# Patient Record
Sex: FEMALE | Race: WHITE
[De-identification: names, ages, dates, MRNs, and addresses within clinical notes are randomized per-mention and may not be internally consistent; named-entity substitution may affect disease eponyms.]

---

## 2017-01-09 ENCOUNTER — HOSPITAL ENCOUNTER (OUTPATIENT)
Dept: HOSPITAL 83 - LAB | Age: 82
Discharge: HOME | End: 2017-01-09
Payer: COMMERCIAL

## 2017-01-09 DIAGNOSIS — I49.3: Primary | ICD-10-CM

## 2017-01-09 DIAGNOSIS — R07.89: ICD-10-CM

## 2017-01-09 DIAGNOSIS — Z79.899: ICD-10-CM

## 2017-01-09 LAB
BASOPHILS # BLD AUTO: 0 10*3/UL (ref 0–0.1)
BASOPHILS NFR BLD AUTO: 0.4 % (ref 0–1)
BUN SERPL-MCNC: 28 MG/DL (ref 7–24)
CHLORIDE SERPL-SCNC: 102 MMOL/L (ref 98–107)
CO2 SERPL-SCNC: 32 MMOL/L (ref 21–32)
EOSINOPHIL # BLD AUTO: 0.2 10*3/UL (ref 0–0.4)
EOSINOPHIL # BLD AUTO: 2.1 % (ref 1–4)
ERYTHROCYTE [DISTWIDTH] IN BLOOD BY AUTOMATED COUNT: 14.4 % (ref 0–14.5)
GLUCOSE SERPL-MCNC: 112 MG/DL (ref 65–99)
HCT VFR BLD AUTO: 40 % (ref 37–47)
HGB BLD-MCNC: 13.5 G/DL (ref 12–16)
IG #: 0 10*3/UL (ref 0–0.1)
LYMPHOCYTES # BLD AUTO: 2.5 10*3/UL (ref 1.3–4.4)
LYMPHOCYTES NFR BLD AUTO: 25.3 % (ref 27–41)
MCH RBC QN AUTO: 29.9 PG (ref 27–31)
MCHC RBC AUTO-ENTMCNC: 33.8 G/DL (ref 33–37)
MCV RBC AUTO: 88.5 FL (ref 81–99)
MONOCYTES # BLD AUTO: 0.8 10*3/UL (ref 0.1–1)
MONOCYTES NFR BLD MANUAL: 8.4 % (ref 3–9)
NEUT #: 6.3 10*3/UL (ref 2.3–7.9)
NEUT %: 63.5 % (ref 47–73)
NRBC BLD QL AUTO: 0 10*3/UL (ref 0–0)
PLATELET # BLD AUTO: 246 10*3/UL (ref 130–400)
PMV BLD AUTO: 9.6 FL (ref 9.6–12.3)
POTASSIUM SERPL-SCNC: 3.2 MMOL/L (ref 3.5–5.1)
RBC # BLD AUTO: 4.52 10*6/UL (ref 4.1–5.1)
SODIUM SERPL-SCNC: 145 MMOL/L (ref 136–145)
TSH SERPL DL<=0.005 MIU/L-ACNC: 4.16 UIU/ML (ref 0.36–4.75)
WBC NRBC COR # BLD AUTO: 10 10*3/UL (ref 4.8–10.8)

## 2017-06-23 ENCOUNTER — HOSPITAL ENCOUNTER (OUTPATIENT)
Dept: HOSPITAL 83 - RAD | Age: 82
Discharge: HOME | End: 2017-06-23
Attending: FAMILY MEDICINE
Payer: COMMERCIAL

## 2017-06-23 DIAGNOSIS — J98.4: Primary | ICD-10-CM

## 2017-06-23 DIAGNOSIS — I10: ICD-10-CM

## 2017-07-18 PROBLEM — I05.0 RHEUMATIC MITRAL STENOSIS: Status: ACTIVE | Noted: 2017-07-18

## 2017-08-11 ENCOUNTER — HOSPITAL ENCOUNTER (EMERGENCY)
Dept: HOSPITAL 83 - ED | Age: 82
Discharge: HOME | End: 2017-08-11
Payer: COMMERCIAL

## 2017-08-11 VITALS — BODY MASS INDEX: 25.55 KG/M2 | WEIGHT: 159 LBS | HEIGHT: 65.98 IN

## 2017-08-11 DIAGNOSIS — Z79.899: ICD-10-CM

## 2017-08-11 DIAGNOSIS — Z88.2: ICD-10-CM

## 2017-08-11 DIAGNOSIS — I50.9: ICD-10-CM

## 2017-08-11 DIAGNOSIS — I11.0: ICD-10-CM

## 2017-08-11 DIAGNOSIS — F41.9: Primary | ICD-10-CM

## 2017-08-11 DIAGNOSIS — Z88.1: ICD-10-CM

## 2017-08-11 LAB
ALBUMIN SERPL-MCNC: 3.3 GM/DL (ref 3.1–4.5)
ALP SERPL-CCNC: 96 U/L (ref 45–117)
ALT SERPL W P-5'-P-CCNC: 18 U/L (ref 12–78)
AST SERPL-CCNC: 18 IU/L (ref 3–35)
BASOPHILS # BLD AUTO: 0.1 10*3/UL (ref 0–0.1)
BASOPHILS NFR BLD AUTO: 0.4 % (ref 0–1)
BUN SERPL-MCNC: 37 MG/DL (ref 7–24)
CHLORIDE SERPL-SCNC: 102 MMOL/L (ref 98–107)
CO2 SERPL-SCNC: 27 MMOL/L (ref 21–32)
EOSINOPHIL # BLD AUTO: 0.3 10*3/UL (ref 0–0.4)
EOSINOPHIL # BLD AUTO: 2.2 % (ref 1–4)
ERYTHROCYTE [DISTWIDTH] IN BLOOD BY AUTOMATED COUNT: 14.6 % (ref 0–14.5)
GLUCOSE SERPL-MCNC: 189 MG/DL (ref 65–99)
HCT VFR BLD AUTO: 37.4 % (ref 37–47)
HGB BLD-MCNC: 12.7 G/DL (ref 12–16)
IG #: 0.1 10*3/UL (ref 0–0.1)
INR BLD: 0.9 (ref 2–3.5)
LYMPHOCYTES # BLD AUTO: 2.6 10*3/UL (ref 1.3–4.4)
LYMPHOCYTES NFR BLD AUTO: 21 % (ref 27–41)
MAGNESIUM SERPL-MCNC: 1.6 MG/DL (ref 1.5–2.1)
MCH RBC QN AUTO: 29.7 PG (ref 27–31)
MCHC RBC AUTO-ENTMCNC: 34 G/DL (ref 33–37)
MCV RBC AUTO: 87.4 FL (ref 81–99)
MONOCYTES # BLD AUTO: 0.9 10*3/UL (ref 0.1–1)
MONOCYTES NFR BLD MANUAL: 7.5 % (ref 3–9)
NEUT #: 8.4 10*3/UL (ref 2.3–7.9)
NEUT %: 68.4 % (ref 47–73)
NRBC BLD QL AUTO: 0 10*3/UL (ref 0–0)
PLATELET # BLD AUTO: 230 10*3/UL (ref 130–400)
PMV BLD AUTO: 9.9 FL (ref 9.6–12.3)
POTASSIUM SERPL-SCNC: 3.2 MMOL/L (ref 3.5–5.1)
PROT SERPL-MCNC: 7.1 GM/DL (ref 6.4–8.2)
PROTHROMBIN TIME: 10 SECONDS (ref 9–12.4)
RBC # BLD AUTO: 4.28 10*6/UL (ref 4.1–5.1)
SODIUM SERPL-SCNC: 139 MMOL/L (ref 136–145)
TROPONIN I SERPL-MCNC: < 0.015 NG/ML (ref ?–0.04)
WBC NRBC COR # BLD AUTO: 12.2 10*3/UL (ref 4.8–10.8)

## 2017-10-30 ENCOUNTER — HOSPITAL ENCOUNTER (OUTPATIENT)
Dept: HOSPITAL 83 - LAB | Age: 82
Discharge: HOME | End: 2017-10-30
Attending: FAMILY MEDICINE
Payer: COMMERCIAL

## 2017-10-30 DIAGNOSIS — F41.1: ICD-10-CM

## 2017-10-30 DIAGNOSIS — E78.00: ICD-10-CM

## 2017-10-30 DIAGNOSIS — I10: Primary | ICD-10-CM

## 2017-10-30 DIAGNOSIS — E74.00: ICD-10-CM

## 2017-10-30 DIAGNOSIS — E55.9: ICD-10-CM

## 2017-10-30 LAB
ALBUMIN SERPL-MCNC: 3.7 GM/DL (ref 3.1–4.5)
ALP SERPL-CCNC: 72 U/L (ref 45–117)
ALT SERPL W P-5'-P-CCNC: 19 U/L (ref 12–78)
AST SERPL-CCNC: 14 IU/L (ref 3–35)
BUN SERPL-MCNC: 25 MG/DL (ref 7–24)
CHLORIDE SERPL-SCNC: 102 MMOL/L (ref 98–107)
CHOLEST SERPL-MCNC: 209 MG/DL (ref ?–200)
CREAT SERPL-MCNC: 1.36 MG/DL (ref 0.55–1.02)
ERYTHROCYTE [DISTWIDTH] IN BLOOD BY AUTOMATED COUNT: 14.7 % (ref 0–14.5)
HCT VFR BLD AUTO: 41.8 % (ref 37–47)
HDLC SERPL-MCNC: 48 MG/DL (ref 40–60)
HGB BLD-MCNC: 14.1 G/DL (ref 12–16)
LDLC SERPL DIRECT ASSAY-MCNC: 120 MG/DL (ref 9–159)
MCH RBC QN AUTO: 30.3 PG (ref 27–31)
MCHC RBC AUTO-ENTMCNC: 33.7 G/DL (ref 33–37)
MCV RBC AUTO: 89.7 FL (ref 81–99)
NRBC BLD QL AUTO: 0 10*3/UL (ref 0–0)
PLATELET # BLD AUTO: 277 10*3/UL (ref 130–400)
PMV BLD AUTO: 9.7 FL (ref 9.6–12.3)
POTASSIUM SERPL-SCNC: 3.7 MMOL/L (ref 3.5–5.1)
PROT SERPL-MCNC: 7.6 GM/DL (ref 6.4–8.2)
RBC # BLD AUTO: 4.66 10*6/UL (ref 4.1–5.1)
SODIUM SERPL-SCNC: 139 MMOL/L (ref 136–145)
TRIGL SERPL-MCNC: 204 MG/DL (ref ?–150)
VLDLC SERPL CALC-MCNC: 41 MG/DL (ref 6–40)
WBC NRBC COR # BLD AUTO: 10.5 10*3/UL (ref 4.8–10.8)

## 2017-11-08 ENCOUNTER — HOSPITAL ENCOUNTER (OUTPATIENT)
Dept: HOSPITAL 83 - US | Age: 82
Discharge: HOME | End: 2017-11-08
Attending: FAMILY MEDICINE
Payer: COMMERCIAL

## 2017-11-08 DIAGNOSIS — E04.2: ICD-10-CM

## 2017-11-08 DIAGNOSIS — R42: ICD-10-CM

## 2017-11-08 DIAGNOSIS — I65.23: Primary | ICD-10-CM

## 2018-01-29 ENCOUNTER — HOSPITAL ENCOUNTER (OUTPATIENT)
Dept: HOSPITAL 83 - LAB | Age: 83
Discharge: HOME | End: 2018-01-29
Attending: FAMILY MEDICINE
Payer: COMMERCIAL

## 2018-01-29 DIAGNOSIS — R53.83: ICD-10-CM

## 2018-01-29 DIAGNOSIS — E04.1: Primary | ICD-10-CM

## 2018-01-29 LAB
T4 FREE SERPL-MCNC: 1.07 NG/DL (ref 0.76–1.46)
TSH SERPL DL<=0.005 MIU/L-ACNC: 3.05 UIU/ML (ref 0.36–4.75)

## 2018-01-31 ENCOUNTER — HOSPITAL ENCOUNTER (OUTPATIENT)
Dept: HOSPITAL 83 - US | Age: 83
Discharge: HOME | End: 2018-01-31
Attending: FAMILY MEDICINE
Payer: COMMERCIAL

## 2018-01-31 DIAGNOSIS — E04.1: Primary | ICD-10-CM

## 2018-04-19 ENCOUNTER — HOSPITAL ENCOUNTER (OUTPATIENT)
Dept: HOSPITAL 83 - US | Age: 83
Discharge: HOME | End: 2018-04-19
Attending: FAMILY MEDICINE
Payer: COMMERCIAL

## 2018-04-19 DIAGNOSIS — Z90.710: ICD-10-CM

## 2018-04-19 DIAGNOSIS — R10.819: Primary | ICD-10-CM

## 2018-04-19 DIAGNOSIS — Z90.722: ICD-10-CM

## 2018-07-31 ENCOUNTER — OFFICE VISIT (OUTPATIENT)
Dept: CARDIOLOGY CLINIC | Age: 83
End: 2018-07-31
Payer: COMMERCIAL

## 2018-07-31 VITALS
DIASTOLIC BLOOD PRESSURE: 70 MMHG | SYSTOLIC BLOOD PRESSURE: 132 MMHG | RESPIRATION RATE: 16 BRPM | WEIGHT: 155 LBS | BODY MASS INDEX: 24.91 KG/M2 | HEART RATE: 89 BPM | HEIGHT: 66 IN

## 2018-07-31 DIAGNOSIS — I49.3 PVC'S (PREMATURE VENTRICULAR CONTRACTIONS): Primary | ICD-10-CM

## 2018-07-31 DIAGNOSIS — R07.89 ATYPICAL CHEST PAIN: ICD-10-CM

## 2018-07-31 DIAGNOSIS — I34.2 NON-RHEUMATIC MITRAL VALVE STENOSIS: ICD-10-CM

## 2018-07-31 PROCEDURE — 99213 OFFICE O/P EST LOW 20 MIN: CPT | Performed by: INTERNAL MEDICINE

## 2018-07-31 PROCEDURE — 93000 ELECTROCARDIOGRAM COMPLETE: CPT | Performed by: INTERNAL MEDICINE

## 2018-07-31 RX ORDER — FLUTICASONE PROPIONATE 50 MCG
SPRAY, SUSPENSION (ML) NASAL
Refills: 1 | COMMUNITY
Start: 2018-07-26 | End: 2019-05-03

## 2018-07-31 RX ORDER — SERTRALINE HYDROCHLORIDE 25 MG/1
TABLET, FILM COATED ORAL
Refills: 3 | COMMUNITY
Start: 2018-05-01 | End: 2019-05-03

## 2018-07-31 NOTE — PROGRESS NOTES
chills  Respiratory: feels congested and states that she has crackles in her chest when laying down at night  Cardiovascular: negative for chest pain, positive for occasional dyspnea with exertion and occasional ankle edema  Gastrointestinal: negative for abdominal pain, diarrhea, nausea and vomiting  Genitourinary: negative for dysuria and hematuria  Derm: negative for rash and skin lesion(s)  Neurological: negative for seizures and tremors  Endocrine: negative for diabetic symptoms including polydipsia and polyuria  Musculoskeletal: negative for CTD  Psychiatric: negative for anxiety and major depression.      The remainder of the review of systems is negative except as noted above. On exam today, she is a well-nourished white female who is awake, alert and oriented. She does look younger than her stated age. Her pulse is 89 and regular. Her blood pressure is 122/70. She weighs 155 pounds (which is 4 pounds less than a year ago). BMI is 25.0. HEENT: Normocephalic and atraumatic. EOMI. Sclerae are clear. Pupils are equal, round and react to light. The oral mucosa is moist.  Tongue is midline. Her neck is supple. She has no jugular distention or hepatojugular reflux. Her carotids are full without bruits. She has no neck or supraclavicular masses. No thyromegaly. Respirations are unlabored. Her chest is clear to auscultation and percussion. She has no presacral edema and no chest wall tenderness. Her heart has a regular rhythm. She has a grade 2/6 holosystolic murmur at the apex. There are no diastolic murmurs. The PMI is not displaced. She has no precordial heave, lift or thrill. Her abdomen is soft and normally active without masses, organomegaly or bruits. Her extremities show no edema. Peripheral pulses are easily palpated bilaterally. She has no obvious skin rashes. I personally reviewed her electrocardiogram.  Once again showed sinus rhythm. I saw no PVCs today.   She had a LAFB and poor precordial R wave progression but these changes were similar to those seen 07/18/2017. She continues to do well and, therefore, I am making no changes today. I thought to possibly repeat her echocardiogram but since she is functionally stable it would not change my management and, therefore, I did not order the echo at this time. She will continue:  sertraline (ZOLOFT) 25 MG tablet TAKE ONE TABLET BY MOUTH ONCE A DAY   fluticasone (FLONASE) 50 MCG/ACT nasal spray INSTILL 2 SPRAYS IN EACH NOSTRIL EVERY DAY   valsartan (DIOVAN) 320 MG tablet TAKE ONE TABLET BY MOUTH DAILY   potassium chloride (KLOR-CON M) 20 MEQ extended release tablet TAKE ONE TABLET BY MOUTH EVERY DAY   hydrochlorothiazide (HYDRODIURIL) 25 MG tablet Take 1 tablet by mouth daily   metoprolol succinate ER (TOPROL-XL) 50 MG XL tablet Take 1 tablet by mouth 2 times daily   fluocinonide (LIDEX) 0.05 % cream Rub into skin 3 times per day. CPAP Machine MISC by Does not apply route nightly. vitamin D3 (CHOLECALCIFEROL) 1000 UNITS TABS tablet Take 1 tablet by mouth daily. Ferrous Sulfate (IRON) 325 (65 FE) MG TABS Take  by mouth daily. As long as she is doing well, we will continue to see her yearly. I thank you, Dr. Israel Reardon, for asking our advice regarding her care.         DAB/aed  [id number]

## 2018-10-17 ENCOUNTER — HOSPITAL ENCOUNTER (OUTPATIENT)
Dept: HOSPITAL 83 - RAD | Age: 83
Discharge: HOME | End: 2018-10-17
Attending: FAMILY MEDICINE
Payer: COMMERCIAL

## 2018-10-17 DIAGNOSIS — R05: ICD-10-CM

## 2018-10-17 DIAGNOSIS — I11.0: ICD-10-CM

## 2018-10-17 DIAGNOSIS — I50.9: ICD-10-CM

## 2018-10-17 DIAGNOSIS — R06.02: Primary | ICD-10-CM

## 2019-03-25 LAB
ALBUMIN SERPL-MCNC: 3.1 GM/DL (ref 3.1–4.5)
ALP SERPL-CCNC: 73 U/L (ref 45–117)
ALT SERPL W P-5'-P-CCNC: 18 U/L (ref 12–78)
APTT PPP: 24.1 SECONDS (ref 20.8–31.5)
AST SERPL-CCNC: 14 IU/L (ref 3–35)
BASOPHILS # BLD AUTO: 0 10*3/UL (ref 0–0.1)
BASOPHILS NFR BLD AUTO: 0.3 % (ref 0–1)
BUN SERPL-MCNC: 29 MG/DL (ref 7–24)
CHLORIDE SERPL-SCNC: 101 MMOL/L (ref 98–107)
CREAT SERPL-MCNC: 1.23 MG/DL (ref 0.55–1.02)
EOSINOPHIL # BLD AUTO: 0.2 10*3/UL (ref 0–0.4)
EOSINOPHIL # BLD AUTO: 1.8 % (ref 1–4)
ERYTHROCYTE [DISTWIDTH] IN BLOOD BY AUTOMATED COUNT: 14.9 % (ref 0–14.5)
HCT VFR BLD AUTO: 37.3 % (ref 37–47)
HGB BLD-MCNC: 12.5 G/DL (ref 12–16)
INR BLD: 1 (ref 2–3.5)
LYMPHOCYTES # BLD AUTO: 2.5 10*3/UL (ref 1.3–4.4)
LYMPHOCYTES NFR BLD AUTO: 21.6 % (ref 27–41)
MCH RBC QN AUTO: 29.6 PG (ref 27–31)
MCHC RBC AUTO-ENTMCNC: 33.5 G/DL (ref 33–37)
MCV RBC AUTO: 88.2 FL (ref 81–99)
MONOCYTES # BLD AUTO: 1 10*3/UL (ref 0.1–1)
MONOCYTES NFR BLD MANUAL: 8.4 % (ref 3–9)
NEUT #: 7.8 10*3/UL (ref 2.3–7.9)
NEUT %: 67.6 % (ref 47–73)
NRBC BLD QL AUTO: 0 % (ref 0–0)
PLATELET # BLD AUTO: 272 10*3/UL (ref 130–400)
PMV BLD AUTO: 9.8 FL (ref 9.6–12.3)
POTASSIUM SERPL-SCNC: 3.1 MMOL/L (ref 3.5–5.1)
PROT SERPL-MCNC: 6.8 GM/DL (ref 6.4–8.2)
RBC # BLD AUTO: 4.23 10*6/UL (ref 4.1–5.1)
SODIUM SERPL-SCNC: 139 MMOL/L (ref 136–145)
WBC NRBC COR # BLD AUTO: 11.6 10*3/UL (ref 4.8–10.8)

## 2019-03-27 ENCOUNTER — HOSPITAL ENCOUNTER (INPATIENT)
Dept: HOSPITAL 83 - SDC | Age: 84
LOS: 2 days | Discharge: OTHER INSTITUTION | DRG: 503 | End: 2019-03-29
Attending: INTERNAL MEDICINE | Admitting: INTERNAL MEDICINE
Payer: MEDICARE

## 2019-03-27 VITALS — DIASTOLIC BLOOD PRESSURE: 60 MMHG

## 2019-03-27 VITALS — WEIGHT: 166.31 LBS | HEIGHT: 65.98 IN | BODY MASS INDEX: 26.73 KG/M2

## 2019-03-27 VITALS — SYSTOLIC BLOOD PRESSURE: 132 MMHG | DIASTOLIC BLOOD PRESSURE: 60 MMHG

## 2019-03-27 VITALS — DIASTOLIC BLOOD PRESSURE: 49 MMHG | SYSTOLIC BLOOD PRESSURE: 121 MMHG

## 2019-03-27 VITALS — DIASTOLIC BLOOD PRESSURE: 61 MMHG

## 2019-03-27 VITALS — DIASTOLIC BLOOD PRESSURE: 53 MMHG

## 2019-03-27 VITALS — DIASTOLIC BLOOD PRESSURE: 55 MMHG

## 2019-03-27 VITALS — DIASTOLIC BLOOD PRESSURE: 59 MMHG | SYSTOLIC BLOOD PRESSURE: 128 MMHG

## 2019-03-27 VITALS — DIASTOLIC BLOOD PRESSURE: 66 MMHG

## 2019-03-27 DIAGNOSIS — Z88.9: ICD-10-CM

## 2019-03-27 DIAGNOSIS — Z83.3: ICD-10-CM

## 2019-03-27 DIAGNOSIS — F41.9: ICD-10-CM

## 2019-03-27 DIAGNOSIS — E78.5: ICD-10-CM

## 2019-03-27 DIAGNOSIS — E55.9: ICD-10-CM

## 2019-03-27 DIAGNOSIS — I10: ICD-10-CM

## 2019-03-27 DIAGNOSIS — N39.0: ICD-10-CM

## 2019-03-27 DIAGNOSIS — S92.331A: Primary | ICD-10-CM
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
## 2019-03-27 DIAGNOSIS — Z79.899: ICD-10-CM

## 2019-03-27 DIAGNOSIS — F32.9: ICD-10-CM
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
## 2019-03-27 DIAGNOSIS — Z90.710: ICD-10-CM

## 2019-03-27 DIAGNOSIS — Z88.2: ICD-10-CM

## 2019-03-27 DIAGNOSIS — Z82.5: ICD-10-CM

## 2019-03-27 DIAGNOSIS — N17.0: ICD-10-CM

## 2019-03-27 DIAGNOSIS — S92.301D: ICD-10-CM

## 2019-03-27 DIAGNOSIS — R26.2: ICD-10-CM

## 2019-03-27 DIAGNOSIS — E43: ICD-10-CM

## 2019-03-27 DIAGNOSIS — R73.9: ICD-10-CM
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
## 2019-03-27 DIAGNOSIS — E87.6: ICD-10-CM

## 2019-03-27 PROCEDURE — 0SSK04Z REPOSITION RIGHT TARSOMETATARSAL JOINT WITH INTERNAL FIXATION DEVICE, OPEN APPROACH: ICD-10-PCS | Performed by: PODIATRIST

## 2019-03-27 PROCEDURE — 0QSN04Z REPOSITION RIGHT METATARSAL WITH INTERNAL FIXATION DEVICE, OPEN APPROACH: ICD-10-PCS | Performed by: PODIATRIST

## 2019-03-28 VITALS — DIASTOLIC BLOOD PRESSURE: 51 MMHG

## 2019-03-28 VITALS — DIASTOLIC BLOOD PRESSURE: 49 MMHG

## 2019-03-28 LAB
25(OH)D3 SERPL-MCNC: 49 NG/ML (ref 30–100)
ALBUMIN SERPL-MCNC: 2.6 GM/DL (ref 3.1–4.5)
ALP SERPL-CCNC: 65 U/L (ref 45–117)
ALT SERPL W P-5'-P-CCNC: 12 U/L (ref 12–78)
AST SERPL-CCNC: 12 IU/L (ref 3–35)
BASOPHILS # BLD AUTO: 0 10*3/UL (ref 0–0.1)
BASOPHILS NFR BLD AUTO: 0.4 % (ref 0–1)
BUN SERPL-MCNC: 22 MG/DL (ref 7–24)
CHLORIDE SERPL-SCNC: 101 MMOL/L (ref 98–107)
CHOLEST SERPL-MCNC: 157 MG/DL (ref ?–200)
CREAT SERPL-MCNC: 0.94 MG/DL (ref 0.55–1.02)
EOSINOPHIL # BLD AUTO: 0.2 10*3/UL (ref 0–0.4)
EOSINOPHIL # BLD AUTO: 2.2 % (ref 1–4)
ERYTHROCYTE [DISTWIDTH] IN BLOOD BY AUTOMATED COUNT: 14.8 % (ref 0–14.5)
HCT VFR BLD AUTO: 33.1 % (ref 37–47)
HDLC SERPL-MCNC: 38 MG/DL (ref 40–60)
HGB BLD-MCNC: 11.1 G/DL (ref 12–16)
LDLC SERPL DIRECT ASSAY-MCNC: 89 MG/DL (ref 9–159)
LYMPHOCYTES # BLD AUTO: 1.6 10*3/UL (ref 1.3–4.4)
LYMPHOCYTES NFR BLD AUTO: 14.9 % (ref 27–41)
MCH RBC QN AUTO: 29.9 PG (ref 27–31)
MCHC RBC AUTO-ENTMCNC: 33.5 G/DL (ref 33–37)
MCV RBC AUTO: 89.2 FL (ref 81–99)
MONOCYTES # BLD AUTO: 1 10*3/UL (ref 0.1–1)
MONOCYTES NFR BLD MANUAL: 9.3 % (ref 3–9)
NEUT #: 7.8 10*3/UL (ref 2.3–7.9)
NEUT %: 72.7 % (ref 47–73)
NRBC BLD QL AUTO: 0 10*3/UL (ref 0–0)
PHOSPHATE SERPL-MCNC: 2.3 MG/DL (ref 2.5–4.9)
PLATELET # BLD AUTO: 232 10*3/UL (ref 130–400)
PMV BLD AUTO: 9.6 FL (ref 9.6–12.3)
POTASSIUM SERPL-SCNC: 3.4 MMOL/L (ref 3.5–5.1)
PROT SERPL-MCNC: 6 GM/DL (ref 6.4–8.2)
RBC # BLD AUTO: 3.71 10*6/UL (ref 4.1–5.1)
SODIUM SERPL-SCNC: 137 MMOL/L (ref 136–145)
TRIGL SERPL-MCNC: 151 MG/DL (ref ?–150)
TSH SERPL DL<=0.005 MIU/L-ACNC: 1.33 UIU/ML (ref 0.36–4.75)
VITAMIN B12: 529 PG/ML (ref 247–911)
VLDLC SERPL CALC-MCNC: 30 MG/DL (ref 6–40)
WBC NRBC COR # BLD AUTO: 10.7 10*3/UL (ref 4.8–10.8)

## 2019-03-29 VITALS — DIASTOLIC BLOOD PRESSURE: 59 MMHG

## 2019-03-29 VITALS — DIASTOLIC BLOOD PRESSURE: 60 MMHG

## 2019-04-17 ENCOUNTER — HOSPITAL ENCOUNTER (OUTPATIENT)
Dept: HOSPITAL 83 - RAD | Age: 84
Discharge: HOME | End: 2019-04-17
Attending: FAMILY MEDICINE
Payer: MEDICARE

## 2019-04-17 DIAGNOSIS — M25.512: Primary | ICD-10-CM

## 2019-05-03 ENCOUNTER — APPOINTMENT (OUTPATIENT)
Dept: GENERAL RADIOLOGY | Age: 84
DRG: 690 | End: 2019-05-03
Payer: MEDICARE

## 2019-05-03 ENCOUNTER — APPOINTMENT (OUTPATIENT)
Dept: CT IMAGING | Age: 84
DRG: 690 | End: 2019-05-03
Payer: MEDICARE

## 2019-05-03 ENCOUNTER — HOSPITAL ENCOUNTER (INPATIENT)
Age: 84
LOS: 2 days | Discharge: HOME | DRG: 690 | End: 2019-05-05
Attending: EMERGENCY MEDICINE | Admitting: HOSPITALIST
Payer: MEDICARE

## 2019-05-03 DIAGNOSIS — N28.1 RENAL CYST: ICD-10-CM

## 2019-05-03 DIAGNOSIS — R42 DIZZINESS: ICD-10-CM

## 2019-05-03 DIAGNOSIS — N30.00 ACUTE CYSTITIS WITHOUT HEMATURIA: Primary | ICD-10-CM

## 2019-05-03 DIAGNOSIS — R07.9 CHEST PAIN, UNSPECIFIED TYPE: ICD-10-CM

## 2019-05-03 PROBLEM — N30.01 ACUTE CYSTITIS WITH HEMATURIA: Status: ACTIVE | Noted: 2019-05-03

## 2019-05-03 PROBLEM — N18.30 CKD (CHRONIC KIDNEY DISEASE) STAGE 3, GFR 30-59 ML/MIN (HCC): Chronic | Status: ACTIVE | Noted: 2019-05-03

## 2019-05-03 LAB
ALBUMIN SERPL-MCNC: 4 G/DL (ref 3.5–5.2)
ALP BLD-CCNC: 77 U/L (ref 35–104)
ALT SERPL-CCNC: 12 U/L (ref 0–32)
ANION GAP SERPL CALCULATED.3IONS-SCNC: 11 MMOL/L (ref 7–16)
AST SERPL-CCNC: 11 U/L (ref 0–31)
BACTERIA: ABNORMAL /HPF
BASOPHILS ABSOLUTE: 0.04 E9/L (ref 0–0.2)
BASOPHILS RELATIVE PERCENT: 0.4 % (ref 0–2)
BILIRUB SERPL-MCNC: 0.2 MG/DL (ref 0–1.2)
BILIRUBIN URINE: NEGATIVE
BLOOD, URINE: NEGATIVE
BUN BLDV-MCNC: 30 MG/DL (ref 8–23)
CALCIUM SERPL-MCNC: 10.2 MG/DL (ref 8.6–10.2)
CHLORIDE BLD-SCNC: 99 MMOL/L (ref 98–107)
CLARITY: CLEAR
CO2: 29 MMOL/L (ref 22–29)
COLOR: YELLOW
CREAT SERPL-MCNC: 1.2 MG/DL (ref 0.5–1)
EKG ATRIAL RATE: 77 BPM
EKG P AXIS: 3 DEGREES
EKG P-R INTERVAL: 146 MS
EKG Q-T INTERVAL: 362 MS
EKG QRS DURATION: 88 MS
EKG QTC CALCULATION (BAZETT): 409 MS
EKG R AXIS: -42 DEGREES
EKG T AXIS: 19 DEGREES
EKG VENTRICULAR RATE: 77 BPM
EOSINOPHILS ABSOLUTE: 0.24 E9/L (ref 0.05–0.5)
EOSINOPHILS RELATIVE PERCENT: 2.2 % (ref 0–6)
EPITHELIAL CELLS, UA: ABNORMAL /HPF
GFR AFRICAN AMERICAN: 52
GFR NON-AFRICAN AMERICAN: 43 ML/MIN/1.73
GLUCOSE BLD-MCNC: 146 MG/DL (ref 74–99)
GLUCOSE URINE: 100 MG/DL
HCT VFR BLD CALC: 40.1 % (ref 34–48)
HEMOGLOBIN: 13.3 G/DL (ref 11.5–15.5)
IMMATURE GRANULOCYTES #: 0.04 E9/L
IMMATURE GRANULOCYTES %: 0.4 % (ref 0–5)
KETONES, URINE: ABNORMAL MG/DL
LACTIC ACID: 2.1 MMOL/L (ref 0.5–2.2)
LEUKOCYTE ESTERASE, URINE: ABNORMAL
LIPASE: 43 U/L (ref 13–60)
LYMPHOCYTES ABSOLUTE: 1.97 E9/L (ref 1.5–4)
LYMPHOCYTES RELATIVE PERCENT: 18.3 % (ref 20–42)
MCH RBC QN AUTO: 29.3 PG (ref 26–35)
MCHC RBC AUTO-ENTMCNC: 33.2 % (ref 32–34.5)
MCV RBC AUTO: 88.3 FL (ref 80–99.9)
MONOCYTES ABSOLUTE: 0.9 E9/L (ref 0.1–0.95)
MONOCYTES RELATIVE PERCENT: 8.4 % (ref 2–12)
NEUTROPHILS ABSOLUTE: 7.57 E9/L (ref 1.8–7.3)
NEUTROPHILS RELATIVE PERCENT: 70.3 % (ref 43–80)
NITRITE, URINE: NEGATIVE
PDW BLD-RTO: 14.6 FL (ref 11.5–15)
PH UA: 5 (ref 5–9)
PLATELET # BLD: 262 E9/L (ref 130–450)
PMV BLD AUTO: 9.4 FL (ref 7–12)
POTASSIUM REFLEX MAGNESIUM: 3.7 MMOL/L (ref 3.5–5)
PROTEIN UA: NEGATIVE MG/DL
RBC # BLD: 4.54 E12/L (ref 3.5–5.5)
RBC UA: ABNORMAL /HPF (ref 0–2)
SODIUM BLD-SCNC: 139 MMOL/L (ref 132–146)
SPECIFIC GRAVITY UA: 1.02 (ref 1–1.03)
TOTAL PROTEIN: 6.9 G/DL (ref 6.4–8.3)
TROPONIN: <0.01 NG/ML (ref 0–0.03)
TROPONIN: <0.01 NG/ML (ref 0–0.03)
UROBILINOGEN, URINE: 0.2 E.U./DL
WBC # BLD: 10.8 E9/L (ref 4.5–11.5)
WBC UA: ABNORMAL /HPF (ref 0–5)

## 2019-05-03 PROCEDURE — 6370000000 HC RX 637 (ALT 250 FOR IP): Performed by: STUDENT IN AN ORGANIZED HEALTH CARE EDUCATION/TRAINING PROGRAM

## 2019-05-03 PROCEDURE — 93005 ELECTROCARDIOGRAM TRACING: CPT | Performed by: STUDENT IN AN ORGANIZED HEALTH CARE EDUCATION/TRAINING PROGRAM

## 2019-05-03 PROCEDURE — 6360000002 HC RX W HCPCS: Performed by: STUDENT IN AN ORGANIZED HEALTH CARE EDUCATION/TRAINING PROGRAM

## 2019-05-03 PROCEDURE — 71275 CT ANGIOGRAPHY CHEST: CPT

## 2019-05-03 PROCEDURE — 83605 ASSAY OF LACTIC ACID: CPT

## 2019-05-03 PROCEDURE — 99223 1ST HOSP IP/OBS HIGH 75: CPT | Performed by: HOSPITALIST

## 2019-05-03 PROCEDURE — 71045 X-RAY EXAM CHEST 1 VIEW: CPT

## 2019-05-03 PROCEDURE — 81001 URINALYSIS AUTO W/SCOPE: CPT

## 2019-05-03 PROCEDURE — 2580000003 HC RX 258: Performed by: STUDENT IN AN ORGANIZED HEALTH CARE EDUCATION/TRAINING PROGRAM

## 2019-05-03 PROCEDURE — 93010 ELECTROCARDIOGRAM REPORT: CPT | Performed by: INTERNAL MEDICINE

## 2019-05-03 PROCEDURE — 85025 COMPLETE CBC W/AUTO DIFF WBC: CPT

## 2019-05-03 PROCEDURE — 83690 ASSAY OF LIPASE: CPT

## 2019-05-03 PROCEDURE — 96366 THER/PROPH/DIAG IV INF ADDON: CPT

## 2019-05-03 PROCEDURE — 80053 COMPREHEN METABOLIC PANEL: CPT

## 2019-05-03 PROCEDURE — G0378 HOSPITAL OBSERVATION PER HR: HCPCS

## 2019-05-03 PROCEDURE — 99285 EMERGENCY DEPT VISIT HI MDM: CPT

## 2019-05-03 PROCEDURE — 96365 THER/PROPH/DIAG IV INF INIT: CPT

## 2019-05-03 PROCEDURE — 6360000004 HC RX CONTRAST MEDICATION: Performed by: RADIOLOGY

## 2019-05-03 PROCEDURE — 2060000000 HC ICU INTERMEDIATE R&B

## 2019-05-03 PROCEDURE — 87088 URINE BACTERIA CULTURE: CPT

## 2019-05-03 PROCEDURE — 84484 ASSAY OF TROPONIN QUANT: CPT

## 2019-05-03 RX ORDER — NITROGLYCERIN 0.4 MG/1
0.4 TABLET SUBLINGUAL ONCE
Status: COMPLETED | OUTPATIENT
Start: 2019-05-03 | End: 2019-05-03

## 2019-05-03 RX ORDER — ASPIRIN 81 MG/1
324 TABLET, CHEWABLE ORAL ONCE
Status: COMPLETED | OUTPATIENT
Start: 2019-05-03 | End: 2019-05-03

## 2019-05-03 RX ORDER — NITROGLYCERIN 0.4 MG/1
0.4 TABLET SUBLINGUAL EVERY 5 MIN PRN
Status: DISCONTINUED | OUTPATIENT
Start: 2019-05-03 | End: 2019-05-05 | Stop reason: HOSPADM

## 2019-05-03 RX ORDER — VALSARTAN 320 MG/1
1 TABLET ORAL DAILY
Status: DISCONTINUED | OUTPATIENT
Start: 2019-05-04 | End: 2019-05-05 | Stop reason: HOSPADM

## 2019-05-03 RX ORDER — ASPIRIN 81 MG/1
81 TABLET, CHEWABLE ORAL DAILY
Status: DISCONTINUED | OUTPATIENT
Start: 2019-05-04 | End: 2019-05-05 | Stop reason: HOSPADM

## 2019-05-03 RX ORDER — ATORVASTATIN CALCIUM 40 MG/1
40 TABLET, FILM COATED ORAL NIGHTLY
Status: DISCONTINUED | OUTPATIENT
Start: 2019-05-03 | End: 2019-05-05 | Stop reason: HOSPADM

## 2019-05-03 RX ORDER — ONDANSETRON 2 MG/ML
4 INJECTION INTRAMUSCULAR; INTRAVENOUS EVERY 6 HOURS PRN
Status: DISCONTINUED | OUTPATIENT
Start: 2019-05-03 | End: 2019-05-05 | Stop reason: HOSPADM

## 2019-05-03 RX ORDER — FERROUS SULFATE 325(65) MG
325 TABLET ORAL DAILY
Status: DISCONTINUED | OUTPATIENT
Start: 2019-05-04 | End: 2019-05-05 | Stop reason: HOSPADM

## 2019-05-03 RX ORDER — PHENAZOPYRIDINE HYDROCHLORIDE 100 MG/1
200 TABLET, FILM COATED ORAL
Status: DISCONTINUED | OUTPATIENT
Start: 2019-05-04 | End: 2019-05-05 | Stop reason: HOSPADM

## 2019-05-03 RX ORDER — CHOLECALCIFEROL (VITAMIN D3) 50 MCG
2000 TABLET ORAL DAILY
Status: DISCONTINUED | OUTPATIENT
Start: 2019-05-04 | End: 2019-05-05 | Stop reason: HOSPADM

## 2019-05-03 RX ORDER — METOPROLOL TARTRATE 50 MG/1
50 TABLET, FILM COATED ORAL 2 TIMES DAILY
Status: DISCONTINUED | OUTPATIENT
Start: 2019-05-03 | End: 2019-05-05 | Stop reason: HOSPADM

## 2019-05-03 RX ORDER — POTASSIUM CHLORIDE 20 MEQ/1
40 TABLET, EXTENDED RELEASE ORAL PRN
Status: DISCONTINUED | OUTPATIENT
Start: 2019-05-03 | End: 2019-05-05 | Stop reason: HOSPADM

## 2019-05-03 RX ORDER — HYDROCHLOROTHIAZIDE 25 MG/1
25 TABLET ORAL DAILY
Status: DISCONTINUED | OUTPATIENT
Start: 2019-05-04 | End: 2019-05-05 | Stop reason: HOSPADM

## 2019-05-03 RX ORDER — SENNA AND DOCUSATE SODIUM 50; 8.6 MG/1; MG/1
2 TABLET, FILM COATED ORAL 2 TIMES DAILY
Status: DISCONTINUED | OUTPATIENT
Start: 2019-05-03 | End: 2019-05-05 | Stop reason: HOSPADM

## 2019-05-03 RX ORDER — SODIUM CHLORIDE 0.9 % (FLUSH) 0.9 %
10 SYRINGE (ML) INJECTION PRN
Status: DISCONTINUED | OUTPATIENT
Start: 2019-05-03 | End: 2019-05-05 | Stop reason: HOSPADM

## 2019-05-03 RX ORDER — POTASSIUM CHLORIDE 7.45 MG/ML
10 INJECTION INTRAVENOUS PRN
Status: DISCONTINUED | OUTPATIENT
Start: 2019-05-03 | End: 2019-05-05 | Stop reason: HOSPADM

## 2019-05-03 RX ORDER — ACETAMINOPHEN 325 MG/1
650 TABLET ORAL EVERY 4 HOURS PRN
Status: DISCONTINUED | OUTPATIENT
Start: 2019-05-03 | End: 2019-05-05 | Stop reason: HOSPADM

## 2019-05-03 RX ORDER — METOPROLOL TARTRATE 50 MG/1
50 TABLET, FILM COATED ORAL 2 TIMES DAILY
COMMUNITY
End: 2021-03-03 | Stop reason: ALTCHOICE

## 2019-05-03 RX ORDER — 0.9 % SODIUM CHLORIDE 0.9 %
1000 INTRAVENOUS SOLUTION INTRAVENOUS ONCE
Status: COMPLETED | OUTPATIENT
Start: 2019-05-03 | End: 2019-05-03

## 2019-05-03 RX ORDER — SODIUM CHLORIDE 0.9 % (FLUSH) 0.9 %
10 SYRINGE (ML) INJECTION EVERY 12 HOURS SCHEDULED
Status: DISCONTINUED | OUTPATIENT
Start: 2019-05-03 | End: 2019-05-05 | Stop reason: HOSPADM

## 2019-05-03 RX ADMIN — SODIUM CHLORIDE 1000 ML: 9 INJECTION, SOLUTION INTRAVENOUS at 20:44

## 2019-05-03 RX ADMIN — ASPIRIN 81 MG 324 MG: 81 TABLET ORAL at 21:46

## 2019-05-03 RX ADMIN — CEFEPIME 2 G: 2 INJECTION, POWDER, FOR SOLUTION INTRAVENOUS at 20:06

## 2019-05-03 RX ADMIN — NITROGLYCERIN 0.4 MG: 0.4 TABLET, ORALLY DISINTEGRATING SUBLINGUAL at 21:46

## 2019-05-03 RX ADMIN — IOPAMIDOL 60 ML: 755 INJECTION, SOLUTION INTRAVENOUS at 19:55

## 2019-05-03 ASSESSMENT — ENCOUNTER SYMPTOMS
ABDOMINAL PAIN: 0
ABDOMINAL DISTENTION: 0
EYE REDNESS: 0
SHORTNESS OF BREATH: 0
COUGH: 0
BACK PAIN: 0
EYE DISCHARGE: 0
NAUSEA: 1
DIARRHEA: 0
VOMITING: 0
CHEST TIGHTNESS: 0

## 2019-05-03 ASSESSMENT — PAIN SCALES - GENERAL: PAINLEVEL_OUTOF10: 0

## 2019-05-03 NOTE — ED PROVIDER NOTES
Sachin Phillips is an 81year old female who presents with dysuria and dizziness. Dizziness has been present since patient fractured her right foot/ankle. Patient has been dizzy intermittently. Patient feels worse with ambulation and with head movement. Patient's symptoms are Patient is being followed at [address] [place] [address] for ortho. Patient has had multiple surgeries and is on Lovenox. Patient is compliant with medication. Patient has no history of PE or DVT. The history is provided by the patient. Dysuria    This is a new problem. The current episode started yesterday. The problem occurs every urination. The problem has not changed since onset. The quality of the pain is described as burning. The pain is at a severity of 0/10. The patient is experiencing no pain. There has been no fever. Associated symptoms include nausea, frequency and urgency. Pertinent negatives include no chills, no vomiting, no discharge and no possible pregnancy. She has tried nothing for the symptoms. Her past medical history does not include kidney stones. Review of Systems   Constitutional: Negative for appetite change, chills, fatigue and fever. HENT: Negative for congestion. Eyes: Negative for discharge and redness. Respiratory: Negative for cough, chest tightness and shortness of breath. Cardiovascular: Negative for chest pain. Gastrointestinal: Positive for nausea. Negative for abdominal distention, abdominal pain, diarrhea and vomiting. Genitourinary: Positive for dysuria, frequency and urgency. Musculoskeletal: Negative for back pain. Skin: Negative for pallor and rash. Neurological: Positive for dizziness. Negative for light-headedness and headaches. Physical Exam   Constitutional: She is oriented to person, place, and time. She appears well-developed and well-nourished. No distress. HENT:   Head: Normocephalic and atraumatic. Eyes: Pupils are equal, round, and reactive to light. Conjunctivae and EOM are normal. Right eye exhibits no discharge. Left eye exhibits no discharge. Neck: Neck supple. Cardiovascular: Normal rate and regular rhythm. Pulmonary/Chest: Effort normal. No respiratory distress. Abdominal: Soft. Bowel sounds are normal. She exhibits no distension. There is tenderness (suprapubic tenderness mild). There is no rebound and no guarding. Musculoskeletal: She exhibits no edema or tenderness. Neurological: She is alert and oriented to person, place, and time. Skin: Skin is warm and dry. Capillary refill takes less than 2 seconds. She is not diaphoretic. No erythema. No pallor. Psychiatric: She has a normal mood and affect. Her behavior is normal.   Nursing note and vitals reviewed. Procedures    MDM  Number of Diagnoses or Management Options  Acute cystitis without hematuria:   Chest pain, unspecified type:   Dizziness:   Renal cyst:   Diagnosis management comments: Syed Vega presented with symptoms of dysuria and dizziness. Patient was found to have UTI. Patient had positive orthostatics. Patient was given cefepime IV in ED. Patient's SpO2 dropped to 88% with standing. Patient became short of breath. Patient is on Subcutaneous lovenox, CTA was ordered due to hypoxia. CTA was negative for PE. Patient is still dizzy following IVF. Patient is afebrile and stable while in ED. Patient unable to ambulate per nursing due to hypoxia and dizziness. Decision was made to admit patient. Just before admission patient developed chest pain radiating to her neck. Patient was given 324 mg aspirin and sublingual nitro. Chest pain still present following one SL nitro. Troponin was drawn. Patient's Last stress test was 2014 per our records and ECHO 2017.  Patient will be admitted for chest pain evaluation.          --------------------------------------------- PAST HISTORY ---------------------------------------------  Past Medical History:  has a past medical history of Anemia, CPAP (continuous positive airway pressure) dependence, Diabetes mellitus ([address] Esophageal stricture, Gastric ulcer, Goiter, Heart murmur, Hypertension, Kidney stones, Rheumatic fever, and Sleep apnea. Past Surgical History:  has a past surgical history that includes cardiovascular stress test (1999); doppler echocardiography (05/14/03); doppler echocardiography (06/09/04); Hysterectomy; Cholecystectomy; polypectomy; Upper gastrointestinal endoscopy (07/11); Colonoscopy (07/11); and Breast lumpectomy (Left). Social History:  reports that she has never smoked. She has never used smokeless tobacco. She reports that she does not drink alcohol or use drugs. Family History: family history includes Alcohol Abuse in her father; Asthma in her mother; Diabetes in her mother. The patients home medications have been reviewed.     Allergies: Nitrofuran derivatives and Sulfa antibiotics    -------------------------------------------------- RESULTS -------------------------------------------------    LABS:  Results for orders placed or performed during the hospital encounter of 05/03/19   CBC auto differential   Result Value Ref Range    WBC 10.8 4.5 - 11.5 E9/L    RBC 4.54 3.50 - 5.50 E12/L    Hemoglobin 13.3 11.5 - 15.5 g/dL    Hematocrit 40.1 34.0 - 48.0 %    MCV 88.3 80.0 - 99.9 fL    MCH 29.3 26.0 - 35.0 pg    MCHC 33.2 32.0 - 34.5 %    RDW 14.6 11.5 - 15.0 fL    Platelets 821 186 - 052 E9/L    MPV 9.4 7.0 - 12.0 fL    Neutrophils % 70.3 43.0 - 80.0 %    Immature Granulocytes % 0.4 0.0 - 5.0 %    Lymphocytes % 18.3 (L) 20.0 - 42.0 %    Monocytes % 8.4 2.0 - 12.0 %    Eosinophils % 2.2 0.0 - 6.0 %    Basophils % 0.4 0.0 - 2.0 %    Neutrophils # 7.57 (H) 1.80 - 7.30 E9/L    Immature Granulocytes # 0.04 E9/L    Lymphocytes # 1.97 1.50 - 4.00 E9/L    Monocytes # 0.90 0.10 - 0.95 E9/L    Eosinophils # 0.24 0.05 - 0.50 E9/L    Basophils # 0.04 0.00 - 0.20 E9/L   Comprehensive Metabolic Panel w/ Reflex to MG Result Value Ref Range    Sodium 139 132 - 146 mmol/L    Potassium reflex Magnesium 3.7 3.5 - 5.0 mmol/L    Chloride 99 98 - 107 mmol/L    CO2 29 22 - 29 mmol/L    Anion Gap 11 7 - 16 mmol/L    Glucose 146 (H) 74 - 99 mg/dL    BUN 30 (H) 8 - 23 mg/dL    CREATININE 1.2 (H) 0.5 - 1.0 mg/dL    GFR Non-African American 43 >=60 mL/min/1.73    GFR African American 52     Calcium 10.2 8.6 - 10.2 mg/dL    Total Protein 6.9 6.4 - 8.3 g/dL    Alb 4.0 3.5 - 5.2 g/dL    Total Bilirubin 0.2 0.0 - 1.2 mg/dL    Alkaline Phosphatase 77 35 - 104 U/L    ALT 12 0 - 32 U/L    AST 11 0 - 31 U/L   Lactic acid, plasma   Result Value Ref Range    Lactic Acid 2.1 0.5 - 2.2 mmol/L   Lipase   Result Value Ref Range    Lipase 43 13 - 60 U/L   Urinalysis with Microscopic   Result Value Ref Range    Color, UA Yellow Straw/Yellow    Clarity, UA Clear Clear    Glucose, Ur 100 (A) Negative mg/dL    Bilirubin Urine Negative Negative    Ketones, Urine TRACE (A) Negative mg/dL    Specific Gravity, UA 1.025 1.005 - 1.030    Blood, Urine Negative Negative    pH, UA 5.0 5.0 - 9.0    Protein, UA Negative Negative mg/dL    Urobilinogen, Urine 0.2 <2.0 E.U./dL    Nitrite, Urine Negative Negative    Leukocyte Esterase, Urine MODERATE (A) Negative    WBC, UA 10-20 (A) 0 - 5 /HPF    RBC, UA 0-1 0 - 2 /HPF    Epi Cells MODERATE /HPF    Bacteria, UA RARE (A) /HPF   Troponin   Result Value Ref Range    Troponin <0.01 0.00 - 0.03 ng/mL   EKG 12 Lead   Result Value Ref Range    Ventricular Rate 77 BPM    Atrial Rate 77 BPM    P-R Interval 146 ms    QRS Duration 88 ms    Q-T Interval 362 ms    QTc Calculation (Bazett) 409 ms    P Axis 3 degrees    R Axis -42 degrees    T Axis 19 degrees       RADIOLOGY:  CTA CHEST W CONTRAST   Final Result      No evidence for pulmonary embolism or aortic dissection. Multiple renal cysts bilaterally that were visualized on the prior   study of 2011. One of these is now hemorrhagic and attenuating.        Round lesion in the cardiac monitoring and continuous pulse oximetry    This patient has remained hemodynamically stable during their ED course. Diagnosis:  1. Acute cystitis without hematuria    2. Dizziness    3. Renal cyst    4. Chest pain, unspecified type        Disposition:  Patient's disposition: Admit to telemetry  Patient's condition is stable.                 Avery Champagne MD  05/03/19 4621

## 2019-05-04 ENCOUNTER — APPOINTMENT (OUTPATIENT)
Dept: NUCLEAR MEDICINE | Age: 84
DRG: 690 | End: 2019-05-04
Payer: MEDICARE

## 2019-05-04 PROBLEM — N30.00 ACUTE CYSTITIS WITHOUT HEMATURIA: Status: ACTIVE | Noted: 2019-05-03

## 2019-05-04 LAB
ALBUMIN SERPL-MCNC: 3.3 G/DL (ref 3.5–5.2)
ALP BLD-CCNC: 66 U/L (ref 35–104)
ALT SERPL-CCNC: 10 U/L (ref 0–32)
ANION GAP SERPL CALCULATED.3IONS-SCNC: 10 MMOL/L (ref 7–16)
AST SERPL-CCNC: 10 U/L (ref 0–31)
BILIRUB SERPL-MCNC: <0.2 MG/DL (ref 0–1.2)
BUN BLDV-MCNC: 25 MG/DL (ref 8–23)
CALCIUM SERPL-MCNC: 9.2 MG/DL (ref 8.6–10.2)
CHLORIDE BLD-SCNC: 98 MMOL/L (ref 98–107)
CHOLESTEROL, TOTAL: 176 MG/DL (ref 0–199)
CO2: 28 MMOL/L (ref 22–29)
CREAT SERPL-MCNC: 1.2 MG/DL (ref 0.5–1)
EKG ATRIAL RATE: 75 BPM
EKG P AXIS: 22 DEGREES
EKG P-R INTERVAL: 152 MS
EKG Q-T INTERVAL: 388 MS
EKG QRS DURATION: 88 MS
EKG QTC CALCULATION (BAZETT): 433 MS
EKG R AXIS: -37 DEGREES
EKG T AXIS: 21 DEGREES
EKG VENTRICULAR RATE: 75 BPM
GFR AFRICAN AMERICAN: 52
GFR NON-AFRICAN AMERICAN: 43 ML/MIN/1.73
GLUCOSE BLD-MCNC: 129 MG/DL (ref 74–99)
HBA1C MFR BLD: 7.1 % (ref 4–5.6)
HCT VFR BLD CALC: 37.5 % (ref 34–48)
HDLC SERPL-MCNC: 35 MG/DL
HEMOGLOBIN: 12.1 G/DL (ref 11.5–15.5)
LDL CHOLESTEROL CALCULATED: 78 MG/DL (ref 0–99)
LV EF: 70 %
LVEF MODALITY: NORMAL
MCH RBC QN AUTO: 28.7 PG (ref 26–35)
MCHC RBC AUTO-ENTMCNC: 32.3 % (ref 32–34.5)
MCV RBC AUTO: 88.9 FL (ref 80–99.9)
PDW BLD-RTO: 14.7 FL (ref 11.5–15)
PLATELET # BLD: 230 E9/L (ref 130–450)
PMV BLD AUTO: 9.7 FL (ref 7–12)
POTASSIUM REFLEX MAGNESIUM: 3.6 MMOL/L (ref 3.5–5)
PRO-BNP: 404 PG/ML (ref 0–450)
RBC # BLD: 4.22 E12/L (ref 3.5–5.5)
SODIUM BLD-SCNC: 136 MMOL/L (ref 132–146)
T4 FREE: 1.08 NG/DL (ref 0.93–1.7)
TOTAL PROTEIN: 6 G/DL (ref 6.4–8.3)
TRIGL SERPL-MCNC: 315 MG/DL (ref 0–149)
TROPONIN: <0.01 NG/ML (ref 0–0.03)
TROPONIN: <0.01 NG/ML (ref 0–0.03)
TSH SERPL DL<=0.05 MIU/L-ACNC: 5.25 UIU/ML (ref 0.27–4.2)
VLDLC SERPL CALC-MCNC: 63 MG/DL
WBC # BLD: 10.3 E9/L (ref 4.5–11.5)

## 2019-05-04 PROCEDURE — APPSS60 APP SPLIT SHARED TIME 46-60 MINUTES: Performed by: NURSE PRACTITIONER

## 2019-05-04 PROCEDURE — 6370000000 HC RX 637 (ALT 250 FOR IP): Performed by: HOSPITALIST

## 2019-05-04 PROCEDURE — 36415 COLL VENOUS BLD VENIPUNCTURE: CPT

## 2019-05-04 PROCEDURE — APPSS30 APP SPLIT SHARED TIME 16-30 MINUTES: Performed by: PHYSICIAN ASSISTANT

## 2019-05-04 PROCEDURE — 78452 HT MUSCLE IMAGE SPECT MULT: CPT

## 2019-05-04 PROCEDURE — 99232 SBSQ HOSP IP/OBS MODERATE 35: CPT | Performed by: INTERNAL MEDICINE

## 2019-05-04 PROCEDURE — 93017 CV STRESS TEST TRACING ONLY: CPT

## 2019-05-04 PROCEDURE — 93016 CV STRESS TEST SUPVJ ONLY: CPT | Performed by: INTERNAL MEDICINE

## 2019-05-04 PROCEDURE — 80061 LIPID PANEL: CPT

## 2019-05-04 PROCEDURE — 6360000002 HC RX W HCPCS: Performed by: HOSPITALIST

## 2019-05-04 PROCEDURE — G0378 HOSPITAL OBSERVATION PER HR: HCPCS

## 2019-05-04 PROCEDURE — A9500 TC99M SESTAMIBI: HCPCS | Performed by: RADIOLOGY

## 2019-05-04 PROCEDURE — 83880 ASSAY OF NATRIURETIC PEPTIDE: CPT

## 2019-05-04 PROCEDURE — 99222 1ST HOSP IP/OBS MODERATE 55: CPT | Performed by: INTERNAL MEDICINE

## 2019-05-04 PROCEDURE — 80053 COMPREHEN METABOLIC PANEL: CPT

## 2019-05-04 PROCEDURE — 96372 THER/PROPH/DIAG INJ SC/IM: CPT

## 2019-05-04 PROCEDURE — 84443 ASSAY THYROID STIM HORMONE: CPT

## 2019-05-04 PROCEDURE — 93018 CV STRESS TEST I&R ONLY: CPT | Performed by: INTERNAL MEDICINE

## 2019-05-04 PROCEDURE — 2580000003 HC RX 258: Performed by: HOSPITALIST

## 2019-05-04 PROCEDURE — 84484 ASSAY OF TROPONIN QUANT: CPT

## 2019-05-04 PROCEDURE — 84439 ASSAY OF FREE THYROXINE: CPT

## 2019-05-04 PROCEDURE — 96367 TX/PROPH/DG ADDL SEQ IV INF: CPT

## 2019-05-04 PROCEDURE — 3430000000 HC RX DIAGNOSTIC RADIOPHARMACEUTICAL: Performed by: RADIOLOGY

## 2019-05-04 PROCEDURE — 2060000000 HC ICU INTERMEDIATE R&B

## 2019-05-04 PROCEDURE — 83036 HEMOGLOBIN GLYCOSYLATED A1C: CPT

## 2019-05-04 PROCEDURE — 85027 COMPLETE CBC AUTOMATED: CPT

## 2019-05-04 RX ADMIN — SERTRALINE HYDROCHLORIDE 50 MG: 50 TABLET ORAL at 14:33

## 2019-05-04 RX ADMIN — PHENAZOPYRIDINE HYDROCHLORIDE 200 MG: 100 TABLET ORAL at 14:32

## 2019-05-04 RX ADMIN — SENNOSIDES AND DOCUSATE SODIUM 2 TABLET: 8.6; 5 TABLET ORAL at 14:33

## 2019-05-04 RX ADMIN — VALSARTAN 320 MG: 320 TABLET, FILM COATED ORAL at 14:33

## 2019-05-04 RX ADMIN — Medication 10 ML: at 20:23

## 2019-05-04 RX ADMIN — CHOLECALCIFEROL TAB 50 MCG (2000 UNIT) 2000 UNITS: 50 TAB at 14:32

## 2019-05-04 RX ADMIN — HYDROCHLOROTHIAZIDE 25 MG: 25 TABLET ORAL at 14:32

## 2019-05-04 RX ADMIN — ASPIRIN 81 MG 81 MG: 81 TABLET ORAL at 14:32

## 2019-05-04 RX ADMIN — CEFTRIAXONE 1 G: 1 INJECTION, POWDER, FOR SOLUTION INTRAMUSCULAR; INTRAVENOUS at 00:19

## 2019-05-04 RX ADMIN — CEFTRIAXONE 1 G: 1 INJECTION, POWDER, FOR SOLUTION INTRAMUSCULAR; INTRAVENOUS at 23:46

## 2019-05-04 RX ADMIN — Medication 30 MILLICURIE: at 12:45

## 2019-05-04 RX ADMIN — SENNOSIDES AND DOCUSATE SODIUM 2 TABLET: 8.6; 5 TABLET ORAL at 00:19

## 2019-05-04 RX ADMIN — PHENAZOPYRIDINE HYDROCHLORIDE 200 MG: 100 TABLET ORAL at 17:16

## 2019-05-04 RX ADMIN — Medication 10 MILLICURIE: at 10:45

## 2019-05-04 RX ADMIN — ATORVASTATIN CALCIUM 40 MG: 40 TABLET, FILM COATED ORAL at 00:19

## 2019-05-04 RX ADMIN — ENOXAPARIN SODIUM 40 MG: 40 INJECTION SUBCUTANEOUS at 14:33

## 2019-05-04 RX ADMIN — FERROUS SULFATE TAB 325 MG (65 MG ELEMENTAL FE) 325 MG: 325 (65 FE) TAB at 14:32

## 2019-05-04 RX ADMIN — ATORVASTATIN CALCIUM 40 MG: 40 TABLET, FILM COATED ORAL at 20:22

## 2019-05-04 RX ADMIN — METOPROLOL TARTRATE 50 MG: 50 TABLET ORAL at 20:22

## 2019-05-04 RX ADMIN — REGADENOSON 0.4 MG: 0.08 INJECTION, SOLUTION INTRAVENOUS at 12:14

## 2019-05-04 RX ADMIN — Medication 10 ML: at 14:33

## 2019-05-04 ASSESSMENT — PAIN SCALES - GENERAL
PAINLEVEL_OUTOF10: 0

## 2019-05-04 NOTE — PROGRESS NOTES
[facility] Hospitalist   Progress Note    Admitting Date and Time: 5/3/2019  4:45 PM  Admit Dx: Chest pain [R07.9]  Chest pain [R07.9]  Acute cystitis with hematuria [N30.01]    Subjective:    Patient was admitted with Chest pain [R07.9]  Chest pain [R07.9]  Acute cystitis with hematuria [N30.01]. Patient states that she is feeling slightly better today. She states that she did begin to feel nauseous during her stress test. She states that she has been urinating only little amounts at a time. Dysuria is resolving. Denies CP, SOB, current nausea, fever and chills. Per RN: No acute events overnight     ROS: denies fever, chills, cp, sob, n/v, HA unless stated above.      enoxaparin  40 mg Subcutaneous Daily    ferrous sulfate  325 mg Oral Daily    hydrochlorothiazide  25 mg Oral Daily    metoprolol tartrate  50 mg Oral BID    sertraline  50 mg Oral Daily    valsartan  1 tablet Oral Daily    vitamin D  2,000 Units Oral Daily    sodium chloride flush  10 mL Intravenous 2 times per day    atorvastatin  40 mg Oral Nightly    aspirin  81 mg Oral Daily    cefTRIAXone (ROCEPHIN) IV  1 g Intravenous Q24H    phenazopyridine  200 mg Oral TID WC    sennosides-docusate sodium  2 tablet Oral BID       sodium chloride flush 10 mL PRN   potassium chloride 40 mEq PRN   Or     potassium alternative oral replacement 40 mEq PRN   Or     potassium chloride 10 mEq PRN   ondansetron 4 mg Q6H PRN   nitroGLYCERIN 0.4 mg Q5 Min PRN   acetaminophen 650 mg Q4H PRN        Objective:    /65   Pulse 75   Temp 98.3 °F (36.8 °C) (Oral)   Resp 18   Ht 5' 6\" (1.676 m)   Wt 151 lb (68.5 kg)   SpO2 94%   BMI 24.37 kg/m²   General Appearance: alert and oriented to person, place and time, well-developed and well-nourished, in no acute distress  Skin: warm and dry, no rash or erythema  Head: normocephalic and atraumatic  Eyes: extraocular eye movements intact and conjunctivae normal  ENT: hearing grossly normal bilaterally  Neck: neck supple and non tender without mass and no thyromegaly   Pulmonary/Chest: clear to auscultation bilaterally- no wheezes, rales or rhonchi, normal air movement, no respiratory distress  Cardiovascular: normal rate, regular rhythm, normal S1 and S2, no murmurs, no gallops and no JVD  Extremities: no cyanosis, no clubbing, no edema and R leg in cast.       Recent Labs     05/03/19  1704 05/04/19  0315    136   K 3.7 3.6   CL 99 98   CO2 29 28   BUN 30* 25*   CREATININE 1.2* 1.2*   GLUCOSE 146* 129*   CALCIUM 10.2 9.2       Recent Labs     05/03/19  1704 05/04/19  0315   WBC 10.8 10.3   RBC 4.54 4.22   HGB 13.3 12.1   HCT 40.1 37.5   MCV 88.3 88.9   MCH 29.3 28.7   MCHC 33.2 32.3   RDW 14.6 14.7    230   MPV 9.4 9.7       Radiology:   CTA CHEST W CONTRAST   Final Result      No evidence for pulmonary embolism or aortic dissection. Multiple renal cysts bilaterally that were visualized on the prior   study of 2011. One of these is now hemorrhagic and attenuating. Round lesion in the tip of the left lobe of the liver is unchanged. No evidence for acute process on CTA of the chest.      XR CHEST PORTABLE   Final Result      No evidence for acute cardiopulmonary process. Scarring in the bilateral lung bases. NM Cardiac Stress Test Nuclear Imaging    (Results Pending)       Assessment:    Principal Problem:    Acute cystitis without hematuria  Active Problems:    HTN (hypertension)    Hyperlipidemia    GERD (gastroesophageal reflux disease)    JERICA on CPAP    Atypical chest pain    CKD (chronic kidney disease) stage 3, GFR 30-59 ml/min ([facility])  Resolved Problems:    * No resolved hospital problems. *      Plan:  1. Acute Cystitis with Hematuria   - Continue Rocephin, await final cultures     2. Chest Pain   - Cardiology consulted   - Stress test pending, Trop - x3     3. HTN  - Improving, Continue home medications     4.  HLD  - Lipid panel done with AM  - hypertriglyceridemia, Pt states that this is known to her but unsure why she isn't on medications, will defer to PCP for further management. 5. JERICA  - Pt does not use home mask d/t not fitting well     6. GERD  - Continue     7. CKD  - Cr 1.2 today, Stable continue to monitor         Electronically signed by Sally Gutiérrez PA-C on 5/4/2019 at 2:48 PM  HOSPITALIST ATTENDING PHYSICIAN NOTE 5/4/2019 2039PM:    Details of the evaluation - subjective assessment (including medication profile, past medical, family and social history when applicable), examination, review of lab and test data, diagnostic impressions and medical decision making - performed by Sally Gutiérrez PA-C, were discussed with me on the date of service and I agree with clinical information herein unless otherwise noted. The patient has been evaluated by me personally earlier today. Pt reports no fevers, chills,n/v.     Exam: heart reg at rate of 76,lungs cta, abd pos bs soft nt, ext neg for le edema    I agree with the assessment and plan of Varsha Cole PA-C    Acute cystitis with hematuria  Chest pain  htn  Hyperlipidemia      Electronically signed by Emerita Marin D.O.   Hospitalist  [facility]

## 2019-05-04 NOTE — PROGRESS NOTES
Database complete. Medications reconciled. Care plans and education initiated. Has CPAP, but has not worn since 8/2018(needed new mask and hose). NWB to RLE and is using sitting rollator at home. Lives alone. Has visiting nurse once a week.

## 2019-05-04 NOTE — PROGRESS NOTES
[facility] Quality Flow/Interdisciplinary Rounds Progress Note        Quality Flow Rounds held on May 4, 2019    Disciplines Attending:  Bedside Nurse, ,  and Nursing Unit Leadership    Beth Flores was admitted on 5/3/2019  4:45 PM    Anticipated Discharge Date:  Expected Discharge Date: 05/06/19    Disposition:    Sumit Score:  Sumit Scale Score: 19    Readmission Risk              Risk of Unplanned Readmission:        14           Discussed patient goal for the day, patient clinical progression, and barriers to discharge. The following Goal(s) of the Day/Commitment(s) have been identified: Check Cardiology plan.       Sharona Camejo  May 4, 2019

## 2019-05-04 NOTE — PROCEDURES
[facility] Nuclear Stress Test:    Cardiologist: Dr. Hina Cordova Scrocco    Date: 5/4/2019    Indications for study: Chest pain    1. No chest pain  2. No new arrhythmias  3. Non-diagnostic stress EKG  4.  Nuclear images pending    Deana Gilmore MD  [facility]) Cardiology

## 2019-05-04 NOTE — H&P
[place] [address] [facility]   History and Physical      CHIEF COMPLAINT:  Dizziness and trouble with pain with urination    History of Present Illness:  81 y.o. female with a history of hypertension, chronic kidney disease, sleep apnea, diabetes presents with dizziness when trying to stand today along with weakness and fatigue along with dysuria and pain with urination. Patient states symptoms began in the last couple days and worsening. She states that's how she fell and broke her right leg, which is still casted. She was seen by her podiatrist who placed 5 pins in her leg 5 weeks ago during surgery. Recently, the cast was removed on Wednesday and her leg was evaluated. She has been continued on Lovenox shots for another 30 days as she had been on the previous 35 days. Patient has not been ambulatory and is using a wheelchair for ambulation. Her sister has been helping her. She states that she's been having pain with urination and the pain is worse now. Patient also states that she has been eating and drinking well. No diarrhea, loose stools. She has had some constipation. Patient states that she had evaluation in [place] with a cardiologist prior to her surgery as she was unable to see her cardiologist from [facility] cardiology. Additionally, patient states she had a normal echocardiogram at that time. Records to be obtained. No recent stress tests. All she was in the ER tonight, she began having pain in the chest and trouble breathing. She states the pain was almost side of the chest that was moderate in severity and was making her short of breath. It made her dizziness somewhat worse at that time as well while she was sitting. Patient denies fevers, chills, headache, vision or hearing changes, new skin rashes or lesions, bedsores. Patient claims pain in the chest is worse with exertion, she also has shortness of breath and dizziness.     Informant(s) for H&P: Patient and sister    REVIEW OF TAKE ONE TABLET BY MOUTH EVERY DAY 5/19/17  Yes Erica Gerard MD   hydrochlorothiazide (HYDRODIURIL) 25 MG tablet Take 1 tablet by mouth daily 5/18/17  Yes Erica Gerard MD   vitamin D3 (CHOLECALCIFEROL) 1000 UNITS TABS tablet Take 1 tablet by mouth daily. Patient taking differently: Take 2,000 Units by mouth daily  9/17/14  Yes Luz Andrade,    Ferrous Sulfate (IRON) 325 (65 FE) MG TABS Take 1 tablet by mouth daily    Yes Historical Provider, MD   CPAP Machine MISC by Does not apply route nightly Has not used since 8/2018 d/t needing mask and hose    Historical Provider, MD       Allergies:    Nitrofuran derivatives and Sulfa antibiotics    Social History:    reports that she has never smoked. She has never used smokeless tobacco. She reports that she does not drink alcohol or use drugs.     Family History:       Problem Relation Age of Onset    Asthma Mother     Diabetes Mother     Alcohol Abuse Father        PHYSICAL EXAM:  Vitals:  BP (!) 184/96   Pulse 79   Temp 98.4 °F (36.9 °C)   Resp 18   Ht 5' 6\" (1.676 m)   Wt 150 lb (68 kg)   SpO2 99%   BMI 24.21 kg/m²   General Appearance: alert and oriented to person, place and time, well developed and well- nourished, in no acute distress  Skin: warm and dry, no rash or erythema  Head: normocephalic and atraumatic  Eyes: pupils equal, round, and reactive to light, extraocular eye movements intact, conjunctivae normal  ENT: tympanic membrane, external ear and ear canal normal bilaterally, nose without deformity, nasal mucosa and turbinates normal without polyps  Neck: supple and non-tender without mass, no thyromegaly or thyroid nodules, no cervical lymphadenopathy  Pulmonary/Chest: clear to auscultation bilaterally- no wheezes, rales or rhonchi, normal air movement, no respiratory distress  Cardiovascular: normal rate, regular rhythm, normal S1 and S2, no murmurs, rubs, clicks, or gallops, distal pulses intact, no carotid bruits  Abdomen: soft, non-tender, non-distended, normal bowel sounds, no masses or organomegaly  Extremities: no cyanosis, clubbing or edema  Neurologic: reflexes normal and symmetric, no cranial nerve deficit, gait, coordination and speech normal  Musculoskeletal: Right leg casted from the mid foot to the knee. He now palpation of the left side of the chest      LABS:  Recent Labs     05/03/19  1704      K 3.7   CL 99   CO2 29   BUN 30*   CREATININE 1.2*   GLUCOSE 146*   CALCIUM 10.2       Recent Labs     05/03/19  1704   WBC 10.8   RBC 4.54   HGB 13.3   HCT 40.1   MCV 88.3   MCH 29.3   MCHC 33.2   RDW 14.6      MPV 9.4       No results for input(s): POCGLU in the last 72 hours.     CBC with Differential:    Lab Results   Component Value Date    WBC 10.8 05/03/2019    RBC 4.54 05/03/2019    HGB 13.3 05/03/2019    HCT 40.1 05/03/2019     05/03/2019    MCV 88.3 05/03/2019    MCH 29.3 05/03/2019    MCHC 33.2 05/03/2019    RDW 14.6 05/03/2019    SEGSPCT 67 02/27/2012    LYMPHOPCT 18.3 05/03/2019    MONOPCT 8.4 05/03/2019    EOSPCT 2.0 04/12/2016    BASOPCT 0.4 05/03/2019    MONOSABS 0.90 05/03/2019    LYMPHSABS 1.97 05/03/2019    EOSABS 0.24 05/03/2019    BASOSABS 0.04 05/03/2019     Hepatic Function Panel:    Lab Results   Component Value Date    ALKPHOS 77 05/03/2019    ALT 12 05/03/2019    AST 11 05/03/2019    PROT 6.9 05/03/2019    BILITOT 0.2 05/03/2019    BILIDIR 0.1 02/27/2012    LABALBU 4.0 05/03/2019    LABALBU 4.3 02/27/2012    LABALBU 3.5 02/27/2012     Magnesium:  No results found for: MG  Phosphorus:  No results found for: PHOS  Warfarin PT/INR:  No components found for: Felisa Murrieta  Last 3 Troponin:    Lab Results   Component Value Date    TROPONINI <0.01 05/03/2019    TROPONINI <0.01 05/03/2019     HgBA1c:  No results found for: LABA1C  FLP:    Lab Results   Component Value Date    TRIG 195 12/30/2015    HDL 48 12/30/2015    LDLCALC 105 12/30/2015     TSH:    Lab Results   Component Value Date    TSH that she's had symptoms in the past but none currently. Given the fact she has been on blood thinners for the last month, check stool for occult blood to see if there is evidence of GI bleeding. However, patient states that she has had no symptoms of reflux recently. 6. Atypical chest pain with worsening pain on palpation of the chest. Consult cardiology and order stress test to evaluate further. Heart score is 5.  7. CK D stage III: Dose medications appropriately based on kidney function  8. Low-sodium diet    Code Status: full  DVT prophylaxis: lovenox      Electronically signed by Michoacano Conley MD on 5/3/2019 at 10:45 PM      NOTE: This report was transcribed using voice recognition software. Every effort was made to ensure accuracy; however, inadvertent computerized transcription errors may be present.

## 2019-05-04 NOTE — CONSULTS
Inpatient Cardiology Consultation      Reason for Consult:  Chest Pain    Consulting Physician: Dr Andi Martínez    Requesting Physician:  Dr Domi Rincon    Date of Consultation: 5/4/2019    HISTORY OF PRESENT ILLNESS: 80 yo  female known to Dr Luiz Reza last seen in [facility] outpatient office 7/31/2018. PMH: mitral stenosis, non ischemic stress (EF 75%) 2016, JERICA (hasn't used for over a year needs new mask), HTN, HLD, CKD, T2DM with neuropathy, R foot fracture s/p surgical repair 4/2018 (non weight bearing). [facility] ED 5/3/2019 with dysuria. Complains of ongoing exertion dyspnea no worse than it has been for last couple of years. When she stands to pivot to wheelchair she does complain of some dizziness. L sided CP over last 4 weeks mostly when laying down described as heavy rated at worse 4 out of 10 improved when patient rolls on side. /73, HR 70's SR, afebrile. Na 139, K+ 3.7, Bun/Cr 30/1.2, , troponin x 3 , triglycerides 315, T Chol 176, HDL 35, LDL 78, HgbA1c 7.1, Albumin 3.3, CBC and LFT's WNL. + UTI. With standing patient was hypoxic (88%), complained of SOB. Orthostatic BP positive. CTA Chest no PE, multiple renal cysts one is now hemorrhagic, round lesion on tip of liver (unchanged). 1 liter NSS , Maxipme IV. According to ED developed CP radiating to neck was given ASA and 1 SL nitroglycerin with no resolution of CP. During physical exam was able to reproduce CP and back pain with palpation. Herlinda reports she is on daily Lovenox shots due to NWB on RLE. Stress test ordered by primary service. Please note: past medical records were reviewed per electronic medical record (EMR) - see detailed reports under Past Medical/ Surgical History. Past Medical/Surgical History:   1. Lifelong non smoker  2. Hypertension. 3. Type II diabetes mellitus. 4. Hyperlipidemia. 5. Obstructive sleep apnea (has not used C-pap for at least 1-2 years [id number] needs new mask)  6. GERD  7.  Hx gastric ulcer (reports reason she does not take ASA)  8. Stomach polyps  had 16 polyps removed from stomach. 9. Esophageal stricture s/p dilatation  10. Hx blood transfusions  11. Cholecystectomy, L breast lumpectomy (begnign), appendectomy, hysterectomy. 12. Chest Pain-->Exercise MPS, 12/29/2014. 1 minute, 51 seconds Henrique protocol. 109% MPHR. Terminated for dyspnea. No diagnostic electrocardiographic changes. Normal myocardial perfusion images with ejection fraction 90%. No evidence for ischemia. Low risk study. 13. TTE Dr. Obrien Standing, 02/12/2015. Normal LV size with borderline global hypokinesis. EF 50-55%. Mild CLVH. Moderate LAE. Moderate-severe MI. Mild AI. Mild-moderate TI. Mild PHTN. 14. TTE 01/08/2016. Normal LV size with moderate CLVH. Normal regional wall motion and systolic function with Stage II diastolic dysfunction. Aortic sclerosis without stenosis, but with mild aortic insufficiency. Thickening of the mitral leaflets with decreased leaflet excursion associated with mild mitral stenosis. Mean transvalvular gradient 7 mmHg and moderate mitral insufficiency with a centrally directed jet. Pulmonary venous flow pattern appeared normal.   15. Chest Pain-->Pharmacologic MPS, 04/19/2016. EF 75%. No ischemia. Low risk exam.   16. R foot fracture s/p surgical repair at [facility] 4/2019--> NWB RLE on Lovenox QD  17. Thyroid nodules diagnosed in 2019        Medications Prior to admit:  Prior to Admission medications    Medication Sig Start Date End Date Taking?  Authorizing Provider   metoprolol tartrate (LOPRESSOR) 50 MG tablet Take 50 mg by mouth 2 times daily   Yes Historical Provider, MD   sertraline (ZOLOFT) 50 MG tablet Take 50 mg by mouth daily   Yes Historical Provider, MD   enoxaparin (LOVENOX) 40 MG/0.4ML injection Inject 40 mg into the skin daily   Yes Historical Provider, MD   valsartan (DIOVAN) 320 MG tablet TAKE ONE TABLET BY MOUTH DAILY 5/24/17  Yes Irina Romero MD   potassium chloride (KLOR-CON M) 20 MEQ extended release tablet TAKE ONE TABLET BY MOUTH EVERY DAY 5/19/17  Yes Keysha Chi MD   hydrochlorothiazide (HYDRODIURIL) 25 MG tablet Take 1 tablet by mouth daily 5/18/17  Yes Keysha Chi MD   vitamin D3 (CHOLECALCIFEROL) 1000 UNITS TABS tablet Take 1 tablet by mouth daily.   Patient taking differently: Take 2,000 Units by mouth daily  9/17/14  Yes Rilla Hipps, DO   Ferrous Sulfate (IRON) 325 (65 FE) MG TABS Take 1 tablet by mouth daily    Yes Historical Provider, MD   CPAP Machine MISC by Does not apply route nightly Has not used since 8/2018 d/t needing mask and hose    Historical Provider, MD       Current Medications:    Current Facility-Administered Medications: enoxaparin (LOVENOX) injection 40 mg, 40 mg, Subcutaneous, Daily  ferrous sulfate tablet 325 mg, 325 mg, Oral, Daily  hydrochlorothiazide (HYDRODIURIL) tablet 25 mg, 25 mg, Oral, Daily  metoprolol tartrate (LOPRESSOR) tablet 50 mg, 50 mg, Oral, BID  sertraline (ZOLOFT) tablet 50 mg, 50 mg, Oral, Daily  valsartan (DIOVAN) tablet 320 mg, 1 tablet, Oral, Daily  vitamin D tablet 2,000 Units, 2,000 Units, Oral, Daily  sodium chloride flush 0.9 % injection 10 mL, 10 mL, Intravenous, 2 times per day  sodium chloride flush 0.9 % injection 10 mL, 10 mL, Intravenous, PRN  potassium chloride (KLOR-CON M) extended release tablet 40 mEq, 40 mEq, Oral, PRN **OR** potassium bicarb-citric acid (EFFER-K) effervescent tablet 40 mEq, 40 mEq, Oral, PRN **OR** potassium chloride 10 mEq/100 mL IVPB (Peripheral Line), 10 mEq, Intravenous, PRN  ondansetron (ZOFRAN) injection 4 mg, 4 mg, Intravenous, Q6H PRN  atorvastatin (LIPITOR) tablet 40 mg, 40 mg, Oral, Nightly  aspirin chewable tablet 81 mg, 81 mg, Oral, Daily  nitroGLYCERIN (NITROSTAT) SL tablet 0.4 mg, 0.4 mg, Sublingual, Q5 Min PRN  cefTRIAXone (ROCEPHIN) 1 g in dextrose 5 % 50 mL IVPB (vial-mate), 1 g, Intravenous, Q24H  phenazopyridine (PYRIDIUM) tablet 200 mg, 200 mg, Oral, TID WC  regadenoson (LEXISCAN) injection 0.4 mg, 0.4 mg, Intravenous, ONCE PRN  sennosides-docusate sodium (SENOKOT-S) 8.6-50 MG tablet 2 tablet, 2 tablet, Oral, BID  acetaminophen (TYLENOL) tablet 650 mg, 650 mg, Oral, Q4H PRN    Allergies:    Nitrofuran: fever and chills  Sulfa: Rash    Social History:    Lifelong non smoker  Denies ETOH/Illicit Drugs  Caffeine: Decaf Tea  Activity; Lives with son in one story home with basement. Using wheelchair currently and notices SOB with activities. CP when laying down improved with turning on side. Code Status: Full Code      Family History: Non contributory secondary to age      REVIEW OF SYSTEMS:     · Constitutional: Denies fatigue, fevers, chills or night sweats  · Eyes: Denies visual changes or drainage  · ENT: Denies headaches or hearing loss. No mouth sores or sore throat. No epistaxis   · Cardiovascular: + chest pain when laying down improved with turning on side. Denies  palpitations. No lower extremity swelling. · Respiratory: Denies LEE, cough, orthopnea or PND. No hemoptysis   · Gastrointestinal: Denies hematemesis or anorexia. No hematochezia or melena    · Genitourinary: Denies urgency, dysuria or hematuria. · Musculoskeletal: Denies gait disturbance, weakness or joint complaints  · Integumentary: Denies rash, hives or pruritis   · Neurological: +dizziness with position changes. Denies headaches or seizures. No numbness or tingling  · Psychiatric: Denies anxiety or depression. · Endocrine: Denies temperature intolerance. No recent weight change. .  · Hematologic/Lymphatic: Denies abnormal bruising or bleeding. No swollen lymph nodes    PHYSICAL EXAM:   BP (!) 145/75   Pulse 75   Temp 97.7 °F (36.5 °C) (Oral)   Resp 18   Ht 5' 6\" (1.676 m)   Wt 151 lb (68.5 kg)   SpO2 97%   BMI 24.37 kg/m²   CONST:  Well developed, well nourished  female who appears of stated age. Awake, alert and cooperative. No apparent distress.    HEENT:   Head- cast  Neurologic: Normal mood and affect    Laboratory Tests:  Lab Results   Component Value Date    WBC 10.3 05/04/2019    HGB 12.1 05/04/2019    HCT 37.5 05/04/2019    MCV 88.9 05/04/2019     05/04/2019     Lab Results   Component Value Date    CREATININE 1.2 (H) 05/04/2019    BUN 25 (H) 05/04/2019     05/04/2019    K 3.6 05/04/2019    CL 98 05/04/2019    CO2 28 05/04/2019     No results found for: MG  Lab Results   Component Value Date    TROPONINI <0.01 05/04/2019    TROPONINI <0.01 05/04/2019    TROPONINI <0.01 05/03/2019     Lab Results   Component Value Date    TSH 3.590 12/30/2015     No results for input(s): PROBNP in the last 72 hours. Lab Results   Component Value Date    CHOL 176 05/04/2019    CHOL 192 12/30/2015    CHOL 141 02/27/2012     Lab Results   Component Value Date    TRIG 315 (H) 05/04/2019    TRIG 195 12/30/2015    TRIG 163 (H) 02/27/2012     Lab Results   Component Value Date    HDL 35 05/04/2019    HDL 48 12/30/2015    HDL 53.0 02/27/2012     Lab Results   Component Value Date    LDLCALC 78 05/04/2019    LDLCALC 105 12/30/2015    LDLCALC 55 02/27/2012     Lab Results   Component Value Date    LABVLDL 63 05/04/2019    VLDL 39 12/30/2015     ECG: SR, LAD, NSSTT changes    Telemetry findings reviewed: SR, rate 70's    Impression:  1. Chest pain (with atypical components)  2. HTN  3. HLD  4. DM II  5. JERICA -- untreated the past 2 years (need new mask)  6. History of esophageal stricture s/p dilatation  7. Valvular heart disease  8. Negative stress test in 4/2016  9. Right foot fracture s/p surgical repair at [facility] 4/2019  10. CKD -- most recent Cr 1.2    Plan:  1. Lexiscan nuclear stress test in progress  2. Continue current medications (including ASA, statin, ARB, and metoprolol)  3. Aggressive risk factor modifications  4. Serial echocardiograms    Thank you for allowing me to participate in your patient's care.  Please feel free to contact me if you have any questions or concerns.     Lucien Funes MD  [facility]) Cardiology

## 2019-05-05 VITALS
HEIGHT: 66 IN | DIASTOLIC BLOOD PRESSURE: 59 MMHG | RESPIRATION RATE: 18 BRPM | WEIGHT: 150.2 LBS | OXYGEN SATURATION: 94 % | BODY MASS INDEX: 24.14 KG/M2 | TEMPERATURE: 98.3 F | HEART RATE: 70 BPM | SYSTOLIC BLOOD PRESSURE: 140 MMHG

## 2019-05-05 LAB
CHOLESTEROL, TOTAL: 172 MG/DL (ref 0–199)
HDLC SERPL-MCNC: 38 MG/DL
LDL CHOLESTEROL CALCULATED: 86 MG/DL (ref 0–99)
TRIGL SERPL-MCNC: 242 MG/DL (ref 0–149)
URINE CULTURE, ROUTINE: NORMAL
VLDLC SERPL CALC-MCNC: 48 MG/DL

## 2019-05-05 PROCEDURE — 2580000003 HC RX 258: Performed by: HOSPITALIST

## 2019-05-05 PROCEDURE — 96366 THER/PROPH/DIAG IV INF ADDON: CPT

## 2019-05-05 PROCEDURE — 36415 COLL VENOUS BLD VENIPUNCTURE: CPT

## 2019-05-05 PROCEDURE — APPSS45 APP SPLIT SHARED TIME 31-45 MINUTES: Performed by: PHYSICIAN ASSISTANT

## 2019-05-05 PROCEDURE — 96372 THER/PROPH/DIAG INJ SC/IM: CPT

## 2019-05-05 PROCEDURE — G0378 HOSPITAL OBSERVATION PER HR: HCPCS

## 2019-05-05 PROCEDURE — 99239 HOSP IP/OBS DSCHRG MGMT >30: CPT | Performed by: INTERNAL MEDICINE

## 2019-05-05 PROCEDURE — 99232 SBSQ HOSP IP/OBS MODERATE 35: CPT | Performed by: INTERNAL MEDICINE

## 2019-05-05 PROCEDURE — 80061 LIPID PANEL: CPT

## 2019-05-05 PROCEDURE — 6370000000 HC RX 637 (ALT 250 FOR IP): Performed by: HOSPITALIST

## 2019-05-05 PROCEDURE — 6360000002 HC RX W HCPCS: Performed by: HOSPITALIST

## 2019-05-05 RX ADMIN — VALSARTAN 320 MG: 320 TABLET, FILM COATED ORAL at 08:26

## 2019-05-05 RX ADMIN — ASPIRIN 81 MG 81 MG: 81 TABLET ORAL at 08:27

## 2019-05-05 RX ADMIN — ENOXAPARIN SODIUM 40 MG: 40 INJECTION SUBCUTANEOUS at 08:26

## 2019-05-05 RX ADMIN — METOPROLOL TARTRATE 50 MG: 50 TABLET ORAL at 08:26

## 2019-05-05 RX ADMIN — SENNOSIDES AND DOCUSATE SODIUM 2 TABLET: 8.6; 5 TABLET ORAL at 08:26

## 2019-05-05 RX ADMIN — CHOLECALCIFEROL TAB 50 MCG (2000 UNIT) 2000 UNITS: 50 TAB at 08:26

## 2019-05-05 RX ADMIN — HYDROCHLOROTHIAZIDE 25 MG: 25 TABLET ORAL at 08:26

## 2019-05-05 RX ADMIN — PHENAZOPYRIDINE HYDROCHLORIDE 200 MG: 100 TABLET ORAL at 08:27

## 2019-05-05 RX ADMIN — FERROUS SULFATE TAB 325 MG (65 MG ELEMENTAL FE) 325 MG: 325 (65 FE) TAB at 08:27

## 2019-05-05 RX ADMIN — Medication 10 ML: at 08:27

## 2019-05-05 RX ADMIN — PHENAZOPYRIDINE HYDROCHLORIDE 200 MG: 100 TABLET ORAL at 12:00

## 2019-05-05 RX ADMIN — SERTRALINE HYDROCHLORIDE 50 MG: 50 TABLET ORAL at 08:26

## 2019-05-05 ASSESSMENT — PAIN SCALES - GENERAL: PAINLEVEL_OUTOF10: 0

## 2019-05-05 NOTE — DISCHARGE SUMMARY
[facility] EMERGENCY SERVICE Physician Discharge Summary       Anthony Jones [address]  [phone]    Call in 1 week      Juan Soto Masood Perez [address] [facility]  [phone]    Call  Call regarding recurrent [address] Jayme Hayden MD  [address]  [phone]    Call in 1 week  follow up for cardiology needs      Activity level: As tolerated     Diet: DIET LOW SODIUM 2 GM; No Caffeine    Dispo: Home    Condition at discharge: Stable    Patient ID:  Lakeisha Delaney  [id number]  81 y.o.  1935    Admit date: 5/3/2019    Discharge date and time:  5/5/2019  2:53 PM    Admission Diagnoses: Principal Problem:    Acute cystitis without hematuria  Active Problems:    HTN (hypertension)    Hyperlipidemia    GERD (gastroesophageal reflux disease)    JERICA on CPAP    Atypical chest pain    CKD (chronic kidney disease) stage 3, GFR 30-59 ml/min (HCC)    Valvular heart disease  Resolved Problems:    * No resolved hospital problems. *      Discharge Diagnoses: Principal Problem:    Acute cystitis without hematuria  Active Problems:    HTN (hypertension)    Hyperlipidemia    GERD (gastroesophageal reflux disease)    JERICA on CPAP    Atypical chest pain    CKD (chronic kidney disease) stage 3, GFR 30-59 ml/min (HCC)    Valvular heart disease  Resolved Problems:    * No resolved hospital problems. *      Consults:  IP CONSULT TO CARDIOLOGY    Procedures:   Stress test: 5-4-19  1. No reversible perfusion defect   2. Ejection fraction is greater than 70  %.   3. No significant wall motion abnormality   4. Oasis software is not available at the time of the interpretation,   if the software becomes available an addendum may be added to this   report         Hospital Course: Patient was admitted with Chest pain [R07.9]  Chest pain [R07.9]  Acute cystitis with hematuria [N30.01].  Fiona Fiore is a 81YOF with a PMHx of hypertension, chronic kidney disease, sleep apnea, diabetes who presented to the ED on 5-3-19 with dizziness when trying to stand along with weakness, fatigue, and dysuria. While in the ED she began to experience chest pain with SOB, EKG without cute findings. She was admitted, cardiology consulted and stress test resulted as above. Cardiology states that she is OK for d/c and  recommended outpatient follow up as well as aggressive risk factor modifications. Pt did have elevated A1C at 7.1 and TSH of 5,  she states that she would rather follow up with PCP for further management of these issues. She was treated with 3 doses of Rocephin, her urine culture resulted with <10,000 CFU of mixed gram positive organisms. Discussed with pt recurrent UTIs and she stated that she was seen by urology before and has a h/o renal cysts with possible obstructive cause of recurrent UTIs. Instructed pt to follow up with urology as an outpatient. She states that she is ready to go home, She will be discharged home today.      Discharge Exam:  Vitals:    05/04/19 2000 05/04/19 2330 05/05/19 0034 05/05/19 0815   BP: (!) 144/90 (!) 168/83  (!) 140/59   Pulse: 78 66  70   Resp: 16 18  18   Temp: 98.5 °F (36.9 °C) 98.2 °F (36.8 °C)  98.3 °F (36.8 °C)   TempSrc:  Oral  Oral   SpO2: 96% 93%  94%   Weight:   150 lb 3.2 oz (68.1 kg)    Height:           General Appearance: alert and oriented to person, place and time, well-developed and well-nourished, in no acute distress  Skin: warm and dry, no rash or erythema  Head: normocephalic and atraumatic  Eyes: extraocular eye movements intact and conjunctivae normal  ENT: hearing grossly normal bilaterally  Neck: neck supple and non tender without mass and no thyromegaly   Pulmonary/Chest: clear to auscultation bilaterally- no wheezes, rales or rhonchi, normal air movement, no respiratory distress  Cardiovascular: normal rate, regular rhythm, normal S1 and S2, no murmurs, no gallops and no JVD  Abdomen: soft, non-tender, non-distended, normal bowel sounds, no masses or organomegaly  Extremities: no cyanosis, no clubbing and no edema. R leg in cast.     I/O last 3 completed shifts: In: 170 [P.O.:120; IV Piggyback:50]  Out: 1125 [DIIQS:1437]  I/O this shift:  In: 250 [P.O.:240; I.V.:10]  Out: 550 [Urine:550]      LABS:  Recent Labs     05/03/19 1704 05/04/19  0315    136   K 3.7 3.6   CL 99 98   CO2 29 28   BUN 30* 25*   CREATININE 1.2* 1.2*   GLUCOSE 146* 129*   CALCIUM 10.2 9.2       Recent Labs     05/03/19  1704 05/04/19 0315   WBC 10.8 10.3   RBC 4.54 4.22   HGB 13.3 12.1   HCT 40.1 37.5   MCV 88.3 88.9   MCH 29.3 28.7   MCHC 33.2 32.3   RDW 14.6 14.7    230   MPV 9.4 9.7       No results for input(s): POCGLU in the last 72 hours. Imaging:   NM Cardiac Stress Test Nuclear Imaging   Final Result   1. No reversible perfusion defect   2. Ejection fraction is greater than 70  %. 3. No significant wall motion abnormality   4. Oasis software is not available at the time of the interpretation,   if the software becomes available an addendum may be added to this   report      CTA CHEST W CONTRAST   Final Result      No evidence for pulmonary embolism or aortic dissection. Multiple renal cysts bilaterally that were visualized on the prior   study of 2011. One of these is now hemorrhagic and attenuating. Round lesion in the tip of the left lobe of the liver is unchanged. No evidence for acute process on CTA of the chest.      XR CHEST PORTABLE   Final Result      No evidence for acute cardiopulmonary process. Scarring in the bilateral lung bases. Patient Instructions:      Medication List      CHANGE how you take these medications    vitamin D 1000 units Tabs tablet  Commonly known as:  CHOLECALCIFEROL  Take 1 tablet by mouth daily.   What changed:  how much to take        CONTINUE taking these medications    CPAP Machine Misc     enoxaparin 40 MG/0.4ML injection  Commonly known as:  LOVENOX     hydrochlorothiazide 25 MG tablet  Commonly known as:  HYDRODIURIL  Take 1 tablet by mouth daily     Iron 325 (65 Fe) MG Tabs     metoprolol tartrate 50 MG tablet  Commonly known as:  LOPRESSOR     potassium chloride 20 MEQ extended release tablet  Commonly known as:  KLOR-CON M  TAKE ONE TABLET BY MOUTH EVERY DAY     sertraline 50 MG tablet  Commonly known as:  ZOLOFT     valsartan 320 MG tablet  Commonly known as:  DIOVAN  TAKE ONE TABLET BY MOUTH DAILY              Note that more than 30 minutes was spent in preparing discharge papers, discussing discharge with patient, medication review, etc.    Signed:  Electronically signed by Letha Hopkins PA-C on 5/5/2019 at 2:53 PM    NOTE: This report was transcribed using voice recognition software. Every effort was made to ensure accuracy; however, inadvertent computerized transcription errors may be present. HOSPITALIST ATTENDING PHYSICIAN NOTE 5/5/2019 1454PM:    Details of the evaluation - subjective assessment (including medication profile, past medical, family and social history when applicable), examination, review of lab and test data, diagnostic impressions and medical decision making - performed by Letha Hopkins PA-C, were discussed with me on the date of service and I agree with clinical information herein unless otherwise noted. The patient has been evaluated by me personally earlier today. Pt reports no fevers, chills,n/v     Exam: heart reg at rate of 74,lungs cta,abd pos bs soft nt, ext neg for le edema    I agree with the assessment and plan of Varsha Cole PA-C    Acute cystitis with hematuria  Chest pain  htn  Hyperlipidemia    Discussed stress test results with pt. Discussed pt to set up appt with pcp for follow up of hospital visit and for stress test results    Total time for discharge is 37  min    Electronically signed by Ni Hunter D.O.   Hospitalist  [facility] Hospitalist [facility] at [facility]

## 2019-05-05 NOTE — PROGRESS NOTES
INPATIENT CARDIOLOGY FOLLOW-UP    Name: Katlin Weller    Age: 81 y.o. Date of Service: 5/5/2019    Chief Complaint: Follow-up for chest pain    Interim History:  Admission for evaluation/treatment UTI. She reports multiple episodes of chest pain recently -- nonexertional, episodes last minutes, \"pain\", +musculoskeletal component, currently CP free. No new overnight cardiac complaints. Negative stress test on 5/4/19. Review of Systems:   Cardiac: As per HPI  General: No fever, chills  Pulmonary: As per HPI  HEENT: No visual disturbances, difficult swallowing  GI: No nausea, vomiting  Musculoskeletal: +RLE cast  Skin: No rashes, warm  Neuro/Psych: No headache or seizures    Problem List:  Patient Active Problem List   Diagnosis    HTN (hypertension)    Hyperlipidemia    Anemia    GERD (gastroesophageal reflux disease)    JERICA on CPAP    Vitamin D deficiency    Abnormal bone density screening    PVC's (premature ventricular contractions)    Mitral insufficiency    Acute on chronic diastolic CHF (congestive heart failure), NYHA class 1 ([facility])    Renal insufficiency    Atypical chest pain    Rheumatic mitral stenosis    Non-rheumatic mitral valve stenosis    Chest pain    CKD (chronic kidney disease) stage 3, GFR 30-59 ml/min ([facility])    Acute cystitis without hematuria    Valvular heart disease       Allergies: Allergies   Allergen Reactions    Nitrofuran Derivatives Other (See Comments)     Causes fever and chills.     Sulfa Antibiotics        Current Medications:  Current Facility-Administered Medications   Medication Dose Route Frequency Provider Last Rate Last Dose    enoxaparin (LOVENOX) injection 40 mg  40 mg Subcutaneous Daily Juany Snyder MD   40 mg at 05/05/19 8914    ferrous sulfate tablet 325 mg  325 mg Oral Daily Juany Snyder MD   325 mg at 05/05/19 0846    hydrochlorothiazide (HYDRODIURIL) tablet 25 mg  25 mg Oral Daily Juany Snyder MD   25 mg at 05/05/19 0826    metoprolol tartrate (LOPRESSOR) tablet 50 mg  50 mg Oral BID Lynette Burt MD   50 mg at 05/05/19 2709    sertraline (ZOLOFT) tablet 50 mg  50 mg Oral Daily Lynette Burt MD   50 mg at 05/05/19 5003    valsartan (DIOVAN) tablet 320 mg  1 tablet Oral Daily Lynette Burt MD   320 mg at 05/05/19 0275    vitamin D tablet 2,000 Units  2,000 Units Oral Daily Lynette Burt MD   2,000 Units at 05/05/19 3298    sodium chloride flush 0.9 % injection 10 mL  10 mL Intravenous 2 times per day Lynette Burt MD   10 mL at 05/05/19 0827    sodium chloride flush 0.9 % injection 10 mL  10 mL Intravenous PRN Lynette Burt MD        potassium chloride (KLOR-CON M) extended release tablet 40 mEq  40 mEq Oral PRN Lynette Burt MD        Or    potassium bicarb-citric acid (EFFER-K) effervescent tablet 40 mEq  40 mEq Oral PRN Lynette Burt MD        Or    potassium chloride 10 mEq/100 mL IVPB (Peripheral Line)  10 mEq Intravenous PRN Lynette Burt MD        ondansetron [facility]) injection 4 mg  4 mg Intravenous Q6H PRN Lynette Burt MD        atorvastatin (LIPITOR) tablet 40 mg  40 mg Oral Nightly Lynette Burt MD   40 mg at 05/04/19 2022    aspirin chewable tablet 81 mg  81 mg Oral Daily Lynette Burt MD   81 mg at 05/05/19 0827    nitroGLYCERIN (NITROSTAT) SL tablet 0.4 mg  0.4 mg Sublingual Q5 Min PRN Lynette Burt MD        cefTRIAXone (ROCEPHIN) 1 g in dextrose 5 % 50 mL IVPB (vial-mate)  1 g Intravenous Q24H Lynette Burt MD   Stopped at 05/05/19 0116    phenazopyridine (PYRIDIUM) tablet 200 mg  200 mg Oral TID WC Lynette Burt MD   200 mg at 05/05/19 0827    sennosides-docusate sodium (SENOKOT-S) 8.6-50 MG tablet 2 tablet  2 tablet Oral BID Lynette Burt MD   2 tablet at 05/05/19 7974    acetaminophen (TYLENOL) tablet 650 mg  650 mg Oral Q4H PRN Lynette Burt MD             Physical Exam:  BP (!) 140/59   Pulse 70   Temp 98.3 °F (36.8 °C) (Oral)   Resp 18   Ht 5' 6\" (1.676 m)   Wt 150 lb 3.2 oz (68.1 kg)   SpO2 94%   BMI 24.24 kg/m²   Wt Readings from Last 3 Encounters:   05/05/19 150 lb 3.2 oz (68.1 kg)   07/31/18 155 lb (70.3 kg)   07/18/17 159 lb (72.1 kg)     Appearance: Awake, no acute respiratory distress  Head: Normocephalic, atraumatic  Eyes: EOMI, no conjunctival erythema  ENMT: MMM, no rhinorrhea  Neck: Supple, no carotid bruits  Lungs: No murmurs apparent  Cardiac: Regular rate and rhythm, no murmurs apparent  Abdomen: Soft, +bowel sounds  Extremities: No LLE edema, +RLE cast  Neurologic: Normal mood and affect    Intake/Output:    Intake/Output Summary (Last 24 hours) at 5/5/2019 0836  Last data filed at 5/5/2019 0828  Gross per 24 hour   Intake 180 ml   Output 1475 ml   Net -1295 ml     I/O this shift:  In: 10 [I.V.:10]  Out: 350 [Urine:350]    Laboratory Tests:  Recent Labs     05/03/19 1704 05/04/19  0315    136   K 3.7 3.6   CL 99 98   CO2 29 28   BUN 30* 25*   CREATININE 1.2* 1.2*   GLUCOSE 146* 129*   CALCIUM 10.2 9.2     No results found for: MG  Recent Labs     05/03/19  1704 05/04/19  0315   ALKPHOS 77 66   ALT 12 10   AST 11 10   PROT 6.9 6.0*   BILITOT 0.2 <0.2   LABALBU 4.0 3.3*     Recent Labs     05/03/19 1704 05/04/19  0315   WBC 10.8 10.3   RBC 4.54 4.22   HGB 13.3 12.1   HCT 40.1 37.5   MCV 88.3 88.9   MCH 29.3 28.7   MCHC 33.2 32.3   RDW 14.6 14.7    230   MPV 9.4 9.7     Lab Results   Component Value Date    TROPONINI <0.01 05/04/2019    TROPONINI <0.01 05/04/2019    TROPONINI <0.01 05/03/2019     Lab Results   Component Value Date    INR 1.2 06/29/2011    PROTIME 12.7 (H) 06/29/2011     Lab Results   Component Value Date    TSH 5.250 (H) 05/04/2019     Lab Results   Component Value Date    LABA1C 7.1 (H) 05/04/2019     No results found for: EAG  Lab Results   Component Value Date    CHOL 172 05/05/2019    CHOL 176 05/04/2019    CHOL 192 12/30/2015     Lab Results   Component Value Date    TRIG 242 (H) 05/05/2019    TRIG 315 (H) 05/04/2019    TRIG 195 12/30/2015     Lab Results   Component Value Date HDL 38 05/05/2019    HDL 35 05/04/2019    HDL 48 12/30/2015     Lab Results   Component Value Date    LDLCALC 86 05/05/2019    LDLCALC 78 05/04/2019    LDLCALC 105 12/30/2015     Lab Results   Component Value Date    LABVLDL 48 05/05/2019    LABVLDL 63 05/04/2019    VLDL 39 12/30/2015     No results found for: CHOLHDLRATIO  Recent Labs     05/04/19  0315   PROBNP 404     Cardiac Tests:  ECG: SR, LAD, NSSTT changes     Telemetry findings reviewed: SR, rate 60's    Lexiscan nuclear stress test: 5/4/19  1. No reversible perfusion defect   2. Ejection fraction is greater than 70  %.   3. No significant wall motion abnormality     Impression:  1. Chest pain (with atypical components) -- currently CP free, negative stress test on 5/4/19  2. HTN -- 's-160's  3. HLD  4. DM II  5. JERICA -- untreated the past 2 years (need new mask)  6. History of esophageal stricture s/p dilatation  7. Valvular heart disease  8. Negative stress test in 4/2016  9. Right foot fracture s/p surgical repair at [facility] 4/2019  10. CKD -- most recent Cr 1.2     Plan:  1. Results of 5/4/19 Lexiscan nuclear stress test reviewed with the patient and her daughter today  2. Continue current medications (including ASA, statin, ARB, and metoprolol)  3. Aggressive risk factor modifications / treatment of JERICA as indicated  4. Serial echocardiograms  5.  Outpatient cardiology follow-up     Jocelynn Lynne MD  [facility]) Cardiology

## 2019-05-05 NOTE — PROGRESS NOTES
P Quality Flow/Interdisciplinary Rounds Progress Note        Quality Flow Rounds held on May 5, 2019    Disciplines Attending:  Bedside Nurse, ,  and Nursing Unit Leadership    Marta Mack was admitted on 5/3/2019  4:45 PM    Anticipated Discharge Date:  Expected Discharge Date: 05/06/19    Disposition:    Sumit Score:  Sumit Scale Score: 19    Readmission Risk              Risk of Unplanned Readmission:        14           Discussed patient goal for the day, patient clinical progression, and barriers to discharge. The following Goal(s) of the Day/Commitment(s) have been identified:  Patient being d/c home today after ATB given.   No needs upon d/c home      Farrah Nayak  May 5, 2019 -Janessa Wu PA-C

## 2019-05-15 ENCOUNTER — TELEPHONE (OUTPATIENT)
Dept: CARDIOLOGY CLINIC | Age: 84
End: 2019-05-15

## 2019-08-30 ENCOUNTER — TELEPHONE (OUTPATIENT)
Dept: CARDIOLOGY CLINIC | Age: 84
End: 2019-08-30

## 2019-10-24 ENCOUNTER — HOSPITAL ENCOUNTER (OUTPATIENT)
Dept: GENERAL RADIOLOGY | Age: 84
Discharge: HOME | End: 2019-10-26
Payer: MEDICARE

## 2019-10-24 DIAGNOSIS — R13.10 DYSPHAGIA, UNSPECIFIED TYPE: ICD-10-CM

## 2019-10-24 PROCEDURE — 6370000000 HC RX 637 (ALT 250 FOR IP): Performed by: RADIOLOGY

## 2019-10-24 PROCEDURE — 74241 FL UGI W KUB: CPT

## 2019-10-24 PROCEDURE — 2500000003 HC RX 250 WO HCPCS: Performed by: RADIOLOGY

## 2019-10-24 RX ADMIN — BARIUM SULFATE 176 G: 960 POWDER, FOR SUSPENSION ORAL at 08:56

## 2019-10-24 RX ADMIN — ANTACID/ANTIFLATULENT 1 EACH: 380; 550; 10; 10 GRANULE, EFFERVESCENT ORAL at 08:56

## 2019-10-24 RX ADMIN — BARIUM SULFATE 140 ML: 980 POWDER, FOR SUSPENSION ORAL at 08:56

## 2019-11-11 RX ORDER — OMEPRAZOLE 40 MG/1
1 CAPSULE, DELAYED RELEASE ORAL EVERY MORNING
Refills: 0 | COMMUNITY
Start: 2019-10-17

## 2019-11-11 RX ORDER — CETIRIZINE HYDROCHLORIDE 10 MG/1
1 TABLET ORAL DAILY
Refills: 3 | COMMUNITY
Start: 2019-09-26 | End: 2019-11-11 | Stop reason: DRUGHIGH

## 2019-11-12 ENCOUNTER — OFFICE VISIT (OUTPATIENT)
Dept: CARDIOLOGY CLINIC | Age: 84
End: 2019-11-12
Payer: MEDICARE

## 2019-11-12 VITALS
RESPIRATION RATE: 18 BRPM | DIASTOLIC BLOOD PRESSURE: 78 MMHG | BODY MASS INDEX: 26.52 KG/M2 | HEART RATE: 84 BPM | WEIGHT: 165 LBS | SYSTOLIC BLOOD PRESSURE: 128 MMHG | HEIGHT: 66 IN

## 2019-11-12 DIAGNOSIS — I05.0 MITRAL VALVE STENOSIS, UNSPECIFIED ETIOLOGY: ICD-10-CM

## 2019-11-12 DIAGNOSIS — I10 ESSENTIAL HYPERTENSION: Primary | ICD-10-CM

## 2019-11-12 PROCEDURE — G8417 CALC BMI ABV UP PARAM F/U: HCPCS | Performed by: INTERNAL MEDICINE

## 2019-11-12 PROCEDURE — G8427 DOCREV CUR MEDS BY ELIG CLIN: HCPCS | Performed by: INTERNAL MEDICINE

## 2019-11-12 PROCEDURE — 1123F ACP DISCUSS/DSCN MKR DOCD: CPT | Performed by: INTERNAL MEDICINE

## 2019-11-12 PROCEDURE — 4040F PNEUMOC VAC/ADMIN/RCVD: CPT | Performed by: INTERNAL MEDICINE

## 2019-11-12 PROCEDURE — 1036F TOBACCO NON-USER: CPT | Performed by: INTERNAL MEDICINE

## 2019-11-12 PROCEDURE — G8400 PT W/DXA NO RESULTS DOC: HCPCS | Performed by: INTERNAL MEDICINE

## 2019-11-12 PROCEDURE — 93000 ELECTROCARDIOGRAM COMPLETE: CPT | Performed by: INTERNAL MEDICINE

## 2019-11-12 PROCEDURE — 1090F PRES/ABSN URINE INCON ASSESS: CPT | Performed by: INTERNAL MEDICINE

## 2019-11-12 PROCEDURE — 99214 OFFICE O/P EST MOD 30 MIN: CPT | Performed by: INTERNAL MEDICINE

## 2019-11-12 PROCEDURE — G8484 FLU IMMUNIZE NO ADMIN: HCPCS | Performed by: INTERNAL MEDICINE

## 2019-11-12 RX ORDER — AMOXICILLIN AND CLAVULANATE POTASSIUM 875; 125 MG/1; MG/1
TABLET, FILM COATED ORAL
Refills: 0 | COMMUNITY
Start: 2019-11-11 | End: 2021-03-03 | Stop reason: ALTCHOICE

## 2019-12-06 ENCOUNTER — HOSPITAL ENCOUNTER (OUTPATIENT)
Dept: NON INVASIVE DIAGNOSTICS | Age: 84
Discharge: HOME | End: 2019-12-06
Payer: MEDICARE

## 2019-12-06 DIAGNOSIS — I05.0 MITRAL VALVE STENOSIS, UNSPECIFIED ETIOLOGY: ICD-10-CM

## 2019-12-06 LAB
LV EF: 58 %
LVEF MODALITY: NORMAL

## 2019-12-06 PROCEDURE — 93306 TTE W/DOPPLER COMPLETE: CPT

## 2019-12-23 ENCOUNTER — HOSPITAL ENCOUNTER (OUTPATIENT)
Age: 84
Discharge: HOME | End: 2019-12-23
Payer: MEDICARE

## 2019-12-23 LAB
ALBUMIN SERPL-MCNC: 4.3 G/DL (ref 3.5–5.2)
ALP BLD-CCNC: 94 U/L (ref 35–104)
ALT SERPL-CCNC: 14 U/L (ref 0–32)
ANION GAP SERPL CALCULATED.3IONS-SCNC: 13 MMOL/L (ref 7–16)
AST SERPL-CCNC: 16 U/L (ref 0–31)
BILIRUB SERPL-MCNC: 0.4 MG/DL (ref 0–1.2)
BUN BLDV-MCNC: 20 MG/DL (ref 8–23)
CALCIUM SERPL-MCNC: 9.8 MG/DL (ref 8.6–10.2)
CHLORIDE BLD-SCNC: 93 MMOL/L (ref 98–107)
CHOLESTEROL, TOTAL: 221 MG/DL (ref 0–199)
CO2: 31 MMOL/L (ref 22–29)
CREAT SERPL-MCNC: 1.2 MG/DL (ref 0.5–1)
GFR AFRICAN AMERICAN: 52
GFR NON-AFRICAN AMERICAN: 43 ML/MIN/1.73
GLUCOSE BLD-MCNC: 169 MG/DL (ref 74–99)
HBA1C MFR BLD: 8.8 % (ref 4–5.6)
HCT VFR BLD CALC: 43.4 % (ref 34–48)
HDLC SERPL-MCNC: 49 MG/DL
HEMOGLOBIN: 14.4 G/DL (ref 11.5–15.5)
LDL CHOLESTEROL CALCULATED: 132 MG/DL (ref 0–99)
MCH RBC QN AUTO: 29.3 PG (ref 26–35)
MCHC RBC AUTO-ENTMCNC: 33.2 % (ref 32–34.5)
MCV RBC AUTO: 88.2 FL (ref 80–99.9)
PDW BLD-RTO: 14.1 FL (ref 11.5–15)
PLATELET # BLD: 296 E9/L (ref 130–450)
PMV BLD AUTO: 9.5 FL (ref 7–12)
POTASSIUM SERPL-SCNC: 3.5 MMOL/L (ref 3.5–5)
RBC # BLD: 4.92 E12/L (ref 3.5–5.5)
SODIUM BLD-SCNC: 137 MMOL/L (ref 132–146)
T4 FREE: 1.26 NG/DL (ref 0.93–1.7)
TOTAL PROTEIN: 7.4 G/DL (ref 6.4–8.3)
TRIGL SERPL-MCNC: 201 MG/DL (ref 0–149)
TSH SERPL DL<=0.05 MIU/L-ACNC: 4.05 UIU/ML (ref 0.27–4.2)
VITAMIN D 25-HYDROXY: 46 NG/ML (ref 30–100)
VLDLC SERPL CALC-MCNC: 40 MG/DL
WBC # BLD: 9.2 E9/L (ref 4.5–11.5)

## 2019-12-23 PROCEDURE — 84443 ASSAY THYROID STIM HORMONE: CPT

## 2019-12-23 PROCEDURE — 82306 VITAMIN D 25 HYDROXY: CPT

## 2019-12-23 PROCEDURE — 80061 LIPID PANEL: CPT

## 2019-12-23 PROCEDURE — 84439 ASSAY OF FREE THYROXINE: CPT

## 2019-12-23 PROCEDURE — 85027 COMPLETE CBC AUTOMATED: CPT

## 2019-12-23 PROCEDURE — 36415 COLL VENOUS BLD VENIPUNCTURE: CPT

## 2019-12-23 PROCEDURE — 80053 COMPREHEN METABOLIC PANEL: CPT

## 2019-12-23 PROCEDURE — 83036 HEMOGLOBIN GLYCOSYLATED A1C: CPT

## 2020-01-21 ENCOUNTER — HOSPITAL ENCOUNTER (OUTPATIENT)
Dept: ULTRASOUND IMAGING | Age: 85
Discharge: HOME | End: 2020-01-23
Payer: MEDICARE

## 2020-01-21 PROCEDURE — 93880 EXTRACRANIAL BILAT STUDY: CPT

## 2020-01-21 PROCEDURE — 76536 US EXAM OF HEAD AND NECK: CPT

## 2020-06-04 ENCOUNTER — HOSPITAL ENCOUNTER (OUTPATIENT)
Dept: HOSPITAL 83 - LAB | Age: 85
Discharge: HOME | End: 2020-06-04
Attending: FAMILY MEDICINE
Payer: MEDICARE

## 2020-06-04 DIAGNOSIS — E74.00: ICD-10-CM

## 2020-06-04 DIAGNOSIS — J98.11: Primary | ICD-10-CM

## 2020-06-04 DIAGNOSIS — R53.83: ICD-10-CM

## 2020-06-04 DIAGNOSIS — F41.1: ICD-10-CM

## 2020-06-04 DIAGNOSIS — M18.9: ICD-10-CM

## 2020-06-04 LAB
ALBUMIN SERPL-MCNC: 3.4 GM/DL (ref 3.1–4.5)
BUN SERPL-MCNC: 15 MG/DL (ref 7–24)
CHLORIDE SERPL-SCNC: 104 MMOL/L (ref 98–107)
CREAT SERPL-MCNC: 1.1 MG/DL (ref 0.55–1.02)
POTASSIUM SERPL-SCNC: 3.5 MMOL/L (ref 3.5–5.1)
SODIUM SERPL-SCNC: 139 MMOL/L (ref 136–145)
T4 FREE SERPL-MCNC: 1.11 NG/DL (ref 0.76–1.46)
TSH SERPL DL<=0.005 MIU/L-ACNC: 3.14 UIU/ML (ref 0.36–4.75)

## 2020-06-23 ENCOUNTER — HOSPITAL ENCOUNTER (OUTPATIENT)
Dept: HOSPITAL 83 - RAD | Age: 85
Discharge: HOME | End: 2020-06-23
Attending: FAMILY MEDICINE
Payer: MEDICARE

## 2020-06-23 DIAGNOSIS — J98.11: Primary | ICD-10-CM

## 2020-10-09 ENCOUNTER — HOSPITAL ENCOUNTER (EMERGENCY)
Dept: HOSPITAL 83 - ED | Age: 85
Discharge: HOME | End: 2020-10-09
Payer: MEDICARE

## 2020-10-09 VITALS — HEIGHT: 55 IN

## 2020-10-09 DIAGNOSIS — Z88.8: ICD-10-CM

## 2020-10-09 DIAGNOSIS — Z79.899: ICD-10-CM

## 2020-10-09 DIAGNOSIS — S20.361A: Primary | ICD-10-CM

## 2020-10-09 DIAGNOSIS — Z88.2: ICD-10-CM
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
## 2020-11-23 ENCOUNTER — HOSPITAL ENCOUNTER (OUTPATIENT)
Dept: HOSPITAL 83 - COVID19 | Age: 85
Discharge: HOME | End: 2020-11-23
Attending: FAMILY MEDICINE
Payer: MEDICARE

## 2020-11-23 DIAGNOSIS — Z20.828: Primary | ICD-10-CM

## 2021-03-03 ENCOUNTER — APPOINTMENT (OUTPATIENT)
Dept: GENERAL RADIOLOGY | Age: 86
DRG: 308 | End: 2021-03-03
Payer: MEDICARE

## 2021-03-03 ENCOUNTER — HOSPITAL ENCOUNTER (INPATIENT)
Age: 86
LOS: 7 days | Discharge: HOME | DRG: 308 | End: 2021-03-10
Attending: EMERGENCY MEDICINE | Admitting: INTERNAL MEDICINE
Payer: MEDICARE

## 2021-03-03 DIAGNOSIS — I48.91 ATRIAL FIBRILLATION WITH RVR (HCC): Primary | ICD-10-CM

## 2021-03-03 DIAGNOSIS — D72.829 LEUKOCYTOSIS, UNSPECIFIED TYPE: ICD-10-CM

## 2021-03-03 DIAGNOSIS — I50.9 CONGESTIVE HEART FAILURE, UNSPECIFIED HF CHRONICITY, UNSPECIFIED HEART FAILURE TYPE (HCC): ICD-10-CM

## 2021-03-03 LAB
ALBUMIN SERPL-MCNC: 2.7 G/DL (ref 3.5–5.2)
ALP BLD-CCNC: 62 U/L (ref 35–104)
ALT SERPL-CCNC: 9 U/L (ref 0–32)
ANION GAP SERPL CALCULATED.3IONS-SCNC: 12 MMOL/L (ref 7–16)
AST SERPL-CCNC: 15 U/L (ref 0–31)
BASOPHILS ABSOLUTE: 0 E9/L (ref 0–0.2)
BASOPHILS RELATIVE PERCENT: 0 % (ref 0–2)
BILIRUB SERPL-MCNC: 0.9 MG/DL (ref 0–1.2)
BILIRUBIN URINE: NEGATIVE
BLOOD, URINE: NEGATIVE
BUN BLDV-MCNC: 24 MG/DL (ref 8–23)
CALCIUM SERPL-MCNC: 7.3 MG/DL (ref 8.6–10.2)
CHLORIDE BLD-SCNC: 103 MMOL/L (ref 98–107)
CHOLESTEROL, TOTAL: 138 MG/DL (ref 0–199)
CLARITY: CLEAR
CO2: 21 MMOL/L (ref 22–29)
COLOR: YELLOW
CREAT SERPL-MCNC: 1 MG/DL (ref 0.5–1)
EKG ATRIAL RATE: 133 BPM
EKG Q-T INTERVAL: 296 MS
EKG QRS DURATION: 88 MS
EKG QTC CALCULATION (BAZETT): 506 MS
EKG R AXIS: -46 DEGREES
EKG T AXIS: 138 DEGREES
EKG VENTRICULAR RATE: 176 BPM
EOSINOPHILS ABSOLUTE: 0 E9/L (ref 0.05–0.5)
EOSINOPHILS RELATIVE PERCENT: 0 % (ref 0–6)
GFR AFRICAN AMERICAN: >60
GFR NON-AFRICAN AMERICAN: 53 ML/MIN/1.73
GLUCOSE BLD-MCNC: 232 MG/DL (ref 74–99)
GLUCOSE URINE: 100 MG/DL
HBA1C MFR BLD: 8.6 % (ref 4–5.6)
HCT VFR BLD CALC: 38.4 % (ref 34–48)
HDLC SERPL-MCNC: 36 MG/DL
HEMOGLOBIN: 12.9 G/DL (ref 11.5–15.5)
INR BLD: 1.3
KETONES, URINE: NEGATIVE MG/DL
LACTIC ACID, SEPSIS: 2.7 MMOL/L (ref 0.5–1.9)
LACTIC ACID, SEPSIS: 2.7 MMOL/L (ref 0.5–1.9)
LDL CHOLESTEROL CALCULATED: 80 MG/DL (ref 0–99)
LEUKOCYTE ESTERASE, URINE: NEGATIVE
LIPASE: 26 U/L (ref 13–60)
LYMPHOCYTES ABSOLUTE: 1.16 E9/L (ref 1.5–4)
LYMPHOCYTES RELATIVE PERCENT: 6.1 % (ref 20–42)
MAGNESIUM: 1.4 MG/DL (ref 1.6–2.6)
MAGNESIUM: 1.5 MG/DL (ref 1.6–2.6)
MCH RBC QN AUTO: 29 PG (ref 26–35)
MCHC RBC AUTO-ENTMCNC: 33.6 % (ref 32–34.5)
MCV RBC AUTO: 86.3 FL (ref 80–99.9)
METAMYELOCYTES RELATIVE PERCENT: 0.9 % (ref 0–1)
METER GLUCOSE: 162 MG/DL (ref 74–99)
MONOCYTES ABSOLUTE: 0.97 E9/L (ref 0.1–0.95)
MONOCYTES RELATIVE PERCENT: 5.2 % (ref 2–12)
NEUTROPHILS ABSOLUTE: 17.27 E9/L (ref 1.8–7.3)
NEUTROPHILS RELATIVE PERCENT: 87.8 % (ref 43–80)
NITRITE, URINE: NEGATIVE
NUCLEATED RED BLOOD CELLS: 0 /100 WBC
PDW BLD-RTO: 14.7 FL (ref 11.5–15)
PH UA: 5.5 (ref 5–9)
PLATELET # BLD: 394 E9/L (ref 130–450)
PMV BLD AUTO: 10.8 FL (ref 7–12)
POTASSIUM REFLEX MAGNESIUM: 3.5 MMOL/L (ref 3.5–5)
PRO-BNP: ABNORMAL PG/ML (ref 0–450)
PROTEIN UA: NEGATIVE MG/DL
PROTHROMBIN TIME: 13.8 SEC (ref 9.3–12.4)
RBC # BLD: 4.45 E12/L (ref 3.5–5.5)
REASON FOR REJECTION: NORMAL
REJECTED TEST: NORMAL
SARS-COV-2, NAAT: NOT DETECTED
SODIUM BLD-SCNC: 136 MMOL/L (ref 132–146)
SPECIFIC GRAVITY UA: 1.02 (ref 1–1.03)
T4 FREE: 1.46 NG/DL (ref 0.93–1.7)
TOTAL PROTEIN: 5.7 G/DL (ref 6.4–8.3)
TRIGL SERPL-MCNC: 112 MG/DL (ref 0–149)
TROPONIN: <0.01 NG/ML (ref 0–0.03)
TSH SERPL DL<=0.05 MIU/L-ACNC: 1.8 UIU/ML (ref 0.27–4.2)
UROBILINOGEN, URINE: 0.2 E.U./DL
VLDLC SERPL CALC-MCNC: 22 MG/DL
WBC # BLD: 19.4 E9/L (ref 4.5–11.5)

## 2021-03-03 PROCEDURE — 99285 EMERGENCY DEPT VISIT HI MDM: CPT

## 2021-03-03 PROCEDURE — 83735 ASSAY OF MAGNESIUM: CPT

## 2021-03-03 PROCEDURE — 87040 BLOOD CULTURE FOR BACTERIA: CPT

## 2021-03-03 PROCEDURE — 83880 ASSAY OF NATRIURETIC PEPTIDE: CPT

## 2021-03-03 PROCEDURE — APPSS60 APP SPLIT SHARED TIME 46-60 MINUTES: Performed by: NURSE PRACTITIONER

## 2021-03-03 PROCEDURE — 80053 COMPREHEN METABOLIC PANEL: CPT

## 2021-03-03 PROCEDURE — 87635 SARS-COV-2 COVID-19 AMP PRB: CPT

## 2021-03-03 PROCEDURE — 80061 LIPID PANEL: CPT

## 2021-03-03 PROCEDURE — 85025 COMPLETE CBC W/AUTO DIFF WBC: CPT

## 2021-03-03 PROCEDURE — 85610 PROTHROMBIN TIME: CPT

## 2021-03-03 PROCEDURE — 96366 THER/PROPH/DIAG IV INF ADDON: CPT

## 2021-03-03 PROCEDURE — 82962 GLUCOSE BLOOD TEST: CPT

## 2021-03-03 PROCEDURE — 93005 ELECTROCARDIOGRAM TRACING: CPT | Performed by: EMERGENCY MEDICINE

## 2021-03-03 PROCEDURE — 96368 THER/DIAG CONCURRENT INF: CPT

## 2021-03-03 PROCEDURE — 2500000003 HC RX 250 WO HCPCS: Performed by: NURSE PRACTITIONER

## 2021-03-03 PROCEDURE — 2500000003 HC RX 250 WO HCPCS: Performed by: EMERGENCY MEDICINE

## 2021-03-03 PROCEDURE — 2580000003 HC RX 258: Performed by: EMERGENCY MEDICINE

## 2021-03-03 PROCEDURE — 83605 ASSAY OF LACTIC ACID: CPT

## 2021-03-03 PROCEDURE — 83690 ASSAY OF LIPASE: CPT

## 2021-03-03 PROCEDURE — 99223 1ST HOSP IP/OBS HIGH 75: CPT | Performed by: INTERNAL MEDICINE

## 2021-03-03 PROCEDURE — 6360000002 HC RX W HCPCS: Performed by: STUDENT IN AN ORGANIZED HEALTH CARE EDUCATION/TRAINING PROGRAM

## 2021-03-03 PROCEDURE — 83036 HEMOGLOBIN GLYCOSYLATED A1C: CPT

## 2021-03-03 PROCEDURE — 2060000000 HC ICU INTERMEDIATE R&B

## 2021-03-03 PROCEDURE — 6360000002 HC RX W HCPCS: Performed by: NURSE PRACTITIONER

## 2021-03-03 PROCEDURE — 6370000000 HC RX 637 (ALT 250 FOR IP): Performed by: FAMILY MEDICINE

## 2021-03-03 PROCEDURE — 96375 TX/PRO/DX INJ NEW DRUG ADDON: CPT

## 2021-03-03 PROCEDURE — 6360000002 HC RX W HCPCS: Performed by: EMERGENCY MEDICINE

## 2021-03-03 PROCEDURE — 81003 URINALYSIS AUTO W/O SCOPE: CPT

## 2021-03-03 PROCEDURE — 6360000002 HC RX W HCPCS

## 2021-03-03 PROCEDURE — 36415 COLL VENOUS BLD VENIPUNCTURE: CPT

## 2021-03-03 PROCEDURE — 71045 X-RAY EXAM CHEST 1 VIEW: CPT

## 2021-03-03 PROCEDURE — 84443 ASSAY THYROID STIM HORMONE: CPT

## 2021-03-03 PROCEDURE — 51702 INSERT TEMP BLADDER CATH: CPT

## 2021-03-03 PROCEDURE — 93010 ELECTROCARDIOGRAM REPORT: CPT | Performed by: INTERNAL MEDICINE

## 2021-03-03 PROCEDURE — 84484 ASSAY OF TROPONIN QUANT: CPT

## 2021-03-03 PROCEDURE — 96372 THER/PROPH/DIAG INJ SC/IM: CPT

## 2021-03-03 PROCEDURE — 96365 THER/PROPH/DIAG IV INF INIT: CPT

## 2021-03-03 PROCEDURE — 96376 TX/PRO/DX INJ SAME DRUG ADON: CPT

## 2021-03-03 PROCEDURE — 84439 ASSAY OF FREE THYROXINE: CPT

## 2021-03-03 RX ORDER — ESMOLOL HYDROCHLORIDE 10 MG/ML
50-300 INJECTION, SOLUTION INTRAVENOUS CONTINUOUS
Status: DISCONTINUED | OUTPATIENT
Start: 2021-03-03 | End: 2021-03-05

## 2021-03-03 RX ORDER — ESMOLOL HYDROCHLORIDE 10 MG/ML
30 INJECTION INTRAVENOUS ONCE
Status: COMPLETED | OUTPATIENT
Start: 2021-03-03 | End: 2021-03-03

## 2021-03-03 RX ORDER — METOPROLOL SUCCINATE 50 MG/1
50 TABLET, EXTENDED RELEASE ORAL 2 TIMES DAILY
COMMUNITY
End: 2021-09-16 | Stop reason: SDUPTHER

## 2021-03-03 RX ORDER — SODIUM CHLORIDE 9 MG/ML
INJECTION, SOLUTION INTRAVENOUS ONCE
Status: COMPLETED | OUTPATIENT
Start: 2021-03-03 | End: 2021-03-03

## 2021-03-03 RX ORDER — DILTIAZEM HYDROCHLORIDE 5 MG/ML
20 INJECTION INTRAVENOUS ONCE
Status: COMPLETED | OUTPATIENT
Start: 2021-03-03 | End: 2021-03-03

## 2021-03-03 RX ORDER — DEXTROSE MONOHYDRATE 25 G/50ML
12.5 INJECTION, SOLUTION INTRAVENOUS PRN
Status: DISCONTINUED | OUTPATIENT
Start: 2021-03-03 | End: 2021-03-10 | Stop reason: HOSPADM

## 2021-03-03 RX ORDER — DEXTROSE MONOHYDRATE 50 MG/ML
100 INJECTION, SOLUTION INTRAVENOUS PRN
Status: DISCONTINUED | OUTPATIENT
Start: 2021-03-03 | End: 2021-03-10 | Stop reason: HOSPADM

## 2021-03-03 RX ORDER — DILTIAZEM HYDROCHLORIDE 5 MG/ML
8 INJECTION INTRAVENOUS ONCE
Status: COMPLETED | OUTPATIENT
Start: 2021-03-03 | End: 2021-03-03

## 2021-03-03 RX ORDER — FUROSEMIDE 10 MG/ML
40 INJECTION INTRAMUSCULAR; INTRAVENOUS 2 TIMES DAILY
Status: DISCONTINUED | OUTPATIENT
Start: 2021-03-03 | End: 2021-03-09

## 2021-03-03 RX ORDER — POTASSIUM CHLORIDE 20 MEQ/1
20 TABLET, EXTENDED RELEASE ORAL DAILY
COMMUNITY
End: 2021-10-29

## 2021-03-03 RX ORDER — PANTOPRAZOLE SODIUM 40 MG/1
40 TABLET, DELAYED RELEASE ORAL
Status: DISCONTINUED | OUTPATIENT
Start: 2021-03-04 | End: 2021-03-10 | Stop reason: HOSPADM

## 2021-03-03 RX ORDER — CETIRIZINE HYDROCHLORIDE 10 MG/1
10 TABLET ORAL DAILY
COMMUNITY
End: 2021-04-27

## 2021-03-03 RX ORDER — DIGOXIN 0.25 MG/ML
250 INJECTION INTRAMUSCULAR; INTRAVENOUS EVERY 6 HOURS
Status: COMPLETED | OUTPATIENT
Start: 2021-03-03 | End: 2021-03-04

## 2021-03-03 RX ORDER — NICOTINE POLACRILEX 4 MG
15 LOZENGE BUCCAL PRN
Status: DISCONTINUED | OUTPATIENT
Start: 2021-03-03 | End: 2021-03-10 | Stop reason: HOSPADM

## 2021-03-03 RX ORDER — METOPROLOL TARTRATE 5 MG/5ML
5 INJECTION INTRAVENOUS ONCE
Status: COMPLETED | OUTPATIENT
Start: 2021-03-03 | End: 2021-03-03

## 2021-03-03 RX ORDER — DILTIAZEM HYDROCHLORIDE 5 MG/ML
0.25 INJECTION INTRAVENOUS ONCE
Status: COMPLETED | OUTPATIENT
Start: 2021-03-03 | End: 2021-03-03

## 2021-03-03 RX ORDER — FERROUS SULFATE 325(65) MG
325 TABLET ORAL
COMMUNITY

## 2021-03-03 RX ORDER — MAGNESIUM SULFATE IN WATER 40 MG/ML
2000 INJECTION, SOLUTION INTRAVENOUS ONCE
Status: COMPLETED | OUTPATIENT
Start: 2021-03-03 | End: 2021-03-03

## 2021-03-03 RX ORDER — VALSARTAN 320 MG/1
320 TABLET ORAL DAILY
Status: ON HOLD | COMMUNITY
End: 2021-03-10 | Stop reason: HOSPADM

## 2021-03-03 RX ORDER — DIGOXIN 0.25 MG/ML
250 INJECTION INTRAMUSCULAR; INTRAVENOUS ONCE
Status: COMPLETED | OUTPATIENT
Start: 2021-03-03 | End: 2021-03-03

## 2021-03-03 RX ORDER — FUROSEMIDE 10 MG/ML
40 INJECTION INTRAMUSCULAR; INTRAVENOUS ONCE
Status: COMPLETED | OUTPATIENT
Start: 2021-03-03 | End: 2021-03-03

## 2021-03-03 RX ORDER — DIGOXIN 0.25 MG/ML
INJECTION INTRAMUSCULAR; INTRAVENOUS
Status: COMPLETED
Start: 2021-03-03 | End: 2021-03-03

## 2021-03-03 RX ORDER — ADENOSINE 3 MG/ML
6 INJECTION, SOLUTION INTRAVENOUS ONCE
Status: DISCONTINUED | OUTPATIENT
Start: 2021-03-03 | End: 2021-03-03

## 2021-03-03 RX ADMIN — DIGOXIN 250 MCG: 0.25 INJECTION INTRAMUSCULAR; INTRAVENOUS at 22:05

## 2021-03-03 RX ADMIN — ESMOLOL HYDROCHLORIDE 50 MCG/KG/MIN: 10 INJECTION INTRAVENOUS at 13:43

## 2021-03-03 RX ADMIN — ESMOLOL HYDROCHLORIDE 150 MCG/KG/MIN: 10 INJECTION INTRAVENOUS at 18:18

## 2021-03-03 RX ADMIN — ESMOLOL HYDROCHLORIDE 30 MG: 10 INJECTION, SOLUTION INTRAVENOUS at 13:42

## 2021-03-03 RX ADMIN — FUROSEMIDE 40 MG: 10 INJECTION, SOLUTION INTRAMUSCULAR; INTRAVENOUS at 18:23

## 2021-03-03 RX ADMIN — DILTIAZEM HYDROCHLORIDE 17 MG: 5 INJECTION INTRAVENOUS at 11:16

## 2021-03-03 RX ADMIN — DIGOXIN 250 MCG: 0.25 INJECTION INTRAMUSCULAR; INTRAVENOUS at 15:15

## 2021-03-03 RX ADMIN — MAGNESIUM SULFATE HEPTAHYDRATE 2000 MG: 40 INJECTION, SOLUTION INTRAVENOUS at 13:15

## 2021-03-03 RX ADMIN — ESMOLOL HYDROCHLORIDE 200 MCG/KG/MIN: 10 INJECTION INTRAVENOUS at 21:56

## 2021-03-03 RX ADMIN — SODIUM CHLORIDE: 9 INJECTION, SOLUTION INTRAVENOUS at 11:15

## 2021-03-03 RX ADMIN — DEXTROSE MONOHYDRATE 5 MG/HR: 50 INJECTION, SOLUTION INTRAVENOUS at 11:22

## 2021-03-03 RX ADMIN — DIGOXIN 250 MCG: 0.25 INJECTION INTRAMUSCULAR; INTRAVENOUS at 12:19

## 2021-03-03 RX ADMIN — FUROSEMIDE 40 MG: 10 INJECTION, SOLUTION INTRAMUSCULAR; INTRAVENOUS at 13:19

## 2021-03-03 RX ADMIN — ENOXAPARIN SODIUM 70 MG: 80 INJECTION SUBCUTANEOUS at 13:44

## 2021-03-03 RX ADMIN — DILTIAZEM HYDROCHLORIDE 20 MG: 5 INJECTION INTRAVENOUS at 11:38

## 2021-03-03 RX ADMIN — METOPROLOL TARTRATE 5 MG: 5 INJECTION INTRAVENOUS at 12:54

## 2021-03-03 RX ADMIN — INSULIN LISPRO 1 UNITS: 100 INJECTION, SOLUTION INTRAVENOUS; SUBCUTANEOUS at 20:27

## 2021-03-03 RX ADMIN — DIGOXIN: 250 INJECTION, SOLUTION INTRAMUSCULAR; INTRAVENOUS; PARENTERAL at 17:44

## 2021-03-03 RX ADMIN — DILTIAZEM HYDROCHLORIDE 8 MG: 5 INJECTION INTRAVENOUS at 11:17

## 2021-03-03 ASSESSMENT — ENCOUNTER SYMPTOMS
VOMITING: 0
COUGH: 1
NAUSEA: 0
CONSTIPATION: 0
CHEST TIGHTNESS: 1
RHINORRHEA: 0
DIARRHEA: 0
SHORTNESS OF BREATH: 1
ABDOMINAL PAIN: 0
SORE THROAT: 0

## 2021-03-03 ASSESSMENT — PAIN SCALES - GENERAL
PAINLEVEL_OUTOF10: 0
PAINLEVEL_OUTOF10: 0

## 2021-03-03 NOTE — H&P
[place] [id number]  Resident History and Physical      CHIEF COMPLAINT:    Chief Complaint   Patient presents with    Emesis     \"havent ate or drank in 2 days\"    Cough     heaviness in chest when coughting    Tachycardia        History of Present Illness:   Farhana Rich  is a 81 y.o. female patient of Nemesio Frye MD  with a pertinent PMHx of DM, HTN, JERICA, GERD, CHF class 1, and depression who presented to the ER from home with chief complaint of cough, shortness of breath, and lightheadedness. She says that her shortness of breath and cough have been going on for months and she was told by her PCP she might have asthma. She states that the lightheadedness started approximately one month ago, and she thought she had a sinus infection. She states that her son was concerned about her symptoms and encouraged her to come into the ED. She states that she hasn't taken any of her medication in 2 days. She states that her symptoms were improved by her medications and exacerbated by nothing. She denies fever, chest pain, headache, palpitations, and swelling. ED Course: In the ED, she was found to be in A Fib RVR with HR of 170-190s. She has no history of A Fib and has not been on any anticoagulation. She was given Cardizem, metoprolol, and digoxin with no response of her HR. Cardiology was consulted from the ED and started the patient on Esmolol drip which improved her HR to 120-130s. Patient was started on therapeutic lovenox in the ED as well. Patient was found to have an elevated WBC and lactate. UA was negative. Chest Xray showed hazy opacities in the lungs bilaterally which may represent an infectious/inflammatory process. Blood cultures were drawn and patient was afebrile. ROS:   Review of Systems   Constitutional: Negative for chills, fatigue and fever. HENT: Negative for congestion, rhinorrhea and sore throat.     Respiratory: Positive for cough, chest tightness and shortness of breath. Cardiovascular: Negative for chest pain and palpitations. Gastrointestinal: Negative for abdominal pain, constipation, diarrhea, nausea and vomiting. Genitourinary: Negative for dysuria and frequency. Neurological: Positive for light-headedness. Negative for dizziness and headaches. All other systems reviewed and are negative. PMHx:  Past Medical History:   Diagnosis Date    Acute cystitis with hematuria 5/3/2019    Anemia 2008    RECEIVED 4 UNITS BLOOD    Atrial fibrillation (HCC)     CKD (chronic kidney disease) stage 3, GFR 30-59 ml/min 5/3/2019    CPAP (continuous positive airway pressure) dependence     Diabetes mellitus (HCC)     Esophageal stricture     Gastric ulcer     Goiter     Heart murmur     Hypertension     SINCE 1994    Kidney stones     Rheumatic fever     POSSIBLY AS A CHILD    Sleep apnea        PSHx  Past Surgical History:   Procedure Laterality Date    BREAST LUMPECTOMY Left     CALCIUM BUILDUP    CARDIOVASCULAR STRESS TEST  1999    ISCHEMIC NUCLEAR STRESS TEST - PATIENT REFUSED HEART CATHETERIZATION    CHOLECYSTECTOMY      COLONOSCOPY  07/11    W/POLYP REMOVAL    DOPPLER ECHOCARDIOGRAPHY  05/14/03    POSSIBLE MILD MITRAL STENOSIS    DOPPLER ECHOCARDIOGRAPHY  06/09/04    MILD MITRAL STENSIS, MODERATE MITRAL REGURGITATION    FOOT SURGERY      HYSTERECTOMY      POLYPECTOMY      16 POLYPS REMOVED FROM STOMACH AND 1 FROM COLON    UPPER GASTROINTESTINAL ENDOSCOPY  07/11       Medications Prior to Admission:    Prior to Admission medications    Medication Sig Start Date End Date Taking?  Authorizing Provider   potassium chloride (KLOR-CON M) 20 MEQ extended release tablet Take 20 mEq by mouth daily   Yes Historical Provider, MD   valsartan (DIOVAN) 320 MG tablet Take 320 mg by mouth daily   Yes Historical Provider, MD   omeprazole (PRILOSEC) 40 MG delayed release capsule Take 1 capsule by mouth every morning 10/17/19  Yes Historical Provider, MD sertraline (ZOLOFT) 50 MG tablet Take 50 mg by mouth daily  9/26/19  Yes Historical Provider, MD   hydrochlorothiazide (HYDRODIURIL) 25 MG tablet Take 1 tablet by mouth daily 5/18/17  Yes Jaxson Wilkerson MD   CPAP Machine MISC by Does not apply route nightly    Yes Historical Provider, MD   vitamin D3 (CHOLECALCIFEROL) 1000 UNITS TABS tablet Take 1 tablet by mouth daily. Patient taking differently: Take 2,000 Units by mouth daily  9/17/14  Yes Manual Mons, DO        Allergies:   Nitrofuran derivatives, Other, and Sulfa antibiotics    Social History:    reports that she has never smoked. She has never used smokeless tobacco. She reports that she does not drink alcohol or use drugs. Family History:   family history includes Alcohol Abuse in her father; Asthma in her mother; Diabetes in her mother. PHYSICAL EXAM:    Vitals:  /71   Pulse 136   Temp 97.8 °F (36.6 °C) (Oral)   Resp 16   Ht 5' 6\" (1.676 m)   Wt 150 lb (68 kg)   SpO2 96%   BMI 24.21 kg/m²     Physical Exam  Constitutional:       General: She is not in acute distress. Appearance: Normal appearance. HENT:      Head: Normocephalic and atraumatic. Nose: Nose normal.      Mouth/Throat:      Mouth: Mucous membranes are moist.      Pharynx: Oropharynx is clear. Eyes:      Extraocular Movements: Extraocular movements intact. Conjunctiva/sclera: Conjunctivae normal.   Neck:      Musculoskeletal: Normal range of motion. Cardiovascular:      Rate and Rhythm: Tachycardia present. Rhythm irregularly irregular. Pulses: Normal pulses. Heart sounds: Normal heart sounds. No murmur. Pulmonary:      Effort: Pulmonary effort is normal.      Breath sounds: Normal breath sounds. No wheezing. Abdominal:      General: Bowel sounds are normal. There is no distension. Palpations: Abdomen is soft. Tenderness: There is no abdominal tenderness. Musculoskeletal:         General: No swelling.    Skin:     General: Range    Protime 13.8 (H) 9.3 - 12.4 sec    INR 1.3    TSH without Reflex    Collection Time: 03/03/21 11:18 AM   Result Value Ref Range    TSH 1.800 0.270 - 4.200 uIU/mL   COVID-19, Rapid    Collection Time: 03/03/21 11:18 AM    Specimen: Nasopharyngeal Swab   Result Value Ref Range    SARS-CoV-2, NAAT Not Detected Not Detected   Hemoglobin A1C    Collection Time: 03/03/21 11:18 AM   Result Value Ref Range    Hemoglobin A1C 8.6 (H) 4.0 - 5.6 %   Lactate, Sepsis    Collection Time: 03/03/21 11:30 AM   Result Value Ref Range    Lactic Acid, Sepsis 2.7 (H) 0.5 - 1.9 mmol/L   SPECIMEN REJECTION    Collection Time: 03/03/21 12:40 PM   Result Value Ref Range    Rejected Test CMP, Trop     Reason for Rejection see below    Lactate, Sepsis    Collection Time: 03/03/21  1:00 PM   Result Value Ref Range    Lactic Acid, Sepsis 2.7 (H) 0.5 - 1.9 mmol/L   Comprehensive Metabolic Panel w/ Reflex to MG    Collection Time: 03/03/21  1:00 PM   Result Value Ref Range    Sodium 136 132 - 146 mmol/L    Potassium reflex Magnesium 3.5 3.5 - 5.0 mmol/L    Chloride 103 98 - 107 mmol/L    CO2 21 (L) 22 - 29 mmol/L    Anion Gap 12 7 - 16 mmol/L    Glucose 232 (H) 74 - 99 mg/dL    BUN 24 (H) 8 - 23 mg/dL    CREATININE 1.0 0.5 - 1.0 mg/dL    GFR Non-African American 53 >=60 mL/min/1.73    GFR African American >60     Calcium 7.3 (L) 8.6 - 10.2 mg/dL    Total Protein 5.7 (L) 6.4 - 8.3 g/dL    Albumin 2.7 (L) 3.5 - 5.2 g/dL    Total Bilirubin 0.9 0.0 - 1.2 mg/dL    Alkaline Phosphatase 62 35 - 104 U/L    ALT 9 0 - 32 U/L    AST 15 0 - 31 U/L   Troponin    Collection Time: 03/03/21  1:00 PM   Result Value Ref Range    Troponin <0.01 0.00 - 0.03 ng/mL   Lipid Panel    Collection Time: 03/03/21  1:00 PM   Result Value Ref Range    Cholesterol, Total 138 0 - 199 mg/dL    Triglycerides 112 0 - 149 mg/dL    HDL 36 >40 mg/dL    LDL Calculated 80 0 - 99 mg/dL    VLDL Cholesterol Calculated 22 mg/dL   Magnesium    Collection Time: 03/03/21  1:00 PM Result Value Ref Range    Magnesium 1.4 (L) 1.6 - 2.6 mg/dL   T4, Free    Collection Time: 03/03/21  1:00 PM   Result Value Ref Range    T4 Free 1.46 0.93 - 1.70 ng/dL   Urinalysis    Collection Time: 03/03/21  2:11 PM   Result Value Ref Range    Color, UA Yellow Straw/Yellow    Clarity, UA Clear Clear    Glucose, Ur 100 (A) Negative mg/dL    Bilirubin Urine Negative Negative    Ketones, Urine Negative Negative mg/dL    Specific Gravity, UA 1.020 1.005 - 1.030    Blood, Urine Negative Negative    pH, UA 5.5 5.0 - 9.0    Protein, UA Negative Negative mg/dL    Urobilinogen, Urine 0.2 <2.0 E.U./dL    Nitrite, Urine Negative Negative    Leukocyte Esterase, Urine Negative Negative       XR CHEST PORTABLE   Final Result   Hazy opacities in the lungs bilaterally may represent edema or an   infectious/inflammatory process. ASSESSMENT/PLAN:      Active Hospital Problems    Diagnosis Date Noted    Atrial fibrillation with RVR ([address] [I48.91] 03/03/2021     1. A Fib with RVR  - EKG showed A fib RVR with ventricular rate of 176  - Patient given Cardizem x3, Metoprolol, Digoxin x2, and started on esmolol drip, improved heart rate to 120-130s  - Started on therapeutic Lovenox in ED, will need to switch to oral anticoagulation at discharge  - TSH and T4 normal, troponin negative, elevated pro-BNP, decreased magnesium supplemented x1  - Patient on 4L O2 in ED resting comfortably sating at 95%, no O2 at home, history of JERICA and uses CPAP at home  - Admit to telemetry  - Cardiology consulted from ED, following  - ECHO ordered  - Daily weights, strict I/Os, daily BMP    2. Leukocytosis  - WBC elevated  - Patient afebrile  - Blood cultures collected  - UA negative for infection, COVID-19 negative  - Elevated lactate, possibly secondary to hypoperfusion from Afib  - Repeat CBC tomorrow  - Hold off on antibiotics  - Continue to monitor for fevers    3.  History of stage 1 CHF  Last ECHO in December 2019, EF of 55-60%, mild to moderate mitral stenosis and regurg, mild to moderate aortic regurg, and aortic regurg  - No lower extremity swelling  - Cardiology following  - Lasix 40mg BID  - ECHO pending    4. Diabetes  - Hb A1C is 8.6  - Patient not currently on any diabetic medication as outpatient  - Placed patient on MDSSI  - Hypoglycemia protocol ordered    5. HTN  - Hold home hydrochlorothiazide and valsartan  - Currently on esmolol drip and Lasix BID  - Normotensive in ED, 101/77    6. GERD  - Continue home omeprazole    7. Depression  - Continue Zoloft     DVT ppx: Lovenox  GI ppx: omeprazole  Code Status: Full code    Case discussed with attending Dr. Swathi Ordonez.     Helene Canela MD  Family Medicine Resident PGY-1  3/3/2021

## 2021-03-03 NOTE — ED PROVIDER NOTES
Other, and Sulfa antibiotics    -------------------------------------------------- RESULTS -------------------------------------------------  All laboratory and radiology results have been personally reviewed by myself   LABS:  Results for orders placed or performed during the hospital encounter of 03/03/21   COVID-19, Rapid    Specimen: Nasopharyngeal Swab   Result Value Ref Range    SARS-CoV-2, NAAT Not Detected Not Detected   Lactate, Sepsis   Result Value Ref Range    Lactic Acid, Sepsis 2.7 (H) 0.5 - 1.9 mmol/L   Lactate, Sepsis   Result Value Ref Range    Lactic Acid, Sepsis 2.7 (H) 0.5 - 1.9 mmol/L   CBC Auto Differential   Result Value Ref Range    WBC 19.4 (H) 4.5 - 11.5 E9/L    RBC 4.45 3.50 - 5.50 E12/L    Hemoglobin 12.9 11.5 - 15.5 g/dL    Hematocrit 38.4 34.0 - 48.0 %    MCV 86.3 80.0 - 99.9 fL    MCH 29.0 26.0 - 35.0 pg    MCHC 33.6 32.0 - 34.5 %    RDW 14.7 11.5 - 15.0 fL    Platelets 774 886 - 872 E9/L    MPV 10.8 7.0 - 12.0 fL    Neutrophils % 87.8 (H) 43.0 - 80.0 %    Lymphocytes % 6.1 (L) 20.0 - 42.0 %    Monocytes % 5.2 2.0 - 12.0 %    Eosinophils % 0.0 0.0 - 6.0 %    Basophils % 0.0 0.0 - 2.0 %    Neutrophils Absolute 17.27 (H) 1.80 - 7.30 E9/L    Lymphocytes Absolute 1.16 (L) 1.50 - 4.00 E9/L    Monocytes Absolute 0.97 (H) 0.10 - 0.95 E9/L    Eosinophils Absolute 0.00 (L) 0.05 - 0.50 E9/L    Basophils Absolute 0.00 0.00 - 0.20 E9/L    Metamyelocytes Relative 0.9 0.0 - 1.0 %    nRBC 0.0 /100 WBC   Magnesium   Result Value Ref Range    Magnesium 1.5 (L) 1.6 - 2.6 mg/dL   Lipase   Result Value Ref Range    Lipase 26 13 - 60 U/L   Brain Natriuretic Peptide   Result Value Ref Range    Pro-BNP 18,248 (H) 0 - 450 pg/mL   Protime-INR   Result Value Ref Range    Protime 13.8 (H) 9.3 - 12.4 sec    INR 1.3    TSH without Reflex   Result Value Ref Range    TSH 1.800 0.270 - 4.200 uIU/mL   SPECIMEN REJECTION   Result Value Ref Range    Rejected Test CMP, Trop     Reason for Rejection see below Comprehensive Metabolic Panel w/ Reflex to MG   Result Value Ref Range    Sodium 136 132 - 146 mmol/L    Potassium reflex Magnesium 3.5 3.5 - 5.0 mmol/L    Chloride 103 98 - 107 mmol/L    CO2 21 (L) 22 - 29 mmol/L    Anion Gap 12 7 - 16 mmol/L    Glucose 232 (H) 74 - 99 mg/dL    BUN 24 (H) 8 - 23 mg/dL    CREATININE 1.0 0.5 - 1.0 mg/dL    GFR Non-African American 53 >=60 mL/min/1.73    GFR African American >60     Calcium 7.3 (L) 8.6 - 10.2 mg/dL    Total Protein 5.7 (L) 6.4 - 8.3 g/dL    Albumin 2.7 (L) 3.5 - 5.2 g/dL    Total Bilirubin 0.9 0.0 - 1.2 mg/dL    Alkaline Phosphatase 62 35 - 104 U/L    ALT 9 0 - 32 U/L    AST 15 0 - 31 U/L   Troponin   Result Value Ref Range    Troponin <0.01 0.00 - 0.03 ng/mL   Urinalysis   Result Value Ref Range    Color, UA Yellow Straw/Yellow    Clarity, UA Clear Clear    Glucose, Ur 100 (A) Negative mg/dL    Bilirubin Urine Negative Negative    Ketones, Urine Negative Negative mg/dL    Specific Gravity, UA 1.020 1.005 - 1.030    Blood, Urine Negative Negative    pH, UA 5.5 5.0 - 9.0    Protein, UA Negative Negative mg/dL    Urobilinogen, Urine 0.2 <2.0 E.U./dL    Nitrite, Urine Negative Negative    Leukocyte Esterase, Urine Negative Negative   Lipid Panel   Result Value Ref Range    Cholesterol, Total 138 0 - 199 mg/dL    Triglycerides 112 0 - 149 mg/dL    HDL 36 >40 mg/dL    LDL Calculated 80 0 - 99 mg/dL    VLDL Cholesterol Calculated 22 mg/dL   Hemoglobin A1C   Result Value Ref Range    Hemoglobin A1C 8.6 (H) 4.0 - 5.6 %   Magnesium   Result Value Ref Range    Magnesium 1.4 (L) 1.6 - 2.6 mg/dL   T4, Free   Result Value Ref Range    T4 Free 1.46 0.93 - 1.70 ng/dL   POCT Glucose   Result Value Ref Range    Meter Glucose 162 (H) 74 - 99 mg/dL   EKG 12 Lead   Result Value Ref Range    Ventricular Rate 176 BPM    Atrial Rate 133 BPM    QRS Duration 88 ms    Q-T Interval 296 ms    QTc Calculation (Bazett) 506 ms    R Axis -46 degrees    T Axis 138 degrees RADIOLOGY:  Interpreted by Radiologist.  XR CHEST PORTABLE   Final Result   Hazy opacities in the lungs bilaterally may represent edema or an   infectious/inflammatory process. ------------------------- NURSING NOTES AND VITALS REVIEWED ---------------------------   The nursing notes within the ED encounter and vital signs as below have been reviewed. BP (!) 125/98   Pulse 150   Temp 98 °F (36.7 °C) (Oral)   Resp 22   Ht 5' 6\" (1.676 m)   Wt 150 lb (68 kg)   SpO2 90%   BMI 24.21 kg/m²   Oxygen Saturation Interpretation: Abnormal      ---------------------------------------------------PHYSICAL EXAM--------------------------------------    Physical Exam  Vitals signs reviewed. Constitutional:       General: She is not in acute distress. Appearance: Normal appearance. She is not toxic-appearing. HENT:      Head: Normocephalic and atraumatic. Right Ear: External ear normal.      Left Ear: External ear normal.      Nose: Nose normal. No congestion. Mouth/Throat:      Mouth: Mucous membranes are moist.      Pharynx: Oropharynx is clear. No posterior oropharyngeal erythema. Eyes:      Extraocular Movements: Extraocular movements intact. Pupils: Pupils are equal, round, and reactive to light. Neck:      Musculoskeletal: Normal range of motion and neck supple. No neck rigidity or muscular tenderness. Vascular: No carotid bruit. Cardiovascular:      Rate and Rhythm: Tachycardia present. Rhythm irregular. Pulses: Normal pulses. Heart sounds: No murmur. Pulmonary:      Effort: Pulmonary effort is normal.      Breath sounds: No wheezing or rhonchi. Chest:      Chest wall: No tenderness. Abdominal:      General: Bowel sounds are normal.      Tenderness: There is no abdominal tenderness. There is no right CVA tenderness, left CVA tenderness or guarding. Musculoskeletal:         General: No swelling or deformity.    Lymphadenopathy:      Cervical: No cervical adenopathy. Skin:     General: Skin is warm and dry. Capillary Refill: Capillary refill takes less than 2 seconds. Findings: No erythema, lesion or rash. Neurological:      General: No focal deficit present. Mental Status: She is alert and oriented to person, place, and time. Sensory: No sensory deficit. Motor: No weakness.       Coordination: Coordination normal.      Gait: Gait normal.   Psychiatric:         Mood and Affect: Mood normal.         Behavior: Behavior normal.               ------------------------------ ED COURSE/MEDICAL DECISION MAKING----------------------  Medications   esmolol (BREVIBLOC) 2.5gm/250ml NS infusion (200 mcg/kg/min × 68 kg Intravenous Rate/Dose Change 3/3/21 2032)   perflutren lipid microspheres (DEFINITY) injection 1.65 mg (has no administration in time range)   furosemide (LASIX) injection 40 mg (40 mg Intravenous Given 3/3/21 1823)   pantoprazole (PROTONIX) tablet 40 mg (has no administration in time range)   sertraline (ZOLOFT) tablet 50 mg (50 mg Oral Not Given 3/3/21 2027)   insulin lispro (HUMALOG) injection vial 0-12 Units (0 Units Subcutaneous Not Given 3/3/21 2027)   insulin lispro (HUMALOG) injection vial 0-6 Units (1 Units Subcutaneous Given 3/3/21 2027)   glucose (GLUTOSE) 40 % oral gel 15 g (has no administration in time range)   dextrose 50 % IV solution (has no administration in time range)   glucagon (rDNA) injection 1 mg (has no administration in time range)   dextrose 5 % solution (has no administration in time range)   ipratropium (ATROVENT) 0.02 % nebulizer solution 0.5 mg (has no administration in time range)   0.9 % sodium chloride infusion ( Intravenous Stopped 3/3/21 1253)   dilTIAZem injection 17 mg (17 mg Intravenous Given 3/3/21 1116)   dilTIAZem injection 8 mg (8 mg Intravenous Given 3/3/21 1117)   dilTIAZem injection 20 mg (20 mg Intravenous Given 3/3/21 1138)   digoxin (LANOXIN) injection 250 mcg (250 mcg Intravenous Given 3/3/21 1219)   metoprolol (LOPRESSOR) injection 5 mg (5 mg Intravenous Given 3/3/21 1254)   magnesium sulfate 2000 mg in 50 mL IVPB premix (0 mg Intravenous Stopped 3/3/21 1344)   furosemide (LASIX) injection 40 mg (40 mg Intravenous Given 3/3/21 1319)   enoxaparin (LOVENOX) injection 70 mg (70 mg Subcutaneous Given 3/3/21 1344)   esmolol (BREVIBLOC) injection 30 mg (30 mg Intravenous Given 3/3/21 1342)   digoxin (LANOXIN) injection 250 mcg (250 mcg Intravenous Given 3/3/21 1515)   digoxin (LANOXIN) 0.25 MG/ML injection (  Given 3/3/21 1744)     EKG: This EKG is signed and interpreted by me. Time:1103  Rate: 176  Rhythm: Atrial fibrillation  Interpretation: rapid ventricular resposnve, left axis deviation, LVH  Comparison: changes compared to previous EKG      ED COURSE:  ED Course as of Mar 03 2059   Wed Mar 03, 2021   1224 [facility] cardiology in evaluating patient    [DEBORAH]   [id number] Pt refractory to cardizem. Pt responded well to lopressor brought hear rate down to 140-150s. Esmolol started by cardiology. HR now going down to high 120s. Pt was given lovenox and lasix. Pt will be admitted    [DEBORAH]   0 Dr owen wants digoxin 250mcg    [DEBORAH]      ED Course User Index  [DEBORAH] Mat Martines, DO       Critical care:  Please note that the withdrawal or failure to initiate urgent interventions for this patient would likely result in a life threatening deterioration or permanent disability. Accordingly this patient received 60 minutes of critical care time, excluding separately billable procedures. Medical Decision Making:    Pt presented with afib rvr, HR was 190s on intial arrival afib is new, pt likely has valvular afib, she is not anticoagulated.  Blood pressure was stable, RVR was refractory of cardizem and digoxin, Her HR did like betablocker pt was given lopressor which brought HR down to 140s-150s, Cardiology dr owen saw pt in ED, WE both did not want to cardiovert w electricity or amio since bp was stable and pt was not anticoagulated. He recommended esmolol which did markedly help HR brought down to 120s-140s. Pt admitted to hospitalist group to intermediate floor    Counseling: The emergency provider has spoken with the patient and discussed todays results, in addition to providing specific details for the plan of care and counseling regarding the diagnosis and prognosis. Questions are answered at this time and they are agreeable with the plan.      --------------------------------- IMPRESSION AND DISPOSITION ---------------------------------    IMPRESSION  1. Atrial fibrillation with RVR (HCC)    2. Leukocytosis, unspecified type    3. Congestive heart failure, unspecified HF chronicity, unspecified heart failure type ([facility] [address]        DISPOSITION  Disposition: Admit to intermediate floor  Patient condition is fair      NOTE: This report was transcribed using voice recognition software.  Every effort was made to ensure accuracy; however, inadvertent computerized transcription errors may be present       Vinnie Bowden DO  03/03/21 2100

## 2021-03-03 NOTE — CONSULTS
Inpatient Cardiology Consultation      Reason for Consult:  AF RVR    Consulting Physician: Dr Liss Simon    Requesting Physician:  Dr Stevenson Chatman    Date of Consultation: 3/3/2021    HISTORY OF PRESENT ILLNESS: 82 yo  female known to Dr Wright Needs last seen in office 11/2019. PMH: HTN, HLD, T2DM, JERICA non complaint with C-pap, CKD stage III, VHD ( mild to moderate MS/MR/AI and mild TR with EF 55-60% 12/2019) , HFpEF, rheumatic fever, anemia, esophageal stricture s/p dilatation, and lifelong non smoker. Please note patient is a very poor historian, "[employer] and some concern of short term memory loss. [facility] 3/3/3021 for not eating only drinking water for 2 days along with dizziness/lightheaded. Also complains of chronic SOB (over several years). Denies swelling, orthopnea or PND. Has not taken her medications for 2 days and has not worn her C-pap for at least 2 months (when asked she doesn't really know why she is'nt wearing it). She does admit to feeling depressed. During interview she sis complain hear felt like it was racing. Does also admit to feeling fevered and chilled over last couple of days and sleeping a lot. Only reports chest pain when coughing or taking a deep breath. Upon arrival to ED /67 's-170's AF RVR O2 saturation 92% and placed on 3 liters NC. CXR read as hazy opacities edema versus infiltrates. Medications in ED--> 1 liter NSS, 25 mg IV then 20 mg IV of Cardizem then placed on gtt at 10 mg/hr, Digoxin 0.25 IV, Magnesium 2 grams IV, Lasix 40 mg IV, Lopressor 5 mg IV. HR remains 130-150's AF RVR. Denies any CP currently. Please note: past medical records were reviewed per electronic medical record (EMR) - see detailed reports under Past Medical/ Surgical History. Past Medical History:    1. Rheumatic fever as child  2. "[employer] wears hearing aides  3. Hypertension. 4. Type II diabetes mellitus. 5. Hyperlipidemia.   6. Obstructive sleep apnea non compliant C-pap  7. Exercise MPS, 12/29/2014. 1 minute, 51 seconds Henrique protocol. 109% MPHR. Terminated for dyspnea. No diagnostic electrocardiographic changes. Normal myocardial perfusion images with ejection fraction 90%. No evidence for ischemia. Low risk study. 8. Echo, interpreted by Dr. Adam Webb, 02/12/2015. Normal LV size with borderline global hypokinesis. EF 50-55%. Mild CLVH. Moderate LAE. Moderate-severe MI. Mild AI. Mild-moderate TI. Mild PHTN. 9. TTE 01/08/2016. Normal LV size with moderate CLVH. Normal regional wall motion and systolic function with Stage II diastolic dysfunction. Aortic sclerosis without stenosis, but with mild aortic insufficiency. Thickening of the mitral leaflets with decreased leaflet excursion associated with mild mitral stenosis. Mean transvalvular gradient 7 mmHg and moderate mitral insufficiency with a centrally directed jet. Pulmonary venous flow pattern appeared normal.   10. Pharmacologic MPS, 04/19/2016. EF 75%. No ischemia. Low risk exam.   11. CKD stage III  12. PUD  13. Anemia on iron supplementation  14. Chest Pain-->Pharmacologic MPS, 04/19/2016. EF 75%. No ischemia. Low risk exam.   15. R foot fracture s/p surgical repair at [facility] 4/2019--> NWB RLE on Lovenox QD  16. Thyroid nodules diagnosed in 2019  17. 5/2019 Lexiscan MPS: Non ischemic. EF 70%. NWM.  18. 12/2019 TTE EF 55-60%. Mild to moderate MR/MS/AI. Mild TR. Past Surgical History:    Hx esophageal dilatation, polypectomy, EDITH/BSO age 29, EGD, L breast lumpectomy (benign), cholecystectomy      Medications Prior to admit:  Prior to Admission medications    Medication Sig Start Date End Date Taking?  Authorizing Provider   amoxicillin-clavulanate (AUGMENTIN) 875-125 MG per tablet TAKE ONE TABLET BY MOUTH TWICE A DAY FOR 10 DAYS 11/11/19   Historical Provider, MD   Cetirizine HCl 10 MG CAPS Take by mouth    Historical Provider, MD   Metoprolol Succinate 50 MG CS24 Take 1 tablet by mouth 2 times daily Historical Provider, MD   omeprazole (PRILOSEC) 40 MG delayed release capsule Take 1 capsule by mouth every morning 10/17/19   Historical Provider, MD   sertraline (ZOLOFT) 25 MG tablet Take 1 tablet by mouth daily 9/26/19   Historical Provider, MD   enoxaparin (LOVENOX) 40 MG/0.4ML injection Inject 0.4 mLs into the skin daily  Patient not taking: Reported on 11/12/2019 5/5/19   Armando Haque MD   metoprolol tartrate (LOPRESSOR) 50 MG tablet Take 50 mg by mouth 2 times daily    Historical Provider, MD   valsartan (DIOVAN) 320 MG tablet TAKE ONE TABLET BY MOUTH DAILY 5/24/17   Dat Thacker MD   potassium chloride (KLOR-CON M) 20 MEQ extended release tablet TAKE ONE TABLET BY MOUTH EVERY DAY 5/19/17   Dat Thacker MD   hydrochlorothiazide (HYDRODIURIL) 25 MG tablet Take 1 tablet by mouth daily 5/18/17   Dat Thacker MD   CPAP Machine MISC by Does not apply route nightly Has not used since 8/2018 d/t needing mask and hose    Historical Provider, MD   vitamin D3 (CHOLECALCIFEROL) 1000 UNITS TABS tablet Take 1 tablet by mouth daily. Patient taking differently: Take 2,000 Units by mouth daily  9/17/14   Corena Line, DO   Ferrous Sulfate (IRON) 325 (65 FE) MG TABS Take 1 tablet by mouth daily     Historical Provider, MD       Current Medications:    Current Facility-Administered Medications: 0.9 % sodium chloride infusion, , Intravenous, Once  dilTIAZem 100 mg in dextrose 5 % 100 mL infusion (ADD-Montara), 5-15 mg/hr, Intravenous, Continuous    Allergies:  Nitrofuran derivatives and Sulfa antibiotics    Social History:    Lifelong non smoker  Denies ETOH/Illicit Drugs  Caffeine: Decaf Tea  Activity; Lives with son in one story home with basement. + chronic LEE but no CP with ADL's. Code Status: Full Code      Family History: Non contributory secondary to age      REVIEW OF SYSTEMS:     · Constitutional: + fatigue, sleeping last 2-3 days. + fevers, chills.  Denies night sweats  · Eyes: Denies visual changes or drainage  · ENT: + [place]. Denies headaches. No mouth sores or sore throat. No epistaxis   · Cardiovascular: Denies chest pain, pressure or palpitations. No lower extremity swelling. · Respiratory: + LEE. Denies cough, orthopnea or PND. No hemoptysis   · Gastrointestinal: + poor appetite. Only drinking water. Denies hematemesis or anorexia. No hematochezia or melena    · Genitourinary: Denies urgency, dysuria or hematuria. · Musculoskeletal: Denies gait disturbance, weakness or joint complaints  · Integumentary: Denies rash, hives or pruritis   · Neurological: + dizzy. Denies  headaches or seizures. No numbness or tingling  · Psychiatric: Denies anxiety or depression. · Endocrine: Denies temperature intolerance. No recent weight change. .  · Hematologic/Lymphatic: Denies abnormal bruising or bleeding. No swollen lymph nodes    PHYSICAL EXAM:   /64   Pulse 163   Temp 98.3 °F (36.8 °C)   Resp 14   Ht 5' 6\" (1.676 m)   Wt 150 lb (68 kg)   SpO2 96%   BMI 24.21 kg/m²   CONST:  Well developed, ill appearing elderly  female who appears of stated age. Awake, alert and cooperative. No apparent distress. HEENT:   Head- Normocephalic, atraumatic   Eyes- Conjunctivae pink, anicteric  Throat- Oral mucosa pink and moist  Neck-  No stridor, trachea midline, + JVD. No carotid bruit. CHEST: Chest symmetrical and non-tender to palpation. No accessory muscle use or intercostal retractions  RESPIRATORY: Lung sounds - crackles in the bases, on 3 liters NC  CARDIOVASCULAR:     Heart Ausculation- IRRR. No murmur heard. PV: Trace BLE edema. No varicosities. Pedal pulses palpable, no clubbing or cyanosis   ABDOMEN: Soft, non-tender to light palpation. Bowel sounds present. No palpable masses; no abdominal bruit  MS: Good muscle strength and tone. No atrophy or abnormal movements. : Deferred  SKIN: Warm and dry no statis dermatitis or ulcers   NEURO / PSYCH: Oriented to person, place and time. Speech clear and appropriate. Follows all commands. Flat affect. + [place]    DATA:    ECG as per Dr Hinojosa's interpretation  Tele strips: AF RVR    Diagnostic:    CXR 3/3/2021: Hazy opacities in the lungs bilaterally may represent edema or an  infectious/inflammatory process. Labs:   CBC:   Recent Labs     03/03/21  1118   WBC 19.4*   HGB 12.9   HCT 38.4          HgA1c:   Lab Results   Component Value Date    LABA1C 8.8 (H) 12/23/2019     FASTING LIPID PANEL:  Lab Results   Component Value Date    CHOL 221 12/23/2019    HDL 49 12/23/2019    LDLCALC 132 12/23/2019    TRIG 201 12/23/2019     A&P per Dr Quinn Diamond  Electronically signed by Avery Wu.  CHEYANNE Chapman on 3/3/2021 at 11:52 AM

## 2021-03-04 LAB
ANION GAP SERPL CALCULATED.3IONS-SCNC: 11 MMOL/L (ref 7–16)
BUN BLDV-MCNC: 26 MG/DL (ref 8–23)
CALCIUM SERPL-MCNC: 8 MG/DL (ref 8.6–10.2)
CHLORIDE BLD-SCNC: 102 MMOL/L (ref 98–107)
CO2: 23 MMOL/L (ref 22–29)
CREAT SERPL-MCNC: 1.2 MG/DL (ref 0.5–1)
GFR AFRICAN AMERICAN: 52
GFR NON-AFRICAN AMERICAN: 43 ML/MIN/1.73
GLUCOSE BLD-MCNC: 129 MG/DL (ref 74–99)
HCT VFR BLD CALC: 33.3 % (ref 34–48)
HEMOGLOBIN: 10.4 G/DL (ref 11.5–15.5)
LV EF: 63 %
LVEF MODALITY: NORMAL
MAGNESIUM: 1.8 MG/DL (ref 1.6–2.6)
MCH RBC QN AUTO: 28 PG (ref 26–35)
MCHC RBC AUTO-ENTMCNC: 31.2 % (ref 32–34.5)
MCV RBC AUTO: 89.8 FL (ref 80–99.9)
METER GLUCOSE: 144 MG/DL (ref 74–99)
METER GLUCOSE: 153 MG/DL (ref 74–99)
METER GLUCOSE: 153 MG/DL (ref 74–99)
METER GLUCOSE: 155 MG/DL (ref 74–99)
PDW BLD-RTO: 14.6 FL (ref 11.5–15)
PLATELET # BLD: 273 E9/L (ref 130–450)
PMV BLD AUTO: 9.7 FL (ref 7–12)
POTASSIUM REFLEX MAGNESIUM: 3.9 MMOL/L (ref 3.5–5)
RBC # BLD: 3.71 E12/L (ref 3.5–5.5)
SODIUM BLD-SCNC: 136 MMOL/L (ref 132–146)
WBC # BLD: 14.5 E9/L (ref 4.5–11.5)

## 2021-03-04 PROCEDURE — 6360000002 HC RX W HCPCS: Performed by: STUDENT IN AN ORGANIZED HEALTH CARE EDUCATION/TRAINING PROGRAM

## 2021-03-04 PROCEDURE — 6370000000 HC RX 637 (ALT 250 FOR IP): Performed by: INTERNAL MEDICINE

## 2021-03-04 PROCEDURE — 99233 SBSQ HOSP IP/OBS HIGH 50: CPT | Performed by: INTERNAL MEDICINE

## 2021-03-04 PROCEDURE — 36415 COLL VENOUS BLD VENIPUNCTURE: CPT

## 2021-03-04 PROCEDURE — 2700000000 HC OXYGEN THERAPY PER DAY

## 2021-03-04 PROCEDURE — 2060000000 HC ICU INTERMEDIATE R&B

## 2021-03-04 PROCEDURE — 83735 ASSAY OF MAGNESIUM: CPT

## 2021-03-04 PROCEDURE — 99232 SBSQ HOSP IP/OBS MODERATE 35: CPT | Performed by: INTERNAL MEDICINE

## 2021-03-04 PROCEDURE — 2500000003 HC RX 250 WO HCPCS: Performed by: NURSE PRACTITIONER

## 2021-03-04 PROCEDURE — 93308 TTE F-UP OR LMTD: CPT

## 2021-03-04 PROCEDURE — 85027 COMPLETE CBC AUTOMATED: CPT

## 2021-03-04 PROCEDURE — 6370000000 HC RX 637 (ALT 250 FOR IP): Performed by: FAMILY MEDICINE

## 2021-03-04 PROCEDURE — 6360000002 HC RX W HCPCS: Performed by: NURSE PRACTITIONER

## 2021-03-04 PROCEDURE — 82962 GLUCOSE BLOOD TEST: CPT

## 2021-03-04 PROCEDURE — 80048 BASIC METABOLIC PNL TOTAL CA: CPT

## 2021-03-04 PROCEDURE — 6360000002 HC RX W HCPCS: Performed by: INTERNAL MEDICINE

## 2021-03-04 RX ORDER — PROMETHAZINE HYDROCHLORIDE 25 MG/ML
25 INJECTION, SOLUTION INTRAMUSCULAR; INTRAVENOUS EVERY 6 HOURS PRN
Status: DISCONTINUED | OUTPATIENT
Start: 2021-03-04 | End: 2021-03-04

## 2021-03-04 RX ORDER — PROCHLORPERAZINE EDISYLATE 5 MG/ML
10 INJECTION INTRAMUSCULAR; INTRAVENOUS EVERY 6 HOURS PRN
Status: DISCONTINUED | OUTPATIENT
Start: 2021-03-04 | End: 2021-03-05 | Stop reason: ALTCHOICE

## 2021-03-04 RX ORDER — PROCHLORPERAZINE MALEATE 5 MG/1
5 TABLET ORAL EVERY 6 HOURS PRN
Status: DISCONTINUED | OUTPATIENT
Start: 2021-03-04 | End: 2021-03-10 | Stop reason: HOSPADM

## 2021-03-04 RX ORDER — PROMETHAZINE HYDROCHLORIDE 25 MG/1
25 TABLET ORAL EVERY 6 HOURS PRN
Status: DISCONTINUED | OUTPATIENT
Start: 2021-03-04 | End: 2021-03-04

## 2021-03-04 RX ADMIN — ESMOLOL HYDROCHLORIDE 200 MCG/KG/MIN: 10 INJECTION INTRAVENOUS at 02:00

## 2021-03-04 RX ADMIN — DIGOXIN 250 MCG: 0.25 INJECTION INTRAMUSCULAR; INTRAVENOUS at 09:07

## 2021-03-04 RX ADMIN — INSULIN LISPRO 1 UNITS: 100 INJECTION, SOLUTION INTRAVENOUS; SUBCUTANEOUS at 20:42

## 2021-03-04 RX ADMIN — DIGOXIN 250 MCG: 0.25 INJECTION INTRAMUSCULAR; INTRAVENOUS at 03:40

## 2021-03-04 RX ADMIN — ESMOLOL HYDROCHLORIDE 200 MCG/KG/MIN: 10 INJECTION INTRAVENOUS at 08:57

## 2021-03-04 RX ADMIN — APIXABAN 5 MG: 5 TABLET, FILM COATED ORAL at 20:42

## 2021-03-04 RX ADMIN — SERTRALINE HYDROCHLORIDE 50 MG: 50 TABLET ORAL at 09:06

## 2021-03-04 RX ADMIN — APIXABAN 5 MG: 5 TABLET, FILM COATED ORAL at 09:06

## 2021-03-04 RX ADMIN — ESMOLOL HYDROCHLORIDE 200 MCG/KG/MIN: 10 INJECTION INTRAVENOUS at 16:02

## 2021-03-04 RX ADMIN — ESMOLOL HYDROCHLORIDE 200 MCG/KG/MIN: 10 INJECTION INTRAVENOUS at 19:32

## 2021-03-04 RX ADMIN — DIGOXIN 250 MCG: 0.25 INJECTION INTRAMUSCULAR; INTRAVENOUS at 16:32

## 2021-03-04 RX ADMIN — ESMOLOL HYDROCHLORIDE 200 MCG/KG/MIN: 10 INJECTION INTRAVENOUS at 23:15

## 2021-03-04 RX ADMIN — PROCHLORPERAZINE MALEATE 5 MG: 5 TABLET ORAL at 13:37

## 2021-03-04 RX ADMIN — FUROSEMIDE 40 MG: 10 INJECTION, SOLUTION INTRAMUSCULAR; INTRAVENOUS at 09:06

## 2021-03-04 RX ADMIN — INSULIN LISPRO 2 UNITS: 100 INJECTION, SOLUTION INTRAVENOUS; SUBCUTANEOUS at 06:06

## 2021-03-04 RX ADMIN — ESMOLOL HYDROCHLORIDE 200 MCG/KG/MIN: 10 INJECTION INTRAVENOUS at 12:04

## 2021-03-04 RX ADMIN — PROCHLORPERAZINE EDISYLATE 10 MG: 5 INJECTION INTRAMUSCULAR; INTRAVENOUS at 20:41

## 2021-03-04 RX ADMIN — FUROSEMIDE 40 MG: 10 INJECTION, SOLUTION INTRAMUSCULAR; INTRAVENOUS at 17:28

## 2021-03-04 ASSESSMENT — PAIN SCALES - GENERAL: PAINLEVEL_OUTOF10: 0

## 2021-03-04 NOTE — PROGRESS NOTES
Dr. Jaciel Haynes notified of patients heart rate 130s-160s, BP 93/61. She is wheezing with increased SOB.  New orders given

## 2021-03-04 NOTE — PROGRESS NOTES
APN 81.6 mL/hr at 03/04/21 0200 200 mcg/kg/min at 03/04/21 0200    perflutren lipid microspheres (DEFINITY) injection 1.65 mg  1.5 mL Intravenous ONCE PRN Carmen Hidalgo. Justin, CHEYANNE        furosemide (LASIX) injection 40 mg  40 mg Intravenous BID Carmen Hidalgo.  Amandaer, APN   40 mg at 03/03/21 1823    pantoprazole (PROTONIX) tablet 40 mg  40 mg Oral QAM AC Babak Malena V, DO        sertraline (ZOLOFT) tablet 50 mg  50 mg Oral Daily Dalila Linear V, DO        insulin lispro (HUMALOG) injection vial 0-12 Units  0-12 Units Subcutaneous TID WC Dalila Linear V, DO   2 Units at 03/04/21 0606    insulin lispro (HUMALOG) injection vial 0-6 Units  0-6 Units Subcutaneous Nightly Dalila Linear V, DO   1 Units at 03/03/21 2027    glucose (GLUTOSE) 40 % oral gel 15 g  15 g Oral PRN Dalila Linear V, DO        dextrose 50 % IV solution  12.5 g Intravenous PRN Dalila Linear V, DO        glucagon (rDNA) injection 1 mg  1 mg Intramuscular PRN Dalila Linear V, DO        dextrose 5 % solution  100 mL/hr Intravenous PRN Dalila Linear V, DO        ipratropium (ATROVENT) 0.02 % nebulizer solution 0.5 mg  0.5 mg Nebulization Q6H PRN Domonique Jackson MD        digoxin (LANOXIN) injection 250 mcg  250 mcg Intravenous Q6H Sharon Diaz MD   250 mcg at 03/04/21 0340      esmolol 200 mcg/kg/min (03/04/21 0200)    dextrose         Physical Exam:  /63   Pulse 130   Temp 97.7 °F (36.5 °C) (Oral)   Resp 26   Ht 5' 6\" (1.676 m)   Wt 157 lb (71.2 kg)   SpO2 95%   BMI 25.34 kg/m²   Wt Readings from Last 3 Encounters:   03/04/21 157 lb (71.2 kg)   11/12/19 165 lb (74.8 kg)   05/05/19 150 lb 3.2 oz (68.1 kg)     Appearance: Awake, alert, no acute respiratory distress  Skin: Intact, no rash  Head: Normocephalic, atraumatic  Eyes: EOMI, no conjunctival erythema  ENMT: No pharyngeal erythema, MMM, no rhinorrhea  Neck: Supple, no elevated JVP, no carotid bruits  Lungs: Has some bibasilar rales and some occasional scattered wheezing  Cardiac: Irregularly irregular rate and rhythm, +S1S2, no murmurs apparent  Abdomen: Soft, nontender, +bowel sounds  Extremities: Moves all extremities x 4, no lower extremity edema  Neurologic: No focal motor deficits apparent, normal mood and affect  Peripheral Pulses: Intact posterior tibial pulses bilaterally    Intake/Output:    Intake/Output Summary (Last 24 hours) at 3/4/2021 0825  Last data filed at 3/4/2021 0751  Gross per 24 hour   Intake 120 ml   Output 1350 ml   Net -1230 ml     I/O this shift:  In: 120 [P.O.:120]  Out: -     Laboratory Tests:  Recent Labs     03/03/21  1300 03/04/21  0358    136   K 3.5 3.9    102   CO2 21* 23   BUN 24* 26*   CREATININE 1.0 1.2*   GLUCOSE 232* 129*   CALCIUM 7.3* 8.0*     Lab Results   Component Value Date    MG 1.8 03/04/2021     Recent Labs     03/03/21  1300   ALKPHOS 62   ALT 9   AST 15   PROT 5.7*   BILITOT 0.9   LABALBU 2.7*     Recent Labs     03/03/21  1118 03/04/21  0358   WBC 19.4* 14.5*   RBC 4.45 3.71   HGB 12.9 10.4*   HCT 38.4 33.3*   MCV 86.3 89.8   MCH 29.0 28.0   MCHC 33.6 31.2*   RDW 14.7 14.6    273   MPV 10.8 9.7     Lab Results   Component Value Date    TROPONINI <0.01 03/03/2021    TROPONINI <0.01 05/04/2019    TROPONINI <0.01 05/04/2019     Lab Results   Component Value Date    INR 1.3 03/03/2021    INR 1.2 06/29/2011    PROTIME 13.8 (H) 03/03/2021    PROTIME 12.7 (H) 06/29/2011     Lab Results   Component Value Date    TSH 1.800 03/03/2021     Lab Results   Component Value Date    LABA1C 8.6 (H) 03/03/2021     No results found for: EAG  Lab Results   Component Value Date    CHOL 138 03/03/2021    CHOL 221 (H) 12/23/2019    CHOL 172 05/05/2019     Lab Results   Component Value Date    TRIG 112 03/03/2021    TRIG 201 (H) 12/23/2019    TRIG 242 (H) 05/05/2019     Lab Results   Component Value Date    HDL 36 03/03/2021    HDL 49 12/23/2019    HDL 38 05/05/2019     Lab Results   Component Value Date    LDLCALC 80 03/03/2021    LDLCALC 132 (H) 12/23/2019    LDLCALC 86 05/05/2019     Lab Results   Component Value Date    LABVLDL 22 03/03/2021    LABVLDL 40 12/23/2019    LABVLDL 48 05/05/2019    VLDL 39 12/30/2015     No results found for: CHOLHDLRATIO    Cardiac Tests:  Telemetry findings reviewed: A. fib with RVR with heart rate in the 120s to 130s. No new tachy/bradyarrhythmias overnight     EKG: A. fib with rapid ventricular response, left axis deviation, LVH, abnormal EKG.    Vitals and labs were reviewed: Blood pressure 120/67 with heart rate of 122 O2 sats 92%.     Labs-BUN/creatinine 24/1.4 rest of the chemistry normal.  Lactic acid 2.7 glucose 232. proBNP 18, 248 troponin less than 0.01 cholesterol 138 HDL 36 LDL 80. Liver function normal.  A1c 8.6. WBC 19.4 rest of CBC normal.     Chest x-ray: Bilateral hazy opacities in the lungs secondary to pulmonary edema are in infectious inflammatory process.     Lexiscan nuclear stress test-5/4/2019:  1. No reversible perfusion defect   2. Ejection fraction is greater than 70  %.   3. No significant wall motion abnormality   4. Oasis software is not available at the time of the interpretation,   if the software becomes available an addendum may be added to this      TTE-12/6/2019:   Left ventricle is normal in size .   LVEF 55 to 60% with indeterminate LV diastolic function.   Mild left ventricular concentric hypertrophy noted with asymetric proximal   septal hypertrophy.   No regional wall motion abnormalities seen.   Normal left ventricular ejection fraction.   The left atrium is borderline dilated.   Mild thickening of the mitral valve leaflets with mild doming of anterior   leaflet.   Mild to moderate mitral stenosis .   Mild to moderate posteriorly directed mitral regurgitation.   Mild-to-moderate aortic regurgitation is noted.   Mild tricuspid regurgitation.  Anjana Hawks is a small circumferential and predominantly anterior pericardial   effusion noted.        Assessment:  1. New onset atrial fibrillation with RVR, not well controlled no room for titrating medications. 2. Acute HFpEF triggered by A. fib with RVR  3. [id number] score-6  4. Hypertension, well controlled  5. Type 2 diabetes, not well controlled  6. Hyperlipidemia  7. Obstructive sleep apnea noncompliant with CPAP use  8. CKD stage III  9. History of peptic ulcer disease  10. Iron deficiency anemia on supplement        Plan:   1. Continue Lasix 40 mg IV every 12 hours with close monitoring of renal functions electrolytes. 2. Strict input output charting. 3. Cardiac diet and restrict daily salt intake less than 2 g  4. Continue esmolol, complete IV digoxin 4 doses. Keep her n.p.o. and will schedule for ISABELLE guided cardioversion today around 3:00. She had a small breakfast this morning. Discussed with the patient about the risks and benefits of ISABELLE guided cardioversion and she is agreeable. 5. We will start her on Eliquis 5 mg p.o. daily for anticoagulation. 6. Continue home medications and management of diabetes as per primary service. Mary Vidales MD., Corry Baron.   [facility]) Cardiology

## 2021-03-04 NOTE — PROGRESS NOTES
[facility]) Hospitalist Progress Note    Admission Date  3/3/2021 10:55 AM  Chief Complaint Emesis / cough / tachycardia  Admit Dx   Atrial fibrillation with RVR (HCC) [I48.91]    Subjective  History of Present Illness  Patient seen at bedside with son present. Patient was going through male, made almost no eye contact through the entire visit, instead focusing on the papers in front of her. Both her and her son seem to be only paying half attention to what I was saying. Discussed current clinical picture and plan for cardioversion, now being planned for tomorrow. Otherwise, patient says she will get into a coughing fit, become nauseated, then have an episode of emesis. She says nothing has been done about her cough so far. Otherwise does admit to some shortness of breath lying in bed. She was on 5 L when seen today. Review of Systems - 12-point review of systems has been reviewed and is otherwise negative except as listed in the HPI    Objective  Physical Exam  Vitals: /63   Pulse 130   Temp 97.7 °F (36.5 °C) (Oral)   Resp 26   Ht 5' 6\" (1.676 m)   Wt 157 lb (71.2 kg)   SpO2 95%   BMI 25.34 kg/m²   General: well-developed, well-nourished, no acute distress, cooperative  Skin: warm, dry, intact, normal color without cyanosis  HEENT: normocephalic, atraumatic, mucous membranes normal  Respiratory: clear to auscultation bilaterally without respiratory distress  Cardiovascular: irregularly irregular rhythm without murmur / rub / gallop  Abdominal: soft, nontender, nondistended, normoactive bowel sounds  Extremities: no mottling, pulses intact, no edema  Neurologic: awake, alert, no focal deficits  Psychiatric: normal affect, cooperative    Assessment / Plan  Atrial fib w RVR -on esmolol drip per cardiology, Eliquis has been started. For cardioversion tomorrow.   Echo December 2019 reviewed, patient had no systolic dysfunction and, because of A. fib, diastolic function could not be evaluated. Will need to see how CV goes to determine next step    CKD IIIa - Cr 1.0-1.2, monitor on labs for now    IDDM - hold orals / diabetic diet / ISS + BG checks ACHS / hypoglycemic protocol / target -180    Anemia, HTN, sleep apnea    Pt's PMH otherwise pertinent for anemia, HTN, sleep apnea. Continue outpt med regimen; if any particular issues regarding these items, will address and move to active problem list above.      Code status  Full  DVT prophylaxis Eliquis  Disposition  Home when ready    Electronically signed by Xavier Mcleod DO on 3/4/2021 at 2:21 PM

## 2021-03-04 NOTE — CARE COORDINATION
CM NOTE: Per QFR---admitted with atrial fib. +IVF. NPO for ISABELLE with cardioversion this afternoon. CM met with pt to discuss role, anticipated LOS & current plan of care. Reports living in 1 story home with her son. Has ramp, cane & rollator from her  & is able to do a step or 2 if needed. Does not have O2 at home & is not diabetic. Hx [facility] years ago followed by [facility] when she returned home. Unable to recall name of agency---thinks it was [address]. [facility]. Discussed dx, current medications, planned cardioversion today. States her plan is to return home at discharge. Declines need for home care & states her son is there & can assist. Discussed weaning & testing of O2. No preference for dme if required at discharge. Will continue to follow pt.

## 2021-03-05 ENCOUNTER — APPOINTMENT (OUTPATIENT)
Dept: ULTRASOUND IMAGING | Age: 86
DRG: 308 | End: 2021-03-05
Payer: MEDICARE

## 2021-03-05 ENCOUNTER — APPOINTMENT (OUTPATIENT)
Dept: GENERAL RADIOLOGY | Age: 86
DRG: 308 | End: 2021-03-05
Payer: MEDICARE

## 2021-03-05 LAB
ANION GAP SERPL CALCULATED.3IONS-SCNC: 11 MMOL/L (ref 7–16)
ANION GAP SERPL CALCULATED.3IONS-SCNC: 16 MMOL/L (ref 7–16)
ANISOCYTOSIS: ABNORMAL
APTT: 25.4 SEC (ref 24.5–35.1)
B.E.: -4.3 MMOL/L (ref -3–3)
BASOPHILS ABSOLUTE: 0.01 E9/L (ref 0–0.2)
BASOPHILS RELATIVE PERCENT: 0.1 % (ref 0–2)
BUN BLDV-MCNC: 38 MG/DL (ref 8–23)
BUN BLDV-MCNC: 39 MG/DL (ref 8–23)
CALCIUM SERPL-MCNC: 8.3 MG/DL (ref 8.6–10.2)
CALCIUM SERPL-MCNC: 8.6 MG/DL (ref 8.6–10.2)
CHLORIDE BLD-SCNC: 101 MMOL/L (ref 98–107)
CHLORIDE BLD-SCNC: 103 MMOL/L (ref 98–107)
CO2: 24 MMOL/L (ref 22–29)
CO2: 27 MMOL/L (ref 22–29)
COHB: 0.1 % (ref 0–1.5)
CREAT SERPL-MCNC: 1.4 MG/DL (ref 0.5–1)
CREAT SERPL-MCNC: 1.4 MG/DL (ref 0.5–1)
CREATININE URINE: 49 MG/DL (ref 29–226)
CRITICAL: ABNORMAL
DATE ANALYZED: ABNORMAL
DATE OF COLLECTION: ABNORMAL
DIGOXIN LEVEL: 3.8 NG/ML (ref 0.8–2)
EOSINOPHILS ABSOLUTE: 0 E9/L (ref 0.05–0.5)
EOSINOPHILS RELATIVE PERCENT: 0 % (ref 0–6)
GFR AFRICAN AMERICAN: 43
GFR AFRICAN AMERICAN: 43
GFR NON-AFRICAN AMERICAN: 36 ML/MIN/1.73
GFR NON-AFRICAN AMERICAN: 36 ML/MIN/1.73
GLUCOSE BLD-MCNC: 170 MG/DL (ref 74–99)
GLUCOSE BLD-MCNC: 198 MG/DL (ref 74–99)
HCO3: 19.8 MMOL/L (ref 22–26)
HCT VFR BLD CALC: 35.1 % (ref 34–48)
HCT VFR BLD CALC: 36.6 % (ref 34–48)
HEMOGLOBIN: 11 G/DL (ref 11.5–15.5)
HEMOGLOBIN: 11.3 G/DL (ref 11.5–15.5)
HHB: 4.9 % (ref 0–5)
IMMATURE GRANULOCYTES #: 0.16 E9/L
IMMATURE GRANULOCYTES %: 1 % (ref 0–5)
INR BLD: 2.1
LAB: ABNORMAL
LACTIC ACID: 1 MMOL/L (ref 0.5–2.2)
LACTIC ACID: 1.8 MMOL/L (ref 0.5–2.2)
LV EF: 63 %
LVEF MODALITY: NORMAL
LYMPHOCYTES ABSOLUTE: 0.38 E9/L (ref 1.5–4)
LYMPHOCYTES RELATIVE PERCENT: 2.5 % (ref 20–42)
Lab: ABNORMAL
MAGNESIUM: 1.6 MG/DL (ref 1.6–2.6)
MCH RBC QN AUTO: 28.3 PG (ref 26–35)
MCH RBC QN AUTO: 28.5 PG (ref 26–35)
MCHC RBC AUTO-ENTMCNC: 30.9 % (ref 32–34.5)
MCHC RBC AUTO-ENTMCNC: 31.3 % (ref 32–34.5)
MCV RBC AUTO: 90.9 FL (ref 80–99.9)
MCV RBC AUTO: 91.5 FL (ref 80–99.9)
METER GLUCOSE: 151 MG/DL (ref 74–99)
METER GLUCOSE: 166 MG/DL (ref 74–99)
METER GLUCOSE: 170 MG/DL (ref 74–99)
METER GLUCOSE: 181 MG/DL (ref 74–99)
METER GLUCOSE: 202 MG/DL (ref 74–99)
METHB: 0.1 % (ref 0–1.5)
MODE: ABNORMAL
MONOCYTES ABSOLUTE: 0.9 E9/L (ref 0.1–0.95)
MONOCYTES RELATIVE PERCENT: 5.8 % (ref 2–12)
NEUTROPHILS ABSOLUTE: 14.01 E9/L (ref 1.8–7.3)
NEUTROPHILS RELATIVE PERCENT: 90.6 % (ref 43–80)
O2 CONTENT: 15.3 ML/DL
O2 SATURATION: 95.1 % (ref 92–98.5)
O2HB: 94.9 % (ref 94–97)
OPERATOR ID: 913
PATIENT TEMP: 37 C
PCO2: 32.9 MMHG (ref 35–45)
PDW BLD-RTO: 14.4 FL (ref 11.5–15)
PDW BLD-RTO: 14.5 FL (ref 11.5–15)
PH BLOOD GAS: 7.4 (ref 7.35–7.45)
PLATELET # BLD: 324 E9/L (ref 130–450)
PLATELET # BLD: 370 E9/L (ref 130–450)
PMV BLD AUTO: 9.4 FL (ref 7–12)
PMV BLD AUTO: 9.9 FL (ref 7–12)
PO2: 74.1 MMHG (ref 75–100)
POLYCHROMASIA: ABNORMAL
POTASSIUM SERPL-SCNC: 3.5 MMOL/L (ref 3.5–5)
POTASSIUM SERPL-SCNC: 4.2 MMOL/L (ref 3.5–5)
PROCALCITONIN: 0.2 NG/ML (ref 0–0.08)
PROTHROMBIN TIME: 23 SEC (ref 9.3–12.4)
RBC # BLD: 3.86 E12/L (ref 3.5–5.5)
RBC # BLD: 4 E12/L (ref 3.5–5.5)
SODIUM BLD-SCNC: 141 MMOL/L (ref 132–146)
SODIUM BLD-SCNC: 141 MMOL/L (ref 132–146)
SODIUM URINE: 41 MMOL/L
SOURCE, BLOOD GAS: ABNORMAL
THB: 11.4 G/DL (ref 11.5–16.5)
TIME ANALYZED: 1139
TROPONIN: <0.01 NG/ML (ref 0–0.03)
TROPONIN: <0.01 NG/ML (ref 0–0.03)
UREA NITROGEN, UR: 444 MG/DL (ref 800–1666)
VITAMIN D 25-HYDROXY: 35 NG/ML (ref 30–100)
WBC # BLD: 12.9 E9/L (ref 4.5–11.5)
WBC # BLD: 15.5 E9/L (ref 4.5–11.5)

## 2021-03-05 PROCEDURE — 6360000002 HC RX W HCPCS: Performed by: INTERNAL MEDICINE

## 2021-03-05 PROCEDURE — 84484 ASSAY OF TROPONIN QUANT: CPT

## 2021-03-05 PROCEDURE — 93312 ECHO TRANSESOPHAGEAL: CPT

## 2021-03-05 PROCEDURE — 99233 SBSQ HOSP IP/OBS HIGH 50: CPT | Performed by: INTERNAL MEDICINE

## 2021-03-05 PROCEDURE — 2700000000 HC OXYGEN THERAPY PER DAY

## 2021-03-05 PROCEDURE — 99232 SBSQ HOSP IP/OBS MODERATE 35: CPT | Performed by: INTERNAL MEDICINE

## 2021-03-05 PROCEDURE — 94660 CPAP INITIATION&MGMT: CPT

## 2021-03-05 PROCEDURE — 36415 COLL VENOUS BLD VENIPUNCTURE: CPT

## 2021-03-05 PROCEDURE — 93320 DOPPLER ECHO COMPLETE: CPT

## 2021-03-05 PROCEDURE — 2580000003 HC RX 258: Performed by: STUDENT IN AN ORGANIZED HEALTH CARE EDUCATION/TRAINING PROGRAM

## 2021-03-05 PROCEDURE — 02HV33Z INSERTION OF INFUSION DEVICE INTO SUPERIOR VENA CAVA, PERCUTANEOUS APPROACH: ICD-10-PCS | Performed by: STUDENT IN AN ORGANIZED HEALTH CARE EDUCATION/TRAINING PROGRAM

## 2021-03-05 PROCEDURE — 85025 COMPLETE CBC W/AUTO DIFF WBC: CPT

## 2021-03-05 PROCEDURE — 36556 INSERT NON-TUNNEL CV CATH: CPT

## 2021-03-05 PROCEDURE — 85610 PROTHROMBIN TIME: CPT

## 2021-03-05 PROCEDURE — 84145 PROCALCITONIN (PCT): CPT

## 2021-03-05 PROCEDURE — 82306 VITAMIN D 25 HYDROXY: CPT

## 2021-03-05 PROCEDURE — 83735 ASSAY OF MAGNESIUM: CPT

## 2021-03-05 PROCEDURE — 2580000003 HC RX 258: Performed by: INTERNAL MEDICINE

## 2021-03-05 PROCEDURE — 6360000002 HC RX W HCPCS

## 2021-03-05 PROCEDURE — 82962 GLUCOSE BLOOD TEST: CPT

## 2021-03-05 PROCEDURE — 5A2204Z RESTORATION OF CARDIAC RHYTHM, SINGLE: ICD-10-PCS | Performed by: INTERNAL MEDICINE

## 2021-03-05 PROCEDURE — 87081 CULTURE SCREEN ONLY: CPT

## 2021-03-05 PROCEDURE — 36620 INSERTION CATHETER ARTERY: CPT

## 2021-03-05 PROCEDURE — 80162 ASSAY OF DIGOXIN TOTAL: CPT

## 2021-03-05 PROCEDURE — 6360000002 HC RX W HCPCS: Performed by: NURSE PRACTITIONER

## 2021-03-05 PROCEDURE — 82570 ASSAY OF URINE CREATININE: CPT

## 2021-03-05 PROCEDURE — 71045 X-RAY EXAM CHEST 1 VIEW: CPT

## 2021-03-05 PROCEDURE — 37799 UNLISTED PX VASCULAR SURGERY: CPT

## 2021-03-05 PROCEDURE — 6370000000 HC RX 637 (ALT 250 FOR IP): Performed by: FAMILY MEDICINE

## 2021-03-05 PROCEDURE — 6370000000 HC RX 637 (ALT 250 FOR IP): Performed by: INTERNAL MEDICINE

## 2021-03-05 PROCEDURE — 92960 CARDIOVERSION ELECTRIC EXT: CPT | Performed by: INTERNAL MEDICINE

## 2021-03-05 PROCEDURE — APPSS60 APP SPLIT SHARED TIME 46-60 MINUTES: Performed by: NURSE PRACTITIONER

## 2021-03-05 PROCEDURE — 76770 US EXAM ABDO BACK WALL COMP: CPT

## 2021-03-05 PROCEDURE — 93325 DOPPLER ECHO COLOR FLOW MAPG: CPT

## 2021-03-05 PROCEDURE — 2000000000 HC ICU R&B

## 2021-03-05 PROCEDURE — 84300 ASSAY OF URINE SODIUM: CPT

## 2021-03-05 PROCEDURE — 82805 BLOOD GASES W/O2 SATURATION: CPT

## 2021-03-05 PROCEDURE — 84540 ASSAY OF URINE/UREA-N: CPT

## 2021-03-05 PROCEDURE — 99291 CRITICAL CARE FIRST HOUR: CPT | Performed by: INTERNAL MEDICINE

## 2021-03-05 PROCEDURE — 80048 BASIC METABOLIC PNL TOTAL CA: CPT

## 2021-03-05 PROCEDURE — C1751 CATH, INF, PER/CENT/MIDLINE: HCPCS

## 2021-03-05 PROCEDURE — 83605 ASSAY OF LACTIC ACID: CPT

## 2021-03-05 PROCEDURE — 85027 COMPLETE CBC AUTOMATED: CPT

## 2021-03-05 PROCEDURE — 2500000003 HC RX 250 WO HCPCS: Performed by: NURSE PRACTITIONER

## 2021-03-05 PROCEDURE — 6370000000 HC RX 637 (ALT 250 FOR IP): Performed by: STUDENT IN AN ORGANIZED HEALTH CARE EDUCATION/TRAINING PROGRAM

## 2021-03-05 PROCEDURE — 85730 THROMBOPLASTIN TIME PARTIAL: CPT

## 2021-03-05 RX ORDER — MIDAZOLAM HYDROCHLORIDE 1 MG/ML
INJECTION INTRAMUSCULAR; INTRAVENOUS
Status: COMPLETED
Start: 2021-03-05 | End: 2021-03-05

## 2021-03-05 RX ORDER — FENTANYL CITRATE 50 UG/ML
INJECTION, SOLUTION INTRAMUSCULAR; INTRAVENOUS
Status: DISPENSED
Start: 2021-03-05 | End: 2021-03-06

## 2021-03-05 RX ORDER — SODIUM CHLORIDE 9 MG/ML
INJECTION, SOLUTION INTRAVENOUS CONTINUOUS
Status: DISCONTINUED | OUTPATIENT
Start: 2021-03-05 | End: 2021-03-06

## 2021-03-05 RX ORDER — FENTANYL CITRATE 50 UG/ML
200 INJECTION, SOLUTION INTRAMUSCULAR; INTRAVENOUS ONCE
Status: COMPLETED | OUTPATIENT
Start: 2021-03-05 | End: 2021-03-05

## 2021-03-05 RX ORDER — METOPROLOL SUCCINATE 25 MG/1
25 TABLET, EXTENDED RELEASE ORAL 2 TIMES DAILY
Status: DISCONTINUED | OUTPATIENT
Start: 2021-03-05 | End: 2021-03-06

## 2021-03-05 RX ORDER — 0.9 % SODIUM CHLORIDE 0.9 %
500 INTRAVENOUS SOLUTION INTRAVENOUS ONCE
Status: COMPLETED | OUTPATIENT
Start: 2021-03-05 | End: 2021-03-05

## 2021-03-05 RX ORDER — MAGNESIUM SULFATE IN WATER 40 MG/ML
2000 INJECTION, SOLUTION INTRAVENOUS ONCE
Status: COMPLETED | OUTPATIENT
Start: 2021-03-05 | End: 2021-03-05

## 2021-03-05 RX ORDER — POTASSIUM CHLORIDE 29.8 MG/ML
20 INJECTION INTRAVENOUS ONCE
Status: COMPLETED | OUTPATIENT
Start: 2021-03-05 | End: 2021-03-05

## 2021-03-05 RX ORDER — MIDAZOLAM HYDROCHLORIDE 2 MG/2ML
4 INJECTION, SOLUTION INTRAMUSCULAR; INTRAVENOUS ONCE
Status: COMPLETED | OUTPATIENT
Start: 2021-03-05 | End: 2021-03-05

## 2021-03-05 RX ORDER — METOPROLOL SUCCINATE 50 MG/1
50 TABLET, EXTENDED RELEASE ORAL 2 TIMES DAILY
Status: DISCONTINUED | OUTPATIENT
Start: 2021-03-05 | End: 2021-03-05

## 2021-03-05 RX ORDER — FENTANYL CITRATE 50 UG/ML
INJECTION, SOLUTION INTRAMUSCULAR; INTRAVENOUS
Status: COMPLETED
Start: 2021-03-05 | End: 2021-03-05

## 2021-03-05 RX ADMIN — FENTANYL CITRATE 75 MCG: 50 INJECTION, SOLUTION INTRAMUSCULAR; INTRAVENOUS at 13:20

## 2021-03-05 RX ADMIN — APIXABAN 5 MG: 5 TABLET, FILM COATED ORAL at 08:33

## 2021-03-05 RX ADMIN — SODIUM CHLORIDE: 9 INJECTION, SOLUTION INTRAVENOUS at 18:02

## 2021-03-05 RX ADMIN — MIDAZOLAM HYDROCHLORIDE 2 MG: 1 INJECTION, SOLUTION INTRAMUSCULAR; INTRAVENOUS at 13:20

## 2021-03-05 RX ADMIN — PANTOPRAZOLE SODIUM 40 MG: 40 TABLET, DELAYED RELEASE ORAL at 06:30

## 2021-03-05 RX ADMIN — ESMOLOL HYDROCHLORIDE 200 MCG/KG/MIN: 10 INJECTION INTRAVENOUS at 03:43

## 2021-03-05 RX ADMIN — ESMOLOL HYDROCHLORIDE 122.55 MCG/KG/MIN: 10 INJECTION INTRAVENOUS at 07:13

## 2021-03-05 RX ADMIN — POTASSIUM CHLORIDE 20 MEQ: 400 INJECTION, SOLUTION INTRAVENOUS at 15:25

## 2021-03-05 RX ADMIN — APIXABAN 5 MG: 5 TABLET, FILM COATED ORAL at 20:33

## 2021-03-05 RX ADMIN — TRIMETHOBENZAMIDE HYDROCHLORIDE 200 MG: 100 INJECTION INTRAMUSCULAR at 11:30

## 2021-03-05 RX ADMIN — METOPROLOL SUCCINATE 25 MG: 25 TABLET, EXTENDED RELEASE ORAL at 20:33

## 2021-03-05 RX ADMIN — INSULIN LISPRO 2 UNITS: 100 INJECTION, SOLUTION INTRAVENOUS; SUBCUTANEOUS at 18:09

## 2021-03-05 RX ADMIN — INSULIN LISPRO 2 UNITS: 100 INJECTION, SOLUTION INTRAVENOUS; SUBCUTANEOUS at 06:29

## 2021-03-05 RX ADMIN — FUROSEMIDE 40 MG: 10 INJECTION, SOLUTION INTRAMUSCULAR; INTRAVENOUS at 08:33

## 2021-03-05 RX ADMIN — METOPROLOL SUCCINATE 25 MG: 25 TABLET, EXTENDED RELEASE ORAL at 14:33

## 2021-03-05 RX ADMIN — SODIUM CHLORIDE 500 ML: 9 INJECTION, SOLUTION INTRAVENOUS at 11:18

## 2021-03-05 RX ADMIN — INSULIN LISPRO 1 UNITS: 100 INJECTION, SOLUTION INTRAVENOUS; SUBCUTANEOUS at 20:36

## 2021-03-05 RX ADMIN — SERTRALINE HYDROCHLORIDE 50 MG: 50 TABLET ORAL at 08:33

## 2021-03-05 RX ADMIN — MIDAZOLAM HYDROCHLORIDE 2 MG: 2 INJECTION, SOLUTION INTRAMUSCULAR; INTRAVENOUS at 13:20

## 2021-03-05 RX ADMIN — OVINE DIGOXIN IMMUNE FAB: 40 INJECTION, POWDER, FOR SOLUTION INTRAVENOUS at 10:43

## 2021-03-05 RX ADMIN — TRIMETHOBENZAMIDE HYDROCHLORIDE 200 MG: 100 INJECTION INTRAMUSCULAR at 11:35

## 2021-03-05 RX ADMIN — MAGNESIUM SULFATE HEPTAHYDRATE 2000 MG: 40 INJECTION, SOLUTION INTRAVENOUS at 15:26

## 2021-03-05 ASSESSMENT — ENCOUNTER SYMPTOMS
COUGH: 0
EYE PAIN: 0
VOMITING: 1
BACK PAIN: 0
EYE REDNESS: 0
ABDOMINAL DISTENTION: 0
SINUS PRESSURE: 0
WHEEZING: 0
NAUSEA: 1
SORE THROAT: 0
SHORTNESS OF BREATH: 0
DIARRHEA: 0
EYE DISCHARGE: 0

## 2021-03-05 ASSESSMENT — PAIN SCALES - GENERAL
PAINLEVEL_OUTOF10: 0

## 2021-03-05 NOTE — PROCEDURES
Central Line Placement Procedure Note    Indication: hypovolemia and long term access    Consent: The patient was counseled regarding the procedure, its indications, risks, potential complications and alternatives, and any questions were answered. Consent was obtained to proceed. Procedure: The patient was positioned appropriately and the skin over the right internal jugular vein was prepped with betadine and draped in a sterile fashion. Local anesthesia was obtained by infiltration using 1.5 cc of 1% Lidocaine without epinephrine. A large bore needle was used to identify the vein. A guide wire was then inserted into the vein through the needle. A triple lumen catheter was then inserted into the vessel over the guide wire using the Seldinger technique. All ports showed good, free flowing blood return and were flushed with saline solution. The catheter was then securely fastened to the skin with suture at 16 cm. Two sutures were placed into the kit included tube clamp, proximal eyelets and a suture end from each of the securing sutures was extended around the catheter and tied to the proximal eyelets as an added measure to prevent dislodgement. An antibiotic disk was placed and the site was then covered with a sterile dressing. A post procedure X-ray was ordered and showed good line position. The patient tolerated the procedure well. Complications: None        Arterial Line Placement Procedure Note                     Indication: Need for serial blood work, arterial blood gases and cardiovascular instability    Consent: The patient was counseled regarding the procedure, its indications, risks, potential complications and alternatives, and any questions were answered. Consent was obtained to proceed. Mark's Test: Normal    Procedure: The skin over the left radial artery was prepped with betadine and draped in a sterile fashion. Local anesthesia was not performed.   An 18 gauge angiocath was then inserted, using a modified Seldinger technique, into the vessel. The transducer set was then attached and securely fastened to the skin with sutures and with an adhesive dressing. Waveforms on the monitor were observed and found to be adequate. The patient had good distal perfusion after the procedure. The site was then dressed in a sterile fashion. The patient tolerated the procedure well. Complications: None      Time: 1:04 PM  Placed by myself, resident physician Dr. Kang Charlton with the supervision of my attending physician.

## 2021-03-05 NOTE — PROGRESS NOTES
[facility]) Hospitalist Progress Note    Admission Date  3/3/2021 10:55 AM  Chief Complaint Emesis / cough / tachycardia  Admit Dx   Atrial fibrillation with RVR ([address] [I48.91]    Subjective  History of Present Illness  Patient seen at bedside during RRT called this morning. Pt tachycardic, hypotensive, with supratherapeutic digoxin levels and pt's esmolol gtt could not be titrated due to BPs. On my arrival Dr. Alex Qiu and Dr. Nohemi Arambula were already present, digibind being administered. Pt at the time was a bit pale though awake and alert, said she felt poorly including lightheadedness, nausea, chest pressure. Transferred to ICU. Pt's sister notified of the transfer. Review of Systems - 12-point review of systems has been reviewed and is otherwise negative except as listed in the HPI    Objective  Physical Exam  Vitals: BP (!) 144/104   Pulse 85   Temp 96.8 °F (36 °C) (Infrared)   Resp 12   Ht 5' 6\" (1.676 m)   Wt 154 lb 1.6 oz (69.9 kg)   SpO2 98%   BMI 24.87 kg/m²   General: well-developed, well-nourished, no acute distress, cooperative  Skin: warm, dry, intact, normal color without cyanosis  HEENT: normocephalic, atraumatic, mucous membranes normal  Respiratory: clear to auscultation bilaterally without respiratory distress  Cardiovascular: irregularly irregular rhythm tachy without murmur / rub / gallop  Abdominal: soft, nontender, nondistended, normoactive bowel sounds  Extremities: no mottling, pulses intact, no edema  Neurologic: awake, alert, no focal deficits  Psychiatric: normal affect, cooperative    Assessment / Plan  Atrial fib w RVR - echo showed severe LA dilation which would I think make cardioversion less likely to keep pt in sinus rhythm. Issues this AM as esmolol gtt could not be titrated due to low BPs. Dig toxicity noted and Digibind administered.   Cardiology closely involved, believe plan still remains for cardioversion today    CKD IIIa w borderline KENTRELL - baseline Cr

## 2021-03-05 NOTE — PROGRESS NOTES
tablet 5 mg  5 mg Oral BID Sb Beltre MD   5 mg at 03/04/21 2042    prochlorperazine (COMPAZINE) tablet 5 mg  5 mg Oral Q6H PRN Noelle Dunlap Lockso, DO   5 mg at 03/04/21 1337    prochlorperazine (COMPAZINE) injection 10 mg  10 mg Intravenous Q6H PRN Renetta Arriaga MD   10 mg at 03/04/21 2041    esmolol (BREVIBLOC) 2.5gm/250ml NS infusion   mcg/kg/min Intravenous Continuous Kathyleen Aase. Sumayander APN 50 mL/hr at 03/05/21 0713 122.549 mcg/kg/min at 03/05/21 8693    perflutren lipid microspheres (DEFINITY) injection 1.65 mg  1.5 mL Intravenous ONCE PRN Kathyleen Aase. CHEYANNE Anderson        furosemide (LASIX) injection 40 mg  40 mg Intravenous BID Kathyleen Aase.  JOCELYN AndersonN   40 mg at 03/04/21 1728    pantoprazole (PROTONIX) tablet 40 mg  40 mg Oral QAM AC [employer] V, DO   40 mg at 03/05/21 0630    sertraline (ZOLOFT) tablet 50 mg  50 mg Oral Daily [facility] V, DO   50 mg at 03/04/21 1131    insulin lispro (HUMALOG) injection vial 0-12 Units  0-12 Units Subcutaneous TID WC Nay [facility] [place] V, DO   2 Units at 03/05/21 6848    insulin lispro (HUMALOG) injection vial 0-6 Units  0-6 Units Subcutaneous Nightly Nay [facility] [place] V, DO   1 Units at 03/04/21 2042    glucose (GLUTOSE) 40 % oral gel 15 g  15 g Oral PRN [facility]        dextrose 50 % IV solution  12.5 g Intravenous PRN [facility]        glucagon (rDNA) injection 1 mg  1 mg Intramuscular PRN [facility]        dextrose 5 % solution  100 mL/hr Intravenous PRN [facility]        ipratropium (ATROVENT) 0.02 % nebulizer solution 0.5 mg  0.5 mg Nebulization Q6H PRN Mj Baires MD          esmolol 122.549 mcg/kg/min (03/05/21 0713)    dextrose         Physical Exam:  BP (!) 156/82   Pulse 108   Temp 97.5 °F (36.4 °C) (Oral)   Resp 26   Ht 5' 6\" (1.676 m)   Wt 158 lb (71.7 kg)   SpO2 95%   BMI 25.50 kg/m²   Wt Readings from Last 3 Encounters:   03/05/21 158 lb (71.7 kg)   11/12/19 165 lb (74.8 kg)   05/05/19 150 lb 3.2 oz (68.1 kg)     Appearance: Awake, alert, no acute respiratory distress  Skin: Intact, no rash  Head: Normocephalic, atraumatic  Eyes: EOMI, no conjunctival erythema  ENMT: No pharyngeal erythema, MMM, no rhinorrhea  Neck: Supple, no elevated JVP, no carotid bruits  Lungs: Has some bibasilar rales and some occasional scattered wheezing  Cardiac: Irregularly irregular rate and rhythm, +S1S2, no murmurs apparent  Abdomen: Soft, nontender, +bowel sounds  Extremities: Moves all extremities x 4, no lower extremity edema  Neurologic: No focal motor deficits apparent, normal mood and affect  Peripheral Pulses: Intact posterior tibial pulses bilaterally    Intake/Output:    Intake/Output Summary (Last 24 hours) at 3/5/2021 0822  Last data filed at 3/5/2021 0547  Gross per 24 hour   Intake 0 ml   Output 1400 ml   Net -1400 ml     No intake/output data recorded.     Laboratory Tests:  Recent Labs     03/03/21  1300 03/04/21  0358    136   K 3.5 3.9    102   CO2 21* 23   BUN 24* 26*   CREATININE 1.0 1.2*   GLUCOSE 232* 129*   CALCIUM 7.3* 8.0*     Lab Results   Component Value Date    MG 1.8 03/04/2021     Recent Labs     03/03/21  1300   ALKPHOS 62   ALT 9   AST 15   PROT 5.7*   BILITOT 0.9   LABALBU 2.7*     Recent Labs     03/03/21  1118 03/04/21  0358   WBC 19.4* 14.5*   RBC 4.45 3.71   HGB 12.9 10.4*   HCT 38.4 33.3*   MCV 86.3 89.8   MCH 29.0 28.0   MCHC 33.6 31.2*   RDW 14.7 14.6    273   MPV 10.8 9.7     Lab Results   Component Value Date    TROPONINI <0.01 03/03/2021    TROPONINI <0.01 05/04/2019    TROPONINI <0.01 05/04/2019     Lab Results   Component Value Date    INR 1.3 03/03/2021    INR 1.2 06/29/2011    PROTIME 13.8 (H) 03/03/2021    PROTIME 12.7 (H) 06/29/2011     Lab Results   Component Value Date    TSH 1.800 03/03/2021     Lab Results   Component Value Date    LABA1C 8.6 (H) 03/03/2021     No results found for: EAG  Lab Results   Component Value Date    CHOL 138 03/03/2021    CHOL 221 (H) 12/23/2019    CHOL 172 05/05/2019     Lab Results   Component Value Date    TRIG 112 03/03/2021    TRIG 201 (H) 12/23/2019    TRIG 242 (H) 05/05/2019     Lab Results   Component Value Date    HDL 36 03/03/2021    HDL 49 12/23/2019    HDL 38 05/05/2019     Lab Results   Component Value Date    LDLCALC 80 03/03/2021    LDLCALC 132 (H) 12/23/2019    LDLCALC 86 05/05/2019     Lab Results   Component Value Date    LABVLDL 22 03/03/2021    LABVLDL 40 12/23/2019    LABVLDL 48 05/05/2019    VLDL 39 12/30/2015     No results found for: CHOLHDLRATIO    Cardiac Tests:  Telemetry findings reviewed: A. fib with RVR with heart rate in the 120s to 130s. No new tachy/bradyarrhythmias overnight     EKG: A. fib with rapid ventricular response, left axis deviation, LVH, abnormal EKG.    Vitals and labs were reviewed: Blood pressure 156/82 with heart rate of 108 O2 sats 92%.     Labs-BUN/creatinine 24/1.4>> 26/1.2 rest of the chemistry normal.  Lactic acid 2.7 glucose 232. proBNP 18, 248 troponin less than 0.01 cholesterol 138 HDL 36 LDL 80. Liver function normal.  A1c 8.6. WBC 19.4 rest of CBC normal.     Chest x-ray: Bilateral hazy opacities in the lungs secondary to pulmonary edema are in infectious inflammatory process.     Lexiscan nuclear stress test-5/4/2019:  1. No reversible perfusion defect   2. Ejection fraction is greater than 70  %.   3. No significant wall motion abnormality   4. Oasis software is not available at the time of the interpretation,   if the software becomes available an addendum may be added to this      TTE-12/6/2019:   Left ventricle is normal in size .   LVEF 55 to 60% with indeterminate LV diastolic function.   Mild left ventricular concentric hypertrophy noted with asymetric proximal   septal hypertrophy.   No regional wall motion abnormalities seen.   Normal left ventricular ejection fraction.   The left atrium is borderline dilated.   Mild thickening of the mitral valve leaflets with mild doming of anterior   leaflet.   Mild to moderate mitral stenosis .   Mild to moderate posteriorly directed mitral regurgitation.   Mild-to-moderate aortic regurgitation is noted.   Mild tricuspid regurgitation.  Balinda Calico is a small circumferential and predominantly anterior pericardial   effusion noted.        Assessment:  1. New onset atrial fibrillation with RVR, not well controlled no room for titrating medications and remains tachycardic. Patient scheduled for ISABELLE guided cardioversion today. 2. Acute HFpEF triggered by A. fib with RVR  3. [id number] score-6  4. Hypertension, well controlled  5. Type 2 diabetes, not well controlled  6. Hyperlipidemia  7. Obstructive sleep apnea noncompliant with CPAP use  8. CKD stage III  9. History of peptic ulcer disease  10. Iron deficiency anemia on supplement  11. Nausea without any abdominal pain, diarrhea or chest pain, etiology unclear. Evaluation as per primary service. Rule out dig toxicity.        Plan:   1. We will check dig level due to nausea to rule out dig toxicity. She got 4 IV doses but not any oral medications. 2. Continue Lasix 40 mg IV every 12 hours with close monitoring of renal functions electrolytes. 3. Strict input output charting. 4. Cardiac diet and restrict daily salt intake less than 2 g  5. Continue esmolol, complete IV digoxin 4 doses. Keep her n.p.o. and will schedule for ISABELLE guided cardioversion today around 3:00. She had a small breakfast this morning. Today, we will proceed with a ISABELLE guided cardioversion as scheduled. Risks and benefits of ISABELLE guided cardioversion discussed with the patient and also with his son and he would like to proceed with it. 6. Continue  Eliquis 5 mg p.o. daily for anticoagulation. 7. Continue home medications and management of diabetes as per primary service. Connor Villa MD., Iveth Carpio.   [facility]) Cardiology

## 2021-03-05 NOTE — PROGRESS NOTES
1010: Dr. Quinn Diamond here to see patient, will cancel due to dig level. Per Dr. Quinn Diamond hopefullt will do around 2-3pm, notified Dr. Tisha Hadley and updated patient's RN. Patient to return to room.

## 2021-03-05 NOTE — PROCEDURES
Preprocedure diagnosis:  New onet atrial fibrillation and hypotension  Procedures, cardioversion elective arrhythmia external    Procedure was performed at the bed side in the ICU,  She received 2 mg of versed and 75 mg of Fentanyl. Preprocedure diagnosis: A. Fib/flutter    Prior tests anticoagulated     Primary procedure  Written informed consent was obtained, the ISABELLE was performed under stable fasting condition and intracardiac thrombi was ruled out, self-adhesive anterior-posterior defibrillation pads were applied, the defibrillator was programmed to deliver energy in a synchronized fashion with a EKG. The patient was set up for monitoring of surface EKG and pulse oximetry continuously. Blood pressure was monitored and with automatic cuff measurements. Supplemental oxygen was administered. The procedure was performed under anesthesia by anesthesiology. After ensuring adequate anesthesia, DC CV was performed by delivering 200 J of direct current delivered in a synchronized fashion. Result: Successful cardioversion to sinus rhythm, confirmed on 12-lead EKG. Complication: None    Patient was monitored until awake and vitals remained stable. Ora Dyer M.D.   [facility] cardiology

## 2021-03-05 NOTE — H&P
Critical Care Team - History and Physical Note      Date and time: 3/5/2021 5:54 PM  Patient's name:  Genoveva Pedro  Medical Record Number: [id number]  Age: 81 y.o. Date of Admission: 3/3/2021 10:55 AM  Length of stay during current admission: 2  Primary Care Physician: Khari Crowe MD  Code Status: Prior      HPI:    80 yo f pt of Dr. Monie Landa with PMHx of DM, HTN, JERICA, GERD, CHF who presented to ER with chief complaint of cough, sob, lightheadedness. She complained about her shortness of breath and cough going worse for few months. In the ER her EKG showed A. Fib RVR with HR in 170s-190s. She denied any previous history of A. Fib. She was given cardizem x3, metoprolol, and digoxin with no response to HR. Cardiology was consulted and recommended esmolol drip. She was also started on lovenox . She had leukocytosis and elevated lactate, unsure of source of infection. CXR showed hazy opacities b/l lungs. Pt had completed IV digoxin 4 doses and continued on esmolol. Pt was also continued on lasix IV q12 hours. Pt's cardioversion was planned for  morning. Pt had elevated digoxin level of 3.8.  : pt was hypotensive hence RRT was called. Pt complained about nausea, vomiting and heaviness in the chest. Pt was given Digifab once due to elevated digoxin levels. EKG showed A. Fib with RVR. Pt was also given dose of Tigan for nausea since QT was elevated. Cardiology planning for cardioversion with ISABELLE later this afternoon.        GENERAL/LINES:  Awake and following commands  No sedation or paralytics  No vasopressors  Right IJ CVC 3/05  L radial art line 3/05      Vitals  BP (!) 144/104   Pulse 87   Temp 97.4 °F (36.3 °C) (Infrared)   Resp 17   Ht 5' 6\" (1.676 m)   Wt 154 lb 1.6 oz (69.9 kg)   SpO2 96%   BMI 24.87 kg/m²   Tmax over 24 hours:  Temp (24hrs), Av.2 °F (36.2 °C), Min:96.8 °F (36 °C), Max:97.5 °F (36.4 °C)      Patient Vitals for the past 6 hrs:   BP Temp Temp src Pulse Resp SpO2 Height Weight 03/05/21 1700 -- -- -- 87 17 96 % -- --   03/05/21 1600 -- 97.4 °F (36.3 °C) Infrared 89 20 96 % -- --   03/05/21 1500 -- -- -- 94 14 99 % -- --   03/05/21 1400 (!) 144/104 -- -- 85 12 98 % -- --   03/05/21 1315 (!) 144/104 -- -- 175 24 94 % -- --   03/05/21 1300 (!) 144/104 -- -- 182 27 -- -- --   03/05/21 1200 133/79 96.8 °F (36 °C) Infrared 155 (!) 34 96 % 5' 6\" (1.676 m) 154 lb 1.6 oz (69.9 kg)         Intake/Output Summary (Last 24 hours) at 3/5/2021 1754  Last data filed at 3/5/2021 1200  Gross per 24 hour   Intake --   Output 1300 ml   Net -1300 ml     Wt Readings from Last 2 Encounters:   03/05/21 154 lb 1.6 oz (69.9 kg)   11/12/19 165 lb (74.8 kg)     Body mass index is 24.87 kg/m². VENT SETTINGS  Vent Information  SpO2: 96 %  Additional Respiratory  Assessments  Pulse: 87  Resp: 17  SpO2: 96 %  Oral Care: Mouthwash, Teeth brushed    Review of Systems   Constitutional: Positive for fatigue. Negative for chills and fever. HENT: Negative for ear pain, sinus pressure and sore throat. Eyes: Negative for pain, discharge and redness. Respiratory: Negative for cough, shortness of breath and wheezing. Cardiovascular: Positive for palpitations. Negative for chest pain and leg swelling. Gastrointestinal: Positive for nausea and vomiting. Negative for abdominal distention and diarrhea. Genitourinary: Negative for dysuria and frequency. Musculoskeletal: Negative for arthralgias and back pain. Skin: Negative for rash and wound. Neurological: Negative for dizziness, weakness and headaches. Hematological: Negative for adenopathy. All other systems reviewed and are negative.       Physical Exam:    General appearance - alert, ill appearing  Mental status - alert, oriented to person, place, and time, normal mood, behavior, speech, dress, motor activity, and thought processes  Eyes - pupils equal and reactive, extraocular eye movements intact, sclera anicteric  Ears - external ear normal  Nose - normal and patent, no erythema, discharge or polyps  Mouth - mucous membranes moist, pharynx normal without lesions  Neck - supple, no significant adenopathy  Chest - clear to auscultation, no wheezes, rales or rhonchi, symmetric air entry  Heart - tachycardia, irregular rhythm, normal S1, S2, no murmurs, rubs, clicks or gallops  Abdomen - soft, nontender, nondistended, no masses or organomegaly  Neurological - alert, oriented, normal speech, no focal findings or movement disorder noted  Extremities - peripheral pulses normal, no clubbing or cyanosis. No edema.   Skin - normal coloration and turgor, no rashes, no suspicious skin lesions noted      Medications:    Scheduled Meds:   fentaNYL        metoprolol succinate  25 mg Oral BID    apixaban  5 mg Oral BID    [Held by provider] furosemide  40 mg Intravenous BID    pantoprazole  40 mg Oral QAM AC    sertraline  50 mg Oral Daily    insulin lispro  0-12 Units Subcutaneous TID WC    insulin lispro  0-6 Units Subcutaneous Nightly     Continuous Infusions:   dextrose       PRN Meds:       trimethobenzamide, 200 mg, Q8H PRN      prochlorperazine, 5 mg, Q6H PRN      perflutren lipid microspheres, 1.5 mL, ONCE PRN      glucose, 15 g, PRN      dextrose, 12.5 g, PRN      glucagon (rDNA), 1 mg, PRN      dextrose, 100 mL/hr, PRN      ipratropium, 0.5 mg, Q6H PRN          Labs:    Complete Blood Count:   Recent Labs     03/04/21  0358 03/05/21  0855 03/05/21  1239   WBC 14.5* 12.9* 15.5*   HGB 10.4* 11.3* 11.0*   HCT 33.3* 36.6 35.1    324 370        Last 3 Blood Glucose:   Recent Labs     03/04/21  0358 03/05/21  0855 03/05/21  1239   GLUCOSE 129* 170* 198*        PT/INR:    Lab Results   Component Value Date    PROTIME 23.0 03/05/2021    PROTIME 12.7 06/29/2011    INR 2.1 03/05/2021     PTT:    Lab Results   Component Value Date    APTT 25.4 03/05/2021       Comprehensive Metabolic Profile:   Recent Labs     03/03/21  1300 03/04/21  0358 03/05/21  0855 03/05/21  1239    136 141 141   K 3.5 3.9 4.2 3.5    102 103 101   CO2 21* 23 27 24   BUN 24* 26* 38* 39*   CREATININE 1.0 1.2* 1.4* 1.4*   GLUCOSE 232* 129* 170* 198*   CALCIUM 7.3* 8.0* 8.3* 8.6   PROT 5.7*  --   --   --    LABALBU 2.7*  --   --   --    BILITOT 0.9  --   --   --    ALKPHOS 62  --   --   --    AST 15  --   --   --    ALT 9  --   --   --       Magnesium:   Lab Results   Component Value Date    MG 1.6 03/05/2021     Phosphorus: No results found for: PHOS  Ionized Calcium: No results found for: CAION     Urinalysis:     Troponin:   Recent Labs     03/03/21  1300 03/05/21  1239   TROPONINI <0.01 <0.01         Radiology/Imaging:   Echo Complete   TTE procedure:Echo Limited Study. Procedure Date Date: 03/04/2021 Start: 08:18 AM Study Location: Portable Technical Quality: Adequate visualization Indications:Atrial fibrillation. Patient Status: Routine Height: 68 inches Weight: 150 pounds BSA: 1.81 m^2 BMI: 22.81 kg/m^2 HR: 142 bpm BP: 125/72 mmHg  Findings   Left Ventricle  Normal left ventricle size and systolic function. Ejection fraction is visually estimated at 60-65%. Atrial fibrillation may  affect the evaluation of LV systolic function. No regional wall motion abnormalities seen. Normal left ventricle wall thickness. Abnormal LV diastolic function with an indeterminate grade (E/e' > 11). Right Ventricle  Normal right ventricular size and function. Left Atrium  The left atrium is severely dilated. Right Atrium  Normal right atrium size. Mitral Valve  Moderate calcification of the leaflets of the mitral valve. Mild mitral regurgitation is present. Mild mitral regurgitation with centrally directed jet. Tricuspid Valve  The tricuspid valve appears structurally normal.  Mild tricuspid regurgitation. RVSP is 56 mmHg. Pulmonary hypertension is moderate . Aortic Valve  Structurally normal aortic valve. The aortic valve is trileaflet.   No hemodynamically significant aortic stenosis is present. No evidence of aortic valve regurgitation. Pulmonic Valve  The pulmonic valve was not well visualized. Pericardial Effusion  No evidence for hemodynamically significant pericardial effusion. Pleural Effusion  No evidence of pleural effusion. Aorta  Aorta was not clearly visualized. Miscellaneous  Dilated Inferior Vena Cava. Inferior Vena Cava, normal respiratory variation. Conclusions   Summary  Normal left ventricle size and systolic function. Ejection fraction is visually estimated at 60-65%. Atrial fibrillation may  affect the evaluation of LV systolic function. No regional wall motion abnormalities seen. Normal left ventricle wall thickness. Abnormal LV diastolic function with an indeterminate grade (E/e' > 11). The left atrium is severely dilated. Normal right ventricular size and function. Mild mitral regurgitation with centrally directed jet. No hemodynamically significant aortic stenosis is present. Mild tricuspid regurgitation. RVSP is 56 mmHg. Pulmonary hypertension is moderate . No evidence for hemodynamically significant pericardial effusion. Xr Chest Portable    Result Date: 3/3/2021  EXAMINATION: ONE XRAY VIEW OF THE CHEST 3/3/2021 11:55 am COMPARISON: 03/03/2019 HISTORY: ORDERING SYSTEM PROVIDED HISTORY: chest pain TECHNOLOGIST PROVIDED HISTORY: Reason for exam:->chest pain FINDINGS: Hazy alveolar opacities in the lungs, most notably in the left lower lung, are nonspecific and may represent edema or an infectious/inflammatory process. The heart is mildly enlarged. No pneumothorax is seen. Small left pleural effusion cannot be excluded. The visualized bones appear demineralized but are otherwise intact. Hazy opacities in the lungs bilaterally may represent edema or an infectious/inflammatory process.      ASSESSMENT & PLAN:     NEURO:  - Awake, responsive  - GCS 15     RESPIRATORY:  - On 5 L NC , wean as tolerated  - CXR shows bilateral hazy opacities, likely edema as BNP is 11651 on arrival   - Procalc pending, possible pneumonia- not on antibiotics. Afebrile. Repeat CXR in the AM  -Patient with history of JERICA noncompliant with CPAP    CARDIOVASCULAR:  - A Fib with RVR : given cardizem x3, metoprolol, digoxin x4 throughout hospitalization  - Was on esmolol drip, however she became hypotensive and was found to be in A fib RVR, therefore drip was stopped  - Patient started on Dig in ER, concern for toxicity, level returned at 3.8. Patient given digibind  - Tigan for nausea as QTc prolonged  - Continue eliquis 5 mg TID for prophylaxis  - ECHO 3/4 shows EF 60-65%  - Bedside ISABELLE performed by Dr. Katherine Barton. Patient also electrically cardioverted with 200J once with conversion to sinus rhythm  - 25 PO Metoprolol BID  - D/C esmolol and dig  - Start amio if return of A fib RVR    GI:  - Protonix 40 mg daily   - Diet as tolerated after procedure  - Tigan for nausea as QTc prolonged     RENAL:  -KENTRELL. crea: 1.4, pt was on IV lasix 40 mg BID  -holding for now due to hypotensive episode   - nephro following. Likely due to hypoperfusion     INFECTIOUS DISEASE:  -afebrile, leukocytosis  -not on abx at this time- check procal     HEME/ONC:  -Hb: 11.3 , stable , monitor H&H , transfuse if < 7  - Wbc : 12.9( 19.4--> 14.5->12. 9)     ENDOCRINE:  - BGL:170  - Hx of T2DM  - continue MDSSI      Code: full   DVT prophylaxis: eliquis 5 mg BID (due to A. Fib RVR)  GI prophylaxis: protonix      Aryan Codie, DO, PGY2  5:54 PM     I personally saw, examined and provided care for the patient. Radiographs, labs and medication list were reviewed by me independently. I spoke with bedside nursing, therapists and consultants. Critical care services and times documented are independent of procedures and multidisciplinary rounds with Residents.  Additionally comprehensive, multidisciplinary rounds were conducted with the MICU team. The case was discussed in detail and plans for care were established. Review of Residents documentation was conducted and revisions were made as appropriate. I agree with the above documented exam, problem list and plan of care.   Treva Plascencia MD   CCT excluding procedures: 45'

## 2021-03-05 NOTE — SIGNIFICANT EVENT
[facility] RRT/Code Blue Note    Subjective:    Called to bedside for hypotension and tachycardia  Patient was admitted for Afib with RVR, she was supposed to get cardioversion, patient started having some nausea and lightheadedness. Patient was found to be in A. fib with RVR blood pressure was in the 80s. Earlier dig level was checked and it was elevated and patient was having this toxicity. We will start patient on IV fluids, she did not respond well, she was also started on esmolol earlier which might have contributed to her hypotension, stopped esmolol continue IV fluids, we contacted the intensive care unit we will transfer the patient to the intensive care unit for possible cardioversion. We gave the patient digbind. On exam patient had clear lungs bilaterally. Heart S1-S2 tachycardic. Abdomen soft nontender. EKG showed A. fib with RVR.   ABGs showed pH of 7.39, PCO2 32.9, PaO2 74.1  Dig level 3.8       fentaNYL        metoprolol succinate  25 mg Oral BID    apixaban  5 mg Oral BID    [Held by provider] furosemide  40 mg Intravenous BID    pantoprazole  40 mg Oral QAM AC    sertraline  50 mg Oral Daily    insulin lispro  0-12 Units Subcutaneous TID WC    insulin lispro  0-6 Units Subcutaneous Nightly         trimethobenzamide, 200 mg, Q8H PRN      prochlorperazine, 5 mg, Q6H PRN      perflutren lipid microspheres, 1.5 mL, ONCE PRN      glucose, 15 g, PRN      dextrose, 12.5 g, PRN      glucagon (rDNA), 1 mg, PRN      dextrose, 100 mL/hr, PRN      ipratropium, 0.5 mg, Q6H PRN         Objective:    BP (!) 144/104   Pulse 87   Temp 97.4 °F (36.3 °C) (Infrared)   Resp 17   Ht 5' 6\" (1.676 m)   Wt 154 lb 1.6 oz (69.9 kg)   SpO2 96%   BMI 24.87 kg/m²         Recent Labs     03/04/21  0358 03/05/21  0855 03/05/21  1239    141 141   K 3.9 4.2 3.5    103 101   CO2 23 27 24   BUN 26* 38* 39*   CREATININE 1.2* 1.4* 1.4*   GLUCOSE 129* 170* 198*   CALCIUM

## 2021-03-05 NOTE — CONSULTS
Nephrology Consult Note  Patient's Name: Tami Gutierrez  2:24 PM  3/5/2021    Nephrologist: Haylie Robbins    Reason for Consult:  KENTRELL on CKD G3A  Requesting Physician:  Diane Salinas MD    Chief Complaint:  SOB    History Obtained From:  patient and past medical records    History of Present Ilness:    Tami Gutierrez is a 81 y.o. female with prior history of CKD G3B with a baseline serum cr 1.3mg/dl and an e-GFR=43ml/min who presented to the ED 3/3/21 with he c/o SOB for 2 weeks PTA. In the ED she was found to be in Afib with RVR with an elevated lactic acid 2.7. The cr was 1.0mg/dl  And UA (-) blood & protein with a SG 1.020. pt did not respond to diltiazem IV and was treated with IV lopressor and esmolol was initiated. She was give furosemide and digoxin. WBC elevated to 19. PM of 3/3 HR went 130-160's with BP 93/61. Pt  Had an elevated digoxin level and digibind given. She underwent a successful cardioversion to NSR this PM. She states she still has some SOB, no CP.  She did say she was told in the past she had kidney disease    Past Medical History:   Diagnosis Date    Acute cystitis with hematuria 5/3/2019    Anemia 2008    RECEIVED 4 UNITS BLOOD    Atrial fibrillation (HCC)     CKD (chronic kidney disease) stage 3, GFR 30-59 ml/min 5/3/2019    CPAP (continuous positive airway pressure) dependence     Diabetes mellitus ([address]     Esophageal stricture     Gastric ulcer     Goiter     Heart murmur     Hypertension     SINCE 1994    Kidney stones     Rheumatic fever     POSSIBLY AS A CHILD    Sleep apnea        Past Surgical History:   Procedure Laterality Date    BREAST LUMPECTOMY Left     CALCIUM BUILDUP    CARDIOVASCULAR STRESS TEST  1999    ISCHEMIC NUCLEAR STRESS TEST - PATIENT REFUSED HEART CATHETERIZATION    CHOLECYSTECTOMY      COLONOSCOPY  07/11    W/POLYP REMOVAL    DOPPLER ECHOCARDIOGRAPHY  05/14/03    POSSIBLE MILD MITRAL STENOSIS    DOPPLER ECHOCARDIOGRAPHY  06/09/04    MILD MITRAL STENSIS, MODERATE MITRAL REGURGITATION    FOOT SURGERY      HYSTERECTOMY      POLYPECTOMY      16 POLYPS REMOVED FROM STOMACH AND 1 FROM COLON    UPPER GASTROINTESTINAL ENDOSCOPY  07/11       Family History   Problem Relation Age of Onset    Asthma Mother     Diabetes Mother     Alcohol Abuse Father         reports that she has never smoked. She has never used smokeless tobacco. She reports that she does not drink alcohol or use drugs. Allergies:  Nitrofuran derivatives, Other, and Sulfa antibiotics    Current Medications:        fentaNYL (SUBLIMAZE) 100 MCG/2ML injection,       metoprolol succinate (TOPROL XL) extended release tablet 25 mg, BID      trimethobenzamide (TIGAN) injection 200 mg, Q8H PRN      apixaban (ELIQUIS) tablet 5 mg, BID      prochlorperazine (COMPAZINE) tablet 5 mg, Q6H PRN      perflutren lipid microspheres (DEFINITY) injection 1.65 mg, ONCE PRN      [Held by provider] furosemide (LASIX) injection 40 mg, BID      pantoprazole (PROTONIX) tablet 40 mg, QAM AC      sertraline (ZOLOFT) tablet 50 mg, Daily      insulin lispro (HUMALOG) injection vial 0-12 Units, TID WC      insulin lispro (HUMALOG) injection vial 0-6 Units, Nightly      glucose (GLUTOSE) 40 % oral gel 15 g, PRN      dextrose 50 % IV solution, PRN      glucagon (rDNA) injection 1 mg, PRN      dextrose 5 % solution, PRN      ipratropium (ATROVENT) 0.02 % nebulizer solution 0.5 mg, Q6H PRN        Review of Systems:   Pertinent items are noted in HPI. Remainder of a complete review of systems is (-) other than stated in the HPI    Physical exam:   Constitutional:  Elderly female in NAD  Vitals:   VITALS:  BP (!) 144/104   Pulse 85   Temp 96.8 °F (36 °C) (Infrared)   Resp 12   Ht 5' 6\" (1.676 m)   Wt 154 lb 1.6 oz (69.9 kg)   SpO2 98%   BMI 24.87 kg/m²   24HR INTAKE/OUTPUT:    Intake/Output Summary (Last 24 hours) at 3/5/2021 1425  Last data filed at 3/5/2021 1200  Gross per 24 hour   Intake --   Output 02/27/2012    LABALBU 3.5 02/27/2012    CALCIUM 8.6 03/05/2021    BILITOT 0.9 03/03/2021    ALKPHOS 62 03/03/2021    AST 15 03/03/2021    ALT 9 03/03/2021     BMP:    Lab Results   Component Value Date     03/05/2021    K 3.5 03/05/2021    K 3.9 03/04/2021     03/05/2021    CO2 24 03/05/2021    BUN 39 03/05/2021    LABALBU 2.7 03/03/2021    LABALBU 4.3 02/27/2012    LABALBU 3.5 02/27/2012    CREATININE 1.4 03/05/2021    CALCIUM 8.6 03/05/2021    GFRAA 43 03/05/2021    LABGLOM 36 03/05/2021    GLUCOSE 198 03/05/2021    GLUCOSE 108 02/27/2012     BUN/Creatinine:    Lab Results   Component Value Date    BUN 39 03/05/2021    CREATININE 1.4 03/05/2021     Hepatic Function Panel:    Lab Results   Component Value Date    ALKPHOS 62 03/03/2021    ALT 9 03/03/2021    AST 15 03/03/2021    PROT 5.7 03/03/2021    BILITOT 0.9 03/03/2021    BILIDIR 0.1 02/27/2012    LABALBU 2.7 03/03/2021    LABALBU 4.3 02/27/2012    LABALBU 3.5 02/27/2012     Albumin:    Lab Results   Component Value Date    LABALBU 2.7 03/03/2021    LABALBU 4.3 02/27/2012    LABALBU 3.5 02/27/2012     Calcium:    Lab Results   Component Value Date    CALCIUM 8.6 03/05/2021     Ionized Calcium:  No results found for: IONCA  Magnesium:    Lab Results   Component Value Date    MG 1.6 03/05/2021     Phosphorus:  No results found for: PHOS  LDH:  No results found for: LDH  Uric Acid:  No results found for: LABURIC, URICACID  PT/INR:    Lab Results   Component Value Date    PROTIME 23.0 03/05/2021    PROTIME 12.7 06/29/2011    INR 2.1 03/05/2021     PTT:    Lab Results   Component Value Date    APTT 25.4 03/05/2021   [APTT}  Last 3 Troponin:    Lab Results   Component Value Date    TROPONINI <0.01 03/05/2021    TROPONINI <0.01 03/03/2021    TROPONINI <0.01 05/04/2019     U/A:    Lab Results   Component Value Date    COLORU Yellow 03/03/2021    PROTEINU Negative 03/03/2021    PHUR 5.5 03/03/2021    WBCUA 10-20 05/03/2019    WBCUA 1-3 01/23/2012    RBCUA collection seen.  No evidence for pneumothorax       There is been placement of a right IJ catheter with tip at the SVC           Impression   Placement of right IJ catheter in satisfactory position no evidence for   pneumothorax       Mild bilateral pulmonary interstitial infiltrates which appear increased from   prior exam         Assessment  1-Stage I KENTRELL most probably sec to decreased effective renal perfusion in the setting of the AFib with RVR  3/3/21 UA no blood or protein in a highly concentrated specimen making glomerular pathology-GN or NS unlikely  PLAN:1. Await the labs for the FeNa and FeUrea  2. Check Renal US    2-AFib with RVR-S/P Cardioversion to NSR  K+<4.0 and Mg++ <2 this AM  PLAN:1. Supplement K+ and Mg++ and follow levels    3-HTN with CKD I-IV  BP at near goal pre cardioversion  PLAN:1.Follow on the metoprolol for present    4- CKD G3B with a baseline serum cr 1.3mg/dl and an e-GFR=43ml/min presumed sec to microvascular disease in the setting of the Dm2 and the HTN    5- Sec HPTH of Renal Origin with hypocalcemia with hypoalbuminemia  Ca++ corrected to WNL  PLAN:1. Check PO4, PTH, Vit D    6- Anemia in CKD  HgB at goal HgB >10  PLAN:1.  Check Fe++, B12, folate      Thank you  for allowing us to participate in care of Cheri David  2:24 PM  3/5/2021

## 2021-03-05 NOTE — CARE COORDINATION
CM NOTE: Per QFR---continues on O2 5L which she does not have at home. Not feeling better & still has dyspnea. IV digoxin & lasix per cardiology----will proceed with ISABELLE guided cardioversion. Continue eliquis ACD. CM went to unit to provide Eliquis discount card to pt since medication is new. RRT activated & pt transferred to ICU. Will hold off on presentation of card at this time. WILL NEED THERAPY EVALS WHEN APPROPRIATE. Will continue to follow pt.

## 2021-03-05 NOTE — CARE COORDINATION
3/5/2021  Social Work Discharge Planning:New eliquis? Pt is on 5lo2 here and uses none at home. Referal was made toTonya with  DME. Cardioversion today at 11am. Declining [facility].  Electronically signed by RADHA Golden on 3/5/2021 at 9:39 AM

## 2021-03-06 ENCOUNTER — APPOINTMENT (OUTPATIENT)
Dept: GENERAL RADIOLOGY | Age: 86
DRG: 308 | End: 2021-03-06
Payer: MEDICARE

## 2021-03-06 LAB
ANION GAP SERPL CALCULATED.3IONS-SCNC: 10 MMOL/L (ref 7–16)
ANION GAP SERPL CALCULATED.3IONS-SCNC: 11 MMOL/L (ref 7–16)
BUN BLDV-MCNC: 41 MG/DL (ref 8–23)
BUN BLDV-MCNC: 43 MG/DL (ref 8–23)
CALCIUM SERPL-MCNC: 8.5 MG/DL (ref 8.6–10.2)
CALCIUM SERPL-MCNC: 8.8 MG/DL (ref 8.6–10.2)
CHLORIDE BLD-SCNC: 103 MMOL/L (ref 98–107)
CHLORIDE BLD-SCNC: 104 MMOL/L (ref 98–107)
CO2: 25 MMOL/L (ref 22–29)
CO2: 26 MMOL/L (ref 22–29)
CREAT SERPL-MCNC: 1.2 MG/DL (ref 0.5–1)
CREAT SERPL-MCNC: 1.4 MG/DL (ref 0.5–1)
FERRITIN: 1517 NG/ML
FOLATE: >20 NG/ML (ref 4.8–24.2)
GFR AFRICAN AMERICAN: 43
GFR AFRICAN AMERICAN: 52
GFR NON-AFRICAN AMERICAN: 36 ML/MIN/1.73
GFR NON-AFRICAN AMERICAN: 43 ML/MIN/1.73
GLUCOSE BLD-MCNC: 123 MG/DL (ref 74–99)
GLUCOSE BLD-MCNC: 207 MG/DL (ref 74–99)
HCT VFR BLD CALC: 29.6 % (ref 34–48)
HCT VFR BLD CALC: 31.2 % (ref 34–48)
HEMOGLOBIN: 9.7 G/DL (ref 11.5–15.5)
HEMOGLOBIN: 9.8 G/DL (ref 11.5–15.5)
IRON SATURATION: 18 % (ref 15–50)
IRON: 31 MCG/DL (ref 37–145)
LACTIC ACID: 1.1 MMOL/L (ref 0.5–2.2)
MAGNESIUM: 2.2 MG/DL (ref 1.6–2.6)
MCH RBC QN AUTO: 28.4 PG (ref 26–35)
MCHC RBC AUTO-ENTMCNC: 32.8 % (ref 32–34.5)
MCV RBC AUTO: 86.8 FL (ref 80–99.9)
METER GLUCOSE: 119 MG/DL (ref 74–99)
METER GLUCOSE: 123 MG/DL (ref 74–99)
METER GLUCOSE: 198 MG/DL (ref 74–99)
METER GLUCOSE: 219 MG/DL (ref 74–99)
METER GLUCOSE: 281 MG/DL (ref 74–99)
PARATHYROID HORMONE INTACT: 79 PG/ML (ref 15–65)
PDW BLD-RTO: 14.8 FL (ref 11.5–15)
PHOSPHORUS: 3.8 MG/DL (ref 2.5–4.5)
PLATELET # BLD: 324 E9/L (ref 130–450)
PMV BLD AUTO: 9.4 FL (ref 7–12)
POTASSIUM SERPL-SCNC: 3.4 MMOL/L (ref 3.5–5)
POTASSIUM SERPL-SCNC: 4 MMOL/L (ref 3.5–5)
PRO-BNP: ABNORMAL PG/ML (ref 0–450)
RBC # BLD: 3.41 E12/L (ref 3.5–5.5)
SODIUM BLD-SCNC: 139 MMOL/L (ref 132–146)
SODIUM BLD-SCNC: 140 MMOL/L (ref 132–146)
TOTAL IRON BINDING CAPACITY: 169 MCG/DL (ref 250–450)
TROPONIN: <0.01 NG/ML (ref 0–0.03)
VITAMIN B-12: 449 PG/ML (ref 211–946)
VITAMIN D 25-HYDROXY: 35 NG/ML (ref 30–100)
WBC # BLD: 16 E9/L (ref 4.5–11.5)

## 2021-03-06 PROCEDURE — 36415 COLL VENOUS BLD VENIPUNCTURE: CPT

## 2021-03-06 PROCEDURE — 6360000002 HC RX W HCPCS: Performed by: INTERNAL MEDICINE

## 2021-03-06 PROCEDURE — 99232 SBSQ HOSP IP/OBS MODERATE 35: CPT | Performed by: INTERNAL MEDICINE

## 2021-03-06 PROCEDURE — 82746 ASSAY OF FOLIC ACID SERUM: CPT

## 2021-03-06 PROCEDURE — 83550 IRON BINDING TEST: CPT

## 2021-03-06 PROCEDURE — 82607 VITAMIN B-12: CPT

## 2021-03-06 PROCEDURE — 94664 DEMO&/EVAL PT USE INHALER: CPT

## 2021-03-06 PROCEDURE — 6370000000 HC RX 637 (ALT 250 FOR IP): Performed by: INTERNAL MEDICINE

## 2021-03-06 PROCEDURE — 2580000003 HC RX 258: Performed by: STUDENT IN AN ORGANIZED HEALTH CARE EDUCATION/TRAINING PROGRAM

## 2021-03-06 PROCEDURE — 84484 ASSAY OF TROPONIN QUANT: CPT

## 2021-03-06 PROCEDURE — 94660 CPAP INITIATION&MGMT: CPT

## 2021-03-06 PROCEDURE — 94640 AIRWAY INHALATION TREATMENT: CPT

## 2021-03-06 PROCEDURE — 83605 ASSAY OF LACTIC ACID: CPT

## 2021-03-06 PROCEDURE — 82962 GLUCOSE BLOOD TEST: CPT

## 2021-03-06 PROCEDURE — P9047 ALBUMIN (HUMAN), 25%, 50ML: HCPCS | Performed by: STUDENT IN AN ORGANIZED HEALTH CARE EDUCATION/TRAINING PROGRAM

## 2021-03-06 PROCEDURE — 85014 HEMATOCRIT: CPT

## 2021-03-06 PROCEDURE — 2000000000 HC ICU R&B

## 2021-03-06 PROCEDURE — 80048 BASIC METABOLIC PNL TOTAL CA: CPT

## 2021-03-06 PROCEDURE — 99233 SBSQ HOSP IP/OBS HIGH 50: CPT | Performed by: INTERNAL MEDICINE

## 2021-03-06 PROCEDURE — 2700000000 HC OXYGEN THERAPY PER DAY

## 2021-03-06 PROCEDURE — 82306 VITAMIN D 25 HYDROXY: CPT

## 2021-03-06 PROCEDURE — 84100 ASSAY OF PHOSPHORUS: CPT

## 2021-03-06 PROCEDURE — 37799 UNLISTED PX VASCULAR SURGERY: CPT

## 2021-03-06 PROCEDURE — 71045 X-RAY EXAM CHEST 1 VIEW: CPT

## 2021-03-06 PROCEDURE — 6370000000 HC RX 637 (ALT 250 FOR IP): Performed by: STUDENT IN AN ORGANIZED HEALTH CARE EDUCATION/TRAINING PROGRAM

## 2021-03-06 PROCEDURE — 83880 ASSAY OF NATRIURETIC PEPTIDE: CPT

## 2021-03-06 PROCEDURE — 6370000000 HC RX 637 (ALT 250 FOR IP): Performed by: FAMILY MEDICINE

## 2021-03-06 PROCEDURE — 6360000002 HC RX W HCPCS: Performed by: STUDENT IN AN ORGANIZED HEALTH CARE EDUCATION/TRAINING PROGRAM

## 2021-03-06 PROCEDURE — 83970 ASSAY OF PARATHORMONE: CPT

## 2021-03-06 PROCEDURE — 85027 COMPLETE CBC AUTOMATED: CPT

## 2021-03-06 PROCEDURE — 83735 ASSAY OF MAGNESIUM: CPT

## 2021-03-06 PROCEDURE — 85018 HEMOGLOBIN: CPT

## 2021-03-06 PROCEDURE — 83540 ASSAY OF IRON: CPT

## 2021-03-06 PROCEDURE — 82728 ASSAY OF FERRITIN: CPT

## 2021-03-06 PROCEDURE — 93005 ELECTROCARDIOGRAM TRACING: CPT | Performed by: STUDENT IN AN ORGANIZED HEALTH CARE EDUCATION/TRAINING PROGRAM

## 2021-03-06 RX ORDER — LEVALBUTEROL 1.25 MG/.5ML
0.63 SOLUTION, CONCENTRATE RESPIRATORY (INHALATION) EVERY 6 HOURS
Status: DISCONTINUED | OUTPATIENT
Start: 2021-03-06 | End: 2021-03-10 | Stop reason: HOSPADM

## 2021-03-06 RX ORDER — AMLODIPINE BESYLATE 5 MG/1
5 TABLET ORAL NIGHTLY
Status: DISCONTINUED | OUTPATIENT
Start: 2021-03-06 | End: 2021-03-10 | Stop reason: HOSPADM

## 2021-03-06 RX ORDER — POTASSIUM CHLORIDE 29.8 MG/ML
20 INJECTION INTRAVENOUS ONCE
Status: COMPLETED | OUTPATIENT
Start: 2021-03-06 | End: 2021-03-06

## 2021-03-06 RX ORDER — METHYLPREDNISOLONE SODIUM SUCCINATE 125 MG/2ML
80 INJECTION, POWDER, LYOPHILIZED, FOR SOLUTION INTRAMUSCULAR; INTRAVENOUS ONCE
Status: COMPLETED | OUTPATIENT
Start: 2021-03-06 | End: 2021-03-06

## 2021-03-06 RX ORDER — ALBUMIN (HUMAN) 12.5 G/50ML
25 SOLUTION INTRAVENOUS ONCE
Status: COMPLETED | OUTPATIENT
Start: 2021-03-06 | End: 2021-03-06

## 2021-03-06 RX ORDER — FUROSEMIDE 10 MG/ML
40 INJECTION INTRAMUSCULAR; INTRAVENOUS ONCE
Status: COMPLETED | OUTPATIENT
Start: 2021-03-06 | End: 2021-03-06

## 2021-03-06 RX ORDER — SODIUM CHLORIDE FOR INHALATION 0.9 %
3 VIAL, NEBULIZER (ML) INHALATION ONCE
Status: DISCONTINUED | OUTPATIENT
Start: 2021-03-06 | End: 2021-03-10 | Stop reason: HOSPADM

## 2021-03-06 RX ORDER — LEVALBUTEROL 1.25 MG/.5ML
0.63 SOLUTION, CONCENTRATE RESPIRATORY (INHALATION) ONCE
Status: COMPLETED | OUTPATIENT
Start: 2021-03-06 | End: 2021-03-06

## 2021-03-06 RX ORDER — POTASSIUM CHLORIDE 29.8 MG/ML
INJECTION INTRAVENOUS
Status: DISPENSED
Start: 2021-03-06 | End: 2021-03-07

## 2021-03-06 RX ORDER — METOPROLOL SUCCINATE 50 MG/1
50 TABLET, EXTENDED RELEASE ORAL 2 TIMES DAILY
Status: DISCONTINUED | OUTPATIENT
Start: 2021-03-06 | End: 2021-03-10 | Stop reason: HOSPADM

## 2021-03-06 RX ADMIN — METHYLPREDNISOLONE SODIUM SUCCINATE 80 MG: 125 INJECTION, POWDER, LYOPHILIZED, FOR SOLUTION INTRAMUSCULAR; INTRAVENOUS at 10:29

## 2021-03-06 RX ADMIN — FUROSEMIDE 40 MG: 10 INJECTION, SOLUTION INTRAMUSCULAR; INTRAVENOUS at 10:29

## 2021-03-06 RX ADMIN — METOPROLOL SUCCINATE 50 MG: 50 TABLET, EXTENDED RELEASE ORAL at 08:46

## 2021-03-06 RX ADMIN — POTASSIUM CHLORIDE 20 MEQ: 400 INJECTION, SOLUTION INTRAVENOUS at 10:30

## 2021-03-06 RX ADMIN — ALBUMIN (HUMAN) 25 G: 0.25 INJECTION, SOLUTION INTRAVENOUS at 09:20

## 2021-03-06 RX ADMIN — PANTOPRAZOLE SODIUM 40 MG: 40 TABLET, DELAYED RELEASE ORAL at 06:28

## 2021-03-06 RX ADMIN — SODIUM CHLORIDE: 9 INJECTION, SOLUTION INTRAVENOUS at 01:30

## 2021-03-06 RX ADMIN — AMLODIPINE BESYLATE 5 MG: 5 TABLET ORAL at 20:52

## 2021-03-06 RX ADMIN — INSULIN LISPRO 4 UNITS: 100 INJECTION, SOLUTION INTRAVENOUS; SUBCUTANEOUS at 11:48

## 2021-03-06 RX ADMIN — LEVALBUTEROL 0.63 MG: 1.25 SOLUTION, CONCENTRATE RESPIRATORY (INHALATION) at 09:45

## 2021-03-06 RX ADMIN — APIXABAN 5 MG: 5 TABLET, FILM COATED ORAL at 20:52

## 2021-03-06 RX ADMIN — METOPROLOL SUCCINATE 50 MG: 50 TABLET, EXTENDED RELEASE ORAL at 20:52

## 2021-03-06 RX ADMIN — SERTRALINE HYDROCHLORIDE 50 MG: 50 TABLET ORAL at 09:20

## 2021-03-06 RX ADMIN — INSULIN LISPRO 3 UNITS: 100 INJECTION, SOLUTION INTRAVENOUS; SUBCUTANEOUS at 20:53

## 2021-03-06 RX ADMIN — LEVALBUTEROL 0.63 MG: 1.25 SOLUTION, CONCENTRATE RESPIRATORY (INHALATION) at 20:17

## 2021-03-06 RX ADMIN — LEVALBUTEROL 0.63 MG: 1.25 SOLUTION, CONCENTRATE RESPIRATORY (INHALATION) at 13:16

## 2021-03-06 RX ADMIN — POTASSIUM CHLORIDE 20 MEQ: 29.8 INJECTION, SOLUTION INTRAVENOUS at 07:03

## 2021-03-06 RX ADMIN — POTASSIUM CHLORIDE 20 MEQ: 29.8 INJECTION, SOLUTION INTRAVENOUS at 12:05

## 2021-03-06 RX ADMIN — APIXABAN 5 MG: 5 TABLET, FILM COATED ORAL at 08:46

## 2021-03-06 RX ADMIN — INSULIN LISPRO 2 UNITS: 100 INJECTION, SOLUTION INTRAVENOUS; SUBCUTANEOUS at 16:42

## 2021-03-06 ASSESSMENT — ENCOUNTER SYMPTOMS
EYE PAIN: 0
BACK PAIN: 0
SHORTNESS OF BREATH: 0
DIARRHEA: 0
ABDOMINAL DISTENTION: 0
NAUSEA: 0
SINUS PRESSURE: 0
WHEEZING: 0
VOMITING: 0
EYE REDNESS: 0
EYE DISCHARGE: 0
SORE THROAT: 0
COUGH: 0

## 2021-03-06 ASSESSMENT — PAIN SCALES - GENERAL
PAINLEVEL_OUTOF10: 0

## 2021-03-06 NOTE — PROGRESS NOTES
Date: 3/5/2021    Time: 10:08 PM    Patient Placed On BIPAP/CPAP/ Non-Invasive Ventilation? Yes    If no must comment. Facial area red/color change? No           If YES are Blister/Lesion present? No   If yes must notify nursing staff  BIPAP/CPAP skin barrier?   Yes    Skin barrier type:mepilexlite       Comments:        Perfecto Goes

## 2021-03-06 NOTE — PROGRESS NOTES
INPATIENT CARDIOLOGY FOLLOW-UP    Name: Karma Skiff    Age: 81 y.o. Date of Admission: 3/3/2021 10:55 AM    Date of Service: 3/6/2021    Primary Cardiologist: Dr. Conchetta Bosworth    Chief Complaint: Follow-up for new onset atrial fibrillation    Interim History:  Successful ISABELLE guided DC cardioversion yesterday. Maintaining sinus rhythm/sinus tachycardia. Reports shortness of breath, denies chest pain.     Review of Systems:   Negative except as described above    Problem List:  Patient Active Problem List   Diagnosis    HTN (hypertension)    Hyperlipidemia    Anemia    GERD (gastroesophageal reflux disease)    JERICA on CPAP    Vitamin D deficiency    Abnormal bone density screening    PVC's (premature ventricular contractions)    Mitral insufficiency    Acute on chronic diastolic CHF (congestive heart failure), NYHA class 1 ([address]    Renal insufficiency    Atypical chest pain    Rheumatic mitral stenosis    Non-rheumatic mitral valve stenosis    Chest pain    CKD (chronic kidney disease) stage 3, GFR 30-59 ml/min    Acute cystitis without hematuria    Valvular heart disease    Atrial fibrillation (HCC)    Congestive heart failure (HCC)    Atrial fibrillation with RVR (HCC)       Current Medications:    Current Facility-Administered Medications:     metoprolol succinate (TOPROL XL) extended release tablet 50 mg, 50 mg, Oral, BID, Lord Terri DO, 50 mg at 03/06/21 0846    sodium chloride nebulizer 0.9 % solution 3 mL, 3 mL, Nebulization, Once, Lord Terri DO    levalbuterol (XOPENEX) nebulizer solution 0.63 mg, 0.63 mg, Nebulization, Q6H, Etelvina Pierce MD, 0.63 mg at 03/06/21 1316    potassium chloride 20 MEQ/50ML IVPB (Central Line), , , ,     amLODIPine (NORVASC) tablet 5 mg, 5 mg, Oral, Nightly, Srini Tomas MD    trimethobenzamide Anita Jamil) injection 200 mg, 200 mg, Intramuscular, Q8H PRN, Lord Terri DO    apixaban (ELIQUIS) tablet 5 mg, 5 mg, Oral, BID, NKFVNL Regular rhythm with a borderline tachycardic rate, S1 & S2 normal, soft systolic murmur left sternal border  Abdomen: Soft, nontender, +bowel sounds  Extremities: Moves all extremities x 4, no lower extremity edema  Neurologic: No focal motor deficits apparent, normal mood and affect  Peripheral Pulses: Intact posterior tibial pulses bilaterally    Intake/Output:    Intake/Output Summary (Last 24 hours) at 3/6/2021 1412  Last data filed at 3/6/2021 1349  Gross per 24 hour   Intake 1458 ml   Output 1700 ml   Net -242 ml     I/O this shift:  In: -   Out: 750 [Urine:750]    Laboratory Tests:  Recent Labs     03/05/21  0855 03/05/21  1239 03/06/21  0435    141 140   K 4.2 3.5 3.4*    101 104   CO2 27 24 26   BUN 38* 39* 43*   CREATININE 1.4* 1.4* 1.4*   GLUCOSE 170* 198* 123*   CALCIUM 8.3* 8.6 8.5*     Lab Results   Component Value Date    MG 2.2 03/06/2021     No results for input(s): ALKPHOS, ALT, AST, PROT, BILITOT, BILIDIR, LABALBU in the last 72 hours.   Recent Labs     03/05/21  0855 03/05/21  1239 03/06/21  0435   WBC 12.9* 15.5* 16.0*   RBC 4.00 3.86 3.41*   HGB 11.3* 11.0* 9.7*   HCT 36.6 35.1 29.6*   MCV 91.5 90.9 86.8   MCH 28.3 28.5 28.4   MCHC 30.9* 31.3* 32.8   RDW 14.4 14.5 14.8    370 324   MPV 9.4 9.9 9.4     Lab Results   Component Value Date    TROPONINI <0.01 03/06/2021    TROPONINI <0.01 03/05/2021    TROPONINI <0.01 03/05/2021     Lab Results   Component Value Date    INR 2.1 03/05/2021    INR 1.3 03/03/2021    INR 1.2 06/29/2011    PROTIME 23.0 (H) 03/05/2021    PROTIME 13.8 (H) 03/03/2021    PROTIME 12.7 (H) 06/29/2011     Lab Results   Component Value Date    TSH 1.800 03/03/2021     Lab Results   Component Value Date    LABA1C 8.6 (H) 03/03/2021     No results found for: EAG  Lab Results   Component Value Date    CHOL 138 03/03/2021    CHOL 221 (H) 12/23/2019    CHOL 172 05/05/2019     Lab Results   Component Value Date    TRIG 112 03/03/2021    TRIG 201 (H) 12/23/2019 TRIG 242 (H) 05/05/2019     Lab Results   Component Value Date    HDL 36 03/03/2021    HDL 49 12/23/2019    HDL 38 05/05/2019     Lab Results   Component Value Date    LDLCALC 80 03/03/2021    LDLCALC 132 (H) 12/23/2019    LDLCALC 86 05/05/2019     Lab Results   Component Value Date    LABVLDL 22 03/03/2021    LABVLDL 40 12/23/2019    LABVLDL 48 05/05/2019    VLDL 39 12/30/2015     No results found for: CHOLHDLRATIO  No results for input(s): PROBNP in the last 72 hours. Cardiac Tests:    EKG:   3/6/2021 at 0 736: Sinus rhythm 91 bpm with PACs. Left axis deviation, nonspecific T wave changes    3/3/2021 at 1103: Atrial fibrillation  bpm.  Left axis, LVH with repolarization changes    Telemetry: Atrial fibrillation -> sinus rhythm post cardioversion    Chest X-ray:   3/6/2021, personally reviewed. Bilateral infiltrates    FINDINGS:   EKG leads overlie the chest.  Right internal jugular venous catheter remains   in place.  Cardiac silhouette is mildly prominent but unchanged.  No   pneumothorax.  Worsening diffuse bilateral infiltrates and small left   effusion.           Impression   Worsening infiltrates favored to represent edema.  Pneumonia thought less   likely but not excluded.  Continued follow-up recommended. Echocardiogram:   Transesophageal echo 3/5/2021     Summary   Normal left ventricle size and systolic function. Ejection fraction is visually estimated at 60-65%. Atrial fibrillation may   affect the evaluation of LV systolic function. No regional wall motion abnormalities seen. Normal left ventricle wall thickness. No evidence of thrombus within left atrium or appendage. Normal right ventricular size and function. Moderate mitral regurgitation is present. No hemodynamically significant aortic stenosis is present. Mild aortic regurgitation is noted. Mild tricuspid regurgitation. RVSP is 41 mmHg. Pulmonary hypertension is mild .    No evidence for hemodynamically significant pericardial effusion. Transthoracic echo 3/4/2021   Summary   Normal left ventricle size and systolic function. Ejection fraction is visually estimated at 60-65%. Atrial fibrillation may   affect the evaluation of LV systolic function. No regional wall motion abnormalities seen. Normal left ventricle wall thickness. Abnormal LV diastolic function with an indeterminate grade (E/e' > 11). The left atrium is severely dilated. Normal right ventricular size and function. Mild mitral regurgitation with centrally directed jet. No hemodynamically significant aortic stenosis is present. Mild tricuspid regurgitation. RVSP is 56 mmHg. Pulmonary hypertension is moderate . No evidence for hemodynamically significant pericardial effusion. Stress test:      Pharmacologic nuclear stress test-5/4/2019:  1. No reversible perfusion defect   2. Ejection fraction is greater than 70  %.   3. No significant wall motion abnormality   4. Oasis software is not available at the time of the interpretation,   if the software becomes available an addendum may be added to this     Cardiac catheterization:     ----------------------------------------------------------------------------------------------------------------------------------------------------------------  IMPRESSION:  New onset atrial fibrillation with RVR. S/p ISABELLE/DCC 3/5/21. Maintaining sinus, on apixaban. [id number] Score 6  Acute HFpEF triggered by A. fib with RVR. Net -3.7 L  Moderate mitral regurgitation, mild AI, mild TR  Acute digoxin toxicity status post digoxin immune Benedict, resolved  Acute hypoxic respiratory failure, on 5 L  Hypertension, well controlled  KENTRELL/CKD 3 creatinine 1.0 -> 1.4  Hypokalemia  Type 2 diabetes, not well controlled  Hyperlipidemia  Obstructive sleep apnea noncompliant with CPAP use  History of peptic ulcer disease  History of esophageal strictures and dilations  Iron deficiency anemia on supplement.   Hgb 12.9 -> 9.7, monitor    RECOMMENDATIONS:    Agree with increased dose of metoprolol succinate to help maintain sinus rhythm  Continue apixaban for stroke risk reduction  Continue diuresis, closely monitor renal function  Electrolyte replacement  Wean oxygen as able    Discussed with Dr. Candelaria Cramer MD, [address] [place] [address] Cardiology    NOTE: This report was transcribed using voice recognition software. Every effort was made to ensure accuracy; however, inadvertent computerized transcription errors may be present.

## 2021-03-06 NOTE — PROGRESS NOTES
[facility]) Hospitalist Progress Note    Admission Date  3/3/2021 10:55 AM  Chief Complaint Emesis / cough / tachycardia  Admit Dx   Atrial fibrillation with RVR ([address] [I48.91]    Subjective  History of Present Illness  Patient was seen at bedside in the ICU this morning. Cardioversion yesterday was successful, she had a fairly uneventful night. Breathing still sounds a little off, not overtly junky, but just working to breathe a little. No productive cough, no fevers, no chest pain. Son was not present today, patient appeared to be much more willing to talk today. Review of Systems - 12-point review of systems has been reviewed and is otherwise negative except as listed in the HPI    Objective  Physical Exam  Vitals: BP (!) 168/72   Pulse 105   Temp 98.3 °F (36.8 °C)   Resp (!) 37   Ht 5' 6\" (1.676 m)   Wt 154 lb 1.6 oz (69.9 kg)   SpO2 90%   BMI 24.87 kg/m²   General: well-developed, well-nourished, no acute distress, cooperative  Skin: warm, dry, intact, normal color without cyanosis  HEENT: normocephalic, atraumatic, mucous membranes normal  Respiratory: clear to auscultation bilaterally without respiratory distress  Cardiovascular: irregularly irregular rhythm tachy without murmur / rub / gallop  Abdominal: soft, nontender, nondistended, normoactive bowel sounds  Extremities: no mottling, pulses intact, no edema  Neurologic: awake, alert, no focal deficits  Psychiatric: normal affect, cooperative    Assessment / Plan  Atrial fib w RVR - echo showed severe LA dilation which would I think make cardioversion less likely to keep pt in sinus rhythm, however patient did have a cardioversion yesterday and she is still in sinus rhythm. Cardiology notes patient be for an amiodarone infusion if she goes back into A. fib. Otherwise, rate in the 90s, now on higher dose BB and Eliquis    CKD IIIa w borderline KENTRELL - baseline Cr 1.0-1.2, 1.4 today.   Feel like pt has some element of pre-renal azotemia w

## 2021-03-06 NOTE — H&P
154 lb 1.6 oz (69.9 kg)   SpO2 94%   BMI 24.87 kg/m²   Tmax over 24 hours:  Temp (24hrs), Av.2 °F (36.2 °C), Min:96.5 °F (35.8 °C), Max:97.8 °F (36.6 °C)      Patient Vitals for the past 6 hrs:   Temp Temp src Pulse Resp SpO2   21 0631 -- -- 95 (!) 33 94 %   21 0600 -- -- 93 28 95 %   21 0500 -- -- 90 25 96 %   21 0400 97.8 °F (36.6 °C) Oral 86 24 97 %         Intake/Output Summary (Last 24 hours) at 3/6/2021 0956  Last data filed at 3/6/2021 0631  Gross per 24 hour   Intake 1458 ml   Output 1350 ml   Net 108 ml     Wt Readings from Last 2 Encounters:   21 154 lb 1.6 oz (69.9 kg)   19 165 lb (74.8 kg)     Body mass index is 24.87 kg/m². VENT SETTINGS  Vent Information  FiO2 : 40 %  SpO2: 94 %  SpO2/FiO2 ratio: 245  Mask Type: Full face mask  Mask Size: Medium  Additional Respiratory  Assessments  Pulse: 95  Resp: (!) 33  SpO2: 94 %  Oral Care: Mouth swabbed    Review of Systems   Constitutional: Negative for chills, fatigue and fever. HENT: Negative for ear pain, sinus pressure and sore throat. Eyes: Negative for pain, discharge and redness. Respiratory: Negative for cough, shortness of breath and wheezing. Cardiovascular: Negative for chest pain, palpitations and leg swelling. Gastrointestinal: Negative for abdominal distention, diarrhea, nausea and vomiting. Genitourinary: Negative for dysuria and frequency. Musculoskeletal: Negative for arthralgias and back pain. Skin: Negative for rash and wound. Neurological: Negative for dizziness, weakness and headaches. Hematological: Negative for adenopathy. All other systems reviewed and are negative.       Physical Exam:    General appearance - alert, no acute distress, resting comfortably in bed  Mental status - alert, oriented to person, place, and time, normal mood, behavior, speech, dress, motor activity, and thought processes  Eyes - pupils equal and reactive, extraocular eye movements intact, Moderate calcification of the leaflets of the mitral valve. Mild mitral regurgitation is present. Mild mitral regurgitation with centrally directed jet. Tricuspid Valve  The tricuspid valve appears structurally normal.  Mild tricuspid regurgitation. RVSP is 56 mmHg. Pulmonary hypertension is moderate . Aortic Valve  Structurally normal aortic valve. The aortic valve is trileaflet. No hemodynamically significant aortic stenosis is present. No evidence of aortic valve regurgitation. Pulmonic Valve  The pulmonic valve was not well visualized. Pericardial Effusion  No evidence for hemodynamically significant pericardial effusion. Pleural Effusion  No evidence of pleural effusion. Aorta  Aorta was not clearly visualized. Miscellaneous  Dilated Inferior Vena Cava. Inferior Vena Cava, normal respiratory variation. Conclusions   Summary  Normal left ventricle size and systolic function. Ejection fraction is visually estimated at 60-65%. Atrial fibrillation may  affect the evaluation of LV systolic function. No regional wall motion abnormalities seen. Normal left ventricle wall thickness. Abnormal LV diastolic function with an indeterminate grade (E/e' > 11). The left atrium is severely dilated. Normal right ventricular size and function. Mild mitral regurgitation with centrally directed jet. No hemodynamically significant aortic stenosis is present. Mild tricuspid regurgitation. RVSP is 56 mmHg. Pulmonary hypertension is moderate . No evidence for hemodynamically significant pericardial effusion.      Xr Chest Portable    Result Date: 3/3/2021  EXAMINATION: ONE XRAY VIEW OF THE CHEST 3/3/2021 11:55 am COMPARISON: 03/03/2019 HISTORY: ORDERING SYSTEM PROVIDED HISTORY: chest pain TECHNOLOGIST PROVIDED HISTORY: Reason for exam:->chest pain FINDINGS: Hazy alveolar opacities in the lungs, most notably in the left lower lung, are nonspecific and may represent edema or an infectious/inflammatory process. The heart is mildly enlarged. No pneumothorax is seen. Small left pleural effusion cannot be excluded. The visualized bones appear demineralized but are otherwise intact. Hazy opacities in the lungs bilaterally may represent edema or an infectious/inflammatory process. ASSESSMENT & PLAN:     NEURO:  - Awake, responsive  - GCS 15     RESPIRATORY:  - On 5 L NC , wean as tolerated  - CXR shows bilateral hazy opacities, likely [place] [address] on arrival   - Procalc 0.2, unlikely pneumonia- not on antibiotics. Afebrile. - Patient with history of JERICA noncompliant with CPAP    CARDIOVASCULAR:  - A Fib with RVR : given cardizem x3, metoprolol, digoxin x4 throughout hospitalization  - Was on esmolol drip, however she became hypotensive and was found to be in A fib RVR, therefore drip was stopped on 3/5  - Dig toxicity, level returned at 3.8. Patient given digibind  - Continue eliquis 5 mg TID for prophylaxis  - ECHO 3/4 shows EF 60-65%  - Bedside ISABELLE performed by Dr. Quinn Diamond. Patient also electrically cardioverted with 200J once with conversion to sinus rhythm on 3/5  - HTN- increase to home  50 PO Metoprolol BID  - D/C esmolol and dig  - Start amio if return of A fib RVR    GI:  - Protonix 40 mg daily   - Diet as tolerated after procedure  - Tigan for nausea as QTc prolonged     RENAL:  - KENTRELL  - Hypokalemia- replace  - given albumin and lasix  - repeat labs this evening  - nephro following. Likely due to hypoperfusion     INFECTIOUS DISEASE:  -afebrile, leukocytosis  -not on abx at this time- check procal     HEME/ONC:  -Hb stable , monitor H&H , transfuse if < 7  - Wbc : 16    ENDOCRINE:  - BGL:170  - Hx of T2DM  - continue MDSSI      Code: full   DVT prophylaxis: eliquis 5 mg BID (due to A. Fib RVR)  GI prophylaxis: protonix      Ryne Sweet DO, PGY2  9:56 AM     .I personally saw, examined and provided care for the patient.  Radiographs, labs and medication list were reviewed by me independently. I spoke with bedside nursing, therapists and consultants. Critical care services and times documented are independent of procedures and multidisciplinary rounds with Residents. Additionally comprehensive, multidisciplinary rounds were conducted with the MICU team. The case was discussed in detail and plans for care were established. Review of Residents documentation was conducted and revisions were made as appropriate. I agree with the above documented exam, problem list and plan of care.   Suzzette Heimlich, MD   CCT excluding procedures:35'

## 2021-03-06 NOTE — PROGRESS NOTES
Nephrology Progress Note  Patient's Name: Sharonda Aranda  1:30 PM  3/6/2021    Nephrologist: Zahra Cerda    Reason for Consult:  KENTRELL on CKD G3A  Requesting Physician:  Tracy Bowens MD    Chief Complaint:  SOB    History Obtained From:  patient and past medical records    History of Present Ilness from the 3/5/21 note:    Sharonda Aranda is a 81 y.o. female with prior history of CKD G3B with a baseline serum cr 1.3mg/dl and an e-GFR=43ml/min who presented to the ED 3/3/21 with he c/o SOB for 2 weeks PTA. In the ED she was found to be in Afib with RVR with an elevated lactic acid 2.7. The cr was 1.0mg/dl  And UA (-) blood & protein with a SG 1.020. pt did not respond to diltiazem IV and was treated with IV lopressor and esmolol was initiated. She was give furosemide and digoxin. WBC elevated to 19. PM of 3/3 HR went 130-160's with BP 93/61. Pt  Had an elevated digoxin level and digibind given. She underwent a successful cardioversion to NSR this PM. She states she still has some SOB, no CP.  She did say she was told in the past she had kidney disease    3/6/21: Pt states she feels somewhat better but still SOB    Past Medical History:   Diagnosis Date    Acute cystitis with hematuria 5/3/2019    Anemia 2008    RECEIVED 4 UNITS BLOOD    Atrial fibrillation (HCC)     CKD (chronic kidney disease) stage 3, GFR 30-59 ml/min 5/3/2019    CPAP (continuous positive airway pressure) dependence     Diabetes mellitus ([address]     Esophageal stricture     Gastric ulcer     Goiter     Heart murmur     Hypertension     SINCE 1994    Kidney stones     Rheumatic fever     POSSIBLY AS A CHILD    Sleep apnea        Past Surgical History:   Procedure Laterality Date    BREAST LUMPECTOMY Left     CALCIUM BUILDUP    CARDIOVASCULAR STRESS TEST  1999    ISCHEMIC NUCLEAR STRESS TEST - PATIENT REFUSED HEART CATHETERIZATION    CHOLECYSTECTOMY      COLONOSCOPY  07/11    W/POLYP REMOVAL    DOPPLER ECHOCARDIOGRAPHY  05/14/03 POSSIBLE MILD MITRAL STENOSIS    DOPPLER ECHOCARDIOGRAPHY  06/09/04    MILD MITRAL STENSIS, MODERATE MITRAL REGURGITATION    FOOT SURGERY      HYSTERECTOMY      POLYPECTOMY      16 POLYPS REMOVED FROM STOMACH AND 1 FROM COLON    UPPER GASTROINTESTINAL ENDOSCOPY  07/11       Family History   Problem Relation Age of Onset    Asthma Mother     Diabetes Mother     Alcohol Abuse Father         reports that she has never smoked. She has never used smokeless tobacco. She reports that she does not drink alcohol or use drugs. Allergies:  Nitrofuran derivatives, Other, and Sulfa antibiotics    Current Medications:    metoprolol succinate (TOPROL XL) extended release tablet 50 mg, BID  sodium chloride nebulizer 0.9 % solution 3 mL, Once  levalbuterol (XOPENEX) nebulizer solution 0.63 mg, Q6H  potassium chloride 20 MEQ/50ML IVPB (Central Line),   trimethobenzamide (TIGAN) injection 200 mg, Q8H PRN  apixaban (ELIQUIS) tablet 5 mg, BID  prochlorperazine (COMPAZINE) tablet 5 mg, Q6H PRN  perflutren lipid microspheres (DEFINITY) injection 1.65 mg, ONCE PRN  [Held by provider] furosemide (LASIX) injection 40 mg, BID  pantoprazole (PROTONIX) tablet 40 mg, QAM AC  sertraline (ZOLOFT) tablet 50 mg, Daily  insulin lispro (HUMALOG) injection vial 0-12 Units, TID WC  insulin lispro (HUMALOG) injection vial 0-6 Units, Nightly  glucose (GLUTOSE) 40 % oral gel 15 g, PRN  dextrose 50 % IV solution, PRN  glucagon (rDNA) injection 1 mg, PRN  dextrose 5 % solution, PRN  ipratropium (ATROVENT) 0.02 % nebulizer solution 0.5 mg, Q6H PRN        Review of Systems:   Pertinent items are noted in HPI. Remainder of a complete review of systems is (-) other than stated in the HPI    Physical exam:   Constitutional:  Elderly female in NAD  Vitals:   VITALS:  BP (!) 168/72   Pulse 95   Temp 97.8 °F (36.6 °C) (Oral)   Resp 21   Ht 5' 6\" (1.676 m)   Wt 154 lb 1.6 oz (69.9 kg)   SpO2 96%   BMI 24.87 kg/m²   24HR INTAKE/OUTPUT: Intake/Output Summary (Last 24 hours) at 3/6/2021 1330  Last data filed at 3/6/2021 0631  Gross per 24 hour   Intake 1458 ml   Output 950 ml   Net 508 ml     URINARY CATHETER OUTPUT (Ha):  Urethral Catheter Straight-tip-Output (mL): 150 mL  DRAIN/TUBE OUTPUT:     VENT SETTINGS:  Vent Information  Skin Assessment: Clean, dry, & intact  FiO2 : 40 %  SpO2: 96 %  SpO2/FiO2 ratio: 245  Mask Type: Full face mask  Mask Size: Medium  Additional Respiratory  Assessments  Pulse: 95  Resp: 21  SpO2: 96 %  Oral Care: Mouth swabbed    Skin: no rash, turgor wnl  Heent:  eomi, mmm  Neck: no bruits or jvd noted  Cardiovascular: RRR S1, S2 without S3  Respiratory: Bilat crackles with a few end expir wheezes  Abdomen:  +bs, soft, nt, nd  Ext: (-) lower extremity edema  Psychiatric: mood and affect appropriate  Musculoskeletal:  Rom, muscular strength intact    Data:   Labs:  CBC:   Lab Results   Component Value Date    WBC 16.0 03/06/2021    RBC 3.41 03/06/2021    HGB 9.7 03/06/2021    HCT 29.6 03/06/2021    MCV 86.8 03/06/2021    MCH 28.4 03/06/2021    MCHC 32.8 03/06/2021    RDW 14.8 03/06/2021     03/06/2021    MPV 9.4 03/06/2021     CBC with Differential:    Lab Results   Component Value Date    WBC 16.0 03/06/2021    RBC 3.41 03/06/2021    HGB 9.7 03/06/2021    HCT 29.6 03/06/2021     03/06/2021    MCV 86.8 03/06/2021    MCH 28.4 03/06/2021    MCHC 32.8 03/06/2021    RDW 14.8 03/06/2021    NRBC 0.0 03/03/2021    SEGSPCT 67 02/27/2012    METASPCT 0.9 03/03/2021    LYMPHOPCT 2.5 03/05/2021    MONOPCT 5.8 03/05/2021    EOSPCT 2.0 04/12/2016    BASOPCT 0.1 03/05/2021    MONOSABS 0.90 03/05/2021    LYMPHSABS 0.38 03/05/2021    EOSABS 0.00 03/05/2021    BASOSABS 0.01 03/05/2021     Hemoglobin/Hematocrit:    Lab Results   Component Value Date    HGB 9.7 03/06/2021    HCT 29.6 03/06/2021     CMP:    Lab Results   Component Value Date     03/06/2021    K 3.4 03/06/2021    K 3.9 03/04/2021     03/06/2021 TROPONINI <0.01 03/06/2021    TROPONINI <0.01 03/05/2021    TROPONINI <0.01 03/05/2021     U/A:    Lab Results   Component Value Date    COLORU Yellow 03/03/2021    PROTEINU Negative 03/03/2021    PHUR 5.5 03/03/2021    WBCUA 10-20 05/03/2019    WBCUA 1-3 01/23/2012    RBCUA 0-1 05/03/2019    RBCUA NONE 01/23/2012    BACTERIA RARE 05/03/2019    CLARITYU Clear 03/03/2021    SPECGRAV 1.020 03/03/2021    LEUKOCYTESUR Negative 03/03/2021    UROBILINOGEN 0.2 03/03/2021    BILIRUBINUR Negative 03/03/2021    BILIRUBINUR NEGATIVE 01/23/2012    BLOODU Negative 03/03/2021    GLUCOSEU 100 03/03/2021    GLUCOSEU NEGATIVE 01/23/2012     ABG:    Lab Results   Component Value Date    PH 7.397 03/05/2021    PCO2 32.9 03/05/2021    PO2 74.1 03/05/2021    HCO3 19.8 03/05/2021    BE -4.3 03/05/2021    O2SAT 95.1 03/05/2021     HgBA1c:    Lab Results   Component Value Date    LABA1C 8.6 03/03/2021     Microalbumen/Creatinine ratio:  No components found for: RUCREAT  FLP:    Lab Results   Component Value Date    TRIG 112 03/03/2021    HDL 36 03/03/2021    LDLCALC 80 03/03/2021    LABVLDL 22 03/03/2021     TSH:    Lab Results   Component Value Date    TSH 1.800 03/03/2021     VITAMIN B12: No components found for: B12  FOLATE:    Lab Results   Component Value Date    FOLATE >20.0 03/06/2021     Iron Saturation:  No components found for: PERCENTFE  FERRITIN:    Lab Results   Component Value Date    FERRITIN 1,517 03/06/2021     ROSSY:  No results found for: ANATITER, ROSSY  AMYLASE:    Lab Results   Component Value Date    AMYLASE 40 06/29/2011     LIPASE:    Lab Results   Component Value Date    LIPASE 26 03/03/2021     24 Hour Urine for Protein:  No components found for: RAWUPRO, UHRS3, CKRQ49MF, UTV3  24 Hour Urine for Creatinine Clearance:  No components found for: CREAT4, UHRS10, UTV10     Imaging:  CXR results:  EXAMINATION:   ONE XRAY VIEW OF THE CHEST       3/5/2021 12:57 pm       COMPARISON:   March 3, 2021       HISTORY:   ORDERING SYSTEM PROVIDED HISTORY: CVC line placement   TECHNOLOGIST PROVIDED HISTORY:   Reason for exam:->CVC line placement       FINDINGS:   Cardiac silhouette is enlarged.  There is bilateral mild pulmonary   interstitial prominence increased from prior exam.  Pulmonary vasculature is   unremarkable.  No pleural fluid collection seen.  No evidence for pneumothorax       There is been placement of a right IJ catheter with tip at the SVC           Impression   Placement of right IJ catheter in satisfactory position no evidence for   pneumothorax       Mild bilateral pulmonary interstitial infiltrates which appear increased from   prior exam     RETROPERITONEAL COMPLETE [[id number]]    Collected: 03/05/21 2029    Updated: 03/05/21 2036    Narrative:     EXAMINATION:   RETROPERITONEAL ULTRASOUND OF THE KIDNEYS AND URINARY BLADDER     3/5/2021     COMPARISON:   CT chest from May 3, 2019     HISTORY:   ORDERING SYSTEM PROVIDED HISTORY: KENTRELL   TECHNOLOGIST PROVIDED HISTORY:   Reason for exam:->KENTRELL   What reading provider will be dictating this exam?->CRC     FINDINGS:     Kidneys: The right kidney measures 10.0 cm in length and the left kidney measures 10.1   cm in length. Right kidney: Corticomedullary differentiation and cortical thickness is   decreased.  Anechoic thin walled nonvascular 13 mm cyst in the mid to lower   pole of the right kidney.  2.5 cm lesion off the upper pole the right kidney   has a hyper dense nodule which probably represents a calcification.  This   appears grossly unchanged compared to prior CT scan of the chest in 2019. intrarenal calcification in the midpole measures 4 mm.  Intrarenal   calcification in the mid to lower pole measures 11 mm.  There is mild   fullness of the right renal pelvis.      Left kidney: Corticomedullary differentiation and cortical thickness is   decreased.  Anechoic thin walled left midpole 2.7 cm cyst is nonvascular on   color Doppler evaluation. Christen Tanner is an Nurys David  1:30 PM  3/6/2021

## 2021-03-06 NOTE — PATIENT CARE CONFERENCE
Intensive Care Daily Quality Rounding Checklist      ICU Team Members: Dr. Luis Wise, [address] [facility], charge nurse and     ICU Day #: 2    Intubation Date:  N/A    Ventilator Day #: N/A    Central Line Insertion Date:  3/5/21        Day #: 2     Arterial Line Insertion Date:  3/5/21      Day #: 2    Temporary Hemodialysis Catheter Insertion Date: N/A      Day # N/A    DVT Prophylaxis: Eliquis    GI Prophylaxis: Protonix    Ha Catheter Insertion Date:  3/5/21       Day #: 2      Continued need (if yes, reason documented and discussed with physician):  Strict I's & O's    Skin Issues/ Wounds and ordered treatment discussed on rounds: SOS precautions    Goals/ Plans for the Day: Monitor daily labs and vitals. Breathing treatments. Lasix and albumin ordered.  Recheck BMP and H&H

## 2021-03-06 NOTE — PLAN OF CARE
Problem: Cardiac Output - Decreased:  Goal: Hemodynamic stability will improve  Description: Hemodynamic stability will improve  3/6/2021 0023 by Veronica Diallo.  Rome Reed RN  Outcome: Met This Shift  3/5/2021 1426 by Debbie Spivey  Outcome: Ongoing

## 2021-03-07 LAB
ALBUMIN SERPL-MCNC: 3.2 G/DL (ref 3.5–5.2)
ALP BLD-CCNC: 58 U/L (ref 35–104)
ALT SERPL-CCNC: 13 U/L (ref 0–32)
ANION GAP SERPL CALCULATED.3IONS-SCNC: 7 MMOL/L (ref 7–16)
AST SERPL-CCNC: 15 U/L (ref 0–31)
BILIRUB SERPL-MCNC: 0.4 MG/DL (ref 0–1.2)
BUN BLDV-MCNC: 37 MG/DL (ref 8–23)
CALCIUM SERPL-MCNC: 8.9 MG/DL (ref 8.6–10.2)
CHLORIDE BLD-SCNC: 105 MMOL/L (ref 98–107)
CO2: 27 MMOL/L (ref 22–29)
CREAT SERPL-MCNC: 1.1 MG/DL (ref 0.5–1)
GFR AFRICAN AMERICAN: 57
GFR NON-AFRICAN AMERICAN: 47 ML/MIN/1.73
GLUCOSE BLD-MCNC: 141 MG/DL (ref 74–99)
HCT VFR BLD CALC: 28.8 % (ref 34–48)
HEMOGLOBIN: 9.2 G/DL (ref 11.5–15.5)
MAGNESIUM: 2.1 MG/DL (ref 1.6–2.6)
MCH RBC QN AUTO: 28.3 PG (ref 26–35)
MCHC RBC AUTO-ENTMCNC: 31.9 % (ref 32–34.5)
MCV RBC AUTO: 88.6 FL (ref 80–99.9)
METER GLUCOSE: 170 MG/DL (ref 74–99)
METER GLUCOSE: 185 MG/DL (ref 74–99)
METER GLUCOSE: 203 MG/DL (ref 74–99)
METER GLUCOSE: 227 MG/DL (ref 74–99)
MRSA CULTURE ONLY: NORMAL
PDW BLD-RTO: 14.7 FL (ref 11.5–15)
PHOSPHORUS: 2.8 MG/DL (ref 2.5–4.5)
PLATELET # BLD: 279 E9/L (ref 130–450)
PMV BLD AUTO: 9.4 FL (ref 7–12)
POTASSIUM SERPL-SCNC: 3.5 MMOL/L (ref 3.5–5)
RBC # BLD: 3.25 E12/L (ref 3.5–5.5)
SODIUM BLD-SCNC: 139 MMOL/L (ref 132–146)
TOTAL PROTEIN: 5.7 G/DL (ref 6.4–8.3)
WBC # BLD: 10.5 E9/L (ref 4.5–11.5)

## 2021-03-07 PROCEDURE — 6370000000 HC RX 637 (ALT 250 FOR IP): Performed by: FAMILY MEDICINE

## 2021-03-07 PROCEDURE — 36415 COLL VENOUS BLD VENIPUNCTURE: CPT

## 2021-03-07 PROCEDURE — 6370000000 HC RX 637 (ALT 250 FOR IP): Performed by: INTERNAL MEDICINE

## 2021-03-07 PROCEDURE — 85027 COMPLETE CBC AUTOMATED: CPT

## 2021-03-07 PROCEDURE — 99232 SBSQ HOSP IP/OBS MODERATE 35: CPT | Performed by: INTERNAL MEDICINE

## 2021-03-07 PROCEDURE — 84100 ASSAY OF PHOSPHORUS: CPT

## 2021-03-07 PROCEDURE — 82962 GLUCOSE BLOOD TEST: CPT

## 2021-03-07 PROCEDURE — 6360000002 HC RX W HCPCS: Performed by: INTERNAL MEDICINE

## 2021-03-07 PROCEDURE — 37799 UNLISTED PX VASCULAR SURGERY: CPT

## 2021-03-07 PROCEDURE — 80053 COMPREHEN METABOLIC PANEL: CPT

## 2021-03-07 PROCEDURE — 6370000000 HC RX 637 (ALT 250 FOR IP): Performed by: STUDENT IN AN ORGANIZED HEALTH CARE EDUCATION/TRAINING PROGRAM

## 2021-03-07 PROCEDURE — 94640 AIRWAY INHALATION TREATMENT: CPT

## 2021-03-07 PROCEDURE — 2060000000 HC ICU INTERMEDIATE R&B

## 2021-03-07 PROCEDURE — 94660 CPAP INITIATION&MGMT: CPT

## 2021-03-07 PROCEDURE — 83735 ASSAY OF MAGNESIUM: CPT

## 2021-03-07 PROCEDURE — 2700000000 HC OXYGEN THERAPY PER DAY

## 2021-03-07 PROCEDURE — 99233 SBSQ HOSP IP/OBS HIGH 50: CPT | Performed by: INTERNAL MEDICINE

## 2021-03-07 RX ORDER — VALSARTAN 160 MG/1
160 TABLET ORAL DAILY
Status: DISCONTINUED | OUTPATIENT
Start: 2021-03-08 | End: 2021-03-07

## 2021-03-07 RX ORDER — SODIUM CHLORIDE 0.9 % (FLUSH) 0.9 %
SYRINGE (ML) INJECTION
Status: DISPENSED
Start: 2021-03-07 | End: 2021-03-07

## 2021-03-07 RX ORDER — FUROSEMIDE 10 MG/ML
40 INJECTION INTRAMUSCULAR; INTRAVENOUS ONCE
Status: COMPLETED | OUTPATIENT
Start: 2021-03-07 | End: 2021-03-07

## 2021-03-07 RX ORDER — VALSARTAN 80 MG/1
80 TABLET ORAL DAILY
Status: DISCONTINUED | OUTPATIENT
Start: 2021-03-08 | End: 2021-03-10 | Stop reason: HOSPADM

## 2021-03-07 RX ORDER — POTASSIUM CHLORIDE 20 MEQ/1
40 TABLET, EXTENDED RELEASE ORAL ONCE
Status: COMPLETED | OUTPATIENT
Start: 2021-03-07 | End: 2021-03-07

## 2021-03-07 RX ADMIN — PANTOPRAZOLE SODIUM 40 MG: 40 TABLET, DELAYED RELEASE ORAL at 06:01

## 2021-03-07 RX ADMIN — INSULIN LISPRO 2 UNITS: 100 INJECTION, SOLUTION INTRAVENOUS; SUBCUTANEOUS at 20:51

## 2021-03-07 RX ADMIN — METOPROLOL SUCCINATE 50 MG: 50 TABLET, EXTENDED RELEASE ORAL at 08:38

## 2021-03-07 RX ADMIN — LEVALBUTEROL 0.63 MG: 1.25 SOLUTION, CONCENTRATE RESPIRATORY (INHALATION) at 13:24

## 2021-03-07 RX ADMIN — AMLODIPINE BESYLATE 5 MG: 5 TABLET ORAL at 20:51

## 2021-03-07 RX ADMIN — INSULIN LISPRO 2 UNITS: 100 INJECTION, SOLUTION INTRAVENOUS; SUBCUTANEOUS at 07:49

## 2021-03-07 RX ADMIN — APIXABAN 5 MG: 5 TABLET, FILM COATED ORAL at 20:51

## 2021-03-07 RX ADMIN — INSULIN LISPRO 4 UNITS: 100 INJECTION, SOLUTION INTRAVENOUS; SUBCUTANEOUS at 15:40

## 2021-03-07 RX ADMIN — LEVALBUTEROL 0.63 MG: 1.25 SOLUTION, CONCENTRATE RESPIRATORY (INHALATION) at 20:30

## 2021-03-07 RX ADMIN — INSULIN LISPRO 2 UNITS: 100 INJECTION, SOLUTION INTRAVENOUS; SUBCUTANEOUS at 11:36

## 2021-03-07 RX ADMIN — METOPROLOL SUCCINATE 50 MG: 50 TABLET, EXTENDED RELEASE ORAL at 20:51

## 2021-03-07 RX ADMIN — APIXABAN 5 MG: 5 TABLET, FILM COATED ORAL at 08:38

## 2021-03-07 RX ADMIN — SERTRALINE HYDROCHLORIDE 50 MG: 50 TABLET ORAL at 08:38

## 2021-03-07 RX ADMIN — FUROSEMIDE 40 MG: 10 INJECTION, SOLUTION INTRAMUSCULAR; INTRAVENOUS at 11:36

## 2021-03-07 RX ADMIN — POTASSIUM CHLORIDE 40 MEQ: 1500 TABLET, EXTENDED RELEASE ORAL at 14:18

## 2021-03-07 RX ADMIN — LEVALBUTEROL 0.63 MG: 1.25 SOLUTION, CONCENTRATE RESPIRATORY (INHALATION) at 08:23

## 2021-03-07 ASSESSMENT — PAIN SCALES - GENERAL: PAINLEVEL_OUTOF10: 0

## 2021-03-07 NOTE — PATIENT CARE CONFERENCE
Intensive Care Daily Quality Rounding Checklist      ICU Team Members: Dr. Gilmer Meckel, resident, charge nurse and bedside nurse    ICU Day #: 3    Intubation Date: N/A    Ventilator Day #: N/A    Central Line Insertion Date: March 5th        Day #: 3     Arterial Line Insertion Date:  March 5th      Day #: 3    Temporary Hemodialysis Catheter Insertion Date:       Day #: N/A    DVT Prophylaxis: Eliquis    GI Prophylaxis: Protonix    Ha Catheter Insertion Date:  March 5th       Day #: 3      Continued need (if yes, reason documented and discussed with physician):     Skin Issues/ Wounds and ordered treatment discussed on rounds:     Goals/ Plans for the Day: Monitor labs and vitals.  Transfer to

## 2021-03-07 NOTE — PROGRESS NOTES
INPATIENT CARDIOLOGY FOLLOW-UP    Name: Arpit Devine    Age: 81 y.o. Date of Admission: 3/3/2021 10:55 AM    Date of Service: 3/7/2021    Primary Cardiologist: Dr. Charlie Alcantara    Chief Complaint: Follow-up for new onset atrial fibrillation    Interim History:  Successful ISABELLE guided DC cardioversion 3/5/21. Maintaining sinus rhythm. Feels better. Reports less shortness of breath, denies chest pain.     Net -3.7 L, kidney function improving    Review of Systems:   Negative except as described above    Problem List:  Patient Active Problem List   Diagnosis    HTN (hypertension)    Hyperlipidemia    Anemia    GERD (gastroesophageal reflux disease)    JERICA on CPAP    Vitamin D deficiency    Abnormal bone density screening    PVC's (premature ventricular contractions)    Mitral insufficiency    Acute on chronic diastolic CHF (congestive heart failure), NYHA class 1 ([facility] [address]    Renal insufficiency    Atypical chest pain    Rheumatic mitral stenosis    Non-rheumatic mitral valve stenosis    Chest pain    CKD (chronic kidney disease) stage 3, GFR 30-59 ml/min    Acute cystitis without hematuria    Valvular heart disease    Atrial fibrillation (HCC)    Congestive heart failure (HCC)    Atrial fibrillation with RVR (HCC)       Current Medications:    Current Facility-Administered Medications:     sodium chloride flush 0.9 % injection, , , , Navya Heaton MD    furosemide (LASIX) injection 40 mg, 40 mg, Intravenous, Once, Melissa Bernardo MD    metoprolol succinate (TOPROL XL) extended release tablet 50 mg, 50 mg, Oral, BID, Shelley Hoskins MD, 50 mg at 03/07/21 0838    sodium chloride nebulizer 0.9 % solution 3 mL, 3 mL, Nebulization, Once, Shelley Hoskins MD    levalbuterol (XOPENEX) nebulizer solution 0.63 mg, 0.63 mg, Nebulization, Q6H, Shelley Hoskins MD, 0.63 mg at 03/07/21 0823    amLODIPine (NORVASC) tablet 5 mg, 5 mg, Oral, Nightly, Shelley Hoskins MD, 5 mg at 03/06/21 2052    trimethobenzamide (TIGAN) injection 200 mg, 200 mg, Intramuscular, Q8H PRN, Etelvina Pierce MD    apixaban (ELIQUIS) tablet 5 mg, 5 mg, Oral, BID, Etelvina Pierce MD, 5 mg at 03/07/21 8504    prochlorperazine (COMPAZINE) tablet 5 mg, 5 mg, Oral, Q6H PRN, Etelvina Pierce MD, 5 mg at 03/04/21 1337    perflutren lipid microspheres (DEFINITY) injection 1.65 mg, 1.5 mL, Intravenous, ONCE PRN, Etelvina Pierce MD    [Held by provider] furosemide (LASIX) injection 40 mg, 40 mg, Intravenous, BID, Etelvina Pierce MD, 40 mg at 03/05/21 0833    pantoprazole (PROTONIX) tablet 40 mg, 40 mg, Oral, QAM AC, Etelvina Pierce MD, 40 mg at 03/07/21 0601    sertraline (ZOLOFT) tablet 50 mg, 50 mg, Oral, Daily, Etelvina Pierce MD, 50 mg at 03/07/21 0838    insulin lispro (HUMALOG) injection vial 0-12 Units, 0-12 Units, Subcutaneous, TID WC, Etelvina Pierce MD, 2 Units at 03/07/21 0749    insulin lispro (HUMALOG) injection vial 0-6 Units, 0-6 Units, Subcutaneous, Nightly, Etelvina Pierce MD, 3 Units at 03/06/21 2053    glucose (GLUTOSE) 40 % oral gel 15 g, 15 g, Oral, PRN, Etelvina Pierce MD    dextrose 50 % IV solution, 12.5 g, Intravenous, PRN, Etelvina Pierce MD    glucagon (rDNA) injection 1 mg, 1 mg, Intramuscular, PRN, Etelvina Pierce MD    dextrose 5 % solution, 100 mL/hr, Intravenous, PRN, Etelvian Pierce MD    ipratropium (ATROVENT) 0.02 % nebulizer solution 0.5 mg, 0.5 mg, Nebulization, Q6H PRN, Etelvina Pierce MD    Physical Exam:  BP (!) 142/61   Pulse 96   Temp 97.5 °F (36.4 °C)   Resp 27   Ht 5' 6\" (1.676 m)   Wt 155 lb 3.3 oz (70.4 kg)   SpO2 94%   BMI 25.05 kg/m²   Wt Readings from Last 3 Encounters:   03/07/21 155 lb 3.3 oz (70.4 kg)   11/12/19 165 lb (74.8 kg)   05/05/19 150 lb 3.2 oz (68.1 kg)     Appearance: Awake, alert, no acute respiratory distress  Skin: Intact, no rash  Head: Normocephalic, atraumatic  Eyes: EOMI, no conjunctival erythema  ENMT: No pharyngeal erythema, MMM, no rhinorrhea  Neck: Supple, + elevated JVP, no carotid bruits. Right IJ triple-lumen catheter  Lungs: Bibasilar rales  Cardiac: PMI nondisplaced, Regular rhythm with a normal rate, S1 & S2 normal, soft systolic murmur left sternal border  Abdomen: Soft, nontender, +bowel sounds  Extremities: Moves all extremities x 4, no lower extremity edema  Neurologic: No focal motor deficits apparent, normal mood and affect  Peripheral Pulses: Intact posterior tibial pulses bilaterally    Intake/Output:    Intake/Output Summary (Last 24 hours) at 3/7/2021 1118  Last data filed at 3/7/2021 0600  Gross per 24 hour   Intake 816 ml   Output 1850 ml   Net -1034 ml     No intake/output data recorded.     Laboratory Tests:  Recent Labs     03/06/21  0435 03/06/21  1609 03/07/21  0600    139 139   K 3.4* 4.0 3.5    103 105   CO2 26 25 27   BUN 43* 41* 37*   CREATININE 1.4* 1.2* 1.1*   GLUCOSE 123* 207* 141*   CALCIUM 8.5* 8.8 8.9     Lab Results   Component Value Date    MG 2.1 03/07/2021     Recent Labs     03/07/21  0600   ALKPHOS 58   ALT 13   AST 15   PROT 5.7*   BILITOT 0.4   LABALBU 3.2*     Recent Labs     03/05/21  1239 03/06/21  0435 03/06/21  1609 03/07/21  0600   WBC 15.5* 16.0*  --  10.5   RBC 3.86 3.41*  --  3.25*   HGB 11.0* 9.7* 9.8* 9.2*   HCT 35.1 29.6* 31.2* 28.8*   MCV 90.9 86.8  --  88.6   MCH 28.5 28.4  --  28.3   MCHC 31.3* 32.8  --  31.9*   RDW 14.5 14.8  --  14.7    324  --  279   MPV 9.9 9.4  --  9.4     Lab Results   Component Value Date    TROPONINI <0.01 03/06/2021    TROPONINI <0.01 03/05/2021    TROPONINI <0.01 03/05/2021     Lab Results   Component Value Date    INR 2.1 03/05/2021    INR 1.3 03/03/2021    INR 1.2 06/29/2011    PROTIME 23.0 (H) 03/05/2021    PROTIME 13.8 (H) 03/03/2021    PROTIME 12.7 (H) 06/29/2011     Lab Results   Component Value Date    TSH 1.800 03/03/2021     Lab Results   Component Value Date    LABA1C 8.6 (H) 03/03/2021     No results found for: EAG  Lab Results   Component Value Date    CHOL 138 03/03/2021    CHOL 221 (H) 12/23/2019    CHOL 172 05/05/2019     Lab Results   Component Value Date    TRIG 112 03/03/2021    TRIG 201 (H) 12/23/2019    TRIG 242 (H) 05/05/2019     Lab Results   Component Value Date    HDL 36 03/03/2021    HDL 49 12/23/2019    HDL 38 05/05/2019     Lab Results   Component Value Date    LDLCALC 80 03/03/2021    LDLCALC 132 (H) 12/23/2019    LDLCALC 86 05/05/2019     Lab Results   Component Value Date    LABVLDL 22 03/03/2021    LABVLDL 40 12/23/2019    LABVLDL 48 05/05/2019    VLDL 39 12/30/2015     No results found for: CHOLHDLRATIO  Recent Labs     03/06/21  0435   PROBNP 14,660*       Cardiac Tests:    EKG:   3/6/2021 at 0 736: Sinus rhythm 91 bpm with PACs. Left axis deviation, nonspecific T wave changes    3/3/2021 at 1103: Atrial fibrillation  bpm.  Left axis, LVH with repolarization changes    Telemetry: Atrial fibrillation -> sinus rhythm post cardioversion    Chest X-ray:   3/6/2021, personally reviewed. Bilateral infiltrates    FINDINGS:   EKG leads overlie the chest.  Right internal jugular venous catheter remains   in place.  Cardiac silhouette is mildly prominent but unchanged.  No   pneumothorax.  Worsening diffuse bilateral infiltrates and small left   effusion.           Impression   Worsening infiltrates favored to represent edema.  Pneumonia thought less   likely but not excluded.  Continued follow-up recommended. Echocardiogram:   Transesophageal echo 3/5/2021     Summary   Normal left ventricle size and systolic function. Ejection fraction is visually estimated at 60-65%. Atrial fibrillation may   affect the evaluation of LV systolic function. No regional wall motion abnormalities seen. Normal left ventricle wall thickness. No evidence of thrombus within left atrium or appendage. Normal right ventricular size and function.    Moderate mitral regurgitation is present. No hemodynamically significant aortic stenosis is present. Mild aortic regurgitation is noted. Mild tricuspid regurgitation. RVSP is 41 mmHg. Pulmonary hypertension is mild . No evidence for hemodynamically significant pericardial effusion. Transthoracic echo 3/4/2021   Summary   Normal left ventricle size and systolic function. Ejection fraction is visually estimated at 60-65%. Atrial fibrillation may   affect the evaluation of LV systolic function. No regional wall motion abnormalities seen. Normal left ventricle wall thickness. Abnormal LV diastolic function with an indeterminate grade (E/e' > 11). The left atrium is severely dilated. Normal right ventricular size and function. Mild mitral regurgitation with centrally directed jet. No hemodynamically significant aortic stenosis is present. Mild tricuspid regurgitation. RVSP is 56 mmHg. Pulmonary hypertension is moderate . No evidence for hemodynamically significant pericardial effusion. Stress test:      Pharmacologic nuclear stress test-5/4/2019:  1. No reversible perfusion defect   2. Ejection fraction is greater than 70  %.   3. No significant wall motion abnormality   4. Oasis software is not available at the time of the interpretation,   if the software becomes available an addendum may be added to this     Cardiac catheterization:     ----------------------------------------------------------------------------------------------------------------------------------------------------------------  IMPRESSION:  New onset atrial fibrillation with RVR. S/p ISABELLE/DCC 3/5/21. Maintaining sinus, on apixaban. [id number] Score 6  Acute HFpEF triggered by A. fib with RVR. Net -3.7 L.   proBNP 18,200 -> 14,700  Moderate mitral regurgitation, mild AI, mild TR  Acute digoxin toxicity status post digoxin immune Benedict, resolved  Acute hypoxic respiratory failure, down to 3 L  Hypertension, well

## 2021-03-07 NOTE — PLAN OF CARE
Problem: Cardiac Output - Decreased:  Goal: Hemodynamic stability will improve  Description: Hemodynamic stability will improve  Outcome: Met This Shift     Problem: Falls - Risk of:  Goal: Will remain free from falls  Description: Will remain free from falls  Outcome: Met This Shift  Goal: Absence of physical injury  Description: Absence of physical injury  Outcome: Met This Shift     Problem: Skin Integrity:  Goal: Will show no infection signs and symptoms  Description: Will show no infection signs and symptoms  Outcome: Met This Shift  Goal: Absence of new skin breakdown  Description: Absence of new skin breakdown  Outcome: Met This Shift

## 2021-03-07 NOTE — PROGRESS NOTES
Nephrology Progress Note  Patient's Name: Tami Gutierrez  2:25 PM  3/7/2021    Nephrologist: Haylie Robbins    Reason for Consult:  KENTRELL on CKD G3A  Requesting Physician:  Diane Salinas MD    Chief Complaint:  SOB    History Obtained From:  patient and past medical records    History of Present Ilness from the 3/5/21 note:    Tami Gutierrez is a 81 y.o. female with prior history of CKD G3B with a baseline serum cr 1.3mg/dl and an e-GFR=43ml/min who presented to the ED 3/3/21 with he c/o SOB for 2 weeks PTA. In the ED she was found to be in Afib with RVR with an elevated lactic acid 2.7. The cr was 1.0mg/dl  And UA (-) blood & protein with a SG 1.020. pt did not respond to diltiazem IV and was treated with IV lopressor and esmolol was initiated. She was give furosemide and digoxin. WBC elevated to 19. PM of 3/3 HR went 130-160's with BP 93/61. Pt  Had an elevated digoxin level and digibind given. She underwent a successful cardioversion to NSR this PM. She states she still has some SOB, no CP.  She did say she was told in the past she had kidney disease    3/7/21: Pt states she feels better less SOB, she is for transfer to [facility]    Past Medical History:   Diagnosis Date    Acute cystitis with hematuria 5/3/2019    Anemia 2008    RECEIVED 4 UNITS BLOOD    Atrial fibrillation (HCC)     CKD (chronic kidney disease) stage 3, GFR 30-59 ml/min 5/3/2019    CPAP (continuous positive airway pressure) dependence     Diabetes mellitus ([address]     Esophageal stricture     Gastric ulcer     Goiter     Heart murmur     Hypertension     SINCE 1994    Kidney stones     Rheumatic fever     POSSIBLY AS A CHILD    Sleep apnea        Past Surgical History:   Procedure Laterality Date    BREAST LUMPECTOMY Left     CALCIUM BUILDUP    CARDIOVASCULAR STRESS TEST  1999    ISCHEMIC NUCLEAR STRESS TEST - PATIENT REFUSED HEART CATHETERIZATION    CHOLECYSTECTOMY      COLONOSCOPY  07/11    W/POLYP REMOVAL    DOPPLER ECHOCARDIOGRAPHY 05/14/03    POSSIBLE MILD MITRAL STENOSIS    DOPPLER ECHOCARDIOGRAPHY  06/09/04    MILD MITRAL STENSIS, MODERATE MITRAL REGURGITATION    FOOT SURGERY      HYSTERECTOMY      POLYPECTOMY      16 POLYPS REMOVED FROM STOMACH AND 1 FROM COLON    UPPER GASTROINTESTINAL ENDOSCOPY  07/11       Family History   Problem Relation Age of Onset    Asthma Mother     Diabetes Mother     Alcohol Abuse Father         reports that she has never smoked. She has never used smokeless tobacco. She reports that she does not drink alcohol or use drugs. Allergies:  Nitrofuran derivatives, Other, and Sulfa antibiotics    Current Medications:    sodium chloride flush 0.9 % injection,   metoprolol succinate (TOPROL XL) extended release tablet 50 mg, BID  sodium chloride nebulizer 0.9 % solution 3 mL, Once  levalbuterol (XOPENEX) nebulizer solution 0.63 mg, Q6H  amLODIPine (NORVASC) tablet 5 mg, Nightly  trimethobenzamide (TIGAN) injection 200 mg, Q8H PRN  apixaban (ELIQUIS) tablet 5 mg, BID  prochlorperazine (COMPAZINE) tablet 5 mg, Q6H PRN  perflutren lipid microspheres (DEFINITY) injection 1.65 mg, ONCE PRN  [Held by provider] furosemide (LASIX) injection 40 mg, BID  pantoprazole (PROTONIX) tablet 40 mg, QAM AC  sertraline (ZOLOFT) tablet 50 mg, Daily  insulin lispro (HUMALOG) injection vial 0-12 Units, TID WC  insulin lispro (HUMALOG) injection vial 0-6 Units, Nightly  glucose (GLUTOSE) 40 % oral gel 15 g, PRN  dextrose 50 % IV solution, PRN  glucagon (rDNA) injection 1 mg, PRN  dextrose 5 % solution, PRN  ipratropium (ATROVENT) 0.02 % nebulizer solution 0.5 mg, Q6H PRN        Review of Systems:   Pertinent items are noted in HPI. Remainder of a complete review of systems is (-) other than stated in the HPI    Physical exam:   Constitutional:  Elderly female in NAD  Vitals:   VITALS:  BP (!) 142/61   Pulse 89   Temp 97.5 °F (36.4 °C)   Resp 26   Ht 5' 6\" (1.676 m)   Wt 155 lb 3.3 oz (70.4 kg)   SpO2 97%   BMI 25.05 kg/m² 24HR INTAKE/OUTPUT:      Intake/Output Summary (Last 24 hours) at 3/7/2021 1425  Last data filed at 3/7/2021 0600  Gross per 24 hour   Intake 816 ml   Output 1100 ml   Net -284 ml     URINARY CATHETER OUTPUT (Ha):  Urethral Catheter Straight-tip-Output (mL): 325 mL  DRAIN/TUBE OUTPUT:     VENT SETTINGS:  Vent Information  Skin Assessment: Clean, dry, & intact  FiO2 : 40 %  SpO2: 97 %  SpO2/FiO2 ratio: 245  Mask Type: Full face mask  Mask Size: Medium  Additional Respiratory  Assessments  Pulse: 89  Resp: 26  SpO2: 97 %  Oral Care: Teeth brushed, Mouthwash    Skin: no rash, turgor wnl  Heent:  eomi, mmm  Neck: no bruits or jvd noted  Cardiovascular: RRR S1, S2 without S3  Respiratory: Bilat crackles with a few end expir wheezes  Abdomen:  +bs, soft, nt, nd  Ext: (-) lower extremity edema  Psychiatric: mood and affect appropriate  Musculoskeletal:  Rom, muscular strength intact    Data:   Labs:  CBC:   Lab Results   Component Value Date    WBC 10.5 03/07/2021    RBC 3.25 03/07/2021    HGB 9.2 03/07/2021    HCT 28.8 03/07/2021    MCV 88.6 03/07/2021    MCH 28.3 03/07/2021    MCHC 31.9 03/07/2021    RDW 14.7 03/07/2021     03/07/2021    MPV 9.4 03/07/2021     CBC with Differential:    Lab Results   Component Value Date    WBC 10.5 03/07/2021    RBC 3.25 03/07/2021    HGB 9.2 03/07/2021    HCT 28.8 03/07/2021     03/07/2021    MCV 88.6 03/07/2021    MCH 28.3 03/07/2021    MCHC 31.9 03/07/2021    RDW 14.7 03/07/2021    NRBC 0.0 03/03/2021    SEGSPCT 67 02/27/2012    METASPCT 0.9 03/03/2021    LYMPHOPCT 2.5 03/05/2021    MONOPCT 5.8 03/05/2021    EOSPCT 2.0 04/12/2016    BASOPCT 0.1 03/05/2021    MONOSABS 0.90 03/05/2021    LYMPHSABS 0.38 03/05/2021    EOSABS 0.00 03/05/2021    BASOSABS 0.01 03/05/2021     Hemoglobin/Hematocrit:    Lab Results   Component Value Date    HGB 9.2 03/07/2021    HCT 28.8 03/07/2021     CMP:    Lab Results   Component Value Date     03/07/2021    K 3.5 03/07/2021    K 3.9 03/04/2021     03/07/2021    CO2 27 03/07/2021    BUN 37 03/07/2021    CREATININE 1.1 03/07/2021    GFRAA 57 03/07/2021    LABGLOM 47 03/07/2021    GLUCOSE 141 03/07/2021    GLUCOSE 108 02/27/2012    PROT 5.7 03/07/2021    LABALBU 3.2 03/07/2021    LABALBU 4.3 02/27/2012    LABALBU 3.5 02/27/2012    CALCIUM 8.9 03/07/2021    BILITOT 0.4 03/07/2021    ALKPHOS 58 03/07/2021    AST 15 03/07/2021    ALT 13 03/07/2021     BMP:    Lab Results   Component Value Date     03/07/2021    K 3.5 03/07/2021    K 3.9 03/04/2021     03/07/2021    CO2 27 03/07/2021    BUN 37 03/07/2021    LABALBU 3.2 03/07/2021    LABALBU 4.3 02/27/2012    LABALBU 3.5 02/27/2012    CREATININE 1.1 03/07/2021    CALCIUM 8.9 03/07/2021    GFRAA 57 03/07/2021    LABGLOM 47 03/07/2021    GLUCOSE 141 03/07/2021    GLUCOSE 108 02/27/2012     BUN/Creatinine:    Lab Results   Component Value Date    BUN 37 03/07/2021    CREATININE 1.1 03/07/2021     Hepatic Function Panel:    Lab Results   Component Value Date    ALKPHOS 58 03/07/2021    ALT 13 03/07/2021    AST 15 03/07/2021    PROT 5.7 03/07/2021    BILITOT 0.4 03/07/2021    BILIDIR 0.1 02/27/2012    LABALBU 3.2 03/07/2021    LABALBU 4.3 02/27/2012    LABALBU 3.5 02/27/2012     Albumin:    Lab Results   Component Value Date    LABALBU 3.2 03/07/2021    LABALBU 4.3 02/27/2012    LABALBU 3.5 02/27/2012     Calcium:    Lab Results   Component Value Date    CALCIUM 8.9 03/07/2021     Ionized Calcium:  No results found for: IONCA  Magnesium:    Lab Results   Component Value Date    MG 2.1 03/07/2021     Phosphorus:    Lab Results   Component Value Date    PHOS 2.8 03/07/2021     LDH:  No results found for: LDH  Uric Acid:  No results found for: LABURIC, URICACID  PT/INR:    Lab Results   Component Value Date    PROTIME 23.0 03/05/2021    PROTIME 12.7 06/29/2011    INR 2.1 03/05/2021     PTT:    Lab Results   Component Value Date    APTT 25.4 03/05/2021   [APTT}  Last 3 Troponin:    Lab 3, 2021       HISTORY:   ORDERING SYSTEM PROVIDED HISTORY: CVC line placement   TECHNOLOGIST PROVIDED HISTORY:   Reason for exam:->CVC line placement       FINDINGS:   Cardiac silhouette is enlarged.  There is bilateral mild pulmonary   interstitial prominence increased from prior exam.  Pulmonary vasculature is   unremarkable.  No pleural fluid collection seen.  No evidence for pneumothorax       There is been placement of a right IJ catheter with tip at the SVC           Impression   Placement of right IJ catheter in satisfactory position no evidence for   pneumothorax       Mild bilateral pulmonary interstitial infiltrates which appear increased from   prior exam     RETROPERITONEAL COMPLETE [[id number]]    Collected: 03/05/21 2029    Updated: 03/05/21 2036    Narrative:     EXAMINATION:   RETROPERITONEAL ULTRASOUND OF THE KIDNEYS AND URINARY BLADDER     3/5/2021     COMPARISON:   CT chest from May 3, 2019     HISTORY:   ORDERING SYSTEM PROVIDED HISTORY: KENTRELL   TECHNOLOGIST PROVIDED HISTORY:   Reason for exam:->KENTRELL   What reading provider will be dictating this exam?->CRC     FINDINGS:     Kidneys: The right kidney measures 10.0 cm in length and the left kidney measures 10.1   cm in length. Right kidney: Corticomedullary differentiation and cortical thickness is   decreased.  Anechoic thin walled nonvascular 13 mm cyst in the mid to lower   pole of the right kidney.  2.5 cm lesion off the upper pole the right kidney   has a hyper dense nodule which probably represents a calcification.  This   appears grossly unchanged compared to prior CT scan of the chest in 2019. intrarenal calcification in the midpole measures 4 mm.  Intrarenal   calcification in the mid to lower pole measures 11 mm.  There is mild   fullness of the right renal pelvis.      Left kidney: Corticomedullary differentiation and cortical thickness is   decreased.  Anechoic thin walled left midpole 2.7 cm cyst is nonvascular on   color Doppler evaluation. Christen Tanner is an adjacent smaller 14 mm cyst.  No   concerning renal mass. Lawdeneennce Reichmann are intrarenal calcifications on the left with   the largest measuring 9 mm.  No hydronephrosis on the left. Bladder:     Ha catheter is decompressing the bladder. Impression:     1.  Bilateral renal cysts. Christen Tanner is a 2.5 cm lesion off the upper pole of   the right kidney which has a hyperdense mural component which probably   represents a calcification.  This is not definitely changed when compared to   prior CT scan from May 3, 2019. 2.  Bilateral renal cortical thinning.  Probably reflects underlying medical   renal disease. 3.  Ha catheter is decompressing the bladder. RECOMMENDATIONS:   Recommend follow-up renal ultrasound in 3-6 months to reassess bilateral   renal cysts and the right upper pole renal lesion         Assessment  1-Stage I KENTRELL  sec to decreased effective renal perfusion in the setting of the AFib with RVR as evidenced by the FeNa<1%  3/3/21 UA no blood or protein in a highly concentrated specimen making glomerular pathology-GN or NS unlikely  Complex R Renal cyst  PLAN:1. Evaluation of the complex renal cyst as an outpt    2-AFib with RVR-S/P Cardioversion to NSR  K+<4.0   Mg++ 2.1  PLAN:1. Dose with K+ this PM  2. Follow Mg++ and K+    3-HTN with CKD I-IV  BP above goal <130/80  PLAN:1.Added amlodipine at HS on 3/6 no new change  2. BB titrated to 50mg bid on 3/6 no new change  3.  Start ARB in the AM and wean off amlodipine    4- CKD G3B with a baseline serum cr 1.3mg/dl and an e-GFR=43ml/min presumed sec to microvascular disease in the setting of the Dm2 and the HTN    5- Sec HPTH of Renal Origin with hypocalcemia with hypoalbuminemia  Ca++ corrected to WNL  PO4 WNL  PTH 79  Vit D 35  PLAN:1. Follow Ca++ and PO4    6- Anemia in CKD  HgB at goal HgB >10  Ferritin 1517, Iron Sat 18%  Folate 20, Vit B 12 449  PLAN:1. Follow H/H      Thank you  for allowing us to participate in care of Rubens David  2:25 PM  3/7/2021

## 2021-03-07 NOTE — PROGRESS NOTES
This note also relates to the following rows which could not be included:  Resp - Cannot attach notes to unvalidated device data  SpO2 - Cannot attach notes to unvalidated device data       03/07/21 0015   NIV Type   $NIV $Daily Charge   Mode CPAP  (standby refuses at this time)

## 2021-03-07 NOTE — PROGRESS NOTES
diastolic function difficult to assess w pt in a fib. Getting Lasix IV per cardio. Follow volume status. Pt's PMH otherwise pertinent for anemia, HTN, sleep apnea. Continue outpt med regimen; if any particular issues regarding these items, will address and move to active problem list above.      Code status  Full  DVT prophylaxis Eliquis  Disposition  Home when ready    Electronically signed by Skylar Barnett, DO on 3/7/2021 at 1:57 PM

## 2021-03-08 ENCOUNTER — APPOINTMENT (OUTPATIENT)
Dept: GENERAL RADIOLOGY | Age: 86
DRG: 308 | End: 2021-03-08
Payer: MEDICARE

## 2021-03-08 LAB
ALBUMIN SERPL-MCNC: 3.1 G/DL (ref 3.5–5.2)
ALP BLD-CCNC: 56 U/L (ref 35–104)
ALT SERPL-CCNC: 15 U/L (ref 0–32)
ANION GAP SERPL CALCULATED.3IONS-SCNC: 10 MMOL/L (ref 7–16)
AST SERPL-CCNC: 14 U/L (ref 0–31)
BILIRUB SERPL-MCNC: 0.3 MG/DL (ref 0–1.2)
BLOOD CULTURE, ROUTINE: NORMAL
BUN BLDV-MCNC: 31 MG/DL (ref 8–23)
CALCIUM SERPL-MCNC: 8.7 MG/DL (ref 8.6–10.2)
CHLORIDE BLD-SCNC: 102 MMOL/L (ref 98–107)
CO2: 28 MMOL/L (ref 22–29)
CREAT SERPL-MCNC: 1.1 MG/DL (ref 0.5–1)
CULTURE, BLOOD 2: NORMAL
GFR AFRICAN AMERICAN: 57
GFR NON-AFRICAN AMERICAN: 47 ML/MIN/1.73
GLUCOSE BLD-MCNC: 160 MG/DL (ref 74–99)
HCT VFR BLD CALC: 31 % (ref 34–48)
HEMOGLOBIN: 10 G/DL (ref 11.5–15.5)
MAGNESIUM: 1.6 MG/DL (ref 1.6–2.6)
MCH RBC QN AUTO: 28.2 PG (ref 26–35)
MCHC RBC AUTO-ENTMCNC: 32.3 % (ref 32–34.5)
MCV RBC AUTO: 87.6 FL (ref 80–99.9)
METER GLUCOSE: 148 MG/DL (ref 74–99)
METER GLUCOSE: 153 MG/DL (ref 74–99)
METER GLUCOSE: 192 MG/DL (ref 74–99)
METER GLUCOSE: 204 MG/DL (ref 74–99)
PDW BLD-RTO: 15 FL (ref 11.5–15)
PHOSPHORUS: 2.5 MG/DL (ref 2.5–4.5)
PLATELET # BLD: 328 E9/L (ref 130–450)
PMV BLD AUTO: 9.3 FL (ref 7–12)
POTASSIUM SERPL-SCNC: 3.6 MMOL/L (ref 3.5–5)
PRO-BNP: ABNORMAL PG/ML (ref 0–450)
RBC # BLD: 3.54 E12/L (ref 3.5–5.5)
SODIUM BLD-SCNC: 140 MMOL/L (ref 132–146)
TOTAL PROTEIN: 5.8 G/DL (ref 6.4–8.3)
WBC # BLD: 11.7 E9/L (ref 4.5–11.5)

## 2021-03-08 PROCEDURE — 2060000000 HC ICU INTERMEDIATE R&B

## 2021-03-08 PROCEDURE — 82962 GLUCOSE BLOOD TEST: CPT

## 2021-03-08 PROCEDURE — 84100 ASSAY OF PHOSPHORUS: CPT

## 2021-03-08 PROCEDURE — 94640 AIRWAY INHALATION TREATMENT: CPT

## 2021-03-08 PROCEDURE — 2700000000 HC OXYGEN THERAPY PER DAY

## 2021-03-08 PROCEDURE — 83735 ASSAY OF MAGNESIUM: CPT

## 2021-03-08 PROCEDURE — 99232 SBSQ HOSP IP/OBS MODERATE 35: CPT | Performed by: INTERNAL MEDICINE

## 2021-03-08 PROCEDURE — 36415 COLL VENOUS BLD VENIPUNCTURE: CPT

## 2021-03-08 PROCEDURE — 6360000002 HC RX W HCPCS: Performed by: INTERNAL MEDICINE

## 2021-03-08 PROCEDURE — 6370000000 HC RX 637 (ALT 250 FOR IP): Performed by: INTERNAL MEDICINE

## 2021-03-08 PROCEDURE — 80053 COMPREHEN METABOLIC PANEL: CPT

## 2021-03-08 PROCEDURE — 83880 ASSAY OF NATRIURETIC PEPTIDE: CPT

## 2021-03-08 PROCEDURE — 97161 PT EVAL LOW COMPLEX 20 MIN: CPT

## 2021-03-08 PROCEDURE — 99233 SBSQ HOSP IP/OBS HIGH 50: CPT | Performed by: INTERNAL MEDICINE

## 2021-03-08 PROCEDURE — 94660 CPAP INITIATION&MGMT: CPT

## 2021-03-08 PROCEDURE — 85027 COMPLETE CBC AUTOMATED: CPT

## 2021-03-08 PROCEDURE — 71046 X-RAY EXAM CHEST 2 VIEWS: CPT

## 2021-03-08 RX ORDER — POTASSIUM CHLORIDE 20 MEQ/1
40 TABLET, EXTENDED RELEASE ORAL ONCE
Status: COMPLETED | OUTPATIENT
Start: 2021-03-08 | End: 2021-03-08

## 2021-03-08 RX ORDER — MAGNESIUM SULFATE IN WATER 40 MG/ML
2000 INJECTION, SOLUTION INTRAVENOUS ONCE
Status: COMPLETED | OUTPATIENT
Start: 2021-03-08 | End: 2021-03-08

## 2021-03-08 RX ORDER — SPIRONOLACTONE 25 MG/1
25 TABLET ORAL DAILY
Status: DISCONTINUED | OUTPATIENT
Start: 2021-03-08 | End: 2021-03-10 | Stop reason: HOSPADM

## 2021-03-08 RX ADMIN — FUROSEMIDE 40 MG: 10 INJECTION, SOLUTION INTRAMUSCULAR; INTRAVENOUS at 10:37

## 2021-03-08 RX ADMIN — INSULIN LISPRO 2 UNITS: 100 INJECTION, SOLUTION INTRAVENOUS; SUBCUTANEOUS at 10:40

## 2021-03-08 RX ADMIN — AMLODIPINE BESYLATE 5 MG: 5 TABLET ORAL at 20:34

## 2021-03-08 RX ADMIN — APIXABAN 5 MG: 5 TABLET, FILM COATED ORAL at 20:34

## 2021-03-08 RX ADMIN — LEVALBUTEROL 0.63 MG: 1.25 SOLUTION, CONCENTRATE RESPIRATORY (INHALATION) at 09:06

## 2021-03-08 RX ADMIN — SERTRALINE HYDROCHLORIDE 50 MG: 50 TABLET ORAL at 07:46

## 2021-03-08 RX ADMIN — LEVALBUTEROL 0.63 MG: 1.25 SOLUTION, CONCENTRATE RESPIRATORY (INHALATION) at 02:04

## 2021-03-08 RX ADMIN — LEVALBUTEROL 0.63 MG: 1.25 SOLUTION, CONCENTRATE RESPIRATORY (INHALATION) at 21:18

## 2021-03-08 RX ADMIN — POTASSIUM CHLORIDE 40 MEQ: 1500 TABLET, EXTENDED RELEASE ORAL at 12:05

## 2021-03-08 RX ADMIN — MAGNESIUM SULFATE HEPTAHYDRATE 2000 MG: 40 INJECTION, SOLUTION INTRAVENOUS at 14:45

## 2021-03-08 RX ADMIN — FUROSEMIDE 40 MG: 10 INJECTION, SOLUTION INTRAMUSCULAR; INTRAVENOUS at 17:23

## 2021-03-08 RX ADMIN — PANTOPRAZOLE SODIUM 40 MG: 40 TABLET, DELAYED RELEASE ORAL at 06:55

## 2021-03-08 RX ADMIN — METOPROLOL SUCCINATE 50 MG: 50 TABLET, EXTENDED RELEASE ORAL at 07:46

## 2021-03-08 RX ADMIN — APIXABAN 5 MG: 5 TABLET, FILM COATED ORAL at 07:46

## 2021-03-08 RX ADMIN — LEVALBUTEROL 0.63 MG: 1.25 SOLUTION, CONCENTRATE RESPIRATORY (INHALATION) at 14:31

## 2021-03-08 RX ADMIN — INSULIN LISPRO 4 UNITS: 100 INJECTION, SOLUTION INTRAVENOUS; SUBCUTANEOUS at 15:56

## 2021-03-08 RX ADMIN — INSULIN LISPRO 2 UNITS: 100 INJECTION, SOLUTION INTRAVENOUS; SUBCUTANEOUS at 07:46

## 2021-03-08 RX ADMIN — SPIRONOLACTONE 25 MG: 25 TABLET ORAL at 10:37

## 2021-03-08 RX ADMIN — VALSARTAN 80 MG: 80 TABLET, FILM COATED ORAL at 07:46

## 2021-03-08 RX ADMIN — METOPROLOL SUCCINATE 50 MG: 50 TABLET, EXTENDED RELEASE ORAL at 20:34

## 2021-03-08 RX ADMIN — INSULIN LISPRO 1 UNITS: 100 INJECTION, SOLUTION INTRAVENOUS; SUBCUTANEOUS at 20:34

## 2021-03-08 ASSESSMENT — PAIN SCALES - GENERAL
PAINLEVEL_OUTOF10: 0

## 2021-03-08 NOTE — PROGRESS NOTES
INPATIENT CARDIOLOGY FOLLOW-UP    Name: Bina Gibbs    Age: 81 y.o. Date of Admission: 3/3/2021 10:55 AM    Date of Service: 3/8/2021    Chief Complaint: Follow-up for paroxysmal atrial fibrillation, acute superimposed upon chronic diastolic heart failure, resolving acute hypoxic respiratory failure, hypertension, chronic kidney disease, obstructive sleep apnea    Interim History: The patient remains subjectively improved with ongoing fluid mobilization. Continued clinical volume overload is present with no interim improvement of proBNP levels and a pending repeat chest x-ray. Sinus rhythm has been maintained with only a transient episode of paroxysmal atrial fibrillation. Persistent stable renal function is present with evidence of hypokalemia. Review of Systems: The remainder of a complete multisystem review including consitutional, central nervous, respiratory, circulatory, gastrointestinal, genitourinary, endocrinologic, hematologic, musculoskeletal and psychiatric are negative. Problem List:  Patient Active Problem List   Diagnosis    HTN (hypertension)    Hyperlipidemia    Anemia    GERD (gastroesophageal reflux disease)    JERICA on CPAP    Vitamin D deficiency    Abnormal bone density screening    PVC's (premature ventricular contractions)    Mitral insufficiency    Acute on chronic diastolic CHF (congestive heart failure), NYHA class 1 ([address]    Renal insufficiency    Atypical chest pain    Rheumatic mitral stenosis    Non-rheumatic mitral valve stenosis    Chest pain    CKD (chronic kidney disease) stage 3, GFR 30-59 ml/min    Acute cystitis without hematuria    Valvular heart disease    Atrial fibrillation (HCC)    Congestive heart failure (HCC)    Atrial fibrillation with RVR (HCC)       Allergies: Allergies   Allergen Reactions    Nitrofuran Derivatives Other (See Comments)     Causes fever and chills.     Other Other (See Comments)     HONEYDEW: Sore Throat  Sulfa Antibiotics        Current Medications:  Current Facility-Administered Medications   Medication Dose Route Frequency Provider Last Rate Last Admin    valsartan (DIOVAN) tablet 80 mg  80 mg Oral Daily Jillian Mon MD   80 mg at 03/08/21 0746    metoprolol succinate (TOPROL XL) extended release tablet 50 mg  50 mg Oral BID Sanjeev Miles MD   50 mg at 03/08/21 0746    sodium chloride nebulizer 0.9 % solution 3 mL  3 mL Nebulization Once Sanjeev Miles MD        levalbuterol (XOPENEX) nebulizer solution 0.63 mg  0.63 mg Nebulization Q6H Sanjeev Miles MD   0.63 mg at 03/08/21 0204    amLODIPine (NORVASC) tablet 5 mg  5 mg Oral Nightly Sanjeev Miles MD   5 mg at 03/07/21 2051    trimethobenzamide (TIGAN) injection 200 mg  200 mg Intramuscular Q8H PRN Sanjeev Miles MD        apixaban (ELIQUIS) tablet 5 mg  5 mg Oral BID Sanjeev Miles MD   5 mg at 03/08/21 0746    prochlorperazine (COMPAZINE) tablet 5 mg  5 mg Oral Q6H PRN Sanjeev Miles MD   5 mg at 03/04/21 1337    perflutren lipid microspheres (DEFINITY) injection 1.65 mg  1.5 mL Intravenous ONCE PRN Sanjeev Miles MD        [Held by provider] furosemide (LASIX) injection 40 mg  40 mg Intravenous BID Sanjeev Miles MD   40 mg at 03/05/21 0833    pantoprazole (PROTONIX) tablet 40 mg  40 mg Oral QAM AC Sanjeev Miles MD   40 mg at 03/08/21 0655    sertraline (ZOLOFT) tablet 50 mg  50 mg Oral Daily Sanjeev Miles MD   50 mg at 03/08/21 0746    insulin lispro (HUMALOG) injection vial 0-12 Units  0-12 Units Subcutaneous TID WC Sanjeev Miles MD   2 Units at 03/08/21 0746    insulin lispro (HUMALOG) injection vial 0-6 Units  0-6 Units Subcutaneous Nightly Sanjeev Miles MD   2 Units at 03/07/21 2051    glucose (GLUTOSE) 40 % oral gel 15 g  15 g Oral PRN Sanjeev Miles MD        dextrose 50 % IV solution  12.5 g Intravenous PRN Sanjeev Miles MD        glucagon (rDNA) injection 1 mg  1 mg Intramuscular PRN Sue Dawkins MD        dextrose 5 % solution  100 mL/hr Intravenous PRN Sue Dawkins MD        ipratropium (ATROVENT) 0.02 % nebulizer solution 0.5 mg  0.5 mg Nebulization Q6H PRN Sue Dawkins MD          dextrose         Physical Exam:  BP (!) 151/75   Pulse 83   Temp 98.4 °F (36.9 °C) (Oral)   Resp 16   Ht 5' 6\" (1.676 m)   Wt 155 lb 3.3 oz (70.4 kg)   SpO2 97%   BMI 25.05 kg/m²   Weight change: Wt Readings from Last 3 Encounters:   03/07/21 155 lb 3.3 oz (70.4 kg)   11/12/19 165 lb (74.8 kg)   05/05/19 150 lb 3.2 oz (68.1 kg)     The patient is awake, alert and in no discomfort or distress. No gross musculoskeletal deformity is present. No significant skin or nail changes are present. Gross examination of head, eyes, nose and throat are negative. Jugular venous pressure is normal and no carotid bruits are present. Normal respiratory effort is noted with no accessory muscle usage present. Lung fields demonstrate bibasilar rales to ascultation. Cardiac examination is notable for a regular rate and rhythm with no palpable thrill. No gallop rhythm or cardiac murmur are identified. A benign abdominal examination is present with no masses or organomegaly. Intact pulses are present throughout all extremities and mild pretibial and pedal edema is present. No focal neurologic deficits are present. Intake/Output:    Intake/Output Summary (Last 24 hours) at 3/8/2021 0752  Last data filed at 3/7/2021 2030  Gross per 24 hour   Intake 222 ml   Output 2025 ml   Net -1803 ml     No intake/output data recorded. Laboratory Tests:  Lab Results   Component Value Date    CREATININE 1.1 (H) 03/08/2021    BUN 31 (H) 03/08/2021     03/08/2021    K 3.6 03/08/2021     03/08/2021    CO2 28 03/08/2021     No results for input(s): CKTOTAL, CKMB in the last 72 hours.     Invalid input(s): TROPONONI  No results found for: BNP  Lab Results Component Value Date    WBC 11.7 03/08/2021    RBC 3.54 03/08/2021    HGB 10.0 03/08/2021    HCT 31.0 03/08/2021    MCV 87.6 03/08/2021    MCH 28.2 03/08/2021    MCHC 32.3 03/08/2021    RDW 15.0 03/08/2021     03/08/2021    MPV 9.3 03/08/2021     Recent Labs     03/07/21  0600 03/08/21  0430   ALKPHOS 58 56   ALT 13 15   AST 15 14   PROT 5.7* 5.8*   BILITOT 0.4 0.3   LABALBU 3.2* 3.1*     Lab Results   Component Value Date    MG 1.6 03/08/2021     Lab Results   Component Value Date    PROTIME 23.0 03/05/2021    PROTIME 12.7 06/29/2011    INR 2.1 03/05/2021     Lab Results   Component Value Date    TSH 1.800 03/03/2021     No components found for: CHLPL  Lab Results   Component Value Date    TRIG 112 03/03/2021    TRIG 201 (H) 12/23/2019    TRIG 242 (H) 05/05/2019     Lab Results   Component Value Date    HDL 36 03/03/2021    HDL 49 12/23/2019    HDL 38 05/05/2019     Lab Results   Component Value Date    LDLCALC 80 03/03/2021    LDLCALC 132 (H) 12/23/2019    LDLCALC 86 05/05/2019       Cardiac Tests:  Telemetry findings reviewed: sinus rhythm with transient paroxysmal atrial fibrillation, no new tachy/bradyarrhythmias overnight  Chest X-ray: pending      ASSESSMENT / PLAN: On a clinical basis, the patient continues to manifest evidence of volume overload in spite of ongoing diuresis and present maintenance of sinus rhythm following cardioversion. proBNP levels failed to demonstrate significant improvement with a repeat chest x-ray pending for further assessment of her interstitial infiltrates. A persistent improving prerenal azotemia is present with borderline hypokalemia and plans of supplementation of her potassium levels as well as that of the addition of spironolactone to assist management of her diastolic heart failure. Ongoing careful monitoring of her volume status as well as that of renal function and electrolytes will be necessary.   Ongoing nutritional support will be essential to additional fluid mobilization as well as reducing her risk of the development of progressive debilitation. Persistent predominately systolic hypertension is noted with additional management necessary to optimize diastolic cardiac performance. Appropriate use of nocturnal CPAP will be further essential to assisting with diastolic cardiac performance and reducing risk of recurrent atrial arrhythmias. Ongoing aggressive risk factor modification of blood pressure, diabetes and serum lipids will remain essential to reducing risk of future atherosclerotic development. Note: This report was completed utilizing computer voice recognition software. Every effort has been made to ensure accuracy, however; inadvertent computerized transcription errors may be present. Saritha Germain, [id number] [facility]

## 2021-03-08 NOTE — PROGRESS NOTES
[facility] ([facility]) Hospitalist Progress Note    Admission Date  3/3/2021 10:55 AM  Chief Complaint Emesis / cough / tachycardia  Admit Dx   Atrial fibrillation with RVR ([address] [I48.91]    Subjective  History of Present Illness  Patient seen at bedside on the fourth floor, up in her chair watching TV, breathing much better, down to 1 L but patient notes she was maintaining her saturations in the 90s on room air though only while sitting. In good spirits today. Does appear to be improving on a daily basis. Add'l Lasix given today and spironolactone added. Review of Systems - 12-point review of systems has been reviewed and is otherwise negative except as listed in the HPI    Objective  Physical Exam  Vitals: BP (!) 151/75   Pulse 83   Temp 98.4 °F (36.9 °C) (Oral)   Resp 16   Ht 5' 6\" (1.676 m)   Wt 153 lb (69.4 kg)   SpO2 96%   BMI 24.69 kg/m²   General: well-developed, well-nourished, no acute distress, cooperative  Skin: warm, dry, intact, normal color without cyanosis  HEENT: normocephalic, atraumatic, mucous membranes normal; NCO2 in place  Respiratory: clear to auscultation bilaterally without respiratory distress  Cardiovascular: irregularly irregular rhythm tachy without murmur / rub / gallop  Abdominal: soft, nontender, nondistended, normoactive bowel sounds  Extremities: no mottling, pulses intact, no edema  Neurologic: awake, alert, no focal deficits  Psychiatric: normal affect, cooperative    Assessment / Plan  Atrial fib w RVR - s/p successful cardioversion 3/5; on Toprol XL 50 mg daily; Eliquis started; cardio following. CKD IIIa w KENTRELL now resolved - baseline Cr 1.0-1.2, monitor    IDDM - hold orals / diabetic diet / ISS + BG checks ACHS / hypoglycemic protocol / target -180 / has been generally 140-200    Volume overload - echo shows no systolic dysfunction, diastolic function difficult to assess w pt in a fib. Getting Lasix IV per cardio w spironolactone added today.   Follow volume status. Pt's PMH otherwise pertinent for anemia, HTN, sleep apnea. Continue outpt med regimen; if any particular issues regarding these items, will address and move to active problem list above.      Code status  Full  DVT prophylaxis Eliquis  Disposition  Home when ready    Electronically signed by Azeem Cerna DO on 3/8/2021 at 2:11 PM

## 2021-03-08 NOTE — PROGRESS NOTES
Pulse ox was 92% on room air at rest.   Ambulated patient on room air. Oxygen saturation was 86% on room air while ambulating. Oxygen applied.    Recovery pulse ox was 96% on 1 liters of oxygen while ambulating

## 2021-03-08 NOTE — PROGRESS NOTES
Nephrology Progress Note  Patient's Name: Bina Gibbs  1:20 PM  3/8/2021    Nephrologist: Zander Cochran    Reason for Consult:  KENTRELL on CKD G3A  Requesting Physician:  Olimpia Lamb MD    Chief Complaint:  SOB    History Obtained From:  patient and past medical records    History of Present Ilness from the 3/5/21 note:    Bina Gibbs is a 81 y.o. female with prior history of CKD G3B with a baseline serum cr 1.3mg/dl and an e-GFR=43ml/min who presented to the ED 3/3/21 with he c/o SOB for 2 weeks PTA. In the ED she was found to be in Afib with RVR with an elevated lactic acid 2.7. The cr was 1.0mg/dl  And UA (-) blood & protein with a SG 1.020. pt did not respond to diltiazem IV and was treated with IV lopressor and esmolol was initiated. She was give furosemide and digoxin. WBC elevated to 19. PM of 3/3 HR went 130-160's with BP 93/61. Pt  Had an elevated digoxin level and digibind given. She underwent a successful cardioversion to NSR this PM. She states she still has some SOB, no CP. She did say she was told in the past she had kidney disease    3/8/21: Pt states she feels better sitting up in chair.  Notes she was able to be up to walk to BR and out into the vargas     Past Medical History:   Diagnosis Date    Acute cystitis with hematuria 5/3/2019    Anemia 2008    RECEIVED 4 UNITS BLOOD    Atrial fibrillation (HCC)     CKD (chronic kidney disease) stage 3, GFR 30-59 ml/min 5/3/2019    CPAP (continuous positive airway pressure) dependence     Diabetes mellitus ([facility] [address] [facility]     Esophageal stricture     Gastric ulcer     Goiter     Heart murmur     Hypertension     SINCE 1994    Kidney stones     Rheumatic fever     POSSIBLY AS A CHILD    Sleep apnea        Past Surgical History:   Procedure Laterality Date    BREAST LUMPECTOMY Left     CALCIUM BUILDUP    CARDIOVASCULAR STRESS TEST  1999    ISCHEMIC NUCLEAR STRESS TEST - PATIENT REFUSED HEART CATHETERIZATION    CHOLECYSTECTOMY      COLONOSCOPY  07/11 W/POLYP REMOVAL    DOPPLER ECHOCARDIOGRAPHY  05/14/03    POSSIBLE MILD MITRAL STENOSIS    DOPPLER ECHOCARDIOGRAPHY  06/09/04    MILD MITRAL STENSIS, MODERATE MITRAL REGURGITATION    FOOT SURGERY      HYSTERECTOMY      POLYPECTOMY      16 POLYPS REMOVED FROM STOMACH AND 1 FROM COLON    UPPER GASTROINTESTINAL ENDOSCOPY  07/11       Family History   Problem Relation Age of Onset    Asthma Mother     Diabetes Mother     Alcohol Abuse Father         reports that she has never smoked. She has never used smokeless tobacco. She reports that she does not drink alcohol or use drugs. Allergies:  Nitrofuran derivatives, Other, and Sulfa antibiotics    Current Medications:    spironolactone (ALDACTONE) tablet 25 mg, Daily  valsartan (DIOVAN) tablet 80 mg, Daily  metoprolol succinate (TOPROL XL) extended release tablet 50 mg, BID  sodium chloride nebulizer 0.9 % solution 3 mL, Once  levalbuterol (XOPENEX) nebulizer solution 0.63 mg, Q6H  amLODIPine (NORVASC) tablet 5 mg, Nightly  trimethobenzamide (TIGAN) injection 200 mg, Q8H PRN  apixaban (ELIQUIS) tablet 5 mg, BID  prochlorperazine (COMPAZINE) tablet 5 mg, Q6H PRN  perflutren lipid microspheres (DEFINITY) injection 1.65 mg, ONCE PRN  furosemide (LASIX) injection 40 mg, BID  pantoprazole (PROTONIX) tablet 40 mg, QAM AC  sertraline (ZOLOFT) tablet 50 mg, Daily  insulin lispro (HUMALOG) injection vial 0-12 Units, TID WC  insulin lispro (HUMALOG) injection vial 0-6 Units, Nightly  glucose (GLUTOSE) 40 % oral gel 15 g, PRN  dextrose 50 % IV solution, PRN  glucagon (rDNA) injection 1 mg, PRN  dextrose 5 % solution, PRN  ipratropium (ATROVENT) 0.02 % nebulizer solution 0.5 mg, Q6H PRN        Review of Systems:   Pertinent items are noted in HPI. Remainder of a complete review of systems is (-) other than stated in the HPI    Physical exam:   Constitutional:  Elderly female in NAD  Vitals:   VITALS:  BP (!) 151/75   Pulse 83   Temp 98.4 °F (36.9 °C) (Oral)   Resp 16 Ht 5' 6\" (1.676 m)   Wt 153 lb (69.4 kg)   SpO2 96%   BMI 24.69 kg/m²   24HR INTAKE/OUTPUT:      Intake/Output Summary (Last 24 hours) at 3/8/2021 1320  Last data filed at 3/8/2021 1207  Gross per 24 hour   Intake 462 ml   Output 3900 ml   Net -3438 ml     URINARY CATHETER OUTPUT (Ha):  [REMOVED] Urethral Catheter Straight-tip-Output (mL): 1400 mL  DRAIN/TUBE OUTPUT:     VENT SETTINGS:  Vent Information  Skin Assessment: Clean, dry, & intact  FiO2 : 40 %  SpO2: 96 %  SpO2/FiO2 ratio: 245  Mask Type: Full face mask  Mask Size: Medium  Additional Respiratory  Assessments  Pulse: 83  Resp: 16  SpO2: 96 %  Oral Care: Teeth brushed, Mouthwash    Skin: no rash, turgor wnl  Heent:  eomi, mmm  Neck: no bruits or jvd noted  Cardiovascular: RRR S1, S2 without S3  Respiratory: Bilat crackles with a few end expir wheezes  Abdomen:  +bs, soft, nt, nd  Ext: (-) lower extremity edema  Psychiatric: mood and affect appropriate  Musculoskeletal:  Rom, muscular strength intact    Data:   Labs:  CBC:   Lab Results   Component Value Date    WBC 11.7 03/08/2021    RBC 3.54 03/08/2021    HGB 10.0 03/08/2021    HCT 31.0 03/08/2021    MCV 87.6 03/08/2021    MCH 28.2 03/08/2021    MCHC 32.3 03/08/2021    RDW 15.0 03/08/2021     03/08/2021    MPV 9.3 03/08/2021     CBC with Differential:    Lab Results   Component Value Date    WBC 11.7 03/08/2021    RBC 3.54 03/08/2021    HGB 10.0 03/08/2021    HCT 31.0 03/08/2021     03/08/2021    MCV 87.6 03/08/2021    MCH 28.2 03/08/2021    MCHC 32.3 03/08/2021    RDW 15.0 03/08/2021    NRBC 0.0 03/03/2021    SEGSPCT 67 02/27/2012    METASPCT 0.9 03/03/2021    LYMPHOPCT 2.5 03/05/2021    MONOPCT 5.8 03/05/2021    EOSPCT 2.0 04/12/2016    BASOPCT 0.1 03/05/2021    MONOSABS 0.90 03/05/2021    LYMPHSABS 0.38 03/05/2021    EOSABS 0.00 03/05/2021    BASOSABS 0.01 03/05/2021     Hemoglobin/Hematocrit:    Lab Results   Component Value Date    HGB 10.0 03/08/2021    HCT 31.0 03/08/2021 CMP:    Lab Results   Component Value Date     03/08/2021    K 3.6 03/08/2021    K 3.9 03/04/2021     03/08/2021    CO2 28 03/08/2021    BUN 31 03/08/2021    CREATININE 1.1 03/08/2021    GFRAA 57 03/08/2021    LABGLOM 47 03/08/2021    GLUCOSE 160 03/08/2021    GLUCOSE 108 02/27/2012    PROT 5.8 03/08/2021    LABALBU 3.1 03/08/2021    LABALBU 4.3 02/27/2012    LABALBU 3.5 02/27/2012    CALCIUM 8.7 03/08/2021    BILITOT 0.3 03/08/2021    ALKPHOS 56 03/08/2021    AST 14 03/08/2021    ALT 15 03/08/2021     BMP:    Lab Results   Component Value Date     03/08/2021    K 3.6 03/08/2021    K 3.9 03/04/2021     03/08/2021    CO2 28 03/08/2021    BUN 31 03/08/2021    LABALBU 3.1 03/08/2021    LABALBU 4.3 02/27/2012    LABALBU 3.5 02/27/2012    CREATININE 1.1 03/08/2021    CALCIUM 8.7 03/08/2021    GFRAA 57 03/08/2021    LABGLOM 47 03/08/2021    GLUCOSE 160 03/08/2021    GLUCOSE 108 02/27/2012     BUN/Creatinine:    Lab Results   Component Value Date    BUN 31 03/08/2021    CREATININE 1.1 03/08/2021     Hepatic Function Panel:    Lab Results   Component Value Date    ALKPHOS 56 03/08/2021    ALT 15 03/08/2021    AST 14 03/08/2021    PROT 5.8 03/08/2021    BILITOT 0.3 03/08/2021    BILIDIR 0.1 02/27/2012    LABALBU 3.1 03/08/2021    LABALBU 4.3 02/27/2012    LABALBU 3.5 02/27/2012     Albumin:    Lab Results   Component Value Date    LABALBU 3.1 03/08/2021    LABALBU 4.3 02/27/2012    LABALBU 3.5 02/27/2012     Calcium:    Lab Results   Component Value Date    CALCIUM 8.7 03/08/2021     Ionized Calcium:  No results found for: IONCA  Magnesium:    Lab Results   Component Value Date    MG 1.6 03/08/2021     Phosphorus:    Lab Results   Component Value Date    PHOS 2.5 03/08/2021     LDH:  No results found for: LDH  Uric Acid:  No results found for: LABURIC, URICACID  PT/INR:    Lab Results   Component Value Date    PROTIME 23.0 03/05/2021    PROTIME 12.7 06/29/2011    INR 2.1 03/05/2021     PTT:    Lab Results   Component Value Date    APTT 25.4 03/05/2021   [APTT}  Last 3 Troponin:    Lab Results   Component Value Date    TROPONINI <0.01 03/06/2021    TROPONINI <0.01 03/05/2021    TROPONINI <0.01 03/05/2021     U/A:    Lab Results   Component Value Date    COLORU Yellow 03/03/2021    PROTEINU Negative 03/03/2021    PHUR 5.5 03/03/2021    WBCUA 10-20 05/03/2019    WBCUA 1-3 01/23/2012    RBCUA 0-1 05/03/2019    RBCUA NONE 01/23/2012    BACTERIA RARE 05/03/2019    CLARITYU Clear 03/03/2021    SPECGRAV 1.020 03/03/2021    LEUKOCYTESUR Negative 03/03/2021    UROBILINOGEN 0.2 03/03/2021    BILIRUBINUR Negative 03/03/2021    BILIRUBINUR NEGATIVE 01/23/2012    BLOODU Negative 03/03/2021    GLUCOSEU 100 03/03/2021    GLUCOSEU NEGATIVE 01/23/2012     ABG:    Lab Results   Component Value Date    PH 7.397 03/05/2021    PCO2 32.9 03/05/2021    PO2 74.1 03/05/2021    HCO3 19.8 03/05/2021    BE -4.3 03/05/2021    O2SAT 95.1 03/05/2021     HgBA1c:    Lab Results   Component Value Date    LABA1C 8.6 03/03/2021     Microalbumen/Creatinine ratio:  No components found for: RUCREAT  FLP:    Lab Results   Component Value Date    TRIG 112 03/03/2021    HDL 36 03/03/2021    LDLCALC 80 03/03/2021    LABVLDL 22 03/03/2021     TSH:    Lab Results   Component Value Date    TSH 1.800 03/03/2021     VITAMIN B12: No components found for: B12  FOLATE:    Lab Results   Component Value Date    FOLATE >20.0 03/06/2021     Iron Saturation:  No components found for: PERCENTFE  FERRITIN:    Lab Results   Component Value Date    FERRITIN 1,517 03/06/2021     ROSSY:  No results found for: ANATITER, ROSSY  AMYLASE:    Lab Results   Component Value Date    AMYLASE 40 06/29/2011     LIPASE:    Lab Results   Component Value Date    LIPASE 26 03/03/2021     24 Hour Urine for Protein:  No components found for: RAWUPRO, UHRS3, FPJK26CJ, UTV3  24 Hour Urine for Creatinine Clearance:  No components found for: CREAT4, UHRS10, UTV10     Imaging:  CXR thickness is   decreased.  Anechoic thin walled left midpole 2.7 cm cyst is nonvascular on   color Doppler evaluation. Kiesha Abed is an adjacent smaller 14 mm cyst.  No   concerning renal mass. Kiesha Abed are intrarenal calcifications on the left with   the largest measuring 9 mm.  No hydronephrosis on the left. Bladder:     Ha catheter is decompressing the bladder. Impression:     1.  Bilateral renal cysts. Kiesha Abed is a 2.5 cm lesion off the upper pole of   the right kidney which has a hyperdense mural component which probably   represents a calcification.  This is not definitely changed when compared to   prior CT scan from May 3, 2019. 2.  Bilateral renal cortical thinning.  Probably reflects underlying medical   renal disease. 3.  Ha catheter is decompressing the bladder. RECOMMENDATIONS:   Recommend follow-up renal ultrasound in 3-6 months to reassess bilateral   renal cysts and the right upper pole renal lesion         Assessment  1-Stage I KENTRELL  sec to decreased effective renal perfusion in the setting of the AFib with RVR as evidenced by the FeNa<1%  3/3/21 UA no blood or protein in a highly concentrated specimen making glomerular pathology-GN or NS unlikely  Complex R Renal cyst  Cr down to 1.1mg/dl  PLAN:1. Evaluation of the complex renal cyst as an outpt    2-AFib with RVR-S/P Cardioversion to NSR  K+<4.0   Mg++ 1.6  PLAN:1. Dose with K+ po and IV Mg++ this PM  2.   Follow Mg++ and K+    3-HTN with CKD I-IV  BP above goal <130/80  PLAN:1 Started valsartan  80mg po QD this AM  2. BB titrated to 50mg bid on 3/6 no new change    4- CKD G3B with a baseline serum cr 1.3mg/dl and an e-GFR=43ml/min presumed sec to microvascular disease in the setting of the Dm2 and the HTN    5- Sec HPTH of Renal Origin with hypocalcemia with hypoalbuminemia  Ca++ corrected to WNL  PO4 WNL  PTH 79  Vit D 35  PLAN:1. Follow Ca++ and PO4    6- Anemia in CKD  HgB at goal HgB >10  Ferritin 1517, Iron Sat 18%  Folate 20, Vit B 12 449  PLAN:1. Follow H/H      Thank you  for allowing us to participate in care of Vivian David  1:20 PM  3/8/2021

## 2021-03-08 NOTE — PROGRESS NOTES
Occupational Therapy  Date:3/8/2021  Patient Name: Charles Padilla  Room: 9596/7385-C     Occupational Therapy (OT) order received, patient's medical record reviewed, and OT evaluation attempted this date; patient unavailable secondary to nursing at bedside and patient beginning to eat dinner. OT evaluation to be re-attempted at later date, as able/appropriate. Alma Cronin OTR/L  License Number: [id number]

## 2021-03-08 NOTE — CARE COORDINATION
Social Work/Discharge Planning:  Met with patient and confirmed her plan remains home with her son at discharge. Discussed option for home health care and she declines need for hhc at discharge. Informed patient to follow up with her PCP if hhc is needed. Patient PCP is Dr. Silvia Martinez and pharmacy is [facility]. Provided patient with free 30 day Eliquis savings card. Patient is currently requiring 1 liter of oxygen and RN will continue to wean as tolerated. Will continue to follow.   Electronically signed by RADHA Mason on 3/8/2021 at 3:07 PM

## 2021-03-08 NOTE — PROGRESS NOTES
Physical Therapy    Facility/Department: [address] INTERMEDIATE 1  Initial Assessment    NAME: Genoveva Pedro  : 1935  MRN: [id number]    Date of Service: 3/8/2021       REQUIRES PT FOLLOW UP: Yes       Patient Diagnosis(es): The primary encounter diagnosis was Atrial fibrillation with RVR ([facility] Diagnoses of Leukocytosis, unspecified type and Congestive heart failure, unspecified HF chronicity, unspecified heart failure type [facility]) were also pertinent to this visit. has a past medical history of Acute cystitis with hematuria, Anemia, Atrial fibrillation ([facility] [address] CKD (chronic kidney disease) stage 3, GFR 30-59 ml/min, CPAP (continuous positive airway pressure) dependence, Diabetes mellitus ([facility] Esophageal stricture, Gastric ulcer, Goiter, Heart murmur, Hypertension, Kidney stones, Rheumatic fever, and Sleep apnea. has a past surgical history that includes cardiovascular stress test (); doppler echocardiography (03); doppler echocardiography (04); Hysterectomy; Cholecystectomy; polypectomy; Upper gastrointestinal endoscopy (); Colonoscopy (); Breast lumpectomy (Left); and Foot surgery. Evaluating Therapist: Stephanie Eduardo, PT     Referring Provider:  Dr. Arik Can #:  [id number]   DIAGNOSIS:  A fib   Prolonged hospital stay   PRECAUTIONS:  Falls, O2 @1 LNC     Social:  Pt lives with  Son  in a  1  floor plan  With ramp    to enter. Prior to admission pt walked with no AD. Has cane and rollator      Initial Evaluation  Date:  3/8/2021  Treatment      Short Term/ Long Term   Goals   Was pt agreeable to Eval/treatment?   yes      Does pt have pain?  none reported      Bed Mobility  Rolling:  Independent   Supine to sit: independent   Sit to supine: independent   Scooting:  Independent    independent    Transfers Sit to stand:  S/SBA   Stand to sit:  S/SBA   Stand pivot:  SBA    independent    Ambulation     80  feet with no AD SBA   150  feet with  No AD/AAD  with  Independent    LE ROM  WFL     LE strength  4- to 4/ 5      AM- PAC RAW score  18/ 24            Pt is alert and Oriented x  3      Balance:  SBA, fall risk due to LE weakness, decreased activity tolerance   Endurance:  Decreased   Bed/Chair alarm: Yes      ASSESSMENT  Pt displays functional ability as noted in the objective portion of this evaluation. Treatment/Education:     Mobility as above. Pt on [email or web address] LNC . Pulse ox at rest 96%. On RA at rest 94%; decreased to 86% with gait. O2 re-applied upon return to room and pulse ox recovered to 91%. RN informed. Pt instructed on proper breathing technique. Mildly unsteady with gait, but no LOB. Pt did not want to trial ww. Pt educated on fall risk,  Safe and proper technique with mobiltiy        Patient response to education:   Pt verbalized understanding Pt demonstrated skill Pt requires further education in this area   x  x       Comments:  Pt left  In bed per pt request after session, with call light in reach. Encouraged pt to get OOB for meals       Rehab potential is Good for reaching above PT goals. Pts/ family goals   1. Home     Patient and or family understand(s) diagnosis, prognosis, and plan of care. - yes     PLAN  PT care will be provided in accordance with the objectives noted above. Whenever appropriate, clear delegation orders will be provided for nursing staff. Exercises and functional mobility practice will be used as well as appropriate assistive devices or modalities to obtain goals. Patient and family education will also be administered as needed. PLAN OF CARE:    Current Treatment Recommendations     [x] Strengthening     [] ROM   [x] Balance Training   [x] Endurance Training   [x] Transfer Training   [x] Gait Training   [] Stair Training   [] Positioning   [x] Safety and Education Training   [x] Patient/Caregiver Education   [] HEP  [] Other       Frequency of treatments will be 2-5x/week x  7 days.     Time in: [address]   Time out: 3327 Evaluation Time includes thorough review of current medical information, gathering information on past medical history/social history and prior level of function, completion of standardized testing/informal observation of tasks, assessment of data and education on plan of care and goals.     CPT codes:  [x] Low Complexity PT evaluation 50501  [] Moderate Complexity PT evaluation 50529  [] High Complexity PT evaluation 48981  [] PT Re-evaluation 33905  [] Gait training 55824  minutes  [] Therapeutic activities 52734  minutes  [] Therapeutic exercises 91421  minutes  [] Neuromuscular reeducation 70221  minutes       Lash[address] 18 number:  PT 3511

## 2021-03-09 LAB
ALBUMIN SERPL-MCNC: 3.4 G/DL (ref 3.5–5.2)
ALP BLD-CCNC: 60 U/L (ref 35–104)
ALT SERPL-CCNC: 13 U/L (ref 0–32)
ANION GAP SERPL CALCULATED.3IONS-SCNC: 9 MMOL/L (ref 7–16)
AST SERPL-CCNC: 9 U/L (ref 0–31)
BILIRUB SERPL-MCNC: 0.3 MG/DL (ref 0–1.2)
BUN BLDV-MCNC: 33 MG/DL (ref 8–23)
CALCIUM SERPL-MCNC: 8.6 MG/DL (ref 8.6–10.2)
CHLORIDE BLD-SCNC: 96 MMOL/L (ref 98–107)
CO2: 35 MMOL/L (ref 22–29)
CREAT SERPL-MCNC: 1.1 MG/DL (ref 0.5–1)
EKG ATRIAL RATE: 91 BPM
EKG P AXIS: 17 DEGREES
EKG P-R INTERVAL: 142 MS
EKG Q-T INTERVAL: 314 MS
EKG QRS DURATION: 92 MS
EKG QTC CALCULATION (BAZETT): 386 MS
EKG R AXIS: -31 DEGREES
EKG T AXIS: 126 DEGREES
EKG VENTRICULAR RATE: 91 BPM
GFR AFRICAN AMERICAN: 57
GFR NON-AFRICAN AMERICAN: 47 ML/MIN/1.73
GLUCOSE BLD-MCNC: 158 MG/DL (ref 74–99)
HCT VFR BLD CALC: 34.4 % (ref 34–48)
HEMOGLOBIN: 11 G/DL (ref 11.5–15.5)
MAGNESIUM: 1.7 MG/DL (ref 1.6–2.6)
MCH RBC QN AUTO: 27.9 PG (ref 26–35)
MCHC RBC AUTO-ENTMCNC: 32 % (ref 32–34.5)
MCV RBC AUTO: 87.3 FL (ref 80–99.9)
METER GLUCOSE: 134 MG/DL (ref 74–99)
METER GLUCOSE: 188 MG/DL (ref 74–99)
METER GLUCOSE: 202 MG/DL (ref 74–99)
METER GLUCOSE: 222 MG/DL (ref 74–99)
PDW BLD-RTO: 14.8 FL (ref 11.5–15)
PHOSPHORUS: 2.6 MG/DL (ref 2.5–4.5)
PLATELET # BLD: 348 E9/L (ref 130–450)
PMV BLD AUTO: 9.1 FL (ref 7–12)
POTASSIUM SERPL-SCNC: 3.5 MMOL/L (ref 3.5–5)
RBC # BLD: 3.94 E12/L (ref 3.5–5.5)
SODIUM BLD-SCNC: 140 MMOL/L (ref 132–146)
TOTAL PROTEIN: 6.1 G/DL (ref 6.4–8.3)
WBC # BLD: 12.8 E9/L (ref 4.5–11.5)

## 2021-03-09 PROCEDURE — 6370000000 HC RX 637 (ALT 250 FOR IP): Performed by: INTERNAL MEDICINE

## 2021-03-09 PROCEDURE — 80053 COMPREHEN METABOLIC PANEL: CPT

## 2021-03-09 PROCEDURE — 6360000002 HC RX W HCPCS: Performed by: INTERNAL MEDICINE

## 2021-03-09 PROCEDURE — 82962 GLUCOSE BLOOD TEST: CPT

## 2021-03-09 PROCEDURE — 94660 CPAP INITIATION&MGMT: CPT

## 2021-03-09 PROCEDURE — 85027 COMPLETE CBC AUTOMATED: CPT

## 2021-03-09 PROCEDURE — 99232 SBSQ HOSP IP/OBS MODERATE 35: CPT | Performed by: INTERNAL MEDICINE

## 2021-03-09 PROCEDURE — 2700000000 HC OXYGEN THERAPY PER DAY

## 2021-03-09 PROCEDURE — 99233 SBSQ HOSP IP/OBS HIGH 50: CPT | Performed by: INTERNAL MEDICINE

## 2021-03-09 PROCEDURE — 36415 COLL VENOUS BLD VENIPUNCTURE: CPT

## 2021-03-09 PROCEDURE — 84100 ASSAY OF PHOSPHORUS: CPT

## 2021-03-09 PROCEDURE — 83735 ASSAY OF MAGNESIUM: CPT

## 2021-03-09 PROCEDURE — 94640 AIRWAY INHALATION TREATMENT: CPT

## 2021-03-09 PROCEDURE — 2060000000 HC ICU INTERMEDIATE R&B

## 2021-03-09 RX ORDER — POTASSIUM CHLORIDE 20 MEQ/1
40 TABLET, EXTENDED RELEASE ORAL ONCE
Status: COMPLETED | OUTPATIENT
Start: 2021-03-09 | End: 2021-03-09

## 2021-03-09 RX ORDER — MAGNESIUM SULFATE IN WATER 40 MG/ML
2000 INJECTION, SOLUTION INTRAVENOUS ONCE
Status: COMPLETED | OUTPATIENT
Start: 2021-03-09 | End: 2021-03-09

## 2021-03-09 RX ORDER — FUROSEMIDE 40 MG/1
40 TABLET ORAL DAILY
Status: DISCONTINUED | OUTPATIENT
Start: 2021-03-09 | End: 2021-03-10 | Stop reason: HOSPADM

## 2021-03-09 RX ADMIN — INSULIN LISPRO 1 UNITS: 100 INJECTION, SOLUTION INTRAVENOUS; SUBCUTANEOUS at 20:04

## 2021-03-09 RX ADMIN — SPIRONOLACTONE 25 MG: 25 TABLET ORAL at 10:22

## 2021-03-09 RX ADMIN — METOPROLOL SUCCINATE 50 MG: 50 TABLET, EXTENDED RELEASE ORAL at 20:03

## 2021-03-09 RX ADMIN — FUROSEMIDE 40 MG: 40 TABLET ORAL at 10:22

## 2021-03-09 RX ADMIN — VALSARTAN 80 MG: 80 TABLET, FILM COATED ORAL at 10:25

## 2021-03-09 RX ADMIN — LEVALBUTEROL 0.63 MG: 1.25 SOLUTION, CONCENTRATE RESPIRATORY (INHALATION) at 20:18

## 2021-03-09 RX ADMIN — PANTOPRAZOLE SODIUM 40 MG: 40 TABLET, DELAYED RELEASE ORAL at 06:05

## 2021-03-09 RX ADMIN — POTASSIUM CHLORIDE 40 MEQ: 1500 TABLET, EXTENDED RELEASE ORAL at 10:23

## 2021-03-09 RX ADMIN — LEVALBUTEROL 0.63 MG: 1.25 SOLUTION, CONCENTRATE RESPIRATORY (INHALATION) at 14:13

## 2021-03-09 RX ADMIN — METOPROLOL SUCCINATE 50 MG: 50 TABLET, EXTENDED RELEASE ORAL at 10:23

## 2021-03-09 RX ADMIN — INSULIN LISPRO 4 UNITS: 100 INJECTION, SOLUTION INTRAVENOUS; SUBCUTANEOUS at 15:43

## 2021-03-09 RX ADMIN — SERTRALINE HYDROCHLORIDE 50 MG: 50 TABLET ORAL at 10:23

## 2021-03-09 RX ADMIN — INSULIN LISPRO 4 UNITS: 100 INJECTION, SOLUTION INTRAVENOUS; SUBCUTANEOUS at 10:29

## 2021-03-09 RX ADMIN — APIXABAN 5 MG: 5 TABLET, FILM COATED ORAL at 20:03

## 2021-03-09 RX ADMIN — LEVALBUTEROL 0.63 MG: 1.25 SOLUTION, CONCENTRATE RESPIRATORY (INHALATION) at 08:28

## 2021-03-09 RX ADMIN — AMLODIPINE BESYLATE 5 MG: 5 TABLET ORAL at 20:03

## 2021-03-09 RX ADMIN — MAGNESIUM SULFATE HEPTAHYDRATE 2000 MG: 40 INJECTION, SOLUTION INTRAVENOUS at 14:53

## 2021-03-09 RX ADMIN — APIXABAN 5 MG: 5 TABLET, FILM COATED ORAL at 10:23

## 2021-03-09 ASSESSMENT — PAIN SCALES - GENERAL: PAINLEVEL_OUTOF10: 0

## 2021-03-09 NOTE — CARE COORDINATION
Social Work/Discharge Planning; Met with patient and confirmed plan remains home at discharge. She is on room air. She is not interested in home health care at discharge and states she will follow up with her Primary Care Physician, IF needed. Will continue to follow.   Electronically signed by RADHA Dickinson on 3/9/2021 at 11:21 AM

## 2021-03-09 NOTE — PROGRESS NOTES
Nephrology Progress Note  Patient's Name: Sadie Francois  12:03 PM  3/9/2021    Nephrologist: Birda Scheuermann    Reason for Consult:  KENTRELL on CKD G3A  Requesting Physician:  Darryle Emerald, MD    Chief Complaint:  SOB    History Obtained From:  patient and past medical records    History of Present Ilness from the 3/5/21 note:    Sadie Francois is a 81 y.o. female with prior history of CKD G3B with a baseline serum cr 1.3mg/dl and an e-GFR=43ml/min who presented to the ED 3/3/21 with he c/o SOB for 2 weeks PTA. In the ED she was found to be in Afib with RVR with an elevated lactic acid 2.7. The cr was 1.0mg/dl  And UA (-) blood & protein with a SG 1.020. pt did not respond to diltiazem IV and was treated with IV lopressor and esmolol was initiated. She was give furosemide and digoxin. WBC elevated to 19. PM of 3/3 HR went 130-160's with BP 93/61. Pt  Had an elevated digoxin level and digibind given. She underwent a successful cardioversion to NSR this PM. She states she still has some SOB, no CP.  She did say she was told in the past she had kidney disease    3/9/21: Pt sitting up in the chair and feels well today eager to go home    Past Medical History:   Diagnosis Date    Acute cystitis with hematuria 5/3/2019    Anemia 2008    RECEIVED 4 UNITS BLOOD    Atrial fibrillation (HCC)     CKD (chronic kidney disease) stage 3, GFR 30-59 ml/min 5/3/2019    CPAP (continuous positive airway pressure) dependence     Diabetes mellitus ([address]     Esophageal stricture     Gastric ulcer     Goiter     Heart murmur     Hypertension     SINCE 1994    Kidney stones     Rheumatic fever     POSSIBLY AS A CHILD    Sleep apnea        Past Surgical History:   Procedure Laterality Date    BREAST LUMPECTOMY Left     CALCIUM BUILDUP    CARDIOVASCULAR STRESS TEST  1999    ISCHEMIC NUCLEAR STRESS TEST - PATIENT REFUSED HEART CATHETERIZATION    CHOLECYSTECTOMY      COLONOSCOPY  07/11    W/POLYP REMOVAL    DOPPLER ECHOCARDIOGRAPHY 05/14/03    POSSIBLE MILD MITRAL STENOSIS    DOPPLER ECHOCARDIOGRAPHY  06/09/04    MILD MITRAL STENSIS, MODERATE MITRAL REGURGITATION    FOOT SURGERY      HYSTERECTOMY      POLYPECTOMY      16 POLYPS REMOVED FROM STOMACH AND 1 FROM COLON    UPPER GASTROINTESTINAL ENDOSCOPY  07/11       Family History   Problem Relation Age of Onset    Asthma Mother     Diabetes Mother     Alcohol Abuse Father         reports that she has never smoked. She has never used smokeless tobacco. She reports that she does not drink alcohol or use drugs. Allergies:  Nitrofuran derivatives, Other, and Sulfa antibiotics    Current Medications:    furosemide (LASIX) tablet 40 mg, Daily  spironolactone (ALDACTONE) tablet 25 mg, Daily  valsartan (DIOVAN) tablet 80 mg, Daily  metoprolol succinate (TOPROL XL) extended release tablet 50 mg, BID  sodium chloride nebulizer 0.9 % solution 3 mL, Once  levalbuterol (XOPENEX) nebulizer solution 0.63 mg, Q6H  amLODIPine (NORVASC) tablet 5 mg, Nightly  trimethobenzamide (TIGAN) injection 200 mg, Q8H PRN  apixaban (ELIQUIS) tablet 5 mg, BID  prochlorperazine (COMPAZINE) tablet 5 mg, Q6H PRN  perflutren lipid microspheres (DEFINITY) injection 1.65 mg, ONCE PRN  pantoprazole (PROTONIX) tablet 40 mg, QAM AC  sertraline (ZOLOFT) tablet 50 mg, Daily  insulin lispro (HUMALOG) injection vial 0-12 Units, TID WC  insulin lispro (HUMALOG) injection vial 0-6 Units, Nightly  glucose (GLUTOSE) 40 % oral gel 15 g, PRN  dextrose 50 % IV solution, PRN  glucagon (rDNA) injection 1 mg, PRN  dextrose 5 % solution, PRN  ipratropium (ATROVENT) 0.02 % nebulizer solution 0.5 mg, Q6H PRN        Review of Systems:   Pertinent items are noted in HPI. Remainder of a complete review of systems is (-) other than stated in the HPI    Physical exam:   Constitutional:  Elderly female in NAD  Vitals:   VITALS:  BP (!) 148/70   Pulse 67   Temp 98.3 °F (36.8 °C) (Oral)   Resp 16   Ht 5' 6\" (1.676 m)   Wt 155 lb 10.3 oz (70.6 kg)   SpO2 94%   BMI 25.12 kg/m²   24HR INTAKE/OUTPUT:      Intake/Output Summary (Last 24 hours) at 3/9/2021 1203  Last data filed at 3/9/2021 1039  Gross per 24 hour   Intake 360 ml   Output 2800 ml   Net -2440 ml     URINARY CATHETER OUTPUT (Ha):  [REMOVED] Urethral Catheter Straight-tip-Output (mL): 1400 mL  DRAIN/TUBE OUTPUT:     VENT SETTINGS:  Vent Information  Skin Assessment: Clean, dry, & intact  FiO2 : 40 %  SpO2: 94 %  SpO2/FiO2 ratio: 245  Mask Type: Full face mask  Mask Size: Medium  Additional Respiratory  Assessments  Pulse: 67  Resp: 16  SpO2: 94 %  Oral Care: Teeth brushed, Mouthwash    Skin: no rash, turgor wnl  Heent:  eomi, mmm  Neck: no bruits or jvd noted  Cardiovascular: RRR S1, S2 without S3  Respiratory: Bilat crackles with a few end expir wheezes  Abdomen:  +bs, soft, nt, nd  Ext: (-) lower extremity edema  Psychiatric: mood and affect appropriate  Musculoskeletal:  Rom, muscular strength intact    Data:   Labs:  CBC:   Lab Results   Component Value Date    WBC 12.8 03/09/2021    RBC 3.94 03/09/2021    HGB 11.0 03/09/2021    HCT 34.4 03/09/2021    MCV 87.3 03/09/2021    MCH 27.9 03/09/2021    MCHC 32.0 03/09/2021    RDW 14.8 03/09/2021     03/09/2021    MPV 9.1 03/09/2021     CBC with Differential:    Lab Results   Component Value Date    WBC 12.8 03/09/2021    RBC 3.94 03/09/2021    HGB 11.0 03/09/2021    HCT 34.4 03/09/2021     03/09/2021    MCV 87.3 03/09/2021    MCH 27.9 03/09/2021    MCHC 32.0 03/09/2021    RDW 14.8 03/09/2021    NRBC 0.0 03/03/2021    SEGSPCT 67 02/27/2012    METASPCT 0.9 03/03/2021    LYMPHOPCT 2.5 03/05/2021    MONOPCT 5.8 03/05/2021    EOSPCT 2.0 04/12/2016    BASOPCT 0.1 03/05/2021    MONOSABS 0.90 03/05/2021    LYMPHSABS 0.38 03/05/2021    EOSABS 0.00 03/05/2021    BASOSABS 0.01 03/05/2021     Hemoglobin/Hematocrit:    Lab Results   Component Value Date    HGB 11.0 03/09/2021    HCT 34.4 03/09/2021     CMP:    Lab Results   Component Value Date     03/09/2021    K 3.5 03/09/2021    K 3.9 03/04/2021    CL 96 03/09/2021    CO2 35 03/09/2021    BUN 33 03/09/2021    CREATININE 1.1 03/09/2021    GFRAA 57 03/09/2021    LABGLOM 47 03/09/2021    GLUCOSE 158 03/09/2021    GLUCOSE 108 02/27/2012    PROT 6.1 03/09/2021    LABALBU 3.4 03/09/2021    LABALBU 4.3 02/27/2012    LABALBU 3.5 02/27/2012    CALCIUM 8.6 03/09/2021    BILITOT 0.3 03/09/2021    ALKPHOS 60 03/09/2021    AST 9 03/09/2021    ALT 13 03/09/2021     BMP:    Lab Results   Component Value Date     03/09/2021    K 3.5 03/09/2021    K 3.9 03/04/2021    CL 96 03/09/2021    CO2 35 03/09/2021    BUN 33 03/09/2021    LABALBU 3.4 03/09/2021    LABALBU 4.3 02/27/2012    LABALBU 3.5 02/27/2012    CREATININE 1.1 03/09/2021    CALCIUM 8.6 03/09/2021    GFRAA 57 03/09/2021    LABGLOM 47 03/09/2021    GLUCOSE 158 03/09/2021    GLUCOSE 108 02/27/2012     BUN/Creatinine:    Lab Results   Component Value Date    BUN 33 03/09/2021    CREATININE 1.1 03/09/2021     Hepatic Function Panel:    Lab Results   Component Value Date    ALKPHOS 60 03/09/2021    ALT 13 03/09/2021    AST 9 03/09/2021    PROT 6.1 03/09/2021    BILITOT 0.3 03/09/2021    BILIDIR 0.1 02/27/2012    LABALBU 3.4 03/09/2021    LABALBU 4.3 02/27/2012    LABALBU 3.5 02/27/2012     Albumin:    Lab Results   Component Value Date    LABALBU 3.4 03/09/2021    LABALBU 4.3 02/27/2012    LABALBU 3.5 02/27/2012     Calcium:    Lab Results   Component Value Date    CALCIUM 8.6 03/09/2021     Ionized Calcium:  No results found for: IONCA  Magnesium:    Lab Results   Component Value Date    MG 1.7 03/09/2021     Phosphorus:    Lab Results   Component Value Date    PHOS 2.6 03/09/2021     LDH:  No results found for: LDH  Uric Acid:  No results found for: LABURIC, URICACID  PT/INR:    Lab Results   Component Value Date    PROTIME 23.0 03/05/2021    PROTIME 12.7 06/29/2011    INR 2.1 03/05/2021     PTT:    Lab Results   Component Value Date    APTT 25.4 03/05/2021   [APTT}  Last 3 Troponin:    Lab Results   Component Value Date    TROPONINI <0.01 03/06/2021    TROPONINI <0.01 03/05/2021    TROPONINI <0.01 03/05/2021     U/A:    Lab Results   Component Value Date    COLORU Yellow 03/03/2021    PROTEINU Negative 03/03/2021    PHUR 5.5 03/03/2021    WBCUA 10-20 05/03/2019    WBCUA 1-3 01/23/2012    RBCUA 0-1 05/03/2019    RBCUA NONE 01/23/2012    BACTERIA RARE 05/03/2019    CLARITYU Clear 03/03/2021    SPECGRAV 1.020 03/03/2021    LEUKOCYTESUR Negative 03/03/2021    UROBILINOGEN 0.2 03/03/2021    BILIRUBINUR Negative 03/03/2021    BILIRUBINUR NEGATIVE 01/23/2012    BLOODU Negative 03/03/2021    GLUCOSEU 100 03/03/2021    GLUCOSEU NEGATIVE 01/23/2012     ABG:    Lab Results   Component Value Date    PH 7.397 03/05/2021    PCO2 32.9 03/05/2021    PO2 74.1 03/05/2021    HCO3 19.8 03/05/2021    BE -4.3 03/05/2021    O2SAT 95.1 03/05/2021     HgBA1c:    Lab Results   Component Value Date    LABA1C 8.6 03/03/2021     Microalbumen/Creatinine ratio:  No components found for: RUCREAT  FLP:    Lab Results   Component Value Date    TRIG 112 03/03/2021    HDL 36 03/03/2021    LDLCALC 80 03/03/2021    LABVLDL 22 03/03/2021     TSH:    Lab Results   Component Value Date    TSH 1.800 03/03/2021     VITAMIN B12: No components found for: B12  FOLATE:    Lab Results   Component Value Date    FOLATE >20.0 03/06/2021     Iron Saturation:  No components found for: PERCENTFE  FERRITIN:    Lab Results   Component Value Date    FERRITIN 1,517 03/06/2021     ROSSY:  No results found for: ANATITER, ROSSY  AMYLASE:    Lab Results   Component Value Date    AMYLASE 40 06/29/2011     LIPASE:    Lab Results   Component Value Date    LIPASE 26 03/03/2021     24 Hour Urine for Protein:  No components found for: RAWUPRO, UHRS3, EDBD30FY, UTV3  24 Hour Urine for Creatinine Clearance:  No components found for: CREAT4, UHRS10, UTV10     Imaging:  CXR results:  EXAMINATION:   ONE XRAY VIEW OF THE CHEST     3/5/2021 12:57 pm       COMPARISON:   March 3, 2021       HISTORY:   ORDERING SYSTEM PROVIDED HISTORY: CVC line placement   TECHNOLOGIST PROVIDED HISTORY:   Reason for exam:->CVC line placement       FINDINGS:   Cardiac silhouette is enlarged.  There is bilateral mild pulmonary   interstitial prominence increased from prior exam.  Pulmonary vasculature is   unremarkable.  No pleural fluid collection seen.  No evidence for pneumothorax       There is been placement of a right IJ catheter with tip at the SVC           Impression   Placement of right IJ catheter in satisfactory position no evidence for   pneumothorax       Mild bilateral pulmonary interstitial infiltrates which appear increased from   prior exam     RETROPERITONEAL COMPLETE [[id number]]    Collected: 03/05/21 2029    Updated: 03/05/21 2036    Narrative:     EXAMINATION:   RETROPERITONEAL ULTRASOUND OF THE KIDNEYS AND URINARY BLADDER     3/5/2021     COMPARISON:   CT chest from May 3, 2019     HISTORY:   ORDERING SYSTEM PROVIDED HISTORY: KENTRELL   TECHNOLOGIST PROVIDED HISTORY:   Reason for exam:->KENTRELL   What reading provider will be dictating this exam?->CRC     FINDINGS:     Kidneys: The right kidney measures 10.0 cm in length and the left kidney measures 10.1   cm in length. Right kidney: Corticomedullary differentiation and cortical thickness is   decreased.  Anechoic thin walled nonvascular 13 mm cyst in the mid to lower   pole of the right kidney.  2.5 cm lesion off the upper pole the right kidney   has a hyper dense nodule which probably represents a calcification.  This   appears grossly unchanged compared to prior CT scan of the chest in 2019. intrarenal calcification in the midpole measures 4 mm.  Intrarenal   calcification in the mid to lower pole measures 11 mm.  There is mild   fullness of the right renal pelvis.      Left kidney: Corticomedullary differentiation and cortical thickness is   decreased.  Anechoic thin walled left midpole 2.7 cm cyst is nonvascular on   color Doppler evaluation. José Manuel Gabe is an adjacent smaller 14 mm cyst.  No   concerning renal mass. José Manuel Gabe are intrarenal calcifications on the left with   the largest measuring 9 mm.  No hydronephrosis on the left. Bladder:     Ha catheter is decompressing the bladder. Impression:     1.  Bilateral renal cysts. José Manuel Gabe is a 2.5 cm lesion off the upper pole of   the right kidney which has a hyperdense mural component which probably   represents a calcification.  This is not definitely changed when compared to   prior CT scan from May 3, 2019. 2.  Bilateral renal cortical thinning.  Probably reflects underlying medical   renal disease. 3.  Ha catheter is decompressing the bladder. RECOMMENDATIONS:   Recommend follow-up renal ultrasound in 3-6 months to reassess bilateral   renal cysts and the right upper pole renal lesion         Assessment  1-Stage I KENTRELL  sec to decreased effective renal perfusion in the setting of the AFib with RVR as evidenced by the FeNa<1%  3/3/21 UA no blood or protein in a highly concentrated specimen making glomerular pathology-GN or NS unlikely  Complex R Renal cyst  Cr down to 1.1mg/dl  PLAN:1. Evaluation of the complex renal cyst as an outpt    2-AFib with RVR-S/P Cardioversion to NSR  K+<4.0   Mg++ 1.7  PLAN:1. Dose with K+ po and IV Mg++ this PM  2. Follow Mg++ and K+    3-HTN with CKD I-IV  BP above goal <130/80  PLAN:1 Continue  valsartan  80mg po QD started 3/8/21  2.  BB titrated to 50mg bid on 3/6 no new change    4- CKD G3B with a baseline serum cr 1.3mg/dl and an e-GFR=43ml/min presumed sec to microvascular disease in the setting of the Dm2 and the HTN    5- Sec HPTH of Renal Origin with hypocalcemia with hypoalbuminemia  Ca++ corrected to WNL  PO4 WNL  PTH 79  Vit D 35  PLAN:1. Follow Ca++ and PO4    6- Anemia in CKD  HgB at goal HgB >10  Ferritin 1517, Iron Sat 18%  Folate 20, Vit B 12 449  PLAN:1. Follow H/H      Thank you  for allowing us to participate in care of Rubens David  12:03 PM  3/9/2021

## 2021-03-09 NOTE — PROGRESS NOTES
INPATIENT CARDIOLOGY FOLLOW-UP    Name: Christine Serrano    Age: 81 y.o. Date of Admission: 3/3/2021 10:55 AM    Date of Service: 3/9/2021    Chief Complaint: Follow-up for paroxysmal atrial fibrillation, acute superimposed upon chronic diastolic heart failure, resolving acute hypoxic respiratory failure, hypertension, chronic kidney disease, obstructive sleep apnea    Interim History: The patient remains symptomatically compensated and has maintained sinus rhythm. In spite of the lack of improvement of her proBNP level, significant improvement of interstitial infiltrates are radiographically noted with ongoing fluid mobilization and a residual left pleural effusion. Transient atrial tachyarrhythmias are present in the face of persistent hypokalemia. Review of Systems: The remainder of a complete multisystem review including consitutional, central nervous, respiratory, circulatory, gastrointestinal, genitourinary, endocrinologic, hematologic, musculoskeletal and psychiatric are negative. Problem List:  Patient Active Problem List   Diagnosis    HTN (hypertension)    Hyperlipidemia    Anemia    GERD (gastroesophageal reflux disease)    JERICA on CPAP    Vitamin D deficiency    Abnormal bone density screening    PVC's (premature ventricular contractions)    Mitral insufficiency    Acute on chronic diastolic CHF (congestive heart failure), NYHA class 1 ([address]    Renal insufficiency    Atypical chest pain    Rheumatic mitral stenosis    Non-rheumatic mitral valve stenosis    Chest pain    CKD (chronic kidney disease) stage 3, GFR 30-59 ml/min    Acute cystitis without hematuria    Valvular heart disease    Atrial fibrillation (HCC)    Congestive heart failure (HCC)    Atrial fibrillation with RVR (HCC)       Allergies: Allergies   Allergen Reactions    Nitrofuran Derivatives Other (See Comments)     Causes fever and chills.     Other Other (See Comments)     HONEYDEW: Sore Throat    50 % IV solution  12.5 g Intravenous PRN Rose Cain MD        glucagon (rDNA) injection 1 mg  1 mg Intramuscular PRN Rose Cain MD        dextrose 5 % solution  100 mL/hr Intravenous PRN Rose Cain MD        ipratropium (ATROVENT) 0.02 % nebulizer solution 0.5 mg  0.5 mg Nebulization Q6H PRN MD Velia Hicks         Physical Exam:  BP (!) 164/72   Pulse 64   Temp 97.8 °F (36.6 °C) (Oral)   Resp 16   Ht 5' 6\" (1.676 m)   Wt 155 lb 10.3 oz (70.6 kg)   SpO2 93%   BMI 25.12 kg/m²   Weight change: 2 lb 10.3 oz (1.2 kg)  Wt Readings from Last 3 Encounters:   03/09/21 155 lb 10.3 oz (70.6 kg)   11/12/19 165 lb (74.8 kg)   05/05/19 150 lb 3.2 oz (68.1 kg)     The patient is awake, alert and in no discomfort or distress. No gross musculoskeletal deformity is present. No significant skin or nail changes are present. Gross examination of head, eyes, nose and throat are negative. Jugular venous pressure is normal and no carotid bruits are present. Normal respiratory effort is noted with no accessory muscle usage present. Lung fields are clear to ascultation diminished breath sounds in the left lung base. Cardiac examination is notable for a regular rate and rhythm with no palpable thrill. No gallop rhythm or cardiac murmur are identified. A benign abdominal examination is present with no masses or organomegaly. Intact pulses are present throughout all extremities and no peripheral edema is present. No focal neurologic deficits are present. Intake/Output:    Intake/Output Summary (Last 24 hours) at 3/9/2021 0707  Last data filed at 3/8/2021 2315  Gross per 24 hour   Intake 420 ml   Output 4300 ml   Net -3880 ml     No intake/output data recorded.     Laboratory Tests:  Lab Results   Component Value Date    CREATININE 1.1 (H) 03/09/2021    BUN 33 (H) 03/09/2021     03/09/2021    K 3.5 03/09/2021    CL 96 (L) 03/09/2021    CO2 35 (H) 03/09/2021     No results for input(s): CKTOTAL, CKMB in the last 72 hours. Invalid input(s): TROPONONI  No results found for: BNP  Lab Results   Component Value Date    WBC 12.8 03/09/2021    RBC 3.94 03/09/2021    HGB 11.0 03/09/2021    HCT 34.4 03/09/2021    MCV 87.3 03/09/2021    MCH 27.9 03/09/2021    MCHC 32.0 03/09/2021    RDW 14.8 03/09/2021     03/09/2021    MPV 9.1 03/09/2021     Recent Labs     03/07/21  0600 03/08/21  0430 03/09/21  0312   ALKPHOS 58 56 60   ALT 13 15 13   AST 15 14 9   PROT 5.7* 5.8* 6.1*   BILITOT 0.4 0.3 0.3   LABALBU 3.2* 3.1* 3.4*     Lab Results   Component Value Date    MG 1.7 03/09/2021     Lab Results   Component Value Date    PROTIME 23.0 03/05/2021    PROTIME 12.7 06/29/2011    INR 2.1 03/05/2021     Lab Results   Component Value Date    TSH 1.800 03/03/2021     No components found for: CHLPL  Lab Results   Component Value Date    TRIG 112 03/03/2021    TRIG 201 (H) 12/23/2019    TRIG 242 (H) 05/05/2019     Lab Results   Component Value Date    HDL 36 03/03/2021    HDL 49 12/23/2019    HDL 38 05/05/2019     Lab Results   Component Value Date    LDLCALC 80 03/03/2021    LDLCALC 132 (H) 12/23/2019    LDLCALC 86 05/05/2019       Cardiac Tests:  Telemetry findings reviewed: sinus rhythm with transient episode of paroxysmal atrial fibrillation of less than 5 seconds duration with spontaneous termination, no new tachy/bradyarrhythmias overnight  Chest X-ray: A repeat chestx-ray reviewed at the time of evaluation demonstrates borderline cardiomegaly with significant improvement of interstitial infiltrates and a residual left pleural effusion      ASSESSMENT / PLAN: On a clinical basis, the patient presently remains compensated from a cardiovascular standpoint with ongoing fluid mobilization and significant improvement of her chest x-ray in spite of the absence of improvement of proBNP levels.   Presently conversion to oral diuretics with careful monitoring of her volume status as well as that of

## 2021-03-09 NOTE — PLAN OF CARE
Problem: Falls - Risk of:  Goal: Will remain free from falls  Description: Will remain free from falls  3/9/2021 0007 by Rock Harding RN  Outcome: Met This Shift     Problem: Falls - Risk of:  Goal: Absence of physical injury  Description: Absence of physical injury  3/9/2021 0007 by Rock Harding RN  Outcome: Met This Shift     Problem: Skin Integrity:  Goal: Will show no infection signs and symptoms  Description: Will show no infection signs and symptoms  3/9/2021 0007 by Rock Harding RN  Outcome: Met This Shift     Problem: Skin Integrity:  Goal: Absence of new skin breakdown  Description: Absence of new skin breakdown  3/9/2021 0007 by Rock Harding RN  Outcome: Met This Shift

## 2021-03-10 ENCOUNTER — TELEPHONE (OUTPATIENT)
Dept: CARDIOLOGY CLINIC | Age: 86
End: 2021-03-10

## 2021-03-10 VITALS
TEMPERATURE: 98.2 F | BODY MASS INDEX: 24.17 KG/M2 | OXYGEN SATURATION: 94 % | SYSTOLIC BLOOD PRESSURE: 122 MMHG | HEART RATE: 74 BPM | HEIGHT: 66 IN | WEIGHT: 150.4 LBS | RESPIRATION RATE: 16 BRPM | DIASTOLIC BLOOD PRESSURE: 58 MMHG

## 2021-03-10 LAB
METER GLUCOSE: 132 MG/DL (ref 74–99)
METER GLUCOSE: 191 MG/DL (ref 74–99)

## 2021-03-10 PROCEDURE — 6370000000 HC RX 637 (ALT 250 FOR IP): Performed by: INTERNAL MEDICINE

## 2021-03-10 PROCEDURE — 82962 GLUCOSE BLOOD TEST: CPT

## 2021-03-10 PROCEDURE — 94660 CPAP INITIATION&MGMT: CPT

## 2021-03-10 PROCEDURE — 99239 HOSP IP/OBS DSCHRG MGMT >30: CPT | Performed by: INTERNAL MEDICINE

## 2021-03-10 PROCEDURE — 97165 OT EVAL LOW COMPLEX 30 MIN: CPT

## 2021-03-10 PROCEDURE — 97530 THERAPEUTIC ACTIVITIES: CPT

## 2021-03-10 PROCEDURE — 6360000002 HC RX W HCPCS: Performed by: INTERNAL MEDICINE

## 2021-03-10 PROCEDURE — 94640 AIRWAY INHALATION TREATMENT: CPT

## 2021-03-10 PROCEDURE — 99232 SBSQ HOSP IP/OBS MODERATE 35: CPT | Performed by: INTERNAL MEDICINE

## 2021-03-10 RX ORDER — SPIRONOLACTONE 25 MG/1
25 TABLET ORAL DAILY
Qty: 30 TABLET | Refills: 0 | Status: SHIPPED | OUTPATIENT
Start: 2021-03-11 | End: 2021-04-27

## 2021-03-10 RX ORDER — VALSARTAN 80 MG/1
80 TABLET ORAL DAILY
Qty: 30 TABLET | Refills: 0 | Status: SHIPPED | OUTPATIENT
Start: 2021-03-11 | End: 2021-09-16 | Stop reason: ALTCHOICE

## 2021-03-10 RX ORDER — FUROSEMIDE 40 MG/1
40 TABLET ORAL DAILY
Qty: 30 TABLET | Refills: 0 | Status: SHIPPED | OUTPATIENT
Start: 2021-03-11 | End: 2022-04-06 | Stop reason: DRUGHIGH

## 2021-03-10 RX ORDER — AMLODIPINE BESYLATE 5 MG/1
5 TABLET ORAL NIGHTLY
Qty: 30 TABLET | Refills: 0 | Status: SHIPPED | OUTPATIENT
Start: 2021-03-10 | End: 2021-04-27

## 2021-03-10 RX ADMIN — FUROSEMIDE 40 MG: 40 TABLET ORAL at 09:17

## 2021-03-10 RX ADMIN — APIXABAN 5 MG: 5 TABLET, FILM COATED ORAL at 09:18

## 2021-03-10 RX ADMIN — SERTRALINE HYDROCHLORIDE 50 MG: 50 TABLET ORAL at 09:18

## 2021-03-10 RX ADMIN — SPIRONOLACTONE 25 MG: 25 TABLET ORAL at 09:17

## 2021-03-10 RX ADMIN — INSULIN LISPRO 2 UNITS: 100 INJECTION, SOLUTION INTRAVENOUS; SUBCUTANEOUS at 11:02

## 2021-03-10 RX ADMIN — METOPROLOL SUCCINATE 50 MG: 50 TABLET, EXTENDED RELEASE ORAL at 09:18

## 2021-03-10 RX ADMIN — VALSARTAN 80 MG: 80 TABLET, FILM COATED ORAL at 09:33

## 2021-03-10 RX ADMIN — PANTOPRAZOLE SODIUM 40 MG: 40 TABLET, DELAYED RELEASE ORAL at 09:17

## 2021-03-10 RX ADMIN — LEVALBUTEROL 0.63 MG: 1.25 SOLUTION, CONCENTRATE RESPIRATORY (INHALATION) at 08:00

## 2021-03-10 ASSESSMENT — PAIN SCALES - GENERAL: PAINLEVEL_OUTOF10: 0

## 2021-03-10 NOTE — PROGRESS NOTES
Other (See Comments)     Causes fever and chills.     Other Other (See Comments)     HONEYDEW: Sore Throat    Sulfa Antibiotics        Current Medications:  Current Facility-Administered Medications   Medication Dose Route Frequency Provider Last Rate Last Admin    furosemide (LASIX) tablet 40 mg  40 mg Oral Daily Deb Kidd MD   40 mg at 03/10/21 0466    spironolactone (ALDACTONE) tablet 25 mg  25 mg Oral Daily Deb Kdid MD   25 mg at 03/10/21 2590    valsartan (DIOVAN) tablet 80 mg  80 mg Oral Daily Cheryle Finner, MD   80 mg at 03/10/21 0933    metoprolol succinate (TOPROL XL) extended release tablet 50 mg  50 mg Oral BID Loki Mason MD   50 mg at 03/10/21 0918    sodium chloride nebulizer 0.9 % solution 3 mL  3 mL Nebulization Once Loki Mason MD        levalbuterol (XOPENEX) nebulizer solution 0.63 mg  0.63 mg Nebulization Q6H Loki Mason MD   0.63 mg at 03/10/21 0800    amLODIPine (NORVASC) tablet 5 mg  5 mg Oral Nightly Loki Mason MD   5 mg at 03/09/21 2003    trimethobenzamide (TIGAN) injection 200 mg  200 mg Intramuscular Q8H PRN Loki Mason MD        apixaban (ELIQUIS) tablet 5 mg  5 mg Oral BID Loki Mason MD   5 mg at 03/10/21 0918    prochlorperazine (COMPAZINE) tablet 5 mg  5 mg Oral Q6H PRN Loki Mason MD   5 mg at 03/04/21 1337    perflutren lipid microspheres (DEFINITY) injection 1.65 mg  1.5 mL Intravenous ONCE PRN Loki Mason MD        pantoprazole (PROTONIX) tablet 40 mg  40 mg Oral QAM AC Loki Mason MD   40 mg at 03/10/21 0917    sertraline (ZOLOFT) tablet 50 mg  50 mg Oral Daily Loki Mason MD   50 mg at 03/10/21 0918    insulin lispro (HUMALOG) injection vial 0-12 Units  0-12 Units Subcutaneous TID WC Loki Mason MD   4 Units at 03/09/21 1543    insulin lispro (HUMALOG) injection vial 0-6 Units  0-6 Units Subcutaneous Nightly Loki Mason MD   1 Units at 03/09/21 2004    glucose (GLUTOSE) 40 % oral gel 15 g  15 g Oral PRN Sanjeev Miles MD        dextrose 50 % IV solution  12.5 g Intravenous PRN Sanjeev Miles MD        glucagon (rDNA) injection 1 mg  1 mg Intramuscular PRN Sanjeev Miles MD        dextrose 5 % solution  100 mL/hr Intravenous PRN Sanjeev Miles MD        ipratropium (ATROVENT) 0.02 % nebulizer solution 0.5 mg  0.5 mg Nebulization Q6H PRN Sanjeev Miles MD          dextrose         Physical Exam:  BP (!) 122/58   Pulse 74   Temp 98.2 °F (36.8 °C) (Oral)   Resp 16   Ht 5' 6\" (1.676 m)   Wt 150 lb 6.4 oz (68.2 kg)   SpO2 94%   BMI 24.28 kg/m²   Weight change: -5 lb 3.9 oz (-2.379 kg)  Wt Readings from Last 3 Encounters:   03/10/21 150 lb 6.4 oz (68.2 kg)   11/12/19 165 lb (74.8 kg)   05/05/19 150 lb 3.2 oz (68.1 kg)     The patient is awake, alert and in no discomfort or distress. No gross musculoskeletal deformity is present. No significant skin or nail changes are present. Gross examination of head, eyes, nose and throat are negative. Jugular venous pressure is normal and no carotid bruits are present. Normal respiratory effort is noted with no accessory muscle usage present. Lung fields demonstrate minimal basilar fibrotic rales to ascultation. Cardiac examination is notable for a regular rate and rhythm with no palpable thrill. No gallop rhythm or cardiac murmur are identified. A benign abdominal examination is present with no masses or organomegaly. Intact pulses are present throughout all extremities and no peripheral edema is present. No focal neurologic deficits are present.     Intake/Output:    Intake/Output Summary (Last 24 hours) at 3/10/2021 1019  Last data filed at 3/10/2021 0729  Gross per 24 hour   Intake 360 ml   Output 1550 ml   Net -1190 ml     I/O this shift:  In: 180 [P.O.:180]  Out: -     Laboratory Tests:  Lab Results   Component Value Date    CREATININE 1.1 (H) 03/09/2021    BUN 33 (H) 03/09/2021  03/09/2021    K 3.5 03/09/2021    CL 96 (L) 03/09/2021    CO2 35 (H) 03/09/2021     No results for input(s): CKTOTAL, CKMB in the last 72 hours. Invalid input(s): TROPONONI  No results found for: BNP  Lab Results   Component Value Date    WBC 12.8 03/09/2021    RBC 3.94 03/09/2021    HGB 11.0 03/09/2021    HCT 34.4 03/09/2021    MCV 87.3 03/09/2021    MCH 27.9 03/09/2021    MCHC 32.0 03/09/2021    RDW 14.8 03/09/2021     03/09/2021    MPV 9.1 03/09/2021     Recent Labs     03/08/21  0430 03/09/21  0312   ALKPHOS 56 60   ALT 15 13   AST 14 9   PROT 5.8* 6.1*   BILITOT 0.3 0.3   LABALBU 3.1* 3.4*     Lab Results   Component Value Date    MG 1.7 03/09/2021     Lab Results   Component Value Date    PROTIME 23.0 03/05/2021    PROTIME 12.7 06/29/2011    INR 2.1 03/05/2021     Lab Results   Component Value Date    TSH 1.800 03/03/2021     No components found for: CHLPL  Lab Results   Component Value Date    TRIG 112 03/03/2021    TRIG 201 (H) 12/23/2019    TRIG 242 (H) 05/05/2019     Lab Results   Component Value Date    HDL 36 03/03/2021    HDL 49 12/23/2019    HDL 38 05/05/2019     Lab Results   Component Value Date    LDLCALC 80 03/03/2021    LDLCALC 132 (H) 12/23/2019    LDLCALC 86 05/05/2019       Cardiac Tests:  Telemetry findings reviewed: sinus rhythm with transient paroxysmal atrial fibrillation the longest duration of approximately 10 seconds with spontaneous conversion to sinus rhythm, no new tachy/bradyarrhythmias overnight      ASSESSMENT / PLAN: On a clinical basis, the patient presently appears compensated from a cardiovascular standpoint with potential minimal residual interstitial infiltrates and further adequate stabilization of her volume status and atrial arrhythmias.   The continuation of her existing medical regimen would be advisable with plans of assessment of oxygen saturations at rest and with ambulation to assure that adequate saturations are present at the time of discharge not necessitating the use of home oxygen. Continued careful monitoring of her volume status as well as that of renal function and electrolytes will be necessary to reduce risk of additional hospitalizations. Appropriate nutritional support will be further necessary to assist fluid mobilization reducing her risk of debilitation. She appears adequately compensated from a cardiovascular standpoint for discharge with arrangements made for follow-up with her primary cardiologist, Maria Elena Calix within 1 week of hospital discharge potentially with that of additional arrhythmia monitoring to assess recurrences of her atrial arrhythmias following her cardioversion as well as that of associated burden to guide additional management. Additional consideration of her enrollment at the heart failure clinic may be beneficial to further reduce her risk of recurrent hospitalization. Ongoing aggressive risk factor modification of blood pressure, diabetes and serum lipids will remain essential to reducing risk of future atherosclerotic development. We will further evaluate her during present hospitalization should additional cardiovascular difficulties or concerns arise. Note: This report was completed utilizing computer voice recognition software. Every effort has been made to ensure accuracy, however; inadvertent computerized transcription errors may be present. Linda Anderson.  Shania Young, [address] [facility]

## 2021-03-10 NOTE — PROGRESS NOTES
CLINICAL PHARMACY NOTE: MEDS TO [address] Select Patient?: No  Total # of Prescriptions Filled: 5   The following medications were delivered to the patient:  · Eliquis 5 mg   · Amlodipine 5 mg   · Lasix 40 mg   · Aldactone 25 mg   · Valsartan 80 mg     Total # of Interventions Completed: 2  Time Spent (min): 30    Additional Documentation:

## 2021-03-10 NOTE — PROGRESS NOTES
Comprehensive Nutrition Assessment    Type and Reason for Visit:  Initial    Nutrition Recommendations/Plan: Continue current diet    Nutrition Assessment:  Pt stable from a nutritional standpoint w/ noted % meal intakes, reports stable wt hx and no other nutritional issues at this time. Will monitor. Malnutrition Assessment:  Malnutrition Status:  No malnutrition    Context:  Chronic Illness     Findings of the 6 clinical characteristics of malnutrition:  Energy Intake:  No significant decrease in energy intake  Weight Loss:  No significant weight loss     Body Fat Loss:  No significant body fat loss     Muscle Mass Loss:  No significant muscle mass loss    Fluid Accumulation:  No significant fluid accumulation     Strength:  Not Performed    Estimated Daily Nutrient Needs:  Energy (kcal):  4991-1762; Weight Used for Energy Requirements:  Current(3/10)     Protein (g):  85-95 (1.2-1.4g/kg); Weight Used for Protein Requirements:  Current        Fluid (ml/day):  9732-7826; Method Used for Fluid Requirements:  1 ml/kcal      Nutrition Related Findings:  A&Ox4, BS Active, -I/Os, No Edema      Wounds:  None       Current Nutrition Therapies:    DIET CARDIAC; No Added Salt (3-4 GM);  Low Fat    Anthropometric Measures:  · Height: 5' 6\" (167.6 cm)  · Current Body Weight: 150 lb 6 oz (68.2 kg)   · Admission Body Weight: 157 lb (71.2 kg)(3/4 bed scale)    · Usual Body Weight: (Unable to obtain per EMR)     · Ideal Body Weight: 130 lbs; % Ideal Body Weight 115.7 %   · BMI: 24.3  · BMI Categories: Normal Weight (BMI 22.0 to 24.9) age over 72       Nutrition Diagnosis:   No nutrition diagnosis at this time     Nutrition Interventions:   Food and/or Nutrient Delivery:  Continue Current Diet  Nutrition Education/Counseling:  Education completed(Discussed limiting red meat and replacing frying food with an air fryer.)   Coordination of Nutrition Care:  Continue to monitor while inpatient    Goals:  Continue > % of meals consumed       Nutrition Monitoring and Evaluation:   Food/Nutrient Intake Outcomes:  Food and Nutrient Intake  Physical Signs/Symptoms Outcomes:  Biochemical Data, GI Status, Fluid Status or Edema, Nutrition Focused Physical Findings, Skin, Weight     Discharge Planning:    No discharge needs at this time     Electronically signed by Jyothi Patton, MS, RD, LD on 3/10/21 at 12:13 PM EST    Contact: [phone]

## 2021-03-10 NOTE — PROGRESS NOTES
Occupational Therapy  OCCUPATIONAL THERAPY INITIAL EVALUATION      Date:3/10/2021  Patient Name: Shayna Gomez  MRN: [id number]  : 1935  Room: [address] [place] [address]    Referring Provider: Coleen Monae DO  Evaluating OT: Nhi Correia OTR/L - [id number]    AM-PAC Daily Activity Raw Score: 19/24    Recommended Adaptive Equipment: Continue to assess. Diagnosis: Atrial fibrillation with RVR ([address] [I48.91]   Surgery: Patient underwent ISABELLE guided DC cardioversion on 3/5/2021. Pertinent Medical History: DM, HTN, sleep apnea, CKD      Precautions: falls    Home Living: Patient lives with her son in a one-floor setup (ramp entry); patient's bedroom and bathroom are on the main living level. Bathroom Setup: tub shower (with extended tub bench and handheld shower head, but no grab bar)  Equipment Owned: rollator    Prior Level of Function (PLOF): Patient reported that she was independent with ADLs and most home-based IADLs. Patient was independent with functional mobility (without device) prior. Patient noted that a neighbor assists with completion of laundry; patient has consistent assistance with shopping. Driving: No - patient's son assists with transportation    Pain Level: No complaints of pain. Cognition: Patient alert and oriented x3. WFL command follow demonstrated. Patient pleasant, cooperative, and motivated to return to [facility] and home environment. Memory: WFL   Sequencing: WFL   Problem Solving: WFL   Judgement/Safety: WFL grossly    Functional Assessment:   Initial Eval Status  Date: 3/10/2021 Treatment Status  Date:  Short Term Goals   Feeding Independent     Grooming Supervision for hand hygiene while standing at sink.   Mod I / Independent  (seated/standing at sink)   UB Dressing Setup  Independent   LB Dressing SBA  Independent / Mod I - with use of AE, as needed/appropriate   Bathing SBA  Mod I / Independent - with use of AE/DME, as needed/appropriate   Toileting Supervision with all aspects of toileting  Mod I / Independent   Bed Mobility  Supine-to-Sit: Independent      Functional Transfers Sit-to-Stand: Independent   from EOB  N/A   Functional Mobility Supervision   (without device) within patient's room and bathroom. Mod I / Independent with functional mobility (with device, as needed/appropriate) in order to maximize independence with ADLs/IADLs and other functional tasks. Balance Sitting: Good  (at EOB)  Standing: Good-  (without device)  Good dynamic standing balance during completion of ADLs/IADLs and other functional tasks. Activity Tolerance Fair+  Patient will demonstrate Good understanding and consistent implementation of energy conservation techniques and work simplification techniques into ADL/IADL routines. Visual/  Perceptual WFL  Patient wears glasses, as needed. N/A   B UE Strength 4-/5  Patient will demonstrate 4/5 B UE strength in order to maximize independence with ADLs/IADLs and functional transfers. Long-Term Goal: Patient will increase functional independence to PLOF in order to allow patient to live in least restrictive environment. ROM: Additional Information:    R UE  WFL    L UE WFL      Hearing: Impaired  Sensation: No complaints of experiencing numbness/tingling in B UEs. Tone: WFL  Edema: No    Comments: RN approved patient's participation in [address] [place] [address] activities. Upon arrival, patient supine in bed. At end of session, patient seated in bedside chair with call light and phone within reach and all lines and tubes intact. Patient would benefit from continued skilled OT to increase safety and independence with completion of ADL/IADL tasks for functional independence and quality of life. Patient declined completion of additional ADLs during this session, including sponge bathing tasks and dressing in preparation for discharge; patient verbalized preference to wait until nursing removed IV access and telemetry (nursing notified).     Patient education provided regardin) importance of having assistance/supervision with ADLs/IADLs, as needed, to ensure safety upon return home. Patient verbalized understanding. Further skilled OT treatment indicated to increase patient's safety and independence with completion of ADL/IADL tasks in order to maximize patient's functional independence and quality of life.     Assessment of Current Deficits:   Functional Mobility [x]  ADLs [x] Strength [x]  Cognition []  Functional Transfers  [x] IADLs [x] Safety Awareness [x]  Endurance [x]  Fine Motor Coordination [] Balance [x] Vision/Perception [] Sensation []   Gross Motor Coordination [] ROM [] Delirium []                  Motor Control []    Plan of Care: 2-5 days/week for 1-2 weeks PRN  [x]ADL retraining/adaptive techniques and AE recommendations to increase functional independence within precautions  [x]Energy conservation techniques to improve tolerance for ADLs/IADLs  [x]Functional transfer/mobility training/DME recommendations for increased independence, safety and fall prevention  [x]Patient/family education to increase safety and functional independence  [x]Environmental modifications for safe mobility and completion of ADLs/IADLs  []Cognitive retraining exercises to improve problem solving skills & safe participation in ADLs/IADLs   []Sensory re-education techniques to improve extremity awareness, maintain skin integrity and improve hand function   []Visual/Perceptual retraining  to improve body awareness and safety during transfers and ADLs   []Splinting/positioning needs to maintain joint/skin integrity and prevent contractures   [x]Therapeutic activity to improve functional performance during ADLs/IADLs  [x]Therapeutic exercise to improve tolerance and functional strength for ADLs/IADLs  [x]Balance retraining/tolerance tasks for facilitation of postural control with dynamic challenges during ADLs   [x]Neuromuscular re-education: facilitate righting/equilibrium reactions, midline orientation, scapular stability/mobility, normalize muscle tone, and facilitate active functional movement/attention  []Delirium prevention/treatment   []Positioning to improve functional independence and prevent skin breakdown   []Other:     Rehab Potential: Good for established goals. Patient / Family Goal: Patient indicated that she anticipates being discharged this afternoon. Patient and/or family were instructed on functional diagnosis, prognosis/goals, and OT plan of care. Demonstrated Good understanding. Eval Complexity: Low    Time In: 1145  Time Out: 1158  Total Treatment Time: 0 minutes      Minutes Units   OT Eval Low 73191 13 1   OT Eval Medium 87274     OT Eval High 09222     OT Re-Eval [id number]     Therapeutic Ex 56885     Therapeutic Activities 87622     ADL/Self Care 76061     Orthotic Management 07513     Neuro Re-Ed 41149     Non-Billable Time N/A ---     Evaluation time includes thorough review of current medical information, gathering information on past medical history/social history and prior level of function, completion of standardized testing/informal observation of tasks, assessment of data, and education on plan of care and goals. Alma Bush OTR/L  License Number: [id number]

## 2021-03-10 NOTE — PLAN OF CARE
Problem: Cardiac Output - Decreased:  Goal: Hemodynamic stability will improve  Description: Hemodynamic stability will improve  Outcome: Ongoing     Problem: Falls - Risk of:  Goal: Will remain free from falls  Description: Will remain free from falls  Outcome: Ongoing  Goal: Absence of physical injury  Description: Absence of physical injury  Outcome: Ongoing     Problem: Skin Integrity:  Goal: Will show no infection signs and symptoms  Description: Will show no infection signs and symptoms  Outcome: Ongoing  Goal: Absence of new skin breakdown  Description: Absence of new skin breakdown  Outcome: Ongoing

## 2021-03-10 NOTE — TELEPHONE ENCOUNTER
----- Message from Yareli Maharaj MD sent at 3/10/2021 10:26 AM EST -----  Patient hospitalized with acute superimposed upon chronic diastolic heart failure and paroxysmal atrial fibrillation.   Please schedule follow-up with Christen Godfrey within 1 week of hospital discharge

## 2021-03-10 NOTE — PROGRESS NOTES
Physical Therapy  Facility/Department: [address] INTERMEDIATE 1  Daily Treatment Note  NAME: Diony Lilly  : 1935  MRN: [id number]    Date of Service: 3/10/2021    Patient Diagnosis(es): The primary encounter diagnosis was Atrial fibrillation with RVR ([facility] Diagnoses of Leukocytosis, unspecified type and Congestive heart failure, unspecified HF chronicity, unspecified heart failure type [facility]) were also pertinent to this visit. has a past medical history of Acute cystitis with hematuria, Anemia, Atrial fibrillation ([facility] [address] CKD (chronic kidney disease) stage 3, GFR 30-59 ml/min, CPAP (continuous positive airway pressure) dependence, Diabetes mellitus ([facility] Esophageal stricture, Gastric ulcer, Goiter, Heart murmur, Hypertension, Kidney stones, Rheumatic fever, and Sleep apnea. has a past surgical history that includes cardiovascular stress test (); doppler echocardiography (03); doppler echocardiography (04); Hysterectomy; Cholecystectomy; polypectomy; Upper gastrointestinal endoscopy (); Colonoscopy (); Breast lumpectomy (Left); and Foot surgery. Evaluating Therapist: Galvin Romberg, PT      Referring Provider:  Dr. Elan Michelle #:  [id number]   DIAGNOSIS:  A fib   Prolonged hospital stay   PRECAUTIONS:  Falls, O2 @1 LNC      Social:  Pt lives with  Son  in a  1  floor plan  With ramp    to enter. Prior to admission pt walked with no AD. Has cane and rollator        Initial Evaluation  Date:  3/8/2021  Treatment  3/10/21    Short Term/ Long Term   Goals   Was pt agreeable to Eval/treatment?   yes  yes      Does pt have pain?  none reported   no c/o pain     Bed Mobility  Rolling:  Independent   Supine to sit: independent   Sit to supine: independent   Scooting:  Independent   independent  independent    Transfers Sit to stand:  S/SBA   Stand to sit:  S/SBA   Stand pivot:  SBA   independnet  independent    Ambulation     80  feet with no AD SBA   100 feet with no device

## 2021-03-10 NOTE — PLAN OF CARE
Problem: Cardiac Output - Decreased:  Goal: Hemodynamic stability will improve  Description: Hemodynamic stability will improve  3/10/2021 1128 by Esau Cantu RN  Outcome: Completed  3/10/2021 0949 by Esau Cantu RN  Outcome: Met This Shift  3/9/2021 2154 by Wyatt Jackson RN  Outcome: Ongoing     Problem: Falls - Risk of:  Goal: Will remain free from falls  Description: Will remain free from falls  3/10/2021 1128 by Esau Cantu RN  Outcome: Completed  3/10/2021 0949 by Esau Cantu RN  Outcome: Met This Shift  3/9/2021 2154 by Wyatt Jackson RN  Outcome: Ongoing  Goal: Absence of physical injury  Description: Absence of physical injury  3/10/2021 1128 by Esau Cantu RN  Outcome: Completed  3/10/2021 0949 by Esau Cantu RN  Outcome: Met This Shift  3/9/2021 2154 by Wyatt Jackson RN  Outcome: Ongoing     Problem: Skin Integrity:  Goal: Will show no infection signs and symptoms  Description: Will show no infection signs and symptoms  3/10/2021 1128 by Esau Cantu RN  Outcome: Completed  3/10/2021 0949 by Esau Cantu RN  Outcome: Met This Shift  3/9/2021 2154 by Wyatt Jackson RN  Outcome: Ongoing  Goal: Absence of new skin breakdown  Description: Absence of new skin breakdown  3/10/2021 1128 by Esau Cantu RN  Outcome: Completed  3/10/2021 [facility] by Esau Cantu RN  Outcome: Met This Shift  3/9/2021 2154 by Wyatt Jackson RN  Outcome: Ongoing

## 2021-03-10 NOTE — DISCHARGE SUMMARY
[facility] Physician Discharge Summary     Crystal Denver, DO [address]  [address] [facility],[address] [phone]    Schedule an appointment as soon as possible for a visit      Travis Duckworth MD  [address]  [place] [address]    Schedule an appointment as soon as possible for a visit      Wendy Lynch MD  [address] [facility] [place] [address]  [phone]    Schedule an appointment as soon as possible for a visit      Activity level   As tolerated    Disposition   Home      Condition on discharge Stable    Patient ID   Joseph Betancur, 81 y. o.female /  1935  / MRN [id number]    Admit date   3/3/2021    Discharge date  3/10/2021  4:38 PM    Admission diagnoses Active Problems:    Atrial fibrillation with RVR ([address]  Resolved Problems:    * No resolved hospital problems. *    Discharge diagnoses Same    Consults   IP CONSULT TO CARDIOLOGY  IP CONSULT TO NEPHROLOGY    Procedures   See hospital course    Hospital Course  85F PMH anemia, atrial fib, CKD III, CPAP, DM, esophageal stricture, HTN, sleep apnea admitted 3/3 w emesis, cough, tachycardia due to atrial fib RVR and volume overload. Cardiology consulted, pt placed on esmolol gtt and digoxin, difficulty w rate control. Cardioversion scheduled but pt experienced RRT and cardioversion moved up after cardiology review. Pt tolerated well and maintained sinus rhythm since that time. From then, pt was steadily diuresed at the direction of cardiology with respiratory / volume status steadily improving to the point where she was weaned to room air. Now back to baseline on PO diuretics, tolerating well, medication regimen determined, and pt stable for dc home at this time.     Discharge Exam  BP (!) 122/58   Pulse 74   Temp 98.2 °F (36.8 °C) (Oral)   Resp 16   Ht 5' 6\" (1.676 m)   Wt 150 lb 6.4 oz (68.2 kg)   SpO2 94%   BMI 24.28 kg/m²   General Appearance: alert and oriented to person, place and time and in no acute distress  Skin: warm and dry  Head: normocephalic and atraumatic  Eyes: pupils equal, round, and reactive to light, extraocular eye movements intact, conjunctivae normal  Neck: neck supple and non tender without mass   Pulmonary/Chest: clear to auscultation bilaterally- no wheezes, rales or rhonchi, normal air movement, no respiratory distress  Cardiovascular: normal rate, normal S1 and S2 and no carotid bruits  Abdomen: soft, non-tender, non-distended, normal bowel sounds, no masses or organomegaly  Extremities: no cyanosis, no clubbing and no edema  Neurologic: no cranial nerve deficit and speech normal    I/O last 3 completed shifts: In: 420 [P.O.:420]  Out: 1200 [Urine:1200]  No intake/output data recorded. Labs  Recent Labs     03/08/21  0430 03/09/21  0312    140   K 3.6 3.5    96*   CO2 28 35*   BUN 31* 33*   CREATININE 1.1* 1.1*   GLUCOSE 160* 158*   CALCIUM 8.7 8.6   WBC 11.7* 12.8*   RBC 3.54 3.94   HGB 10.0* 11.0*   HCT 31.0* 34.4   MCV 87.6 87.3   MCH 28.2 27.9   MCHC 32.3 32.0   RDW 15.0 14.8    348   MPV 9.3 9.1       Imaging  Xr Chest (2 Vw)    Result Date: 3/8/2021  Resolving infiltrates which could be secondary to resolving CHF. Clinical correlation is recommended     Xr Chest Portable    Result Date: 3/6/2021  Worsening infiltrates favored to represent edema. Pneumonia thought less likely but not excluded. Continued follow-up recommended. Xr Chest Portable    Result Date: 3/5/2021  Placement of right IJ catheter in satisfactory position no evidence for pneumothorax Mild bilateral pulmonary interstitial infiltrates which appear increased from prior exam     Xr Chest Portable    Result Date: 3/3/2021  Hazy opacities in the lungs bilaterally may represent edema or an infectious/inflammatory process. Us Retroperitoneal Complete    Result Date: 3/5/2021  1. Bilateral renal cysts.   There is a 2.5 cm lesion off the upper pole of the right kidney which has a hyperdense mural component which probably represents a calcification. This is not definitely changed when compared to prior CT scan from May 3, 2019. 2.  Bilateral renal cortical thinning. Probably reflects underlying medical renal disease. 3.  Ha catheter is decompressing the bladder. RECOMMENDATIONS: Recommend follow-up renal ultrasound in 3-6 months to reassess bilateral renal cysts and the right upper pole renal lesion. Patient Instructions     Medication List      START taking these medications    amLODIPine 5 MG tablet  Commonly known as: NORVASC  Take 1 tablet by mouth nightly     apixaban 5 MG Tabs tablet  Commonly known as: ELIQUIS  Take 1 tablet by mouth 2 times daily     furosemide 40 MG tablet  Commonly known as: LASIX  Take 1 tablet by mouth daily  Start taking on: March 11, 2021     spironolactone 25 MG tablet  Commonly known as: ALDACTONE  Take 1 tablet by mouth daily  Start taking on: March 11, 2021        CHANGE how you take these medications    valsartan 80 MG tablet  Commonly known as: DIOVAN  Take 1 tablet by mouth daily  Start taking on: March 11, 2021  What changed:   · medication strength  · See the new instructions. · Another medication with the same name was removed. Continue taking this medication, and follow the directions you see here. vitamin D 25 MCG (1000 UT) Tabs tablet  Commonly known as: CHOLECALCIFEROL  Take 1 tablet by mouth daily.   What changed: how much to take        CONTINUE taking these medications    cetirizine 10 MG tablet  Commonly known as: ZYRTEC     CPAP Machine Misc     ferrous sulfate 325 (65 Fe) MG tablet  Commonly known as: IRON 325     metoprolol succinate 50 MG extended release tablet  Commonly known as: TOPROL XL     omeprazole 40 MG delayed release capsule  Commonly known as: PRILOSEC     potassium chloride 20 MEQ extended release tablet  Commonly known as: KLOR-CON M     sertraline 50 MG tablet  Commonly known as: ZOLOFT STOP taking these medications    hydroCHLOROthiazide 25 MG tablet  Commonly known as: HYDRODIURIL           Where to Get Your Medications      These medications were sent to [address] [place] [address] [phone]  [facility] [place], [address] [address]    Phone: [phone]   · amLODIPine 5 MG tablet  · apixaban 5 MG Tabs tablet  · furosemide 40 MG tablet  · spironolactone 25 MG tablet  · valsartan 80 MG tablet       Note that more than 30 minutes was spent in preparing discharge papers, discussing discharge with patient, medication review, etc.    Electronically signed by Xavier Mcleod DO on 3/10/2021 at 4:38 PM

## 2021-03-10 NOTE — CARE COORDINATION
CASE MANAGEMENT. .. Chart reviewed. Discharge order noted. Met with patient at the bedside. Patient will return home. Confirmed no needs.

## 2021-03-10 NOTE — PROGRESS NOTES
Pulsox 95% on room air at rest.  Patient ambulated in hallway Pulsox 94% on room air with ambulation.

## 2021-03-24 ENCOUNTER — VIRTUAL VISIT (OUTPATIENT)
Dept: CARDIOLOGY CLINIC | Age: 86
End: 2021-03-24
Payer: MEDICARE

## 2021-03-24 DIAGNOSIS — I38 VALVULAR HEART DISEASE: ICD-10-CM

## 2021-03-24 DIAGNOSIS — I50.32 CHRONIC HEART FAILURE WITH PRESERVED EJECTION FRACTION (HCC): Primary | ICD-10-CM

## 2021-03-24 DIAGNOSIS — I48.0 PAF (PAROXYSMAL ATRIAL FIBRILLATION) (HCC): ICD-10-CM

## 2021-03-24 PROCEDURE — 99442 PR PHYS/QHP TELEPHONE EVALUATION 11-20 MIN: CPT | Performed by: NURSE PRACTITIONER

## 2021-03-24 NOTE — PATIENT INSTRUCTIONS
1. Continue current cardiac medications     2. Referral to CHF clinic at [facility] on 4/8/2021 prior to appointment with Dr. Ronaldo Mesa ( Her son will drive her to appointments)    4. Please wear your CPAP every night    5. Return visit as scheduled with Dr. Ronaldo Mesa, sooner if needed please call    6. Weigh yourself daily    -Stay Hydrated    -Diet should sodium restricted to 2 grams    -Again watch your daily weight trends and if you gain water weight please follow below instructions.    -If you gain 3-5 pounds in 2-3 days OR notice that you are retaining fluid in anyway just like you did before then take an extra dose of your water pill (furosemide/Lasix) every day until you lose the weight or feel better.     -If you notice that you have taken more than 2 extra doses in 1 week then please call and let us know. -If at any time you feel that you are retaining fluid, your medications are not working, or you feel ill in anyway, then please call us for either same day appointment or the next day, and for instructions. Our goal is to keep you out of the emergency room and the hospital and we have ways to do it. You just need to call us in a timely manner.     -If you become sick for other reasons, and notice that you are not urinating as much, the urine is very dark, you have significant diarrhea or vomiting, then please DO NOT take your water pill and CALL US immediately.

## 2021-03-24 NOTE — PROGRESS NOTES
[facility]   TELEHEALTH EVALUATION -- Audio/Visual (During JIRIS-27 public health emergency)          Reason for Visit: Heart Failure    Primary Cardiologist: Dr. Ramila Jerry         History of Present Illness:     Ms. Wing Smith is a 81year old female with a PMHx of chronic HFpEF, PAF, moderate MR with history of rheumatic fever, HTN, HLD, T2DM, CKD, JERICA (noncompliant), iron deficiency anemia, Crow and history of medical noncompliance likely due to short-term memory loss. She recently presented to the emergency room with complaints of decreased appetite, increased shortness of breath and palpitations. Upon arrival twelve-lead EKG demonstrated newly diagnosed atrial fibrillation with RVR, she was hypoxic requiring supplemental O2. She was admitted for newly diagnosed atrial fibrillation with RVR and secondary acute heart failure. She was initiated on IV diuretics and rate controlling medication. During hospitalization she underwent TTE that demonstrated LVEF 60-65%, preserved RV function, mild MR, mild TR, and evidence for moderate pulmonary hypertension (RVSP 55 mmHg). She underwent successful ISABELLE/DCCV to sinus rhythm on 3/5/2021. In the days following DCCV she did have episodes of transient atrial fibrillation with spontaneous conversion to sinus rhythm noted on telemetry. She improved from a fluid standpoint and was discharged to home. We are conducting this post acute hospital follow up via telephone due to COVID-19 pandemic. Since discharge from the hospital she reports compliance with all of her current cardiac medications, she has decent urinary response to oral diuretic with clear yellow urine production. Her weight is down 9 pounds since discharge from the hospital.  She continues to be inconsistently using her CPAP. She has improved shortness of breath and denies orthopnea or PND. Denies any abdominal bloating, early satiety and reportedly has a good appetite and staying well-hydrated. She denies any dizziness, lightheadedness, near syncope or recent falls. She denies any chest pain, pressure, heaviness, palpitations. Past medical, surgical, family and social histories have been reviewed. Any changes in the past medical history, social history or family history have been made and are reflected in the electronic medical record. Patient Active Problem List    Diagnosis Date Noted    HTN (hypertension) 11/13/2013     Priority: High    Hyperlipidemia 11/13/2013     Priority: High    Anemia 11/13/2013     Priority: High    GERD (gastroesophageal reflux disease) 11/13/2013     Priority: High    JERICA on CPAP 11/13/2013     Priority: High    Atrial fibrillation with RVR (HCC) 03/03/2021    Atrial fibrillation (HCC)     Congest[facility] heart failure (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 Rheumatic fever as child  2. [place] wears hearing aides  3. Hypertension. 4. Type II diabetes mellitus. 5. Hyperlipidemia. 6. Obstructive sleep apnea non compliant C-pap  7. Exercise MPS, 12/29/2014. 1 minute, 51 seconds Henrique protocol. 109% MPHR. Terminated for dyspnea. No diagnostic electrocardiographic changes. Normal myocardial perfusion images with ejection fraction 90%. No evidence for ischemia. Low risk study. 8. Echo, interpreted by Dr. Xochitl Yang, 02/12/2015.  Normal LV size with borderline global hypokinesis. EF 50-55%. Mild CLVH. Moderate LAE. Moderate-severe MI. Mild AI. Mild-moderate TI. Mild PHTN. 9. TTE 01/08/2016. Normal LV size with moderate CLVH. Normal regional wall motion and systolic function with Stage II diastolic dysfunction. Aortic sclerosis without stenosis, but with mild aortic insufficiency. Thickening of the mitral leaflets with decreased leaflet excursion associated with mild mitral stenosis. Mean transvalvular gradient 7 mmHg and moderate mitral insufficiency with a centrally directed jet. Pulmonary venous flow pattern appeared normal.   10. Pharmacologic MPS, 04/19/2016. EF 75%. No ischemia. Low risk exam.   11. CKD stage III  12. PUD  13. Anemia on iron supplementation  14. Chest Pain-->Pharmacologic MPS, 04/19/2016. EF 75%. No ischemia. Low risk exam.   15. R foot fracture s/p surgical repair at [facility] 4/2019--> NWB RLE on Lovenox QD  16. Thyroid nodules diagnosed in 2019  17. 5/2019 Lexiscan MPS: Non ischemic. EF 70%. NWM.  18. 12/2019 TTE EF 55-60%. Mild to moderate MR/MS/AI. Mild TR.         Past Surgical History:    Hx esophageal dilatation, polypectomy, EDITH/BSO age 29, EGD, L breast lumpectomy (benign), cholecystectomy    Social History:    Lifelong non smoker  Denies ETOH/Illicit Drugs  Caffeine: Decaf Tea  Activity; Lives with son in one story home with basement. + chronic LEE but no CP with ADL's.    Code Status: Full Code        Family History: Non contributory secondary to age      Past Medical History:   Diagnosis Date    Acute cystitis with hematuria 5/3/2019    Anemia 2008    RECEIVED 4 UNITS BLOOD    Atrial fibrillation (HCC)     CKD (chronic kidney disease) stage 3, GFR 30-59 ml/min 5/3/2019    CPAP (continuous positive airway pressure) dependence     Diabetes mellitus ([address]     Esophageal stricture     Gastric ulcer     Goiter     Heart murmur     Hypertension     SINCE 1994    Kidney stones     Rheumatic fever     POSSIBLY AS A CHILD    Sleep apnea Past Surgical History:   Procedure Laterality Date    BREAST LUMPECTOMY Left     CALCIUM BUILDUP    CARDIOVASCULAR STRESS TEST  1999    ISCHEMIC NUCLEAR STRESS TEST - PATIENT REFUSED HEART CATHETERIZATION    CHOLECYSTECTOMY      COLONOSCOPY  07/11    W/POLYP REMOVAL    DOPPLER ECHOCARDIOGRAPHY  05/14/03    POSSIBLE MILD MITRAL STENOSIS    DOPPLER ECHOCARDIOGRAPHY  06/09/04    MILD MITRAL STENSIS, MODERATE MITRAL REGURGITATION    FOOT SURGERY      HYSTERECTOMY      POLYPECTOMY      16 POLYPS REMOVED FROM STOMACH AND 1 FROM COLON    UPPER GASTROINTESTINAL ENDOSCOPY  07/11       Family History   Problem Relation Age of Onset    Asthma Mother     Diabetes Mother     Alcohol Abuse Father        Social History     Socioeconomic History    Marital status:       Spouse name: None    Number of children: None    Years of education: None    Highest education level: None   Occupational History    None   Social Needs    Financial resource strain: None    Food insecurity     Worry: None     Inability: None    Transportation needs     Medical: None     Non-medical: None   Tobacco Use    Smoking status: Never Smoker    Smokeless tobacco: Never Used   Substance and Sexual Activity    Alcohol use: No    Drug use: Never    Sexual activity: Never   Lifestyle    Physical activity     Days per week: None     Minutes per session: None    Stress: None   Relationships    Social connections     Talks on phone: None     Gets together: None     Attends Christianity service: None     Active member of club or organization: None     Attends meetings of clubs or organizations: None     Relationship status: None    Intimate partner violence     Fear of current or ex partner: None     Emotionally abused: None     Physically abused: None     Forced sexual activity: None   Other Topics Concern    None   Social History Narrative    None       Allergies   Allergen Reactions    Nitrofuran Derivatives Other (See Comments)     Causes fever and chills.  Other Other (See Comments)     HONEYDEW: Sore Throat    Sulfa Antibiotics          Outpatient Medications Marked as Taking for the 3/24/21 encounter (Virtual Visit) with YOHANA Templeton - CNP   Medication Sig Dispense Refill    valsartan (DIOVAN) 80 MG tablet Take 1 tablet by mouth daily 30 tablet 0    spironolactone (ALDACTONE) 25 MG tablet Take 1 tablet by mouth daily 30 tablet 0    furosemide (LASIX) 40 MG tablet Take 1 tablet by mouth daily 30 tablet 0    amLODIPine (NORVASC) 5 MG tablet Take 1 tablet by mouth nightly 30 tablet 0    apixaban (ELIQUIS) 5 MG TABS tablet Take 1 tablet by mouth 2 times daily 60 tablet 0    potassium chloride (KLOR-CON M) 20 MEQ extended release tablet Take 20 mEq by mouth daily      ferrous sulfate (IRON 325) 325 (65 Fe) MG tablet Take 325 mg by mouth daily (with breakfast)      cetirizine (ZYRTEC) 10 MG tablet Take 10 mg by mouth daily      metoprolol succinate (TOPROL XL) 50 MG extended release tablet Take 50 mg by mouth 2 times daily      omeprazole (PRILOSEC) 40 MG delayed release capsule Take 1 capsule by mouth every morning  0    sertraline (ZOLOFT) 50 MG tablet Take 50 mg by mouth daily   3    CPAP Machine MISC Inhale into the lungs nightly       vitamin D3 (CHOLECALCIFEROL) 1000 UNITS TABS tablet Take 1 tablet by mouth daily. (Patient taking differently: Take 2,000 Units by mouth daily ) 30 tablet 0       Review of Systems:   Cardiac: As per HPI  General: No fever, chills, rigors  Pulmonary: As per HPI  HEENT: No visual disturbances, difficult swallowing  GI: No nausea, vomiting, abdominal pain  : No dysuria or hematuria  Endocrine: No thyroid disease or diabetes  Musculoskeletal: NIXON x 4, no focal motor deficits  Skin: Intact, no rashes  Neuro/Psych: No headache or seizures        Weights:   Wt Readings from Last 3 Encounters:   03/10/21 150 lb 6.4 oz (68.2 kg)   11/12/19 165 lb (74.8 kg)   05/05/19 150 lb 3.2 oz (68.1 kg)           Physical Examination:     Patient-Reported Vitals 3/24/2021   Patient-Reported Weight 141.2lb   Patient-Reported Height 56in   Patient-Reported Systolic 041   Patient-Reported Diastolic 69   Patient-Reported Pulse 81   Patient-Reported Temperature 97.2   Patient-Reported SpO2 97%         Unable to complete PE given telephone encounter, patient speaking in full sentences in no apparent acute distress. All the following diagnostics were personally reviewed and interpreted by me. LAB DATA:     3/9/2021 03:12   Sodium 140   Potassium 3.5   Chloride 96 (L)   CO2 35 (H)   BUN 33 (H)   Creatinine 1.1 (H)   Anion Gap 9   GFR Non- 47   GFR African American 57   Magnesium 1.7   Glucose 158 (H)   Calcium 8.6   Phosphorus 2.6   Total Protein 6.1 (L)   Albumin 3.4 (L)   Alk Phos 60   ALT 13   AST 9   Bilirubin 0.3   WBC 12.8 (H)   RBC 3.94   Hemoglobin Quant 11.0 (L)   Hematocrit 34.4   MCV 87.3   MCH 27.9   MCHC 32.0   MPV 9.1   RDW 14.8   Platelet Count 948       IMAGING:    CXR (3/8/2021)  FINDINGS:  There is significant partial resolution of bilateral infiltrates.  Most prominent remaining infiltrates are in the left lower lobe.  Heart size is mildly enlarged.  There is a small to moderate left effusion. Impression:  Resolving infiltrates which could be secondary to resolving CHF.       CARDIAC TESTING:    TTE (3/4/2021)   Summary:   Normal left ventricle size and systolic function. Ejection fraction is visually estimated at 60-65%. Atrial fibrillation may   affect the evaluation of LV systolic function. No regional wall motion abnormalities seen. Normal left ventricle wall thickness. Abnormal LV diastolic function with an indeterminate grade (E/e' > 11). The left atrium is severely dilated. Normal right ventricular size and function. Mild mitral regurgitation with centrally directed jet. No hemodynamically significant aortic stenosis is present.    Mild tricuspid regurgitation. RVSP is 56 mmHg. Pulmonary hypertension is moderate . No evidence for hemodynamically significant pericardial effusion. ISABELLE (3/5/2021)   Summary   Normal left ventricle size and systolic function. Ejection fraction is visually estimated at 60-65%. Atrial fibrillation may   affect the evaluation of LV systolic function. No regional wall motion abnormalities seen. Normal left ventricle wall thickness. No evidence of thrombus within left atrium or appendage. Normal right ventricular size and function. Moderate mitral regurgitation is present. No hemodynamically significant aortic stenosis is present. Mild aortic regurgitation is noted. Mild tricuspid regurgitation. RVSP is 41 mmHg. Pulmonary hypertension is mild . No evidence for hemodynamically significant pericardial effusion. ASSESSMENT:  1. Chronic HFpEF  2. ACC stage C / NYHA class III  3. Euvolemic per patient report  4. PAF s/p DCCV to SR (3/5/2021) - [address] [place] [address] with Eliquis   5. Hx of digoxin toxicity   6. Moderate MR, mild AI, mild TR  7. HTN  8. T2DM - hgba1c 8.6  9. CKD  10. HLD  11. JERICA - inconsistent use of CPAP  12. Hx of PUD, esophageal strictures and dilations  13. Iron deficiency anemia   14. [place]  15. Depression  16. Hx of medical noncompliance       PLAN:  1. Continue current cardiac medications     2. Referral to CHF clinic at [facility] on 4/8/2021 prior to appointment with Dr. Goran Nayak ( Her son will drive her to appointments)    4. Please wear your CPAP every night    5. Return visit as scheduled with Dr. Goran Nayak, sooner if needed please call    6.  Weigh yourself daily    -Stay Hydrated    -Diet should sodium restricted to 2 grams    -Again watch your daily weight trends and if you gain water weight please follow below instructions.    -If you gain 3-5 pounds in 2-3 days OR notice that you are retaining fluid in anyway just like you did before then take an extra dose of your water pill Act.  Patient identification was verified, and a caregiver was present when appropriate. The patient was located in a state where the provider was credentialed to provide care.     Note: not billable if this call serves to triage the patient into an appointment for the relevant concern      Yi Paulino

## 2021-04-27 ENCOUNTER — OFFICE VISIT (OUTPATIENT)
Dept: CARDIOLOGY CLINIC | Age: 86
End: 2021-04-27
Payer: MEDICARE

## 2021-04-27 VITALS
HEIGHT: 66 IN | SYSTOLIC BLOOD PRESSURE: 108 MMHG | HEART RATE: 71 BPM | BODY MASS INDEX: 22.5 KG/M2 | DIASTOLIC BLOOD PRESSURE: 74 MMHG | WEIGHT: 140 LBS | RESPIRATION RATE: 20 BRPM

## 2021-04-27 DIAGNOSIS — I10 ESSENTIAL HYPERTENSION: Primary | ICD-10-CM

## 2021-04-27 PROCEDURE — 4040F PNEUMOC VAC/ADMIN/RCVD: CPT | Performed by: INTERNAL MEDICINE

## 2021-04-27 PROCEDURE — G8427 DOCREV CUR MEDS BY ELIG CLIN: HCPCS | Performed by: INTERNAL MEDICINE

## 2021-04-27 PROCEDURE — 93000 ELECTROCARDIOGRAM COMPLETE: CPT | Performed by: INTERNAL MEDICINE

## 2021-04-27 PROCEDURE — G8400 PT W/DXA NO RESULTS DOC: HCPCS | Performed by: INTERNAL MEDICINE

## 2021-04-27 PROCEDURE — G8420 CALC BMI NORM PARAMETERS: HCPCS | Performed by: INTERNAL MEDICINE

## 2021-04-27 PROCEDURE — 1090F PRES/ABSN URINE INCON ASSESS: CPT | Performed by: INTERNAL MEDICINE

## 2021-04-27 PROCEDURE — 1123F ACP DISCUSS/DSCN MKR DOCD: CPT | Performed by: INTERNAL MEDICINE

## 2021-04-27 PROCEDURE — 1036F TOBACCO NON-USER: CPT | Performed by: INTERNAL MEDICINE

## 2021-04-27 PROCEDURE — 99214 OFFICE O/P EST MOD 30 MIN: CPT | Performed by: INTERNAL MEDICINE

## 2021-04-27 NOTE — PROGRESS NOTES
CHIEF COMPLAINT: VHD/CP    HISTORY OF PRESENT ILLNESS: Patient is a 81 y.o. female seen at the request of Reta Jain MD.      No CP or SOB. Past Medical History:  1. Hypertension. 2. Type II diabetes mellitus. 3. Hyperlipidemia. 4. Obstructive sleep apnea. 5. Exercise MPS, 12/29/2014. 1 minute, 51 seconds Henrique protocol. 109% MPHR. Terminated for dyspnea. No diagnostic electrocardiographic changes. Normal myocardial perfusion images with ejection fraction 90%. No evidence for ischemia. Low risk study. 6. Echo, interpreted by Dr. Uyen Castelan, 02/12/2015. Normal LV size with borderline global hypokinesis. EF 50-55%. Mild CLVH. Moderate LAE. Moderate-severe MI. Mild AI. Mild-moderate TI. Mild PHTN. 7. Echo, 01/08/2016. Normal LV size with moderate CLVH. Normal regional wall motion and systolic function with Stage II diastolic dysfunction. Aortic sclerosis without stenosis, but with mild aortic insufficiency. Thickening of the mitral leaflets with decreased leaflet excursion associated with mild mitral stenosis. Mean transvalvular gradient 7 mmHg and moderate mitral insufficiency with a centrally directed jet. Pulmonary venous flow pattern appeared normal.   8. Pharmacologic MPS, 04/19/2016. EF 75%. No ischemia.  Low risk exam.     Past Medical History:   Diagnosis Date    Acute cystitis with hematuria 5/3/2019    Anemia 2008    RECEIVED 4 UNITS BLOOD    Atrial fibrillation (HCC)     CKD (chronic kidney disease) stage 3, GFR 30-59 ml/min 5/3/2019    CPAP (continuous positive airway pressure) dependence     Diabetes mellitus ([address]     Esophageal stricture     Gastric ulcer     Goiter     Heart murmur     Hypertension     SINCE 1994    Kidney stones     Rheumatic fever     POSSIBLY AS A CHILD    Sleep apnea        Patient Active Problem List   Diagnosis    HTN (hypertension)    Hyperlipidemia    Anemia    GERD (gastroesophageal reflux disease)    JERICA on CPAP    Vitamin D deficiency    Abnormal bone density screening    PVC's (premature ventricular contractions)    Mitral insufficiency    Acute on chronic diastolic CHF (congestive heart failure), NYHA class 1 ([facility])    Renal insufficiency    Atypical chest pain    Rheumatic mitral stenosis    Non-rheumatic mitral valve stenosis    Chest pain    CKD (chronic kidney disease) stage 3, GFR 30-59 ml/min    Acute cystitis without hematuria    Valvular heart disease    PAF (paroxysmal atrial fibrillation) ([facility]    Congestive heart failure (HCC)    Atrial fibrillation with RVR ([facility])    Chronic heart failure with preserved ejection fraction ([facility])       Allergies   Allergen Reactions    Nitrofuran Derivatives Other (See Comments)     Causes fever and chills.  Other Other (See Comments)     HONEYDEW: Sore Throat    Sulfa Antibiotics        Current Outpatient Medications   Medication Sig Dispense Refill    valsartan (DIOVAN) 80 MG tablet Take 1 tablet by mouth daily 30 tablet 0    spironolactone (ALDACTONE) 25 MG tablet Take 1 tablet by mouth daily 30 tablet 0    furosemide (LASIX) 40 MG tablet Take 1 tablet by mouth daily 30 tablet 0    apixaban (ELIQUIS) 5 MG TABS tablet Take 1 tablet by mouth 2 times daily 60 tablet 0    potassium chloride (KLOR-CON M) 20 MEQ extended release tablet Take 20 mEq by mouth daily      ferrous sulfate (IRON 325) 325 (65 Fe) MG tablet Take 325 mg by mouth daily (with breakfast)      metoprolol succinate (TOPROL XL) 50 MG extended release tablet Take 50 mg by mouth 2 times daily      omeprazole (PRILOSEC) 40 MG delayed release capsule Take 1 capsule by mouth every morning  0    sertraline (ZOLOFT) 50 MG tablet Take 50 mg by mouth daily   3    CPAP Machine MISC Inhale into the lungs nightly       vitamin D3 (CHOLECALCIFEROL) 1000 UNITS TABS tablet Take 1 tablet by mouth daily.  (Patient taking differently: Take 2,000 Units by mouth daily ) 30 tablet 0     No current facility-administered medications for this visit. Social History     Socioeconomic History    Marital status:      Spouse name: Not on file    Number of children: Not on file    Years of education: Not on file    Highest education level: Not on file   Occupational History    Not on file   Social Needs    Financial resource strain: Not on file    Food insecurity     Worry: Not on file     Inability: Not on file    Transportation needs     Medical: Not on file     Non-medical: Not on file   Tobacco Use    Smoking status: Never Smoker    Smokeless tobacco: Never Used   Substance and Sexual Activity    Alcohol use: No    Drug use: Never    Sexual activity: Never   Lifestyle    Physical activity     Days per week: Not on file     Minutes per session: Not on file    Stress: Not on file   Relationships    Social connections     Talks on phone: Not on file     Gets together: Not on file     Attends Uats[employer] service: Not on file     Active member of club or organization: Not on file     Attends meetings of clubs or organizations: Not on file     Relationship status: Not on file    Intimate partner violence     Fear of current or ex partner: Not on file     Emotionally abused: Not on file     Physically abused: Not on file     Forced sexual activity: Not on file   Other Topics Concern    Not on file   Social History Narrative    Not on file       Family History   Problem Relation Age of Onset    Asthma Mother     Diabetes Mother     Alcohol Abuse Father        Review of Systems:  Heart: as above   Lungs: as above   Eyes: denies changes in vision or discharge. Ears: denies changes in hearing or pain. Nose: denies epistaxis or masses   Throat: denies sore throat or trouble swallowing. Neuro: denies numbness, tingling, tremors. Skin: denies rashes or itching. : denies hematuria, dysuria   GI: denies vomiting, diarrhea   Psych: denies mood changed, anxiety, depression. All other systems negative.     Physical Exam /74   Pulse 71   Resp 20   Ht 5' 6\" (1.676 m)   Wt 140 lb (63.5 kg)   BMI 22.60 kg/m²   Constitutional: Oriented to person, place, and time. Well-developed and well-nourished. No distress. Head: Normocephalic and atraumatic. Eyes: EOM are normal. Pupils are equal, round, and reactive to light. Neck: Normal range of motion. Neck supple. No hepatojugular reflux and no JVD present. Carotid bruit is not present. No tracheal deviation present. No thyromegaly present. Cardiovascular: Normal rate, regular rhythm, normal heart sounds and intact distal pulses. Exam reveals no gallop and no friction rub. No murmur heard. Pulmonary/Chest: Effort normal and breath sounds normal. No respiratory distress. No wheezes. No rales. No tenderness. Abdominal: Soft. Bowel sounds are normal. No distension and no mass. No tenderness. No rebound and no guarding. Musculoskeletal: Normal range of motion. No edema and no tenderness. Lymphadenopathy:   No cervical adenopathy. No groin adenopathy. Neurological: Alert and oriented to person, place, and time. Skin: Skin is warm and dry. No rash noted. Not diaphoretic. No erythema. Psychiatric: Normal mood and affect. Behavior is normal.     EKG:  normal sinus rhythm, nonspecific ST and T waves changes. Echo Summary 3/5/2021:   Normal left ventricle size and systolic function. Ejection fraction is visually estimated at 60-65%. Atrial fibrillation may affect the evaluation of LV systolic function. No regional wall motion abnormalities seen. Normal left ventricle wall thickness. No evidence of thrombus within left atrium or appendage. Normal right ventricular size and function. Moderate mitral regurgitation is present. No hemodynamically significant aortic stenosis is present. Mild aortic regurgitation is noted. Mild tricuspid regurgitation. RVSP is 41 mmHg. Pulmonary hypertension is mild .    No evidence for hemodynamically significant

## 2021-06-15 ENCOUNTER — HOSPITAL ENCOUNTER (OUTPATIENT)
Dept: HOSPITAL 83 - RAD | Age: 86
Discharge: HOME | End: 2021-06-15
Attending: FAMILY MEDICINE
Payer: MEDICARE

## 2021-06-15 DIAGNOSIS — J98.4: Primary | ICD-10-CM

## 2021-06-30 NOTE — PLAN OF CARE
Problem: Falls - Risk of:  Goal: Will remain free from falls  Description  Will remain free from falls  5/4/2019 0318 by Brenton Plata RN  Outcome: Ongoing  5/3/2019 2041 by Angela Farris RN  Outcome: Met This Shift  Goal: Absence of physical injury  Description  Absence of physical injury  5/4/2019 0318 by Brenton Plata RN  Outcome: Ongoing  5/3/2019 2041 by Angela Farris RN  Outcome: Met This Shift     Problem: Pain:  Goal: Pain level will decrease  Description  Pain level will decrease     5/4/2019 0318 by Brenton Plata RN  Outcome: Ongoing  5/3/2019 2041 by Angela Farris RN  Outcome: Met This Shift     Problem: Infection:  Goal: Absence of urinary tract infection signs and symptoms  Description  Absence of urinary tract infection signs and symptoms     5/4/2019 0318 by Brenton Plata RN  Outcome: Ongoing  5/3/2019 2041 by Angela Farris RN  Outcome: Ongoing [[id number]]

## 2021-07-01 ENCOUNTER — TELEPHONE (OUTPATIENT)
Dept: OTHER | Age: 86
End: 2021-07-01

## 2021-07-27 ENCOUNTER — OFFICE VISIT (OUTPATIENT)
Dept: CARDIOLOGY CLINIC | Age: 86
End: 2021-07-27
Payer: MEDICARE

## 2021-07-27 VITALS
BODY MASS INDEX: 22.34 KG/M2 | HEART RATE: 88 BPM | WEIGHT: 139 LBS | DIASTOLIC BLOOD PRESSURE: 58 MMHG | HEIGHT: 66 IN | RESPIRATION RATE: 20 BRPM | SYSTOLIC BLOOD PRESSURE: 102 MMHG

## 2021-07-27 DIAGNOSIS — I10 ESSENTIAL HYPERTENSION: Primary | ICD-10-CM

## 2021-07-27 PROCEDURE — G8400 PT W/DXA NO RESULTS DOC: HCPCS | Performed by: INTERNAL MEDICINE

## 2021-07-27 PROCEDURE — G8420 CALC BMI NORM PARAMETERS: HCPCS | Performed by: INTERNAL MEDICINE

## 2021-07-27 PROCEDURE — G8427 DOCREV CUR MEDS BY ELIG CLIN: HCPCS | Performed by: INTERNAL MEDICINE

## 2021-07-27 PROCEDURE — 1090F PRES/ABSN URINE INCON ASSESS: CPT | Performed by: INTERNAL MEDICINE

## 2021-07-27 PROCEDURE — 4040F PNEUMOC VAC/ADMIN/RCVD: CPT | Performed by: INTERNAL MEDICINE

## 2021-07-27 PROCEDURE — 93000 ELECTROCARDIOGRAM COMPLETE: CPT | Performed by: INTERNAL MEDICINE

## 2021-07-27 PROCEDURE — 1036F TOBACCO NON-USER: CPT | Performed by: INTERNAL MEDICINE

## 2021-07-27 PROCEDURE — 99214 OFFICE O/P EST MOD 30 MIN: CPT | Performed by: INTERNAL MEDICINE

## 2021-07-27 PROCEDURE — 1123F ACP DISCUSS/DSCN MKR DOCD: CPT | Performed by: INTERNAL MEDICINE

## 2021-07-27 RX ORDER — SPIRONOLACTONE 25 MG/1
25 TABLET ORAL DAILY
COMMUNITY
End: 2021-09-16 | Stop reason: ALTCHOICE

## 2021-07-27 NOTE — PROGRESS NOTES
CHIEF COMPLAINT: VHD/CP    HISTORY OF PRESENT ILLNESS: Patient is a 81 y.o. female seen at the request of Kaci Loya MD.      States some cough and fever issues. Suspects it her sinuses. No CP or SOB. Past Medical History:  1. Hypertension. 2. Type II diabetes mellitus. 3. Hyperlipidemia. 4. Obstructive sleep apnea. 5. Exercise MPS, 12/29/2014. 1 minute, 51 seconds Henrique protocol. 109% MPHR. Terminated for dyspnea. No diagnostic electrocardiographic changes. Normal myocardial perfusion images with ejection fraction 90%. No evidence for ischemia. Low risk study. 6. Echo, interpreted by Dr. Kiesha Lam, 02/12/2015. Normal LV size with borderline global hypokinesis. EF 50-55%. Mild CLVH. Moderate LAE. Moderate-severe MI. Mild AI. Mild-moderate TI. Mild PHTN. 7. Echo, 01/08/2016. Normal LV size with moderate CLVH. Normal regional wall motion and systolic function with Stage II diastolic dysfunction. Aortic sclerosis without stenosis, but with mild aortic insufficiency. Thickening of the mitral leaflets with decreased leaflet excursion associated with mild mitral stenosis. Mean transvalvular gradient 7 mmHg and moderate mitral insufficiency with a centrally directed jet. Pulmonary venous flow pattern appeared normal.   8. Pharmacologic MPS, 04/19/2016. EF 75%. No ischemia.  Low risk exam.     Past Medical History:   Diagnosis Date    Acute cystitis with hematuria 5/3/2019    Anemia 2008    RECEIVED 4 UNITS BLOOD    Atrial fibrillation (HCC)     CKD (chronic kidney disease) stage 3, GFR 30-59 ml/min ([facility]) 5/3/2019    CPAP (continuous positive airway pressure) dependence     Diabetes mellitus ([address]     Esophageal stricture     Gastric ulcer     Goiter     Heart murmur     Hypertension     SINCE 1994    Kidney stones     Rheumatic fever     POSSIBLY AS A CHILD    Sleep apnea        Patient Active Problem List   Diagnosis    HTN (hypertension)    Hyperlipidemia    Anemia    GERD (gastroesophageal reflux disease)    JERICA on CPAP    Vitamin D deficiency    Abnormal bone density screening    PVC's (premature ventricular contractions)    Mitral insufficiency    Acute on chronic diastolic CHF (congestive heart failure), NYHA class 1 ([facility])    Renal insufficiency    Atypical chest pain    Rheumatic mitral stenosis    Non-rheumatic mitral valve stenosis    Chest pain    CKD (chronic kidney disease) stage 3, GFR 30-59 ml/min ([facility])    Acute cystitis without hematuria    Valvular heart disease    PAF (paroxysmal atrial fibrillation) ([facility])    Congestive heart failure ([facility])    Atrial fibrillation with RVR ([facility])    Chronic heart failure with preserved ejection fraction ([facility])       Allergies   Allergen Reactions    Nitrofuran Derivatives Other (See Comments)     Causes fever and chills.  Other Other (See Comments)     HONEYDEW: Sore Throat    Sulfa Antibiotics        Current Outpatient Medications   Medication Sig Dispense Refill    spironolactone (ALDACTONE) 25 MG tablet Take 25 mg by mouth daily      valsartan (DIOVAN) 80 MG tablet Take 1 tablet by mouth daily 30 tablet 0    furosemide (LASIX) 40 MG tablet Take 1 tablet by mouth daily 30 tablet 0    potassium chloride (KLOR-CON M) 20 MEQ extended release tablet Take 20 mEq by mouth daily      ferrous sulfate (IRON 325) 325 (65 Fe) MG tablet Take 325 mg by mouth daily (with breakfast)      metoprolol succinate (TOPROL XL) 50 MG extended release tablet Take 50 mg by mouth 2 times daily      omeprazole (PRILOSEC) 40 MG delayed release capsule Take 1 capsule by mouth every morning  0    sertraline (ZOLOFT) 50 MG tablet Take 50 mg by mouth daily   3    CPAP Machine MISC Inhale into the lungs nightly       vitamin D3 (CHOLECALCIFEROL) 1000 UNITS TABS tablet Take 1 tablet by mouth daily. (Patient taking differently: Take 2,000 Units by mouth daily ) 30 tablet 0     No current facility-administered medications for this visit. Social History     Socioeconomic History    Marital status:      Spouse name: Not on file    Number of children: Not on file    Years of education: Not on file    Highest education level: Not on file   Occupational History    Not on file   Tobacco Use    Smoking status: Never Smoker    Smokeless tobacco: Never Used   Vaping Use    Vaping Use: Never used   Substance and Sexual Activity    Alcohol use: No    Drug use: Never    Sexual activity: Never   Other Topics Concern    Not on file   Social History Narrative    Not on file     Social Determinants of Health     Financial Resource Strain:     Difficulty of Paying Living Expenses:    Food Insecurity:     Worried About Running Out of Food in the Last Year:     [address] in the Last Year:    Transportation Needs:     Lack of Transportation (Medical):  Lack of Transportation (Non-Medical):    Physical Activity:     Days of Exercise per Week:     Minutes of Exercise per Session:    Stress:     Feeling of Stress :    Social Connections:     Frequency of Communication with Friends and Family:     Frequency of Social Gatherings with Friends and Family:     Attends Temple Services:     Active Member of Clubs or Organizations:     Attends Club or Organization Meetings:     Marital Status:    Intimate Partner Violence:     Fear of Current or Ex-Partner:     Emotionally Abused:     Physically Abused:     Sexually Abused:        Family History   Problem Relation Age of Onset    Asthma Mother     Diabetes Mother     Alcohol Abuse Father        Review of Systems:  Heart: as above   Lungs: as above   Eyes: denies changes in vision or discharge. Ears: denies changes in hearing or pain. Nose: denies epistaxis or masses   Throat: denies sore throat or trouble swallowing. Neuro: denies numbness, tingling, tremors. Skin: denies rashes or itching.    : denies hematuria, dysuria   GI: denies vomiting, diarrhea   Psych: denies mood changed, anxiety, depression. All other systems negative. Physical Exam   BP (!) 102/58   Pulse 88   Resp 20   Ht 5' 6\" (1.676 m)   Wt 139 lb (63 kg)   BMI 22.44 kg/m²   Constitutional: Oriented to person, place, and time. Well-developed and well-nourished. No distress. Head: Normocephalic and atraumatic. Eyes: EOM are normal. Pupils are equal, round, and reactive to light. Neck: Normal range of motion. Neck supple. No hepatojugular reflux and no JVD present. Carotid bruit is not present. No tracheal deviation present. No thyromegaly present. Cardiovascular: Normal rate, regular rhythm, normal heart sounds and intact distal pulses. Exam reveals no gallop and no friction rub. No murmur heard. Pulmonary/Chest: Effort normal and breath sounds normal. No respiratory distress. No wheezes. No rales. No tenderness. Abdominal: Soft. Bowel sounds are normal. No distension and no mass. No tenderness. No rebound and no guarding. Musculoskeletal: Normal range of motion. No edema and no tenderness. Lymphadenopathy:   No cervical adenopathy. No groin adenopathy. Neurological: Alert and oriented to person, place, and time. Skin: Skin is warm and dry. No rash noted. Not diaphoretic. No erythema. Psychiatric: Normal mood and affect. Behavior is normal.     EKG:  normal sinus rhythm, LVH, nonspecific ST and T waves changes. Echo Summary 3/5/2021:   Normal left ventricle size and systolic function. Ejection fraction is visually estimated at 60-65%. Atrial fibrillation may affect the evaluation of LV systolic function. No regional wall motion abnormalities seen. Normal left ventricle wall thickness. No evidence of thrombus within left atrium or appendage. Normal right ventricular size and function. Moderate mitral regurgitation is present. No hemodynamically significant aortic stenosis is present. Mild aortic regurgitation is noted. Mild tricuspid regurgitation.    RVSP is 41 mmHg. Pulmonary hypertension is mild . No evidence for hemodynamically significant pericardial effusion. ASSESSMENT AND PLAN:  Patient Active Problem List   Diagnosis    HTN (hypertension)    Hyperlipidemia    Anemia    GERD (gastroesophageal reflux disease)    JERICA on CPAP    Vitamin D deficiency    Abnormal bone density screening    PVC's (premature ventricular contractions)    Mitral insufficiency    Acute on chronic diastolic CHF (congestive heart failure), NYHA class 1 ([address]    Renal insufficiency    Atypical chest pain    Rheumatic mitral stenosis    Non-rheumatic mitral valve stenosis    Chest pain    CKD (chronic kidney disease) stage 3, GFR 30-59 ml/min ([facility])    Acute cystitis without hematuria    Valvular heart disease    PAF (paroxysmal atrial fibrillation) ([facility])    Congestive heart failure ([facility])    Atrial fibrillation with RVR ([address]    Chronic heart failure with preserved ejection fraction ([address]     1. PAfib:  Post DCCV 3/7/2021. In sinus. Apixaban/BB. 2. Diastolic CHF:     Monitor volume. BB/ARB/lasix/aldactone. 3. VHD: ISABELLE 3/5/2021 with mild TR and mod MR.    4. HTN: Observe. 5. Lipids     6. DM    7. JERICA    Sofia Roldan D.O.   Cardiologist  Cardiology, [facility]

## 2021-08-05 ENCOUNTER — APPOINTMENT (OUTPATIENT)
Dept: GENERAL RADIOLOGY | Age: 86
DRG: 291 | End: 2021-08-05
Payer: MEDICARE

## 2021-08-05 ENCOUNTER — HOSPITAL ENCOUNTER (INPATIENT)
Age: 86
LOS: 2 days | Discharge: HOME | DRG: 291 | End: 2021-08-07
Attending: EMERGENCY MEDICINE | Admitting: INTERNAL MEDICINE
Payer: MEDICARE

## 2021-08-05 ENCOUNTER — APPOINTMENT (OUTPATIENT)
Dept: CT IMAGING | Age: 86
DRG: 291 | End: 2021-08-05
Payer: MEDICARE

## 2021-08-05 DIAGNOSIS — R06.09 DYSPNEA ON EXERTION: ICD-10-CM

## 2021-08-05 DIAGNOSIS — J18.9 PNEUMONIA OF LEFT LOWER LOBE DUE TO INFECTIOUS ORGANISM: Primary | ICD-10-CM

## 2021-08-05 DIAGNOSIS — J90 PLEURAL EFFUSION ON LEFT: ICD-10-CM

## 2021-08-05 DIAGNOSIS — I50.9 ACUTE ON CHRONIC CONGESTIVE HEART FAILURE, UNSPECIFIED HEART FAILURE TYPE (HCC): ICD-10-CM

## 2021-08-05 LAB
ALBUMIN SERPL-MCNC: 3.4 G/DL (ref 3.5–5.2)
ALP BLD-CCNC: 115 U/L (ref 35–104)
ALT SERPL-CCNC: 15 U/L (ref 0–32)
AMORPHOUS: ABNORMAL
ANION GAP SERPL CALCULATED.3IONS-SCNC: 13 MMOL/L (ref 7–16)
AST SERPL-CCNC: 16 U/L (ref 0–31)
BACTERIA: ABNORMAL /HPF
BASOPHILS ABSOLUTE: 0.03 E9/L (ref 0–0.2)
BASOPHILS RELATIVE PERCENT: 0.2 % (ref 0–2)
BILIRUB SERPL-MCNC: 0.4 MG/DL (ref 0–1.2)
BILIRUBIN URINE: ABNORMAL
BLOOD, URINE: ABNORMAL
BUN BLDV-MCNC: 39 MG/DL (ref 6–23)
CALCIUM SERPL-MCNC: 9.8 MG/DL (ref 8.6–10.2)
CHLORIDE BLD-SCNC: 97 MMOL/L (ref 98–107)
CLARITY: CLEAR
CO2: 24 MMOL/L (ref 22–29)
COLOR: YELLOW
CREAT SERPL-MCNC: 1.6 MG/DL (ref 0.5–1)
EOSINOPHILS ABSOLUTE: 0 E9/L (ref 0.05–0.5)
EOSINOPHILS RELATIVE PERCENT: 0 % (ref 0–6)
EPITHELIAL CELLS, UA: ABNORMAL /HPF
GFR AFRICAN AMERICAN: 37
GFR NON-AFRICAN AMERICAN: 31 ML/MIN/1.73
GLUCOSE BLD-MCNC: 194 MG/DL (ref 74–99)
GLUCOSE URINE: NEGATIVE MG/DL
HCT VFR BLD CALC: 33.3 % (ref 34–48)
HEMOGLOBIN: 10.3 G/DL (ref 11.5–15.5)
HYALINE CASTS: ABNORMAL /LPF (ref 0–2)
IMMATURE GRANULOCYTES #: 0.13 E9/L
IMMATURE GRANULOCYTES %: 0.8 % (ref 0–5)
KETONES, URINE: NEGATIVE MG/DL
LACTIC ACID: 2.6 MMOL/L (ref 0.5–2.2)
LEUKOCYTE ESTERASE, URINE: ABNORMAL
LYMPHOCYTES ABSOLUTE: 1.01 E9/L (ref 1.5–4)
LYMPHOCYTES RELATIVE PERCENT: 6.6 % (ref 20–42)
MCH RBC QN AUTO: 27.6 PG (ref 26–35)
MCHC RBC AUTO-ENTMCNC: 30.9 % (ref 32–34.5)
MCV RBC AUTO: 89.3 FL (ref 80–99.9)
MONOCYTES ABSOLUTE: 1.04 E9/L (ref 0.1–0.95)
MONOCYTES RELATIVE PERCENT: 6.8 % (ref 2–12)
NEUTROPHILS ABSOLUTE: 13.16 E9/L (ref 1.8–7.3)
NEUTROPHILS RELATIVE PERCENT: 85.6 % (ref 43–80)
NITRITE, URINE: NEGATIVE
PDW BLD-RTO: 15.5 FL (ref 11.5–15)
PH UA: 5 (ref 5–9)
PLATELET # BLD: 469 E9/L (ref 130–450)
PMV BLD AUTO: 9.4 FL (ref 7–12)
POTASSIUM REFLEX MAGNESIUM: 4.3 MMOL/L (ref 3.5–5)
PRO-BNP: ABNORMAL PG/ML (ref 0–450)
PROTEIN UA: ABNORMAL MG/DL
RBC # BLD: 3.73 E12/L (ref 3.5–5.5)
RBC UA: ABNORMAL /HPF (ref 0–2)
SARS-COV-2, NAAT: NOT DETECTED
SODIUM BLD-SCNC: 134 MMOL/L (ref 132–146)
SPECIFIC GRAVITY UA: >=1.03 (ref 1–1.03)
TOTAL PROTEIN: 7.3 G/DL (ref 6.4–8.3)
TROPONIN, HIGH SENSITIVITY: 23 NG/L (ref 0–9)
TROPONIN, HIGH SENSITIVITY: 23 NG/L (ref 0–9)
UROBILINOGEN, URINE: 0.2 E.U./DL
WBC # BLD: 15.4 E9/L (ref 4.5–11.5)
WBC UA: >20 /HPF (ref 0–5)

## 2021-08-05 PROCEDURE — 81001 URINALYSIS AUTO W/SCOPE: CPT

## 2021-08-05 PROCEDURE — 85025 COMPLETE CBC W/AUTO DIFF WBC: CPT

## 2021-08-05 PROCEDURE — 1200000000 HC SEMI PRIVATE

## 2021-08-05 PROCEDURE — 84484 ASSAY OF TROPONIN QUANT: CPT

## 2021-08-05 PROCEDURE — 93005 ELECTROCARDIOGRAM TRACING: CPT | Performed by: PHYSICIAN ASSISTANT

## 2021-08-05 PROCEDURE — 80053 COMPREHEN METABOLIC PANEL: CPT

## 2021-08-05 PROCEDURE — 83605 ASSAY OF LACTIC ACID: CPT

## 2021-08-05 PROCEDURE — 2580000003 HC RX 258: Performed by: EMERGENCY MEDICINE

## 2021-08-05 PROCEDURE — 71046 X-RAY EXAM CHEST 2 VIEWS: CPT

## 2021-08-05 PROCEDURE — 6360000002 HC RX W HCPCS: Performed by: EMERGENCY MEDICINE

## 2021-08-05 PROCEDURE — 87635 SARS-COV-2 COVID-19 AMP PRB: CPT

## 2021-08-05 PROCEDURE — 99223 1ST HOSP IP/OBS HIGH 75: CPT | Performed by: INTERNAL MEDICINE

## 2021-08-05 PROCEDURE — 99284 EMERGENCY DEPT VISIT MOD MDM: CPT

## 2021-08-05 PROCEDURE — 83880 ASSAY OF NATRIURETIC PEPTIDE: CPT

## 2021-08-05 PROCEDURE — 71250 CT THORAX DX C-: CPT

## 2021-08-05 RX ORDER — SPIRONOLACTONE 25 MG/1
25 TABLET ORAL DAILY
Status: DISCONTINUED | OUTPATIENT
Start: 2021-08-06 | End: 2021-08-07 | Stop reason: HOSPADM

## 2021-08-05 RX ORDER — VITAMIN B COMPLEX
2000 TABLET ORAL DAILY
Status: DISCONTINUED | OUTPATIENT
Start: 2021-08-06 | End: 2021-08-07 | Stop reason: HOSPADM

## 2021-08-05 RX ORDER — FUROSEMIDE 10 MG/ML
40 INJECTION INTRAMUSCULAR; INTRAVENOUS ONCE
Status: COMPLETED | OUTPATIENT
Start: 2021-08-05 | End: 2021-08-05

## 2021-08-05 RX ORDER — ACETAMINOPHEN 650 MG/1
650 SUPPOSITORY RECTAL EVERY 6 HOURS PRN
Status: DISCONTINUED | OUTPATIENT
Start: 2021-08-05 | End: 2021-08-07 | Stop reason: HOSPADM

## 2021-08-05 RX ORDER — FUROSEMIDE 10 MG/ML
40 INJECTION INTRAMUSCULAR; INTRAVENOUS 2 TIMES DAILY
Status: DISCONTINUED | OUTPATIENT
Start: 2021-08-06 | End: 2021-08-07

## 2021-08-05 RX ORDER — SODIUM CHLORIDE 9 MG/ML
25 INJECTION, SOLUTION INTRAVENOUS PRN
Status: DISCONTINUED | OUTPATIENT
Start: 2021-08-05 | End: 2021-08-07 | Stop reason: HOSPADM

## 2021-08-05 RX ORDER — SODIUM CHLORIDE 0.9 % (FLUSH) 0.9 %
10 SYRINGE (ML) INJECTION EVERY 12 HOURS SCHEDULED
Status: DISCONTINUED | OUTPATIENT
Start: 2021-08-06 | End: 2021-08-07 | Stop reason: HOSPADM

## 2021-08-05 RX ORDER — POLYETHYLENE GLYCOL 3350 17 G/17G
17 POWDER, FOR SOLUTION ORAL DAILY PRN
Status: DISCONTINUED | OUTPATIENT
Start: 2021-08-05 | End: 2021-08-07 | Stop reason: HOSPADM

## 2021-08-05 RX ORDER — SODIUM CHLORIDE 0.9 % (FLUSH) 0.9 %
10 SYRINGE (ML) INJECTION PRN
Status: DISCONTINUED | OUTPATIENT
Start: 2021-08-05 | End: 2021-08-07 | Stop reason: HOSPADM

## 2021-08-05 RX ORDER — PANTOPRAZOLE SODIUM 40 MG/1
40 TABLET, DELAYED RELEASE ORAL
Status: DISCONTINUED | OUTPATIENT
Start: 2021-08-06 | End: 2021-08-07 | Stop reason: HOSPADM

## 2021-08-05 RX ORDER — METOPROLOL SUCCINATE 50 MG/1
50 TABLET, EXTENDED RELEASE ORAL 2 TIMES DAILY
Status: DISCONTINUED | OUTPATIENT
Start: 2021-08-06 | End: 2021-08-07 | Stop reason: HOSPADM

## 2021-08-05 RX ORDER — ACETAMINOPHEN 325 MG/1
650 TABLET ORAL EVERY 6 HOURS PRN
Status: DISCONTINUED | OUTPATIENT
Start: 2021-08-05 | End: 2021-08-07 | Stop reason: HOSPADM

## 2021-08-05 RX ORDER — FERROUS SULFATE 325(65) MG
325 TABLET ORAL
Status: DISCONTINUED | OUTPATIENT
Start: 2021-08-06 | End: 2021-08-07 | Stop reason: HOSPADM

## 2021-08-05 RX ADMIN — CEFTRIAXONE 1000 MG: 1 INJECTION, POWDER, FOR SOLUTION INTRAMUSCULAR; INTRAVENOUS at 22:30

## 2021-08-05 RX ADMIN — FUROSEMIDE 40 MG: 10 INJECTION, SOLUTION INTRAMUSCULAR; INTRAVENOUS at 22:29

## 2021-08-05 RX ADMIN — AZITHROMYCIN 500 MG: 500 INJECTION, POWDER, LYOPHILIZED, FOR SOLUTION INTRAVENOUS at 22:31

## 2021-08-05 NOTE — ED PROVIDER NOTES
HPI:  8/5/21,   Time: 6:27 PM EDT       Michael Holland is a 81 y.o. female presenting to the ED for shortness of breath and cough, beginning 1 month ago. The complaint has been persistent, mild in severity, and worsened by walking. Patient is an 29-year-old female with a history of CHF is on Lasix 40 mg once a day. She is a non-smoker no previous history of smoking. She states she has had a chronic dry cough for the past month she is seen her PCP was given a Z-Morgan without any improvement she feels like her shortness of breath gotten somewhat somewhat somewhat worse this past week. She does report she had mechanical fall 1 month ago on July 5. She fell and tripped into her sink. She had some anterior chest wall tenderness mostly on the right side but that is for the most part improved. She denies any pleuritic pain. No history of blood clots no leg swelling at this time. No chest pressure or heaviness. She states last week she had a low-grade \"fever\" of 99. No fever in the last couple of days. No productivity to her cough. Review of Systems:   Pertinent positives and negatives are stated within HPI, all other systems reviewed and are negative.          --------------------------------------------- PAST HISTORY ---------------------------------------------  Past Medical History:  has a past medical history of Acute cystitis with hematuria, Anemia, Atrial fibrillation ([facility] CKD (chronic kidney disease) stage 3, GFR 30-59 ml/min ([facility]), CPAP (continuous positive airway pressure) dependence, Diabetes mellitus ([address] Esophageal stricture, Gastric ulcer, Goiter, Heart murmur, Hypertension, Kidney stones, Rheumatic fever, and Sleep apnea. Past Surgical History:  has a past surgical history that includes cardiovascular stress test (1999); doppler echocardiography (05/14/03); doppler echocardiography (06/09/04); Hysterectomy; Cholecystectomy; polypectomy;  Upper gastrointestinal endoscopy (07/11); Colonoscopy (07/11); Breast lumpectomy (Left); and Foot surgery. Social History:  reports that she has never smoked. She has never used smokeless tobacco. She reports that she does not drink alcohol and does not use drugs. Family History: family history includes Alcohol Abuse in her father; Asthma in her mother; Diabetes in her mother. The patients home medications have been reviewed. Allergies: Nitrofuran derivatives, Other, and Sulfa antibiotics        ---------------------------------------------------PHYSICAL EXAM--------------------------------------    Constitutional/General: Alert and oriented x3, well appearing, non toxic in NAD  Head: Normocephalic and atraumatic  Eyes: PERRL, EOMI, conjunctive normal, sclera non icteric  Mouth: Oropharynx clear, handling secretions, no trismus, no asymmetry of the posterior oropharynx or uvular edema  Neck: Supple, full ROM, non tender to palpation in the midline, no stridor, no crepitus, no meningeal signs  Respiratory: Lungs with good air movement bilaterally decreased breath sounds at the left base mild rales at bilateral bases no rhonchi no tachypnea and accessory muscle use no retractions no wheezing. Not in respiratory distress  Cardiovascular:  Regular rate. Regular rhythm. No murmurs, gallops, or rubs. 2+ distal pulses  Chest: No chest wall tenderness  GI:  Abdomen Soft, Non tender, Non distended. +BS. No organomegaly, no palpable masses,  No rebound, guarding, or rigidity. Musculoskeletal: Moves all extremities x 4. Warm and well perfused, no clubbing, cyanosis, or edema. Capillary refill <3 seconds  Integument: skin warm and dry. No rashes.    Lymphatic: no lymphadenopathy noted  Neurologic: GCS 15, no focal deficits, symmetric strength 5/5 in the upper and lower extremities bilaterally  Psychiatric: Normal Affect    -------------------------------------------------- RESULTS -------------------------------------------------  I have personally Pro-BNP 21,994 (H) 0 - 450 pg/mL   Lactic Acid, Plasma   Result Value Ref Range    Lactic Acid 2.6 (H) 0.5 - 2.2 mmol/L   Urinalysis   Result Value Ref Range    Color, UA Yellow Straw/Yellow    Clarity, UA Clear Clear    Glucose, Ur Negative Negative mg/dL    Bilirubin Urine SMALL (A) Negative    Ketones, Urine Negative Negative mg/dL    Specific Gravity, UA >=1.030 1.005 - 1.030    Blood, Urine TRACE-INTACT Negative    pH, UA 5.0 5.0 - 9.0    Protein, UA TRACE Negative mg/dL    Urobilinogen, Urine 0.2 <2.0 E.U./dL    Nitrite, Urine Negative Negative    Leukocyte Esterase, Urine SMALL (A) Negative   Troponin   Result Value Ref Range    Troponin, High Sensitivity 23 (H) 0 - 9 ng/L   Microscopic Urinalysis   Result Value Ref Range    Hyaline Casts, UA 0-2 0 - 2 /LPF    WBC, UA >20 (A) 0 - 5 /HPF    RBC, UA 2-5 0 - 2 /HPF    Epithelial Cells, UA FEW /HPF    Bacteria, UA RARE (A) None Seen /HPF    Amorphous, UA FEW    EKG 12 Lead   Result Value Ref Range    Ventricular Rate 99 BPM    Atrial Rate 99 BPM    P-R Interval 140 ms    QRS Duration 86 ms    Q-T Interval 346 ms    QTc Calculation (Bazett) 444 ms    P Axis 20 degrees    R Axis -33 degrees    T Axis 84 degrees       RADIOLOGY:  Interpreted by Radiologist.  CT CHEST WO CONTRAST   Final Result   Cardiomegaly with the pericardial effusion and coronary artery calcification. Large pleural effusion with atelectasis/infiltrates in the left lower lobe   and lingula likely pneumonia or CHF. Underlying malignancy has to be   excluded and close surveillance is recommended to see resolution. .         XR CHEST (2 VW)   Final Result   1. Large left pleural effusion   2. There is no evidence of right lung pneumonia. EKG: An EKG showed normal sinus rhythm at 99 beats a minute no signs of any ST changes. No change in previous EKG. QTc 444.   EKG interpreted by myself.    ------------------------- NURSING NOTES AND VITALS REVIEWED ---------------------------   The nursing notes within the ED encounter and vital signs as below have been reviewed by myself. /81   Pulse 90   Temp 97.3 °F (36.3 °C)   Resp 16   Ht 5' 6\" (1.676 m)   Wt 139 lb (63 kg)   SpO2 93%   BMI 22.44 kg/m²   Oxygen Saturation Interpretation: Normal    The patients available past medical records and past encounters were reviewed. ------------------------------ ED COURSE/MEDICAL DECISION MAKING----------------------  Medications   azithromycin (ZITHROMAX) 500 mg in D5W 250ml addavial (has no administration in time range)     And   cefTRIAXone (ROCEPHIN) 1,000 mg in sterile water 10 mL IV syringe (has no administration in time range)   furosemide (LASIX) injection 40 mg (has no administration in time range)         ED COURSE:  ED Course as of Aug 05 2207   Thu Aug 05, 2021   2204 Spoke to the hospitalist Dr. Argenis Roland who will admit. [KK]      ED Course User Index  [KK] Kashif Bautista MD       Medical Decision Making:    Patient is pleasant 78-year-old female had mechanical fall 1 month ago with some tenderness to the anterior chest wall for the last month but no reproducible tenderness here. She reports increasing shortness of breath the past week a dry cough for the past month no fever no chill. Diagnosis includes pneumonia pleural effusion CHF ACS dysrhythmia MI. Covid    This patient has remained hemodynamically stable during their ED course. EKG showed normal sinus rhythm at 99 beats a minute no signs of any ST changes. Covid test was negative. Urinalysis negative for acute infection. CBC white count was elevated at 15.4. Chemistry showed creatinine elevated 1.6 slight KENTRELL but patient does have a history of mild renal insufficiency. First and second troponin were both 23 and a delta of 0. BNP was markedly elevated 21,000. Patient was given a dose of IV Lasix lactic acid was 2.6. Chest x-ray showed large left pleural effusion.   CT of the chest did show large left pleural effusion and possible infiltrates in the left lower lobe. Therefore with recent cough and elevated white count, patient was started on IV antibiotics Rocephin and azithromycin. Patient stable here in the department with plan for admission. Spoke to the hospitalist agreed with plan for admission. Admitted to monitored bed. Re-Evaluations:             Re-evaluation. Patients symptoms are improving    Re-examination  8/5/21   6:27 PM EDT          Vital Signs:   Vitals:    08/05/21 1408 08/05/21 1712 08/05/21 1839 08/05/21 2102   BP: (!) 111/58 119/67 117/71 132/81   Pulse: 104 91 88 90   Resp: 18 18 18 16   Temp: 97.3 °F (36.3 °C)      SpO2: 93% 95% 93% 93%   Weight: 139 lb (63 kg)      Height: 5' 6\" (1.676 m)            Counseling: The emergency provider has spoken with the patient and discussed todays results, in addition to providing specific details for the plan of care and counseling regarding the diagnosis and prognosis. Questions are answered at this time and they are agreeable with the plan.       --------------------------------- IMPRESSION AND DISPOSITION ---------------------------------    IMPRESSION  1. Pneumonia of left lower lobe due to infectious organism    2. Acute on chronic congestive heart failure, unspecified heart failure type ([facility] [place] [address]    3. Pleural effusion on left    4. Dyspnea on exertion        DISPOSITION  Disposition: Admit to telemetry  Patient condition is fair    NOTE: This report was transcribed using voice recognition software.  Every effort was made to ensure accuracy; however, inadvertent computerized transcription errors may be present        Lidia Guzman MD  08/06/21 8301

## 2021-08-05 NOTE — ED PROVIDER NOTES
FIRST PROVIDER CONTACT ASSESSMENT NOTE                                                                                                Department of Emergency Medicine                                                      First Provider Note  21  2:47 PM EDT  NAME: Otilia Ugarte  : 1935  MRN: [id number]    Chief Complaint: Cough, Shortness of Breath, Fall, and Rib Pain      History of Present Illness:   Otilia Ugarte is a 81 y.o. female who presents to the ED for cough, SOB. States she fell a few weeks ago. The cough congestion and shortness of breath just started. Denies any fever or chills. The patient was not vaccinated for COVID-19. She does have a history of CHF. No current LE edema. Focused Physical Exam:  VS:    ED Triage Vitals   BP Temp Temp src Pulse Resp SpO2 Height Weight   21 1408 21 1408 -- 21 1328 21 1328 21 1328 21 1408 21 1408   (!) 111/58 97.3 °F (36.3 °C)  113 20 93 % 5' 6\" (1.676 m) 139 lb (63 kg)        General: Alert and in no apparent distress. Medical History:  has a past medical history of Acute cystitis with hematuria, Anemia, Atrial fibrillation ([address] CKD (chronic kidney disease) stage 3, GFR 30-59 ml/min ([facility]), CPAP (continuous positive airway pressure) dependence, Diabetes mellitus ([address] Esophageal stricture, Gastric ulcer, Goiter, Heart murmur, Hypertension, Kidney stones, Rheumatic fever, and Sleep apnea. Surgical History:  has a past surgical history that includes cardiovascular stress test (); doppler echocardiography (03); doppler echocardiography (04); Hysterectomy; Cholecystectomy; polypectomy; Upper gastrointestinal endoscopy (); Colonoscopy (); Breast lumpectomy (Left); and Foot surgery. Social History:  reports that she has never smoked. She has never used smokeless tobacco. She reports that she does not drink alcohol and does not use drugs.     Family History: family history includes Alcohol Abuse in her father; Asthma in her mother; Diabetes in her mother.     Allergies: Nitrofuran derivatives, Other, and Sulfa antibiotics     Initial Plan of Care:  Initiate Treatment-Testing, Proceed toTreatment Area When Bed Available for ED Attending/MLP to Continue Care    -------------------------------------------------END OF FIRST PROVIDER CONTACT ASSESSMENT NOTE--------------------------------------------------------  Electronically signed by Katherine Ch PA-C   DD: 8/5/21       Katherine Ch PA-C  08/05/21 1448

## 2021-08-06 ENCOUNTER — APPOINTMENT (OUTPATIENT)
Dept: ULTRASOUND IMAGING | Age: 86
DRG: 291 | End: 2021-08-06
Payer: MEDICARE

## 2021-08-06 ENCOUNTER — APPOINTMENT (OUTPATIENT)
Dept: GENERAL RADIOLOGY | Age: 86
DRG: 291 | End: 2021-08-06
Payer: MEDICARE

## 2021-08-06 LAB
ANION GAP SERPL CALCULATED.3IONS-SCNC: 15 MMOL/L (ref 7–16)
APPEARANCE FLUID: CLEAR
BASOPHILS ABSOLUTE: 0.04 E9/L (ref 0–0.2)
BASOPHILS RELATIVE PERCENT: 0.3 % (ref 0–2)
BUN BLDV-MCNC: 42 MG/DL (ref 6–23)
CALCIUM SERPL-MCNC: 10 MG/DL (ref 8.6–10.2)
CELL COUNT FLUID TYPE: NORMAL
CHLORIDE BLD-SCNC: 97 MMOL/L (ref 98–107)
CO2: 24 MMOL/L (ref 22–29)
COLOR FLUID: YELLOW
CREAT SERPL-MCNC: 1.8 MG/DL (ref 0.5–1)
EKG ATRIAL RATE: 99 BPM
EKG P AXIS: 20 DEGREES
EKG P-R INTERVAL: 140 MS
EKG Q-T INTERVAL: 346 MS
EKG QRS DURATION: 86 MS
EKG QTC CALCULATION (BAZETT): 444 MS
EKG R AXIS: -33 DEGREES
EKG T AXIS: 84 DEGREES
EKG VENTRICULAR RATE: 99 BPM
EOSINOPHILS ABSOLUTE: 0.31 E9/L (ref 0.05–0.5)
EOSINOPHILS RELATIVE PERCENT: 2 % (ref 0–6)
FLUID TYPE: NORMAL
GFR AFRICAN AMERICAN: 32
GFR NON-AFRICAN AMERICAN: 27 ML/MIN/1.73
GLUCOSE BLD-MCNC: 107 MG/DL (ref 74–99)
GLUCOSE, FLUID: 164 MG/DL
HCT VFR BLD CALC: 33.3 % (ref 34–48)
HEMOGLOBIN: 10.4 G/DL (ref 11.5–15.5)
IMMATURE GRANULOCYTES #: 0.09 E9/L
IMMATURE GRANULOCYTES %: 0.6 % (ref 0–5)
LD, FLUID: 52 U/L
LYMPHOCYTES ABSOLUTE: 2.48 E9/L (ref 1.5–4)
LYMPHOCYTES RELATIVE PERCENT: 16.3 % (ref 20–42)
MCH RBC QN AUTO: 27.6 PG (ref 26–35)
MCHC RBC AUTO-ENTMCNC: 31.2 % (ref 32–34.5)
MCV RBC AUTO: 88.3 FL (ref 80–99.9)
MONOCYTE, FLUID: 66 %
MONOCYTES ABSOLUTE: 1.27 E9/L (ref 0.1–0.95)
MONOCYTES RELATIVE PERCENT: 8.3 % (ref 2–12)
NEUTROPHIL, FLUID: 35 %
NEUTROPHILS ABSOLUTE: 11.05 E9/L (ref 1.8–7.3)
NEUTROPHILS RELATIVE PERCENT: 72.5 % (ref 43–80)
NUCLEATED CELLS FLUID: 296 /UL
PDW BLD-RTO: 15.5 FL (ref 11.5–15)
PLATELET # BLD: 491 E9/L (ref 130–450)
PMV BLD AUTO: 9.4 FL (ref 7–12)
POTASSIUM REFLEX MAGNESIUM: 4.2 MMOL/L (ref 3.5–5)
PROCALCITONIN: 0.37 NG/ML (ref 0–0.08)
PROTEIN FLUID: 3.5 G/DL
RBC # BLD: 3.77 E12/L (ref 3.5–5.5)
RBC FLUID: <2000 /UL
SODIUM BLD-SCNC: 136 MMOL/L (ref 132–146)
WBC # BLD: 15.2 E9/L (ref 4.5–11.5)

## 2021-08-06 PROCEDURE — 85025 COMPLETE CBC W/AUTO DIFF WBC: CPT

## 2021-08-06 PROCEDURE — 1200000000 HC SEMI PRIVATE

## 2021-08-06 PROCEDURE — 36415 COLL VENOUS BLD VENIPUNCTURE: CPT

## 2021-08-06 PROCEDURE — 83986 ASSAY PH BODY FLUID NOS: CPT

## 2021-08-06 PROCEDURE — 88305 TISSUE EXAM BY PATHOLOGIST: CPT

## 2021-08-06 PROCEDURE — 80048 BASIC METABOLIC PNL TOTAL CA: CPT

## 2021-08-06 PROCEDURE — 71045 X-RAY EXAM CHEST 1 VIEW: CPT

## 2021-08-06 PROCEDURE — 6360000002 HC RX W HCPCS: Performed by: INTERNAL MEDICINE

## 2021-08-06 PROCEDURE — 88112 CYTOPATH CELL ENHANCE TECH: CPT

## 2021-08-06 PROCEDURE — 0W9B3ZZ DRAINAGE OF LEFT PLEURAL CAVITY, PERCUTANEOUS APPROACH: ICD-10-PCS | Performed by: INTERNAL MEDICINE

## 2021-08-06 PROCEDURE — 93010 ELECTROCARDIOGRAM REPORT: CPT | Performed by: INTERNAL MEDICINE

## 2021-08-06 PROCEDURE — 2580000003 HC RX 258: Performed by: INTERNAL MEDICINE

## 2021-08-06 PROCEDURE — 87205 SMEAR GRAM STAIN: CPT

## 2021-08-06 PROCEDURE — 6370000000 HC RX 637 (ALT 250 FOR IP): Performed by: INTERNAL MEDICINE

## 2021-08-06 PROCEDURE — 32555 ASPIRATE PLEURA W/ IMAGING: CPT

## 2021-08-06 PROCEDURE — 83615 LACTATE (LD) (LDH) ENZYME: CPT

## 2021-08-06 PROCEDURE — 84145 PROCALCITONIN (PCT): CPT

## 2021-08-06 PROCEDURE — 89051 BODY FLUID CELL COUNT: CPT

## 2021-08-06 PROCEDURE — 82947 ASSAY GLUCOSE BLOOD QUANT: CPT

## 2021-08-06 PROCEDURE — 99232 SBSQ HOSP IP/OBS MODERATE 35: CPT | Performed by: INTERNAL MEDICINE

## 2021-08-06 PROCEDURE — 87070 CULTURE OTHR SPECIMN AEROBIC: CPT

## 2021-08-06 PROCEDURE — 84157 ASSAY OF PROTEIN OTHER: CPT

## 2021-08-06 RX ADMIN — Medication 2000 UNITS: at 09:16

## 2021-08-06 RX ADMIN — SPIRONOLACTONE 25 MG: 25 TABLET ORAL at 09:17

## 2021-08-06 RX ADMIN — FERROUS SULFATE TAB 325 MG (65 MG ELEMENTAL FE) 325 MG: 325 (65 FE) TAB at 09:16

## 2021-08-06 RX ADMIN — METOPROLOL SUCCINATE 50 MG: 50 TABLET, EXTENDED RELEASE ORAL at 00:54

## 2021-08-06 RX ADMIN — SODIUM CHLORIDE, PRESERVATIVE FREE 10 ML: 5 INJECTION INTRAVENOUS at 09:17

## 2021-08-06 RX ADMIN — SODIUM CHLORIDE, PRESERVATIVE FREE 10 ML: 5 INJECTION INTRAVENOUS at 21:30

## 2021-08-06 RX ADMIN — PANTOPRAZOLE SODIUM 40 MG: 40 TABLET, DELAYED RELEASE ORAL at 06:32

## 2021-08-06 RX ADMIN — SERTRALINE HYDROCHLORIDE 50 MG: 50 TABLET ORAL at 09:17

## 2021-08-06 RX ADMIN — METOPROLOL SUCCINATE 50 MG: 50 TABLET, EXTENDED RELEASE ORAL at 21:30

## 2021-08-06 RX ADMIN — METOPROLOL SUCCINATE 50 MG: 50 TABLET, EXTENDED RELEASE ORAL at 09:17

## 2021-08-06 RX ADMIN — FUROSEMIDE 40 MG: 10 INJECTION, SOLUTION INTRAMUSCULAR; INTRAVENOUS at 09:17

## 2021-08-06 RX ADMIN — FUROSEMIDE 40 MG: 10 INJECTION, SOLUTION INTRAMUSCULAR; INTRAVENOUS at 18:04

## 2021-08-06 ASSESSMENT — PAIN SCALES - GENERAL
PAINLEVEL_OUTOF10: 0
PAINLEVEL_OUTOF10: 0

## 2021-08-06 NOTE — CARE COORDINATION
Met with pt at bedside re; d/c planning; pt reside with son; uses rollator prn; no home 02 ; . Pt has hx [place] [facility] for broken foot years ago. Offered HHC services at discharge; not receptive at this time. will advise us if she changes her mind. Await pulmonology input; IV atb's; iv diuresis; CHF ed to see. Pt currently on room air. PCP is Maria C Bailon. Pharmacy is [facility]. plan is home with son at discharge. Son can transport. Will follow. [place] Hen[place].

## 2021-08-06 NOTE — H&P
[facility] History and Physical      CHIEF COMPLAINT:  Shortness of breath    History of Present Illness: 82 yo F PMHx HTN, JERICA, GERD,  diastolic heart failure, moderate mitral regurg CKD stage III paroxysmal A. Fib. For the past 6 weeks she has had gradually worsening shortness of breath as well as a dry cough. Now has shortness of breath when she talks and when she ambulates short distances. Her cough has worsened with occasional production. She states she had a low-grade temperature around 99 for the past 4 weeks but it is unclear if this is accurate. No URTI symptoms. 4 weeks ago she fell on the kitchen sink counter and since then has had pain in the left side of her chest with palpation and with deep inspiration.     Informant(s) for H&P: Patient    REVIEW OF SYSTEMS:  A comprehensive 14 point review of systems was negative except for: what is in the HPI    PMH:  Past Medical History:   Diagnosis Date    Acute cystitis with hematuria 5/3/2019    Anemia 2008    RECEIVED 4 UNITS BLOOD    Atrial fibrillation ([facility])     CKD (chronic kidney disease) stage 3, GFR 30-59 ml/min ([facility]) 5/3/2019    CPAP (continuous positive airway pressure) dependence     Diabetes mellitus ([facility] [address]     Esophageal stricture     Gastric ulcer     Goiter     Heart murmur     Hypertension     SINCE 1994    Kidney stones     Rheumatic fever     POSSIBLY AS A CHILD    Sleep apnea        Surgical History:  Past Surgical History:   Procedure Laterality Date    BREAST LUMPECTOMY Left     CALCIUM BUILDUP    CARDIOVASCULAR STRESS TEST  1999    ISCHEMIC NUCLEAR STRESS TEST - PATIENT REFUSED HEART CATHETERIZATION    CHOLECYSTECTOMY      COLONOSCOPY  07/11    W/POLYP REMOVAL    DOPPLER ECHOCARDIOGRAPHY  05/14/03    POSSIBLE MILD MITRAL STENOSIS    DOPPLER ECHOCARDIOGRAPHY  06/09/04    MILD MITRAL STENSIS, MODERATE MITRAL REGURGITATION    FOOT SURGERY      HYSTERECTOMY      POLYPECTOMY      16 POLYPS REMOVED FROM STOMACH AND 1 FROM COLON    UPPER GASTROINTESTINAL ENDOSCOPY  07/11       Medications Prior to Admission:    Prior to Admission medications    Medication Sig Start Date End Date Taking? Authorizing Provider   spironolactone (ALDACTONE) 25 MG tablet Take 25 mg by mouth daily    Historical Provider, MD   valsartan (DIOVAN) 80 MG tablet Take 1 tablet by mouth daily 3/11/21   Iva Guardado DO   furosemide (LASIX) 40 MG tablet Take 1 tablet by mouth daily 3/11/21   Trevon Guardado DO   potassium chloride (KLOR-CON M) 20 MEQ extended release tablet Take 20 mEq by mouth daily    Historical Provider, MD   ferrous sulfate (IRON 325) 325 (65 Fe) MG tablet Take 325 mg by mouth daily (with breakfast)    Historical Provider, MD   metoprolol succinate (TOPROL XL) 50 MG extended release tablet Take 50 mg by mouth 2 times daily    Historical Provider, MD   omeprazole (PRILOSEC) 40 MG delayed release capsule Take 1 capsule by mouth every morning 10/17/19   Historical Provider, MD   sertraline (ZOLOFT) 50 MG tablet Take 50 mg by mouth daily  9/26/19   Historical Provider, MD   CPAP Machine MISC Inhale into the lungs nightly     Historical Provider, MD   vitamin D3 (CHOLECALCIFEROL) 1000 UNITS TABS tablet Take 1 tablet by mouth daily. Patient taking differently: Take 2,000 Units by mouth daily  9/17/14   Sreedhar Oro DO       Allergies:    Nitrofuran derivatives, Other, and Sulfa antibiotics    Social History:    reports that she has never smoked. She has never used smokeless tobacco. She reports that she does not drink alcohol and does not use drugs. Family History:   family history includes Alcohol Abuse in her father; Asthma in her mother; Diabetes in her mother.        PHYSICAL EXAM:  Vitals:  /60   Pulse 96   Temp 97.4 °F (36.3 °C) (Oral)   Resp 16   Ht 5' 6\" (1.676 m)   Wt 139 lb (63 kg)   SpO2 94%   BMI 22.44 kg/m²   General Appearance: alert and oriented to person, place and time and in no acute distress  Skin: warm and dry, turgor not diminished  Head: normocephalic and atraumatic  Eyes: pupils equal, round, and reactive to light, extraocular eye movements intact, conjunctivae normal  Neck: neck supple and non tender without mass   Pulmonary/Chest: clear to auscultation bilaterally- no wheezes, rales or rhonchi, normal air movement, no respiratory distress diminished on left. tachypneic with talking  Cardiovascular: normal rate, normal S1 and S2 and no M/R/R  Abdomen: soft, non-tender, non-distended, normal bowel sounds, no masses or organomegaly  Extremities: no cyanosis, no clubbing and no edema  Neurologic: no cranial nerve deficit and speech normal        LABS:  Recent Labs     08/05/21  1701      K 4.3   CL 97*   CO2 24   BUN 39*   CREATININE 1.6*   GLUCOSE 194*   CALCIUM 9.8       Recent Labs     08/05/21  1701   WBC 15.4*   RBC 3.73   HGB 10.3*   HCT 33.3*   MCV 89.3   MCH 27.6   MCHC 30.9*   RDW 15.5*   *   MPV 9.4       No results for input(s): POCGLU in the last 72 hours. Radiology:   CT CHEST WO CONTRAST   Final Result   Cardiomegaly with the pericardial effusion and coronary artery calcification. Large pleural effusion with atelectasis/infiltrates in the left lower lobe   and lingula likely pneumonia or CHF. Underlying malignancy has to be   excluded and close surveillance is recommended to see resolution. .         XR CHEST (2 VW)   Final Result   1. Large left pleural effusion   2. There is no evidence of right lung pneumonia. EKG: no acute abnl    ASSESSMENT:      Active Problems:    Pleural effusion on left  LEE  Acute kidney injury  msk chest pain  Paroxysmal A. fib  Essential hypertension  Diastolic heart failure  Moderate mitral regurg  JERICA    PLAN:    Dyspnea on exertion: Likely due to pleural effusion with atelectasis. ,  Does not appear to have been significant enough to cause pneumothorax. ?   Fluid overload  Consult pulmonology to consider thoracentesis  BNP elevated, trial gentle diuresis  Check procalcitonin. Hold off on antibiotics  On CPAP for JERICA. Start now. Acute kidney injury: Hold valsartan. Continue Aldactone. Monitor with diuresis. Paroxysmal A. fib: Normal sinus when seen. Continue Toprol. Not on anticoagulation. JERICA: Continue CPAP    Code Status: Full  DVT prophylaxis: Hold pending thoracentesis      NOTE: This report was transcribed using voice recognition software. Every effort was made to ensure accuracy; however, inadvertent computerized transcription errors may be present.   Electronically signed by Alexei Ohara MD on 8/5/2021 at 10:42 PM

## 2021-08-06 NOTE — PROGRESS NOTES
Occupational Therapy      Occupational Therapy referral received.   Attempt this pm   Patient had thoracentesis this date - feeling sore on L side - preferred to bed left in bed to rest.

## 2021-08-06 NOTE — CONSULTS
Jojo Aguilera M.D.,[facility]  Mounika Gonsalez D.O., F.A.C.O.I., Sara Harry M.D. Skye Obrien M.D. Donovan Gallardo D.O. Patient:  Marianne Nicholas 81 y.o. female MRN: [id number]     Date of Service: 8/6/2021      PULMONARY CONSULTATION    Reason for Consultation: pleural effusion  Referring Physician: Dr. Tolu Rapp with the referring physician will be sent via the electronic medical record. Chief Complaint: shortness of breath    CODE STATUS: full code    SUBJECTIVE:  HPI:  Marianne Nicholas is a 81 y.o. female not previously known to our practice with a past medical history of anemia, atrial fibrillation, CKD, JERICA treated with CPAP, diabetes mellitus, esophageal stricture, gastric ulcer, goiter, heart murmur, essential hypertension, kidney stones, rheumatic fever who presented to the ED for cough and shortness of breath. Apparently, she fell a few weeks ago, but the cough and shortness of breath seems noncontributory. She denies fever and chills and was not vaccinated for COVID-19. She does say that she has had a low-grade fever for about a month. She has a history of CHF. Today, she is seen and examined sitting on the side of the bed in no apparent distress. She is currently on room air but endorses shortness of breath and says that she hears rattling in her lungs when lying down. She wears a CPAP at night which is managed by her PCP. Chest x-ray showed large effusion on the left side, will undergo thoracentesis today.       Past Medical History:   Diagnosis Date    Acute cystitis with hematuria 5/3/2019    Anemia 2008    RECEIVED 4 UNITS BLOOD    Atrial fibrillation (HCC)     CKD (chronic kidney disease) stage 3, GFR 30-59 ml/min ([facility]) 5/3/2019    CPAP (continuous positive airway pressure) dependence     Diabetes mellitus ([address]     Esophageal stricture     Gastric ulcer     Goiter     Heart murmur     Hypertension     SINCE 1994    Kidney stones     Rheumatic fever     POSSIBLY AS A CHILD    Sleep apnea        Past Surgical History:   Procedure Laterality Date    BREAST LUMPECTOMY Left     CALCIUM BUILDUP    CARDIOVASCULAR STRESS TEST  1999    ISCHEMIC NUCLEAR STRESS TEST - PATIENT REFUSED HEART CATHETERIZATION    CHOLECYSTECTOMY      COLONOSCOPY  07/11    W/POLYP REMOVAL    DOPPLER ECHOCARDIOGRAPHY  05/14/03    POSSIBLE MILD MITRAL STENOSIS    DOPPLER ECHOCARDIOGRAPHY  06/09/04    MILD MITRAL STENSIS, MODERATE MITRAL REGURGITATION    FOOT SURGERY      HYSTERECTOMY      POLYPECTOMY      16 POLYPS REMOVED FROM STOMACH AND 1 FROM COLON    UPPER GASTROINTESTINAL ENDOSCOPY  07/11       Family History   Problem Relation Age of Onset    Asthma Mother     Diabetes Mother     Alcohol Abuse Father        Social History:   Social History     Socioeconomic History    Marital status:      Spouse name: Not on file    Number of children: Not on file    Years of education: Not on file    Highest education level: Not on file   Occupational History    Not on file   Tobacco Use    Smoking status: Never Smoker    Smokeless tobacco: Never Used   Vaping Use    Vaping Use: Never used   Substance and Sexual Activity    Alcohol use: No    Drug use: Never    Sexual activity: Never   Other Topics Concern    Not on file   Social History Narrative    Not on file     Social Determinants of Health     Financial Resource Strain:     Difficulty of Paying Living Expenses:    Food Insecurity:     Worried About Running Out of Food in the Last Year:     [address] in the Last Year:    Transportation Needs:     Lack of Transportation (Medical):      Lack of Transportation (Non-Medical):    Physical Activity:     Days of Exercise per Week:     Minutes of Exercise per Session:    Stress:     Feeling of Stress :    Social Connections:     Frequency of Communication with Friends and Family:     Frequency of Social Gatherings with Friends and Family:     Attends [employer] Services:     Active Member of Clubs or Organizations:     Attends Club or Organization Meetings:     Marital Status:    Intimate Partner Violence:     Fear of Current or Ex-Partner:     Emotionally Abused:     Physically Abused:     Sexually Abused:      Smoking history: The patient is a never smoker  ETOH:   reports no history of alcohol use. Exposures: There  is not history of TB or TB exposure. There is not asbestos or silica dust exposure. The patient reports does not have coal, foundry, quarry or [employer] exposure. Recent travel history none. There is not  history of recreational or IV drug use. There is not hot tub exposure. The patient does not have any exotic pets, turtles or exotic birds.        Vaccines:    Influenza:  Up-to-date  Pneumococcal Polysaccharide:  Up-to-date; approximate date: 2019  Covid vaccine: None  Immunization History   Administered Date(s) Administered    Influenza 11/13/2013    Influenza Vaccine, unspecified formulation 10/05/2018    Influenza, High Dose (Fluzone 65 yrs and older) 10/07/2015    Pneumococcal Conjugate 13-valent ([id number]) 06/05/2015    Pneumococcal Conjugate 7-valent (Prevnar7) 12/06/2005    Pneumococcal Polysaccharide ([id number]) 04/03/2019        Home Meds: Medications Prior to Admission: spironolactone (ALDACTONE) 25 MG tablet, Take 25 mg by mouth daily  valsartan (DIOVAN) 80 MG tablet, Take 1 tablet by mouth daily  furosemide (LASIX) 40 MG tablet, Take 1 tablet by mouth daily  potassium chloride (KLOR-CON M) 20 MEQ extended release tablet, Take 20 mEq by mouth daily  ferrous sulfate (IRON 325) 325 (65 Fe) MG tablet, Take 325 mg by mouth daily (with breakfast)  metoprolol succinate (TOPROL XL) 50 MG extended release tablet, Take 50 mg by mouth 2 times daily  omeprazole (PRILOSEC) 40 MG delayed release capsule, Take 1 capsule by mouth every morning  sertraline (ZOLOFT) 50 MG tablet, Take 50 mg by mouth daily   CPAP Machine MISC, Inhale into the lungs nightly   vitamin D3 (CHOLECALCIFEROL) 1000 UNITS TABS tablet, Take 1 tablet by mouth daily. (Patient taking differently: Take 2,000 Units by mouth daily )    CURRENT MEDS :  Scheduled Meds:   azithromycin  500 mg Intravenous Q24H    And    cefTRIAXone (ROCEPHIN) IV  1,000 mg Intravenous Q24H    ferrous sulfate  325 mg Oral Daily with breakfast    metoprolol succinate  50 mg Oral BID    pantoprazole  40 mg Oral QAM AC    sertraline  50 mg Oral Daily    spironolactone  25 mg Oral Daily    Vitamin D  2,000 Units Oral Daily    sodium chloride flush  10 mL Intravenous 2 times per day    furosemide  40 mg Intravenous BID       Continuous Infusions:   sodium chloride         Allergies   Allergen Reactions    Nitrofuran Derivatives Other (See Comments)     Causes fever and chills.  Other Other (See Comments)     HONEYDEW: Sore Throat    Sulfa Antibiotics        REVIEW OF SYSTEMS:  Constitutional: + Low-grade fever,weight loss, night sweats, and fatigue  Skin: Denies pigmentation, dark lesions, and rashes   HEENT: Denies hearing loss, tinnitus, ear drainage, epistaxis, sore throat, and hoarseness. Cardiovascular: Denies palpitations, chest pain, and chest pressure. Respiratory: + Cough,dyspnea at rest, hemoptysis, apnea, and choking.   Gastrointestinal: Denies nausea, vomiting, poor appetite, diarrhea, heartburn or reflux  Genitourinary: Denies dysuria, frequency, urgency or hematuria  Musculoskeletal: Denies myalgias, muscle weakness, and bone pain  Neurological: Denies dizziness, vertigo, headache, and focal weakness  Psychological: Denies anxiety and depression  Endocrine: Denies heat intolerance and cold intolerance  Hematopoietic/Lymphatic: Denies bleeding problems and blood transfusions    OBJECTIVE:   /67   Pulse 74   Temp 97.7 °F (36.5 °C) (Oral)   Resp 20   Ht 5' 6\" (1.676 m)   Wt 140 lb (63.5 kg)   SpO2 92%   BMI 22.60 kg/m²   SpO2 Readings from Last 1 Encounters:   08/06/21 92%        I/O:  No intake or output data in the 24 hours ending 08/06/21 1415  Vent Information  SpO2: 92 %                Physical Exam:  General: The patient is sitting on the side of the bed comfortably without any distress. Breathing is not labored  HEENT: Pupils are equal round and reactive to light, there are no oral lesions and no post-nasal drip   Neck: supple without adenopathy  Cardiovascular: regular rate and rhythm without murmur or gallop  Respiratory: Clear to auscultation bilaterally without wheezing or crackles. Air entry is symmetric  Abdomen: soft, non-tender, non-distended, normal bowel sounds  Extremities: warm, no edema, no clubbing  Skin: no rash or lesion  Neurologic: CN II-XII grossly intact, no focal deficits    Pulmonary Function Testing personally reviewed and interpreted  Not on file    Imaging personally reviewed:  CXR 8/6/2021  Pending    Echo:  3/5/2021  Normal left ventricle size and systolic function. Ejection fraction is visually estimated at 60-65%. Atrial fibrillation may   affect the evaluation of LV systolic function. No regional wall motion abnormalities seen. Normal left ventricle wall thickness. No evidence of thrombus within left atrium or appendage. Normal right ventricular size and function. Moderate mitral regurgitation is present. No hemodynamically significant aortic stenosis is present. Mild aortic regurgitation is noted. Mild tricuspid regurgitation. RVSP is 41 mmHg. Pulmonary hypertension is mild . No evidence for hemodynamically significant pericardial effusion.     Labs:  Lab Results   Component Value Date    WBC 15.2 08/06/2021    HGB 10.4 08/06/2021    HCT 33.3 08/06/2021    MCV 88.3 08/06/2021    MCH 27.6 08/06/2021    MCHC 31.2 08/06/2021    RDW 15.5 08/06/2021     08/06/2021    MPV 9.4 08/06/2021     Lab Results   Component Value Date     08/06/2021    K 4.2 08/06/2021    CL 97 08/06/2021    CO2 24 08/06/2021    BUN 42 08/06/2021    CREATININE 1.8 08/06/2021    LABALBU 3.4 08/05/2021    LABALBU 4.3 02/27/2012    LABALBU 3.5 02/27/2012    CALCIUM 10.0 08/06/2021    GFRAA 32 08/06/2021    LABGLOM 27 08/06/2021     Lab Results   Component Value Date    PROTIME 23.0 03/05/2021    PROTIME 12.7 06/29/2011    INR 2.1 03/05/2021     Recent Labs     08/05/21  1701   PROBNP 21,994*     No results for input(s): TROPONINI in the last 72 hours. Recent Labs     08/06/21  0504   PROCAL 0.37*     This SmartLink has not been configured with any valid records. Micro:  No results for input(s): CULTRESP in the last 72 hours. No results for input(s): LABGRAM in the last 72 hours. No results for input(s): LEGUR in the last 72 hours. No results for input(s): STREPNEUMAGU in the last 72 hours. No results for input(s): LP1UAG in the last 72 hours. Assessment:  1. Pleural effusion, left lung  2. Dyspnea on exertion  3. Acute kidney injury  4. Paroxysmal A. fib  5. Essential hypertension  6. Diastolic heart failure  7. Moderate mitral regurgitation  8. JERICA treated with CPAP    Plan:  1. Oxygen therapy as needed  2. Thoracentesis today, 1 L removed, awaiting culture and cytology  3. Follow chest x-ray  4. Check procalcitonin, ESR, CRP, sputum culture  5. Agree with gentle diuresis  6. Continue CPAP therapy    Thank you for allowing me to participate in the care of Thierno Dumont. Please feel free to call with questions. This plan of care was reviewed in collaboration with Dr. Maegan Chu    Electronically signed by YOHANA Skaggs CNP on 8/6/2021 at 2:15 PM      Note: This report was completed utilizing computer voice recognition software. Every effort has been made to ensure accuracy, however; inadvertent computerized transcription errors may be present      I personally saw, examined, and cared for the patient. Labs, medications, radiographs reviewed.  I agree with history exam and plans detailed in NP note.    Acute on chronic dyspnea in patient with large left pleural effusion, diastolic heart failure and KENTRELL on CKD. Status post left thoracentesis with 1 L of clear yellow fluid removed. Suspect transudate.     Doubt pneumonia    -Continue diuresis  -DC antibiotics from a pulmonary perspective    Jensen Fuentes, DO

## 2021-08-06 NOTE — PROCEDURES
THORACENTESIS PROCEDURE NOTE    Date: 8/6/2021    Procedure:  diagnostic and therapeutic thoracentesis    Attending:  Ramila Rosales DO    Indication: pleural effusion    Findings:  1. Large free flowing left effusion  2. 1000 mL of clear yellow fluid removed    Site: left    Consent: Obtained    Medications: 10 cc of 1% lidocaine    Description:  The procedure, including risks and benefits, was discussed. Timeout performed. Ultrasound used: Yes. Using ultrasound, largest pocket of pleural fluid identified. Area prepped and draped in sterile fashion. Anesthesia with 10 ml 1% lidocaine. An 18 gauge needle with 8 [place] catheter was advanced into the pleural space, catheter advanced over the needle and needle withdrawn. 1000 mL of fluid was collected for diagnostic and therapeutic purposes. Catheter removed. Dressing placed over the procedure site.     CXR ordered for post-thoracentesis    Est blood loss: minimal    ELECTRONICALLY SIGNED:  Ramila Rosales DO  8/6/2021  1:17 PM

## 2021-08-06 NOTE — PROGRESS NOTES
Comprehensive Nutrition Assessment    Type and Reason for Visit:  Initial, RD Nutrition Re-Screen/LOS    Nutrition Recommendations/Plan: Continue current diet and added ONS to help optimize nutrient needs. Nutrition Assessment:  Pt adm with SOB on exertion with Pneumonia of left lower lobe due to infectious organism. PMHx HTN, JERICA, GERD,  diastolic heart failure, moderate mitral regurg CKD stage III paroxysmal A. Fib. Plans for thoracentesis this afternoon with pulmonology. Noted wt loss of 10#s in 4months. Pt stated prior to adm poor appetite and PO ~1-2wks. Malnutrition Assessment:  Malnutrition Status:  No malnutrition    Context:  Chronic Illness     Findings of the 6 clinical characteristics of malnutrition:  Energy Intake:  Mild decrease in energy intake (Comment) (pt stated prior to adm poor appetite and PO ~1-2wks)  Weight Loss:  1 - Mild weight loss (specify amount and time period) (7% over 4months)     Body Fat Loss:  No significant body fat loss     Muscle Mass Loss:  No significant muscle mass loss    Fluid Accumulation:  No significant fluid accumulation     Strength:  Not Performed    Estimated Daily Nutrient Needs:  Energy (kcal):   (MSJx1. 2SF); Weight Used for Energy Requirements:  Current     Protein (g):  80-100g (1.3-1.5g/kg CBW); Weight Used for Protein Requirements:  Current        Fluid (ml/day):  ; Method Used for Fluid Requirements:  1 ml/kcal      Nutrition Related Findings:  A&Ox4, Abd WDL, I/Os UTO per EMR, no Edema      Wounds:  None       Current Nutrition Therapies:    ADULT DIET; Regular; No Added Salt (3-4 gm)    Anthropometric Measures:  · Height: 5' 6\" (167.6 cm)  · Current Body Weight: 140 lb (63.5 kg) (8/6)   · Admission Body Weight:      · Usual Body Weight: 150 lb 6.4 oz (68.2 kg) (3/3/21 [facility])     · Ideal Body Weight: 130 lbs; % Ideal Body Weight 107.7 %   · BMI: 22.6      · BMI Categories: Normal Weight (BMI 18.5-24. 9)       Nutrition Diagnosis: · Inadequate oral intake related to impaired respiratory function (pneumonia) as evidenced by intake 26-50%, weight loss, poor intake prior to admission, nausea, GI abnormality    Nutrition Interventions:   Food and/or Nutrient Delivery:  Continue Current Diet, Start Oral Nutrition Supplement (Ensure Enlive BID)  Nutrition Education/Counseling:  Education initiated (discussed importance of ONS)   Coordination of Nutrition Care:  Continue to monitor while inpatient    Goals:  >50% of meals and ONS consumed       Nutrition Monitoring and Evaluation:   Behavioral-Environmental Outcomes:  None Identified   Food/Nutrient Intake Outcomes:  Food and Nutrient Intake, Supplement Intake  Physical Signs/Symptoms Outcomes:  Biochemical Data, GI Status, Nausea or Vomiting, Fluid Status or Edema, Nutrition Focused Physical Findings, Skin, Weight     Discharge Planning:     Too soon to determine     Electronically signed by Moishe Bloch, RD, LD on 8/6/21 at 1:27 PM EDT    Contact: [phone]

## 2021-08-06 NOTE — PROGRESS NOTES
Message sent to Dr Deon Lane who is covering for Dr Maryanne Hanley for Pulmonary consult by this nurse via [facility]

## 2021-08-06 NOTE — PROGRESS NOTES
[facility] Progress Note    Admitting Date and Time: 8/5/2021  5:54 PM  Admit Dx: Dyspnea on exertion [R06.00]  Pleural effusion on left [J90]  Pneumonia of left lower lobe due to infectious organism [J18.9]  Acute on chronic congestive heart failure, unspecified heart failure type ([address] [I50.9]    Subjective:  Patient is being followed for Dyspnea on exertion [R06.00]  Pleural effusion on left [J90]  Pneumonia of left lower lobe due to infectious organism [J18.9]  Acute on chronic congestive heart failure, unspecified heart failure type ([address] [I50.9]     Pt resting in bed in no acute distress watching TV  [place]  Reporting ongoing sob  Plans for thoracentesis this afternoon with pulmonology  Reporting her weight has been down since her last hospitalization pt claims she is down 10 lb          ROS: denies fever, chills, cp, sob, n/v, HA unless stated above.      azithromycin  500 mg Intravenous Q24H    And    cefTRIAXone (ROCEPHIN) IV  1,000 mg Intravenous Q24H    ferrous sulfate  325 mg Oral Daily with breakfast    metoprolol succinate  50 mg Oral BID    pantoprazole  40 mg Oral QAM AC    sertraline  50 mg Oral Daily    spironolactone  25 mg Oral Daily    Vitamin D  2,000 Units Oral Daily    sodium chloride flush  10 mL Intravenous 2 times per day    furosemide  40 mg Intravenous BID     sodium chloride flush, 10 mL, PRN  sodium chloride, 25 mL, PRN  polyethylene glycol, 17 g, Daily PRN  acetaminophen, 650 mg, Q6H PRN   Or  acetaminophen, 650 mg, Q6H PRN         Objective:    /67   Pulse 74   Temp 97.7 °F (36.5 °C) (Oral)   Resp 20   Ht 5' 6\" (1.676 m)   Wt 140 lb (63.5 kg)   SpO2 92%   BMI 22.60 kg/m²     General Appearance: alert and oriented to person, place and time and in no acute distress  Skin: warm and dry  Neck: neck supple and non tender without mass   Pulmonary/Chest: clear to auscultation bilaterally-  Cardiovascular: normal rate, normal S1 and S2 and no carotid Leukocytosis: monitor    6. JERICA on CPAP    7. Deconditioning: PT/OT    NOTE: This report was transcribed using voice recognition software. Every effort was made to ensure accuracy; however, inadvertent computerized transcription errors may be present.   Electronically signed by CHEYANNE Sharma on 8/6/2021 at 10:41 AM

## 2021-08-07 VITALS
OXYGEN SATURATION: 95 % | HEIGHT: 66 IN | TEMPERATURE: 97.9 F | WEIGHT: 140.31 LBS | HEART RATE: 69 BPM | DIASTOLIC BLOOD PRESSURE: 55 MMHG | SYSTOLIC BLOOD PRESSURE: 116 MMHG | RESPIRATION RATE: 20 BRPM | BODY MASS INDEX: 22.55 KG/M2

## 2021-08-07 LAB
ANION GAP SERPL CALCULATED.3IONS-SCNC: 10 MMOL/L (ref 7–16)
BASOPHILS ABSOLUTE: 0.03 E9/L (ref 0–0.2)
BASOPHILS RELATIVE PERCENT: 0.3 % (ref 0–2)
BUN BLDV-MCNC: 46 MG/DL (ref 6–23)
CALCIUM SERPL-MCNC: 8.6 MG/DL (ref 8.6–10.2)
CHLORIDE BLD-SCNC: 101 MMOL/L (ref 98–107)
CO2: 27 MMOL/L (ref 22–29)
CREAT SERPL-MCNC: 1.6 MG/DL (ref 0.5–1)
EOSINOPHILS ABSOLUTE: 0.29 E9/L (ref 0.05–0.5)
EOSINOPHILS RELATIVE PERCENT: 3.1 % (ref 0–6)
GFR AFRICAN AMERICAN: 37
GFR NON-AFRICAN AMERICAN: 31 ML/MIN/1.73
GLUCOSE BLD-MCNC: 128 MG/DL (ref 74–99)
GRAM STAIN ORDERABLE: NORMAL
HCT VFR BLD CALC: 30.5 % (ref 34–48)
HEMOGLOBIN: 9.7 G/DL (ref 11.5–15.5)
IMMATURE GRANULOCYTES #: 0.08 E9/L
IMMATURE GRANULOCYTES %: 0.9 % (ref 0–5)
LYMPHOCYTES ABSOLUTE: 1.76 E9/L (ref 1.5–4)
LYMPHOCYTES RELATIVE PERCENT: 18.8 % (ref 20–42)
MCH RBC QN AUTO: 27.6 PG (ref 26–35)
MCHC RBC AUTO-ENTMCNC: 31.8 % (ref 32–34.5)
MCV RBC AUTO: 86.9 FL (ref 80–99.9)
MONOCYTES ABSOLUTE: 0.71 E9/L (ref 0.1–0.95)
MONOCYTES RELATIVE PERCENT: 7.6 % (ref 2–12)
NEUTROPHILS ABSOLUTE: 6.49 E9/L (ref 1.8–7.3)
NEUTROPHILS RELATIVE PERCENT: 69.3 % (ref 43–80)
PDW BLD-RTO: 15.5 FL (ref 11.5–15)
PLATELET # BLD: 386 E9/L (ref 130–450)
PMV BLD AUTO: 9.3 FL (ref 7–12)
POTASSIUM REFLEX MAGNESIUM: 3.9 MMOL/L (ref 3.5–5)
RBC # BLD: 3.51 E12/L (ref 3.5–5.5)
SODIUM BLD-SCNC: 138 MMOL/L (ref 132–146)
WBC # BLD: 9.4 E9/L (ref 4.5–11.5)

## 2021-08-07 PROCEDURE — 97161 PT EVAL LOW COMPLEX 20 MIN: CPT

## 2021-08-07 PROCEDURE — 80048 BASIC METABOLIC PNL TOTAL CA: CPT

## 2021-08-07 PROCEDURE — 99239 HOSP IP/OBS DSCHRG MGMT >30: CPT | Performed by: INTERNAL MEDICINE

## 2021-08-07 PROCEDURE — 6370000000 HC RX 637 (ALT 250 FOR IP): Performed by: INTERNAL MEDICINE

## 2021-08-07 PROCEDURE — 85025 COMPLETE CBC W/AUTO DIFF WBC: CPT

## 2021-08-07 PROCEDURE — 2580000003 HC RX 258: Performed by: INTERNAL MEDICINE

## 2021-08-07 PROCEDURE — 6360000002 HC RX W HCPCS: Performed by: INTERNAL MEDICINE

## 2021-08-07 PROCEDURE — 36415 COLL VENOUS BLD VENIPUNCTURE: CPT

## 2021-08-07 RX ADMIN — FERROUS SULFATE TAB 325 MG (65 MG ELEMENTAL FE) 325 MG: 325 (65 FE) TAB at 08:44

## 2021-08-07 RX ADMIN — SERTRALINE HYDROCHLORIDE 50 MG: 50 TABLET ORAL at 08:45

## 2021-08-07 RX ADMIN — Medication 2000 UNITS: at 08:44

## 2021-08-07 RX ADMIN — PANTOPRAZOLE SODIUM 40 MG: 40 TABLET, DELAYED RELEASE ORAL at 06:30

## 2021-08-07 RX ADMIN — SPIRONOLACTONE 25 MG: 25 TABLET ORAL at 08:44

## 2021-08-07 RX ADMIN — FUROSEMIDE 40 MG: 10 INJECTION, SOLUTION INTRAMUSCULAR; INTRAVENOUS at 08:45

## 2021-08-07 RX ADMIN — METOPROLOL SUCCINATE 50 MG: 50 TABLET, EXTENDED RELEASE ORAL at 08:44

## 2021-08-07 RX ADMIN — SODIUM CHLORIDE, PRESERVATIVE FREE 10 ML: 5 INJECTION INTRAVENOUS at 08:46

## 2021-08-07 ASSESSMENT — PAIN SCALES - GENERAL: PAINLEVEL_OUTOF10: 0

## 2021-08-07 NOTE — PROGRESS NOTES
Physical Therapy    Facility/Department: [address] MED SURG/TELE  Initial Assessment    NAME: Sterling Buitrago  : 1935  MRN: [id number]    Date of Service: 2021       REQUIRES PT FOLLOW UP: Yes       Patient Diagnosis(es): The primary encounter diagnosis was Pneumonia of left lower lobe due to infectious organism. Diagnoses of Acute on chronic congestive heart failure, unspecified heart failure type ([address] Pleural effusion on left, and Dyspnea on exertion were also pertinent to this visit. has a past medical history of Acute cystitis with hematuria, Anemia, Atrial fibrillation ([address] CKD (chronic kidney disease) stage 3, GFR 30-59 ml/min ([facility]), CPAP (continuous positive airway pressure) dependence, Diabetes mellitus ([facility] Esophageal stricture, Gastric ulcer, Goiter, Heart murmur, Hypertension, Kidney stones, Rheumatic fever, and Sleep apnea. has a past surgical history that includes cardiovascular stress test (); doppler echocardiography (03); doppler echocardiography (04); Hysterectomy; Cholecystectomy; polypectomy; Upper gastrointestinal endoscopy (); Colonoscopy (); Breast lumpectomy (Left); and Foot surgery. Evaluating Therapist: Javier Nobles PT     Referring Provider:  CHEYANNE Martinez    PT order : PT eval and treat     Room #:  536   DIAGNOSIS:  LEE, PNA   PRECAUTIONS:  Falls, [place]     Social:  Pt lives with son  in a  1  floor plan with ramp  to enter. Prior to admission pt walked with rollator      Initial Evaluation  Date: 2021  Treatment      Short Term/ Long Term   Goals   Was pt agreeable to Eval/treatment? yes      Does pt have pain?   L shoulder/upper chest      Bed Mobility  Rolling:  NT   Supine to sit: SBA   Sit to supine:  SBA   Scooting: SBA    independent    Transfers Sit to stand:  SBA   Stand to sit:  SBA   Stand pivot:  SBA    independent    Ambulation     150  feet with  ww  with SBA    200  feet with  ww  with  Independent    LE ROM WFL     LE strength  4-/ 5      AM- PAC RAW score   18/ 24            Pt is alert and Oriented x  3      Balance:  SBA , no LOB with gait   Endurance: decreased  Bed/Chair alarm: yes      ASSESSMENT  Pt displays functional ability as noted in the objective portion of this evaluation. Conditions Requiring Skilled Therapeutic Intervention:    [x]Decreased strength     []Decreased ROM  [x]Decreased functional mobility  [x]Decreased balance   [x]Decreased endurance   []Decreased posture  []Decreased sensation  []Decreased coordination   []Decreased vision  []Decreased safety awareness   [x]Increased pain             Treatment/Education:    Mobility as above. Pt on RA. Pulse ox 96% at rest and after gait. Pt educated on fall risk, safety with mobility        Patient response to education:   Pt verbalized understanding Pt demonstrated skill Pt requires further education in this area    x  x       Comments:  Pt left  In bed per pt request after session, with call light in reach. Rehab potential is Good for reaching above PT goals. Pts/ family goals   1. Home     Patient and or family understand(s) diagnosis, prognosis, and plan of care. - yes     PLAN  PT care will be provided in accordance with the objectives noted above. Whenever appropriate, clear delegation orders will be provided for nursing staff. Exercises and functional mobility practice will be used as well as appropriate assistive devices or modalities to obtain goals. Patient and family education will also be administered as needed.         PLAN OF CARE:    Current Treatment Recommendations     [x] Strengthening to improve independence with functional mobility   [] ROM to improve independence with functional mobility   [x] Balance Training to improve static/dynamic balance and to reduce fall risk  [x] Endurance Training to improve activity tolerance during functional mobility   [x] Transfer Training to improve safety and independence with all functional transfers   [x] Gait Training to improve gait mechanics, endurance and assess need for appropriate assistive device  [] Stair Training in preparation for safe discharge home and/or into the community   [x] Positioning to prevent skin breakdown and contractures  [x] Safety and Education Training   [x] Patient/Caregiver Education   [] HEP  [] Other     Frequency of treatments will be 2-5x/week x  7 days. Time in: 0830   Time out: 0842       Evaluation Time includes thorough review of current medical information, gathering information on past medical history/social history and prior level of function, completion of standardized testing/informal observation of tasks, assessment of data and education on plan of care and goals.     CPT codes:  [x] Low Complexity PT evaluation 86036  [] Moderate Complexity PT evaluation 01366  [] High Complexity PT evaluation 79625  [] PT Re-evaluation 56835  [] Gait training 99036  minutes  [] Therapeutic activities 97654  minutes  [] Therapeutic exercises 63983  minutes  [] Neuromuscular reeducation 67752  minutes       Lashae Newton number:  PT [id number]

## 2021-08-07 NOTE — DISCHARGE SUMMARY
[facility] EMERGENCY SERVICE Physician Discharge Summary       No follow-up provider specified. Activity level: As tolerated    Diet: ADULT DIET; Regular; No Added Salt (3-4 gm)  Adult Oral Nutrition Supplement; Standard High Calorie/High Protein Oral Supplement    Dispo:Home    Condition at discharge: Stable    Patient ID:  Angeles Montemayor  [id number]  70 y.o.  1935    Admit date: 8/5/2021    Discharge date and time:  8/7/2021  12:27 PM    Admission Diagnoses: Active Problems:    Pleural effusion on left  Resolved Problems:    Pneumonia of left lower lobe due to infectious organism      Discharge Diagnoses: Active Problems:    Pleural effusion on left  Resolved Problems:    Pneumonia of left lower lobe due to infectious organism      Consults:  IP CONSULT TO PULMONOLOGY  IP CONSULT TO HEART FAILURE NURSE/COORDINATOR    Procedures: Thorocentesis    Hospital Course: Patient was admitted with Dyspnea on exertion [R06.00]  Pleural effusion on left [J90]  Pneumonia of left lower lobe due to infectious organism [J18.9]  Acute on chronic congestive heart failure, unspecified heart failure type ([address] [I50.9]. 81-year-old female with history of hypertension, gastroesophageal reflux disease, paroxysmal atrial fibrillation, chronic diastolic heart failure with moderate mitral regurgitation and CKD stage III. Patient is having increasing shortness of breath especially with exertion. She was found to have a pleural effusion on the left side. BNP was found to be almost 22,000. Creatinine was also mildly elevated at 1.6. She was started on IV Lasix and creatinine at the time of discharge and increased to 1.8 but was down to 1.6. Patient received no improvement in her shortness of breath from Lasix, however she was evaluated by pulmonary medicine for a thoracentesis. Patient received bedside thoracentesis and 1000 mL of fluid was collected.   Initial studies were consistent with transudate. She felt much better after thoracentesis. She was able to ambulate with physical therapy with no complaints at all of shortness of breath. Ambulated over 150 feet. Patient was felt to be stable for discharge home. Medications on discharge are unchanged from admission including spironolactone and Lasix p.o. Patient follow-up with her primary care provider within 7 to 10 days. Discharge Exam:  Vitals:    08/06/21 1316 08/06/21 2000 08/07/21 0007 08/07/21 0830   BP:  113/64  (!) 116/55   Pulse:  73  69   Resp:  18  20   Temp:  97.3 °F (36.3 °C)  97.9 °F (36.6 °C)   TempSrc:  Temporal  Oral   SpO2:  90%  95%   Weight:   140 lb 5 oz (63.6 kg)    Height: 5' 6\" (1.676 m)        General Appearance: alert and oriented to person, place and time, well developed and well- nourished, in no acute distress  Skin: warm and dry, no rash or erythema  Head: normocephalic and atraumatic  Neck: supple and non-tender   Pulmonary/Chest: decreased sounds but poor respiratory effort, dull with effusion left base and bronchial sounds with crackles at superior aspect of effusion.   Cardiovascular: normal rate, regular rhythm, normal S1 and S2, no murmurs, rubs, clicks, or gallops, distal pulses intact, no carotid bruits  Abdomen: soft, non-tender, non-distended, normal bowel sounds, no masses or organomegaly  Extremities: no cyanosis, clubbing or edema    LABS:  Recent Labs     08/05/21 1701 08/06/21  0504 08/07/21  0505    136 138   K 4.3 4.2 3.9   CL 97* 97* 101   CO2 24 24 27   BUN 39* 42* 46*   CREATININE 1.6* 1.8* 1.6*   GLUCOSE 194* 107* 128*   CALCIUM 9.8 10.0 8.6       Recent Labs     08/05/21 1701 08/06/21  0504 08/07/21  0505   WBC 15.4* 15.2* 9.4   RBC 3.73 3.77 3.51   HGB 10.3* 10.4* 9.7*   HCT 33.3* 33.3* 30.5*   MCV 89.3 88.3 86.9   MCH 27.6 27.6 27.6   MCHC 30.9* 31.2* 31.8*   RDW 15.5* 15.5* 15.5*   * 491* 386   MPV 9.4 9.4 9.3       No results for input(s): POCGLU in the last 72 hours.    Imaging:   XR CHEST PORTABLE   Final Result   Interval reduction in volume of left pleural fluid following thoracentesis. No pneumothorax is appreciated. US THORACENTESIS Which side should the procedure be performed? Left   Final Result   Left-sided pleural effusion. Please refer to separate procedure report for   further detail. CT CHEST WO CONTRAST   Final Result   Cardiomegaly with the pericardial effusion and coronary artery calcification. Large pleural effusion with atelectasis/infiltrates in the left lower lobe   and lingula likely pneumonia or CHF. Underlying malignancy has to be   excluded and close surveillance is recommended to see resolution. .         XR CHEST (2 VW)   Final Result   1. Large left pleural effusion   2. There is no evidence of right lung pneumonia. Patient Instructions:      Medication List      CHANGE how you take these medications    vitamin D 25 MCG (1000 UT) Tabs tablet  Commonly known as: CHOLECALCIFEROL  Take 1 tablet by mouth daily.   What changed: how much to take        CONTINUE taking these medications    CPAP Machine Misc     ferrous sulfate 325 (65 Fe) MG tablet  Commonly known as: IRON 325     furosemide 40 MG tablet  Commonly known as: LASIX  Take 1 tablet by mouth daily     metoprolol succinate 50 MG extended release tablet  Commonly known as: TOPROL XL     omeprazole 40 MG delayed release capsule  Commonly known as: PRILOSEC     potassium chloride 20 MEQ extended release tablet  Commonly known as: KLOR-CON M     sertraline 50 MG tablet  Commonly known as: ZOLOFT     spironolactone 25 MG tablet  Commonly known as: ALDACTONE     valsartan 80 MG tablet  Commonly known as: DIOVAN  Take 1 tablet by mouth daily              Note that more than 30 minutes was spent in preparing discharge papers, discussing discharge with patient, medication review, etc.    Signed:  Electronically signed by Skylar Ch MD on 8/7/2021 at 12:27 PM    NOTE: This report was transcribed using voice recognition software. Every effort was made to ensure accuracy; however, inadvertent computerized transcription errors may be present.

## 2021-08-09 LAB
BODY FLUID CULTURE, STERILE: NORMAL
GRAM STAIN RESULT: NORMAL
REPORT: NORMAL
THIS TEST SENT TO: NORMAL

## 2021-08-29 ENCOUNTER — APPOINTMENT (OUTPATIENT)
Dept: GENERAL RADIOLOGY | Age: 86
DRG: 186 | End: 2021-08-29
Payer: MEDICARE

## 2021-08-29 ENCOUNTER — HOSPITAL ENCOUNTER (INPATIENT)
Age: 86
LOS: 4 days | Discharge: HOME | DRG: 186 | End: 2021-09-03
Attending: EMERGENCY MEDICINE | Admitting: INTERNAL MEDICINE
Payer: MEDICARE

## 2021-08-29 ENCOUNTER — APPOINTMENT (OUTPATIENT)
Dept: CT IMAGING | Age: 86
DRG: 186 | End: 2021-08-29
Payer: MEDICARE
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
## 2021-08-29 DIAGNOSIS — A41.9 SEPSIS WITHOUT ACUTE ORGAN DYSFUNCTION, DUE TO UNSPECIFIED ORGANISM (HCC): ICD-10-CM
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
## 2021-08-29 DIAGNOSIS — J18.9 HAP (HOSPITAL-ACQUIRED PNEUMONIA): Primary | ICD-10-CM

## 2021-08-29 LAB
ALBUMIN SERPL-MCNC: 3.4 G/DL (ref 3.5–5.2)
ALP BLD-CCNC: 90 U/L (ref 35–104)
ALT SERPL-CCNC: 9 U/L (ref 0–32)
ANION GAP SERPL CALCULATED.3IONS-SCNC: 14 MMOL/L (ref 7–16)
AST SERPL-CCNC: 14 U/L (ref 0–31)
BASOPHILS ABSOLUTE: 0.03 E9/L (ref 0–0.2)
BASOPHILS RELATIVE PERCENT: 0.2 % (ref 0–2)
BILIRUB SERPL-MCNC: 0.3 MG/DL (ref 0–1.2)
BUN BLDV-MCNC: 24 MG/DL (ref 6–23)
CALCIUM SERPL-MCNC: 9.5 MG/DL (ref 8.6–10.2)
CHLORIDE BLD-SCNC: 93 MMOL/L (ref 98–107)
CO2: 27 MMOL/L (ref 22–29)
CREAT SERPL-MCNC: 1.5 MG/DL (ref 0.5–1)
EOSINOPHILS ABSOLUTE: 0.23 E9/L (ref 0.05–0.5)
EOSINOPHILS RELATIVE PERCENT: 1.3 % (ref 0–6)
GFR AFRICAN AMERICAN: 40
GFR NON-AFRICAN AMERICAN: 33 ML/MIN/1.73
GLUCOSE BLD-MCNC: 165 MG/DL (ref 74–99)
HCT VFR BLD CALC: 35.6 % (ref 34–48)
HEMOGLOBIN: 11.2 G/DL (ref 11.5–15.5)
IMMATURE GRANULOCYTES #: 0.19 E9/L
IMMATURE GRANULOCYTES %: 1 % (ref 0–5)
LYMPHOCYTES ABSOLUTE: 1.24 E9/L (ref 1.5–4)
LYMPHOCYTES RELATIVE PERCENT: 6.8 % (ref 20–42)
MCH RBC QN AUTO: 26.4 PG (ref 26–35)
MCHC RBC AUTO-ENTMCNC: 31.5 % (ref 32–34.5)
MCV RBC AUTO: 83.8 FL (ref 80–99.9)
MONOCYTES ABSOLUTE: 1.18 E9/L (ref 0.1–0.95)
MONOCYTES RELATIVE PERCENT: 6.5 % (ref 2–12)
NEUTROPHILS ABSOLUTE: 15.32 E9/L (ref 1.8–7.3)
NEUTROPHILS RELATIVE PERCENT: 84.2 % (ref 43–80)
PDW BLD-RTO: 16 FL (ref 11.5–15)
PLATELET # BLD: 427 E9/L (ref 130–450)
PMV BLD AUTO: 9.3 FL (ref 7–12)
POTASSIUM REFLEX MAGNESIUM: 3.9 MMOL/L (ref 3.5–5)
RBC # BLD: 4.25 E12/L (ref 3.5–5.5)
SODIUM BLD-SCNC: 134 MMOL/L (ref 132–146)
TOTAL PROTEIN: 7.2 G/DL (ref 6.4–8.3)
TROPONIN, HIGH SENSITIVITY: 12 NG/L (ref 0–9)
WBC # BLD: 18.2 E9/L (ref 4.5–11.5)

## 2021-08-29 PROCEDURE — 72125 CT NECK SPINE W/O DYE: CPT

## 2021-08-29 PROCEDURE — 2580000003 HC RX 258: Performed by: EMERGENCY MEDICINE

## 2021-08-29 PROCEDURE — 93005 ELECTROCARDIOGRAM TRACING: CPT | Performed by: EMERGENCY MEDICINE

## 2021-08-29 PROCEDURE — 80053 COMPREHEN METABOLIC PANEL: CPT

## 2021-08-29 PROCEDURE — 71046 X-RAY EXAM CHEST 2 VIEWS: CPT

## 2021-08-29 PROCEDURE — 70450 CT HEAD/BRAIN W/O DYE: CPT

## 2021-08-29 PROCEDURE — 99284 EMERGENCY DEPT VISIT MOD MDM: CPT

## 2021-08-29 PROCEDURE — 84484 ASSAY OF TROPONIN QUANT: CPT

## 2021-08-29 PROCEDURE — 96361 HYDRATE IV INFUSION ADD-ON: CPT

## 2021-08-29 PROCEDURE — 85025 COMPLETE CBC W/AUTO DIFF WBC: CPT

## 2021-08-29 RX ORDER — 0.9 % SODIUM CHLORIDE 0.9 %
1000 INTRAVENOUS SOLUTION INTRAVENOUS ONCE
Status: COMPLETED | OUTPATIENT
Start: 2021-08-29 | End: 2021-08-30

## 2021-08-29 RX ADMIN — SODIUM CHLORIDE 1000 ML: 9 INJECTION, SOLUTION INTRAVENOUS at 23:58

## 2021-08-29 ASSESSMENT — PAIN SCALES - GENERAL: PAINLEVEL_OUTOF10: 3

## 2021-08-30 PROBLEM — J18.9 HOSPITAL ACQUIRED PNA: Status: ACTIVE | Noted: 2021-08-30
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
## 2021-08-30 LAB
ANION GAP SERPL CALCULATED.3IONS-SCNC: 9 MMOL/L (ref 7–16)
BACTERIA: NORMAL /HPF
BASOPHILS ABSOLUTE: 0.02 E9/L (ref 0–0.2)
BASOPHILS RELATIVE PERCENT: 0.2 % (ref 0–2)
BILIRUBIN URINE: NEGATIVE
BLOOD, URINE: NEGATIVE
BUN BLDV-MCNC: 23 MG/DL (ref 6–23)
CALCIUM SERPL-MCNC: 8.8 MG/DL (ref 8.6–10.2)
CHLORIDE BLD-SCNC: 96 MMOL/L (ref 98–107)
CLARITY: CLEAR
CO2: 27 MMOL/L (ref 22–29)
COLOR: YELLOW
CREAT SERPL-MCNC: 1.5 MG/DL (ref 0.5–1)
EKG ATRIAL RATE: 138 BPM
EKG P AXIS: 17 DEGREES
EKG P-R INTERVAL: 132 MS
EKG Q-T INTERVAL: 290 MS
EKG QRS DURATION: 84 MS
EKG QTC CALCULATION (BAZETT): 439 MS
EKG R AXIS: -37 DEGREES
EKG T AXIS: 117 DEGREES
EKG VENTRICULAR RATE: 138 BPM
EOSINOPHILS ABSOLUTE: 0.18 E9/L (ref 0.05–0.5)
EOSINOPHILS RELATIVE PERCENT: 1.7 % (ref 0–6)
EPITHELIAL CELLS, UA: NORMAL /HPF
GFR AFRICAN AMERICAN: 40
GFR NON-AFRICAN AMERICAN: 33 ML/MIN/1.73
GLUCOSE BLD-MCNC: 156 MG/DL (ref 74–99)
GLUCOSE URINE: NEGATIVE MG/DL
HCT VFR BLD CALC: 32.3 % (ref 34–48)
HEMOGLOBIN: 9.9 G/DL (ref 11.5–15.5)
IMMATURE GRANULOCYTES #: 0.07 E9/L
IMMATURE GRANULOCYTES %: 0.7 % (ref 0–5)
KETONES, URINE: NEGATIVE MG/DL
LACTIC ACID: 2.2 MMOL/L (ref 0.5–2.2)
LEUKOCYTE ESTERASE, URINE: ABNORMAL
LYMPHOCYTES ABSOLUTE: 1.56 E9/L (ref 1.5–4)
LYMPHOCYTES RELATIVE PERCENT: 14.5 % (ref 20–42)
MCH RBC QN AUTO: 26 PG (ref 26–35)
MCHC RBC AUTO-ENTMCNC: 30.7 % (ref 32–34.5)
MCV RBC AUTO: 84.8 FL (ref 80–99.9)
MONOCYTES ABSOLUTE: 0.65 E9/L (ref 0.1–0.95)
MONOCYTES RELATIVE PERCENT: 6.1 % (ref 2–12)
NEUTROPHILS ABSOLUTE: 8.26 E9/L (ref 1.8–7.3)
NEUTROPHILS RELATIVE PERCENT: 76.8 % (ref 43–80)
NITRITE, URINE: NEGATIVE
PDW BLD-RTO: 15.9 FL (ref 11.5–15)
PH UA: 5 (ref 5–9)
PLATELET # BLD: 340 E9/L (ref 130–450)
PMV BLD AUTO: 9.5 FL (ref 7–12)
POTASSIUM REFLEX MAGNESIUM: 4.1 MMOL/L (ref 3.5–5)
PROCALCITONIN: 0.19 NG/ML (ref 0–0.08)
PROTEIN UA: NEGATIVE MG/DL
RBC # BLD: 3.81 E12/L (ref 3.5–5.5)
RBC UA: NORMAL /HPF (ref 0–2)
SARS-COV-2, NAAT: NOT DETECTED
SODIUM BLD-SCNC: 132 MMOL/L (ref 132–146)
SPECIFIC GRAVITY UA: 1.02 (ref 1–1.03)
UROBILINOGEN, URINE: 0.2 E.U./DL
WBC # BLD: 10.7 E9/L (ref 4.5–11.5)
WBC UA: NORMAL /HPF (ref 0–5)

## 2021-08-30 PROCEDURE — 84145 PROCALCITONIN (PCT): CPT

## 2021-08-30 PROCEDURE — 81001 URINALYSIS AUTO W/SCOPE: CPT

## 2021-08-30 PROCEDURE — 83605 ASSAY OF LACTIC ACID: CPT

## 2021-08-30 PROCEDURE — 85025 COMPLETE CBC W/AUTO DIFF WBC: CPT

## 2021-08-30 PROCEDURE — 2060000000 HC ICU INTERMEDIATE R&B

## 2021-08-30 PROCEDURE — 6370000000 HC RX 637 (ALT 250 FOR IP): Performed by: INTERNAL MEDICINE

## 2021-08-30 PROCEDURE — 87040 BLOOD CULTURE FOR BACTERIA: CPT

## 2021-08-30 PROCEDURE — 99223 1ST HOSP IP/OBS HIGH 75: CPT | Performed by: INTERNAL MEDICINE

## 2021-08-30 PROCEDURE — 97165 OT EVAL LOW COMPLEX 30 MIN: CPT

## 2021-08-30 PROCEDURE — 96360 HYDRATION IV INFUSION INIT: CPT

## 2021-08-30 PROCEDURE — 6360000002 HC RX W HCPCS: Performed by: EMERGENCY MEDICINE

## 2021-08-30 PROCEDURE — 87077 CULTURE AEROBIC IDENTIFY: CPT

## 2021-08-30 PROCEDURE — 36415 COLL VENOUS BLD VENIPUNCTURE: CPT

## 2021-08-30 PROCEDURE — 6360000002 HC RX W HCPCS: Performed by: INTERNAL MEDICINE

## 2021-08-30 PROCEDURE — 2580000003 HC RX 258: Performed by: EMERGENCY MEDICINE

## 2021-08-30 PROCEDURE — 2580000003 HC RX 258: Performed by: INTERNAL MEDICINE

## 2021-08-30 PROCEDURE — 96367 TX/PROPH/DG ADDL SEQ IV INF: CPT

## 2021-08-30 PROCEDURE — 93010 ELECTROCARDIOGRAM REPORT: CPT | Performed by: INTERNAL MEDICINE

## 2021-08-30 PROCEDURE — 87635 SARS-COV-2 COVID-19 AMP PRB: CPT

## 2021-08-30 PROCEDURE — 96365 THER/PROPH/DIAG IV INF INIT: CPT

## 2021-08-30 PROCEDURE — 80048 BASIC METABOLIC PNL TOTAL CA: CPT

## 2021-08-30 PROCEDURE — 97161 PT EVAL LOW COMPLEX 20 MIN: CPT

## 2021-08-30 PROCEDURE — 87449 NOS EACH ORGANISM AG IA: CPT

## 2021-08-30 PROCEDURE — 87186 SC STD MICRODIL/AGAR DIL: CPT

## 2021-08-30 RX ORDER — PANTOPRAZOLE SODIUM 40 MG/1
40 TABLET, DELAYED RELEASE ORAL
Status: DISCONTINUED | OUTPATIENT
Start: 2021-08-30 | End: 2021-09-03 | Stop reason: HOSPADM

## 2021-08-30 RX ORDER — ONDANSETRON 4 MG/1
4 TABLET, ORALLY DISINTEGRATING ORAL EVERY 8 HOURS PRN
Status: DISCONTINUED | OUTPATIENT
Start: 2021-08-30 | End: 2021-09-03 | Stop reason: HOSPADM

## 2021-08-30 RX ORDER — METOPROLOL SUCCINATE 50 MG/1
50 TABLET, EXTENDED RELEASE ORAL 2 TIMES DAILY
Status: DISCONTINUED | OUTPATIENT
Start: 2021-08-30 | End: 2021-09-03 | Stop reason: HOSPADM

## 2021-08-30 RX ORDER — DOXYCYCLINE HYCLATE 100 MG/1
100 CAPSULE ORAL EVERY 12 HOURS SCHEDULED
Status: DISCONTINUED | OUTPATIENT
Start: 2021-08-30 | End: 2021-08-31

## 2021-08-30 RX ORDER — VITAMIN B COMPLEX
2000 TABLET ORAL DAILY
Status: DISCONTINUED | OUTPATIENT
Start: 2021-08-30 | End: 2021-09-03 | Stop reason: HOSPADM

## 2021-08-30 RX ORDER — FERROUS SULFATE 325(65) MG
325 TABLET ORAL
Status: DISCONTINUED | OUTPATIENT
Start: 2021-08-30 | End: 2021-09-03 | Stop reason: HOSPADM

## 2021-08-30 RX ORDER — ACETAMINOPHEN 325 MG/1
650 TABLET ORAL EVERY 6 HOURS PRN
Status: DISCONTINUED | OUTPATIENT
Start: 2021-08-30 | End: 2021-09-03 | Stop reason: HOSPADM

## 2021-08-30 RX ORDER — SODIUM CHLORIDE 9 MG/ML
25 INJECTION, SOLUTION INTRAVENOUS PRN
Status: DISCONTINUED | OUTPATIENT
Start: 2021-08-30 | End: 2021-09-03 | Stop reason: HOSPADM

## 2021-08-30 RX ORDER — ACETAMINOPHEN 650 MG/1
650 SUPPOSITORY RECTAL EVERY 6 HOURS PRN
Status: DISCONTINUED | OUTPATIENT
Start: 2021-08-30 | End: 2021-09-03 | Stop reason: HOSPADM

## 2021-08-30 RX ORDER — SODIUM CHLORIDE 0.9 % (FLUSH) 0.9 %
5-40 SYRINGE (ML) INJECTION EVERY 12 HOURS SCHEDULED
Status: DISCONTINUED | OUTPATIENT
Start: 2021-08-30 | End: 2021-09-03 | Stop reason: HOSPADM

## 2021-08-30 RX ORDER — ONDANSETRON 2 MG/ML
4 INJECTION INTRAMUSCULAR; INTRAVENOUS EVERY 6 HOURS PRN
Status: DISCONTINUED | OUTPATIENT
Start: 2021-08-30 | End: 2021-09-03 | Stop reason: HOSPADM

## 2021-08-30 RX ORDER — SODIUM CHLORIDE 0.9 % (FLUSH) 0.9 %
5-40 SYRINGE (ML) INJECTION PRN
Status: DISCONTINUED | OUTPATIENT
Start: 2021-08-30 | End: 2021-09-03 | Stop reason: HOSPADM

## 2021-08-30 RX ORDER — POLYETHYLENE GLYCOL 3350 17 G/17G
17 POWDER, FOR SOLUTION ORAL DAILY PRN
Status: DISCONTINUED | OUTPATIENT
Start: 2021-08-30 | End: 2021-09-03 | Stop reason: HOSPADM

## 2021-08-30 RX ADMIN — Medication 2000 UNITS: at 09:30

## 2021-08-30 RX ADMIN — FERROUS SULFATE TAB 325 MG (65 MG ELEMENTAL FE) 325 MG: 325 (65 FE) TAB at 09:30

## 2021-08-30 RX ADMIN — PANTOPRAZOLE SODIUM 40 MG: 40 TABLET, DELAYED RELEASE ORAL at 06:01

## 2021-08-30 RX ADMIN — METOPROLOL SUCCINATE 50 MG: 50 TABLET, FILM COATED, EXTENDED RELEASE ORAL at 09:30

## 2021-08-30 RX ADMIN — DOXYCYCLINE HYCLATE 100 MG: 100 CAPSULE ORAL at 06:01

## 2021-08-30 RX ADMIN — ENOXAPARIN SODIUM 30 MG: 30 INJECTION SUBCUTANEOUS at 09:30

## 2021-08-30 RX ADMIN — DOXYCYCLINE HYCLATE 100 MG: 100 CAPSULE ORAL at 17:58

## 2021-08-30 RX ADMIN — CEFEPIME HYDROCHLORIDE 1000 MG: 1 INJECTION, POWDER, FOR SOLUTION INTRAMUSCULAR; INTRAVENOUS at 14:00

## 2021-08-30 RX ADMIN — VANCOMYCIN HYDROCHLORIDE 1000 MG: 1 INJECTION, POWDER, LYOPHILIZED, FOR SOLUTION INTRAVENOUS at 03:09

## 2021-08-30 RX ADMIN — METOPROLOL SUCCINATE 50 MG: 50 TABLET, FILM COATED, EXTENDED RELEASE ORAL at 22:03

## 2021-08-30 RX ADMIN — Medication 10 ML: at 09:30

## 2021-08-30 RX ADMIN — SERTRALINE HYDROCHLORIDE 50 MG: 50 TABLET ORAL at 09:30

## 2021-08-30 RX ADMIN — Medication 10 ML: at 21:00

## 2021-08-30 RX ADMIN — CEFEPIME HYDROCHLORIDE 2000 MG: 2 INJECTION, POWDER, FOR SOLUTION INTRAVENOUS at 02:34

## 2021-08-30 ASSESSMENT — PAIN DESCRIPTION - ONSET: ONSET: ON-GOING

## 2021-08-30 ASSESSMENT — PAIN DESCRIPTION - PAIN TYPE: TYPE: ACUTE PAIN

## 2021-08-30 ASSESSMENT — PAIN SCALES - GENERAL
PAINLEVEL_OUTOF10: 2
PAINLEVEL_OUTOF10: 0
PAINLEVEL_OUTOF10: 0

## 2021-08-30 ASSESSMENT — PAIN DESCRIPTION - DESCRIPTORS: DESCRIPTORS: ACHING

## 2021-08-30 ASSESSMENT — PAIN DESCRIPTION - LOCATION: LOCATION: HEAD

## 2021-08-30 ASSESSMENT — PAIN DESCRIPTION - ORIENTATION: ORIENTATION: MID;UPPER

## 2021-08-30 NOTE — ED PROVIDER NOTES
HPI:  8/30/21,   Time: 2:44 AM EDT        Froilan Gongora is a 81 y.o. female presenting to the ED for fall. Patient states she was feeling lightheaded throughout the day today, she had 2 episodes where she felt lightheaded causing her to fall. Patient denies loss of consciousness or syncope. Patient states second fall she did hit the front of her head. Again no LOC. Patient with mild frontal headache but denies any other specific complaints. Denies neck or back pain. Denies any other trauma or injuries from fall. Patient states her primary care doctor did recently take her off her antihypertensives because her blood pressure was low. ROS:   GEN: no fevers, no chills, no fatique  HENT: No trauma, no sore throat, no swelling throat or oral mucosa  EYES: No changes in vision, no pain  CARDIO: No chest pain, no palpitations  PULM: No trouble breathing, no wheezing  ABD: No pain, no nausea, no vomiting  MSK: No pain, no trauma, no deformities  SKIN: No rashes, no abrasions, no lacerations  NEURO: No changes in mentation, see HPI      --------------------------------------------- PAST HISTORY ---------------------------------------------  Past Medical History:  has a past medical history of Acute cystitis with hematuria, Anemia, Atrial fibrillation ([facility]), CKD (chronic kidney disease) stage 3, GFR 30-59 ml/min ([facility]), CPAP (continuous positive airway pressure) dependence, Diabetes mellitus ([address] Esophageal stricture, Gastric ulcer, Goiter, Heart murmur, Hypertension, Kidney stones, Rheumatic fever, and Sleep apnea. Past Surgical History:  has a past surgical history that includes cardiovascular stress test (1999); doppler echocardiography (05/14/03); doppler echocardiography (06/09/04); Hysterectomy; Cholecystectomy; polypectomy; Upper gastrointestinal endoscopy (07/11); Colonoscopy (07/11); Breast lumpectomy (Left); and Foot surgery. Social History:  reports that she has never smoked.  She has never used smokeless tobacco. She reports that she does not drink alcohol and does not use drugs. Family History: family history includes Alcohol Abuse in her father; Asthma in her mother; Diabetes in her mother. The patients home medications have been reviewed.     Allergies: Nitrofuran derivatives, Other, and Sulfa antibiotics    -------------------------------------------------- RESULTS -------------------------------------------------  All laboratory and radiology results have been personally reviewed by myself   LABS:  Results for orders placed or performed during the hospital encounter of 08/29/21   Comprehensive Metabolic Panel w/ Reflex to MG   Result Value Ref Range    Sodium 134 132 - 146 mmol/L    Potassium reflex Magnesium 3.9 3.5 - 5.0 mmol/L    Chloride 93 (L) 98 - 107 mmol/L    CO2 27 22 - 29 mmol/L    Anion Gap 14 7 - 16 mmol/L    Glucose 165 (H) 74 - 99 mg/dL    BUN 24 (H) 6 - 23 mg/dL    CREATININE 1.5 (H) 0.5 - 1.0 mg/dL    GFR Non-African American 33 >=60 mL/min/1.73    GFR African American 40     Calcium 9.5 8.6 - 10.2 mg/dL    Total Protein 7.2 6.4 - 8.3 g/dL    Albumin 3.4 (L) 3.5 - 5.2 g/dL    Total Bilirubin 0.3 0.0 - 1.2 mg/dL    Alkaline Phosphatase 90 35 - 104 U/L    ALT 9 0 - 32 U/L    AST 14 0 - 31 U/L   Troponin   Result Value Ref Range    Troponin, High Sensitivity 12 (H) 0 - 9 ng/L   CBC auto differential   Result Value Ref Range    WBC 18.2 (H) 4.5 - 11.5 E9/L    RBC 4.25 3.50 - 5.50 E12/L    Hemoglobin 11.2 (L) 11.5 - 15.5 g/dL    Hematocrit 35.6 34.0 - 48.0 %    MCV 83.8 80.0 - 99.9 fL    MCH 26.4 26.0 - 35.0 pg    MCHC 31.5 (L) 32.0 - 34.5 %    RDW 16.0 (H) 11.5 - 15.0 fL    Platelets 467 939 - 980 E9/L    MPV 9.3 7.0 - 12.0 fL    Neutrophils % 84.2 (H) 43.0 - 80.0 %    Immature Granulocytes % 1.0 0.0 - 5.0 %    Lymphocytes % 6.8 (L) 20.0 - 42.0 %    Monocytes % 6.5 2.0 - 12.0 %    Eosinophils % 1.3 0.0 - 6.0 %    Basophils % 0.2 0.0 - 2.0 %    Neutrophils Absolute 15.32 (H) 1.80 - 7.30 E9/L    Immature Granulocytes # 0.19 E9/L    Lymphocytes Absolute 1.24 (L) 1.50 - 4.00 E9/L    Monocytes Absolute 1.18 (H) 0.10 - 0.95 E9/L    Eosinophils Absolute 0.23 0.05 - 0.50 E9/L    Basophils Absolute 0.03 0.00 - 0.20 E9/L   Urinalysis, reflex to microscopic   Result Value Ref Range    Color, UA Yellow Straw/Yellow    Clarity, UA Clear Clear    Glucose, Ur Negative Negative mg/dL    Bilirubin Urine Negative Negative    Ketones, Urine Negative Negative mg/dL    Specific Gravity, UA 1.020 1.005 - 1.030    Blood, Urine Negative Negative    pH, UA 5.0 5.0 - 9.0    Protein, UA Negative Negative mg/dL    Urobilinogen, Urine 0.2 <2.0 E.U./dL    Nitrite, Urine Negative Negative    Leukocyte Esterase, Urine SMALL (A) Negative   Microscopic Urinalysis   Result Value Ref Range    WBC, UA 2-5 0 - 5 /HPF    RBC, UA 0-1 0 - 2 /HPF    Epithelial Cells, UA RARE /HPF    Bacteria, UA NONE SEEN None Seen /HPF   EKG 12 Lead   Result Value Ref Range    Ventricular Rate 138 BPM    Atrial Rate 138 BPM    P-R Interval 132 ms    QRS Duration 84 ms    Q-T Interval 290 ms    QTc Calculation (Bazett) 439 ms    P Axis 17 degrees    R Axis -37 degrees    T Axis 117 degrees       RADIOLOGY:  Interpreted by Radiologist.  CT Head WO Contrast   Final Result   No CT evidence of an acute intracranial abnormality. No evidence of acute fracture or traumatic malalignment of the cervical spine. Scattered lucencies are noted throughout the cervical spine which are   nonspecific. Possible consideration could include osteopenia, metabolic   disorder, or metastatic disease. Additional chronic and age related changes in the head and cervical spine, as   noted above. CT Cervical Spine WO Contrast   Final Result   No CT evidence of an acute intracranial abnormality. No evidence of acute fracture or traumatic malalignment of the cervical spine.       Scattered lucencies are noted throughout the cervical spine which are   nonspecific. Possible consideration could include osteopenia, metabolic   disorder, or metastatic disease. Additional chronic and age related changes in the head and cervical spine, as   noted above. XR CHEST (2 VW)   Final Result   Opacification of the left lower lung, likely combination infiltrates,   atelectasis and effusion.             ------------------------- NURSING NOTES AND VITALS REVIEWED ---------------------------   The nursing notes within the ED encounter and vital signs as below have been reviewed. BP (!) 159/72   Pulse 94   Temp 97.3 °F (36.3 °C) (Tympanic)   Resp 14   Ht 5' 6\" (1.676 m)   Wt 136 lb (61.7 kg)   SpO2 96%   BMI 21.95 kg/m²   Oxygen Saturation Interpretation: Normal    ---------------------------------------------------PHYSICAL EXAM--------------------------------------    GEN: No acute distress, well-appearing, well nourished   HENT: Small forehead hematoma and superficial abrasion, no lacerations, no other facial trauma, oral mucosa moist  EYES: No scleral injection, no scleral icterus, PERRL   PULM: Lungs clear to ascultation bilaterally, no wheezes, no crackles  CARDIO: Tachycardia, regular rhythm, normal S1/S2  ABD: Soft, non-tender, no rigidity  MSK: No deformities, no edema, palpable pulses all extremities   SKIN: No rashes, no lacerations, no abrasions   NEURO: Awake, alert, appropriate         ------------------------------ ED COURSE/MEDICAL DECISION MAKING----------------------  Medications   cefepime (MAXIPIME) 2000 mg IVPB minibag (2,000 mg IntraVENous New Bag 8/30/21 0234)   vancomycin 1000 mg IVPB in 250 mL D5W addavial (has no administration in time range)   0.9 % sodium chloride bolus (0 mLs IntraVENous Stopped 8/30/21 0204)         ED Course and Medical Decision Making:   Patient presents with complaint of lightheadedness resulting in 2 falls today. Patient with mild abrasion and hematoma to forehead. No other trauma or injuries.   Patient tachycardic and hypotensive on arrival, was given 1 L IV fluids as she does have a history of CHF, blood pressure and pulse did improve significantly after 1 L of IV fluids. Patient with leukocytosis. Chest x-ray concerning for left lower lobe pneumonia. Urinalysis unremarkable. Patient was started on antibiotics, Vanco and cefepime as she had recent hospital admission. Blood cultures obtained. Patient admitted for ongoing evaluation and management. Patient and daughter at bedside amendable to plan. Spoke with admitting team, case discussed, accepted for admission.       --------------------------------- ADDITIONAL PROVIDER NOTES ---------------------------------        EKG Interpretation  Interpreted by emergency department physician    Rhythm: sinus tachycardia  Rate: 138  Axis: left  Conduction: normal  ST Segments: normal  T Waves: non specific    Clinical Impression: non-specific EKG          This patient's ED course included: re-evaluation prior to disposition and a personal history and physicial eaxmination    This patient has remained hemodynamically stable and improved during their ED course. Counseling: The emergency provider has spoken with the patient and daughter and discussed todays results, in addition to providing specific details for the plan of care and counseling regarding the diagnosis and prognosis. Questions are answered at this time and they are agreeable with the plan.      --------------------------------- IMPRESSION AND DISPOSITION ---------------------------------    IMPRESSION  1. HAP (hospital-acquired pneumonia)    2. Fall, initial encounter    3.  Sepsis without acute organ dysfunction, due to unspecified organism [facility])        DISPOSITION  Disposition: Admit to med/surg floor  Patient condition is fair        Laz Wright, DO  08/30/21 3103 Jerald Woodard, DO  08/30/21 6846

## 2021-08-30 NOTE — PROGRESS NOTES
Occupational Therapy  OCCUPATIONAL THERAPY INITIAL EVALUATION     [facility] [place], [address]  Patient Name: Deanna Atwood  MRN: [id number]  : 1935  Room: [address] [place] [address]    Evaluating OT: Shelly Aguirre OTR/L [id number]    Referring Provider: Sarah Mercedes DO  Specific Provider Orders/Date: eval and treat 21    Diagnosis: Hospital acquired PNA. Pt presents to ED from home post fall d/t lightheadedness.    Procedure: 21 thoracentesis   Pertinent Medical History: DM, HTN, anemia, afib, CKD     Precautions:  fall risk    Assessment of current deficits   [x] Functional mobility  [x]ADLs  [x] Strength               [x]Cognition   [x] Functional transfers   [x] IADLs         [x] Safety Awareness   [x]Endurance   [] Fine Coordination              [x] Balance      [] Vision/perception   []Sensation    []Gross Motor Coordination  [] ROM  [] Delirium                   [] Motor Control     OT PLAN OF CARE   OT POC based on physician orders, patient diagnosis and results of clinical assessment    Frequency/Duration 1-3 days/wk for 10-14 days PRN   Specific OT Treatment Interventions to include:   * Instruction/training on adapted ADL techniques and AE recommendations to increase functional independence within precautions       * Training on energy conservation strategies, correct breathing pattern and techniques to improve independence/tolerance for self-care routine  * Functional transfer/mobility training/DME recommendations for increased independence, safety, and fall prevention  * Patient/Family education to increase follow through with safety techniques and functional independence  * Recommendation of environmental modifications for increased safety with functional transfers/mobility and ADLs  * Therapeutic exercise to improve motor endurance, ROM, and functional strength for ADLs/functional transfers  * Therapeutic activities to facilitate/challenge dynamic balance, stand tolerance for increased safety and independence with ADLs    Recommended Adaptive Equipment: TBD     Home Living: Pt lives with son in a single story home with ramp. Bathroom setup: tub/shower combo with extended tub bench, standard commode     Prior Level of Function: Independent with ADLs, son assists PRN with IADLs; completed functional mobility with no AD. Has rollator for PRN. Pain Level: no reported pain    Cognition: A&O: 4/4. Problem solving:  WFL   Judgement/safety:  WFL     Functional Assessment: AM-PAC Daily Activity Raw Score: 19/24   Initial Eval Status  Date: 8/30/21 Treatment session:  STGs=LTGS  Timeframe 10-14 days     Feeding Independent     Grooming Set up  Independent   UB Dressing Set up  Independent   LB Dressing SBA  Donning B socks seated EOB  Mod I    Bathing Min A  Mod I   Toileting SBA  Mod I   Bed Mobility  Supine <> sit: Independent     Functional Transfers STS: SBA  Independent   Functional Mobility SBA with no Ad  Household distance  Independent during ADLs   Balance Sitting: Good     Standing: Fair plus     Activity Tolerance fair  standing taz x7-8 min with good balance during self care tasks             Treatment: Patient educated on techniques for completion of ADL, safe functional transfers and functional mobility. Patient required cues for follow through with proper hand/foot placement, pacing, safety, compensatory strategies, breathing and technique in bed mobility, functional transfers, functional mobility, commode transfer and LB dressing in preparation for maximum independence in all self care tasks.      Hand Dominance: Right   Strength ROM Additional Info:    RUE  4/5 WFL good  and FMC/dexterity noted during ADL tasks     LUE 4/5 WFL good  and FMC/dexterity noted during ADL tasks         Hearing: WFL  Vision: WFL   Sensation:  No c/o numbness or tingling  Tone: WFL   Edema: none Rehab Potential: Good for established goals     Patient/Family Goal: To get home. Patient and/or family were instructed on functional diagnosis, prognosis/goals and OT plan of care. Pt verbalized understanding. Upon arrival, patient supine in bed. At end of session, patient supine in bed per pt request, encouraged to sit in chair, pt declines d/t fatigue, with call light and phone within reach, all lines and tubes intact. Pt would benefit from continued skilled OT to increase safety and independence with completion of ADL/IADL tasks for functional independence and quality of life.  Bed/chair alarm: ON    Low Evaluation 21852  Time In: 1408   Time Out: 1418  Total: 10 min      Evaluation time includes thorough review of current medical information, gathering information on past medical history/social history and prior level of function, completion of standardized testing/informal observation of tasks, assessment of data, and development of POC/Goals    Lisette Hu OTR/L  [id number]

## 2021-08-30 NOTE — PLAN OF CARE
Problem: Falls - Risk of:  Goal: Will remain free from falls  Description: Will remain free from falls  8/30/2021 1619 by Nabil Contreras RN  Outcome: Met This Shift  8/30/2021 1618 by Nabil Contreras RN  Outcome: Met This Shift  Goal: Absence of physical injury  Description: Absence of physical injury  8/30/2021 1619 by Nabil Contreras RN  Outcome: Met This Shift  8/30/2021 1618 by Nabil Contreras RN  Outcome: Met This Shift     Problem: Skin Integrity:  Goal: Will show no infection signs and symptoms  Description: Will show no infection signs and symptoms  8/30/2021 1619 by Nabil Contreras RN  Outcome: Met This Shift  8/30/2021 1618 by Nabil Contreras RN  Outcome: Met This Shift  Goal: Absence of new skin breakdown  Description: Absence of new skin breakdown  8/30/2021 1619 by Nabil Contreras RN  Outcome: Met This Shift  8/30/2021 1618 by Nabil Contreras RN  Outcome: Met This Shift     Problem: Pain:  Goal: Pain level will decrease  Description: Pain level will decrease  Outcome: Met This Shift  Goal: Control of acute pain  Description: Control of acute pain  Outcome: Met This Shift

## 2021-08-30 NOTE — CARE COORDINATION
COVID - 8/30. Met w/ patient. Explained role of  and plan of care. Pt's son lives with her in a 1 story house- ramp to entrance. Has rollator, home CPAP through [facility]. Hx [facility]. Hx [facility]- agency unknown. PCP is Dr. Adriana Boxer and pharmacy is [facility]. Currently on iv/po abx. Awaiting PT/OT evals. Per pt, plan is to return home on discharge- undecided if she wants [facility] on discharge- states her sister or son will provide transportation home.  Will follow Mary Metcalf RN case manager

## 2021-08-30 NOTE — H&P
[facility] History and Physical      CHIEF COMPLAINT: Fall    History of Present Illness:    60-year-old female with history of A. fib status post DC cardioversion not on Eliquis, history of diastolic heart failure was recently discharged from the hospital for left-sided pleural effusion status post thoracentesis with removal of 1 L. Patient reported after being discharged from the hospital feeling well. Continues to have some cough, over the last week she started having worsening shortness of breath associated with lightheadedness, she followed up with her PCP who held her antihypertensive medication. Today the patient started feeling worsening lightheadedness and dizziness and ended up falling twice and hitting her head, she did not lose consciousness. She reported having some palpitations as well over the last week. When she fell she also hit her chest on the counter and she feels some chest wall tenderness. She denied any nausea or vomiting no abdominal pain no diarrhea no constipation.       Informant(s) for H&P: Patient and chart    REVIEW OF SYSTEMS:  A comprehensive review of systems was negative except for: what is in the HPI      PMH:  Past Medical History:   Diagnosis Date    Acute cystitis with hematuria 5/3/2019    Anemia 2008    RECEIVED 4 UNITS BLOOD    [facility]     CKD (chronic kidney disease) stage 3, GFR 30-59 ml/min ([facility]) 5/3/2019    CPAP (continuous positive airway pressure) dependence     Diabetes mellitus ([facility] [address]     Esophageal stricture     Gastric ulcer     Goiter     Heart murmur     Hypertension     SINCE 1994    Kidney stones     Rheumatic fever     POSSIBLY AS A CHILD    Sleep apnea        Surgical History:  Past Surgical History:   Procedure Laterality Date    BREAST LUMPECTOMY Left     CALCIUM BUILDUP    CARDIOVASCULAR STRESS TEST  1999    ISCHEMIC NUCLEAR STRESS TEST - PATIENT REFUSED HEART CATHETERIZATION    CHOLECYSTECTOMY  COLONOSCOPY  07/11    W/POLYP REMOVAL    DOPPLER ECHOCARDIOGRAPHY  05/14/03    POSSIBLE MILD MITRAL STENOSIS    DOPPLER ECHOCARDIOGRAPHY  06/09/04    MILD MITRAL STENSIS, MODERATE MITRAL REGURGITATION    FOOT SURGERY      HYSTERECTOMY      POLYPECTOMY      16 POLYPS REMOVED FROM STOMACH AND 1 FROM COLON    UPPER GASTROINTESTINAL ENDOSCOPY  07/11       Medications Prior to Admission:    Prior to Admission medications    Medication Sig Start Date End Date Taking? Authorizing Provider   spironolactone (ALDACTONE) 25 MG tablet Take 25 mg by mouth daily    Historical Provider, MD   valsartan (DIOVAN) 80 MG tablet Take 1 tablet by mouth daily 3/11/21   Gertrudis Earljuli Guardado,    furosemide (LASIX) 40 MG tablet Take 1 tablet by mouth daily 3/11/21   Trevon Guardado, DO   potassium chloride (KLOR-CON M) 20 MEQ extended release tablet Take 20 mEq by mouth daily    Historical Provider, MD   ferrous sulfate (IRON 325) 325 (65 Fe) MG tablet Take 325 mg by mouth daily (with breakfast)    Historical Provider, MD   metoprolol succinate (TOPROL XL) 50 MG extended release tablet Take 50 mg by mouth 2 times daily    Historical Provider, MD   omeprazole (PRILOSEC) 40 MG delayed release capsule Take 1 capsule by mouth every morning 10/17/19   Historical Provider, MD   sertraline (ZOLOFT) 50 MG tablet Take 50 mg by mouth daily  9/26/19   Historical Provider, MD   CPAP Machine MISC Inhale into the lungs nightly     Historical Provider, MD   vitamin D3 (CHOLECALCIFEROL) 1000 UNITS TABS tablet Take 1 tablet by mouth daily. Patient taking differently: Take 2,000 Units by mouth daily  9/17/14   Urban Meckel, DO       Allergies:    Nitrofuran derivatives, Other, and Sulfa antibiotics    Social History:    reports that she has never smoked. She has never used smokeless tobacco. She reports that she does not drink alcohol and does not use drugs.     Family History:   family history includes Alcohol Abuse in her father; Asthma in her mother; Diabetes in her mother. PHYSICAL EXAM:  Vitals:  /62   Pulse 92   Temp 97.3 °F (36.3 °C) (Tympanic)   Resp 14   Ht 5' 6\" (1.676 m)   Wt 136 lb (61.7 kg)   SpO2 95%   BMI 21.95 kg/m²     General Appearance: alert and oriented to person, place and time and in no acute distress  Skin: warm and dry, 2 ecchymotic areas on the forehead  Head: normocephalic and atraumatic  Eyes: pupils equal, round, and reactive to light, extraocular eye movements intact, conjunctivae normal  Neck: neck supple and non tender without mass   Pulmonary/Chest: Decreased sounds left base otherwise clear bilaterally- no wheezes, rales or rhonchi, normal air movement, no respiratory distress  Cardiovascular: normal rate, normal S1 and S2 and no carotid bruits  Abdomen: soft, non-tender, non-distended, normal bowel sounds, no masses or organomegaly  Extremities: no cyanosis, no clubbing and no edema  Neurologic: no cranial nerve deficit and speech normal        LABS:  Recent Labs     08/29/21  2144      K 3.9   CL 93*   CO2 27   BUN 24*   CREATININE 1.5*   GLUCOSE 165*   CALCIUM 9.5       Recent Labs     08/29/21  2144   WBC 18.2*   RBC 4.25   HGB 11.2*   HCT 35.6   MCV 83.8   MCH 26.4   MCHC 31.5*   RDW 16.0*      MPV 9.3       No results for input(s): POCGLU in the last 72 hours. Radiology:   CT Head WO Contrast   Final Result   No CT evidence of an acute intracranial abnormality. No evidence of acute fracture or traumatic malalignment of the cervical spine. Scattered lucencies are noted throughout the cervical spine which are   nonspecific. Possible consideration could include osteopenia, metabolic   disorder, or metastatic disease. Additional chronic and age related changes in the head and cervical spine, as   noted above. CT Cervical Spine WO Contrast   Final Result   No CT evidence of an acute intracranial abnormality.       No evidence of acute fracture or traumatic malalignment of the cervical spine. Scattered lucencies are noted throughout the cervical spine which are   nonspecific. Possible consideration could include osteopenia, metabolic   disorder, or metastatic disease. Additional chronic and age related changes in the head and cervical spine, as   noted above. XR CHEST (2 VW)   Final Result   Opacification of the left lower lung, likely combination infiltrates,   atelectasis and effusion. EKG: Sinus tachycardia    ASSESSMENT:      Active Problems:    * No active hospital problems. *  Resolved Problems:    * No resolved hospital problems. *      PLAN:    1. Left lower lobe pneumonia, most likely resistant organism due to recent admission, suspecting gram-negative rods, patient received cefepime and vancomycin in the ED, will continue cefepime for now, obtain procalcitonin sputum cultures, strep and Legionella antigens  2. Sepsis due to pneumonia with leukocytosis at 18.2 and tachycardia, patient was hypotensive on presentation at 88/48 responded to IV fluids, monitor for septic shock, obtain lactic acid, start IV antibiotics and obtain cultures. 3.  History of diastolic heart failure, not in exacerbation, holding diuretics due to sepsis and hypotension. 4.  History of A. fib, status post DC cardioversion earlier this year in March, was taken off Eliquis in July, she was in sinus tachycardia, resuming metoprolol as blood pressure improved  5. History of hypertension, presented with hypotension, holding valsartan and spironolactone  6. History of chronic kidney disease stage III, most recent admission creatinine peaked at 1.8 then trended down, currently creatinine is at 1.5, will monitor    Code Status: Full code  DVT prophylaxis: Lovenox      NOTE: This report was transcribed using voice recognition software. Every effort was made to ensure accuracy; however, inadvertent computerized transcription errors may be present.   Electronically signed by Debbra Severance, MD on 8/30/2021 at 1:29 AM

## 2021-08-30 NOTE — PROGRESS NOTES
Physical Therapy    Facility/Department: [address] INTERNAL MEDICINE 2  Initial Assessment    NAME: Diane Dominguez  : 1935  MRN: [id number]    Date of Service: 2021    REQUIRES PT FOLLOW UP: Yes       Patient Diagnosis(es): The primary encounter diagnosis was HAP (hospital-acquired pneumonia). Diagnoses of Fall, initial encounter and Sepsis without acute organ dysfunction, due to unspecified organism [facility]) were also pertinent to this visit. has a past medical history of Acute cystitis with hematuria, Anemia, Atrial fibrillation ([facility] [address] CKD (chronic kidney disease) stage 3, GFR 30-59 ml/min ([facility]), CPAP (continuous positive airway pressure) dependence, Diabetes mellitus ([facility] [address] Esophageal stricture, Gastric ulcer, Goiter, Heart murmur, Hypertension, Kidney stones, Rheumatic fever, and Sleep apnea. has a past surgical history that includes cardiovascular stress test (); doppler echocardiography (03); doppler echocardiography (04); Hysterectomy; Cholecystectomy; polypectomy; Upper gastrointestinal endoscopy (); Colonoscopy (); Breast lumpectomy (Left); and Foot surgery. Evaluating Therapist: Allegra Rivero PT     Referring Provider:  Celia Escobar DO    PT order : Pt eval and treat     Room #:  417  DIAGNOSIS:  PNA   Additional Pertinent History:recent fall   PRECAUTIONS:  Falls , [place]     Social:  Pt lives with  son in a  1  floor plan  With ramp  to enter. Prior to admission pt walked with  No AD . Has brent, angus, w/c      Initial Evaluation  Date:  2021  Treatment      Short Term/ Long Term   Goals   Was pt agreeable to Eval/treatment?   yes      Does pt have pain?  none reported      Bed Mobility  Rolling:  NT  Supine to sit: SBA   Sit to supine:  NT   Scooting:  BSA    independent    Transfers Sit to stand:  CGA   Stand to sit: SBA   Stand pivot: NT    independent    Ambulation     160  feet with  ww  with  SBA    200  feet with  No AD/AAD  with  Independent LE ROM  WFL     LE strength  4-/ 5      AM- PAC RAW score   18/ 24             Pt is alert and Oriented x 3. [place]      Balance:  SBA , no LOB with gait with use of ww. Endurance: [facility]   Bed/Chair alarm: Yes      ASSESSMENT  Pt displays functional ability as noted in the objective portion of this evaluation. Conditions Requiring Skilled Therapeutic Intervention:    [x]Decreased strength     []Decreased ROM  [x]Decreased functional mobility  [x]Decreased balance   []Decreased endurance   []Decreased posture  []Decreased sensation  []Decreased coordination   []Decreased vision  []Decreased safety awareness   []Increased pain       Treatment/Education:    Pt in bed upon arrival. Her sister is present. Pt agreeable to PT eval. Mobility as above. Pt preferred to use ww for longer gait distance. Cues for hand placement with transfers, controlled sit, and proper ww use needed. Pt educated on fall risk,  Safety with mobility, use of ww as needed at home to increase gait safety/decrease fallrisk        Patient response to education:   Pt verbalized understanding Pt demonstrated skill Pt requires further education in this area   x With cues   x        Comments:  Pt left in chair after session, with call light in reach. Waffle cushion placed. Pt set up with dinner       Rehab potential is Good for reaching above PT goals. Pts/ family goals   1. None stated     Patient and or family understand(s) diagnosis, prognosis, and plan of care. -  Yes     PLAN  PT care will be provided in accordance with the objectives noted above. Whenever appropriate, clear delegation orders will be provided for nursing staff. Exercises and functional mobility practice will be used as well as appropriate assistive devices or modalities to obtain goals. Patient and family education will also be administered as needed.         PLAN OF CARE:    Current Treatment Recommendations     [x] Strengthening to improve independence with functional mobility   [] ROM to improve independence with functional mobility   [x] Balance Training to improve static/dynamic balance and to reduce fall risk  [x] Endurance Training to improve activity tolerance during functional mobility   [x] Transfer Training to improve safety and independence with all functional transfers   [x] Gait Training to improve gait mechanics, endurance and assess need for appropriate assistive device  [] Stair Training in preparation for safe discharge home and/or into the community   [x] Positioning to prevent skin breakdown and contractures  [x] Safety and Education Training   [x] Patient/Caregiver Education   [] HEP  [] Other     Frequency of treatments will be 2-5x/week x 7 days. Time in: 1615  Time out: 1630       Evaluation Time includes thorough review of current medical information, gathering information on past medical history/social history and prior level of function, completion of standardized testing/informal observation of tasks, assessment of data and education on plan of care and goals.     CPT codes:  [x] Low Complexity PT evaluation 02436  [] Moderate Complexity PT evaluation 50925  [] High Complexity PT evaluation 11296  [] PT Re-evaluation 72556  [] Gait training 35862  minutes  [] Therapeutic activities 44570  minutes  [] Therapeutic exercises 84967  minutes  [] Neuromuscular reeducation 65369  minutes       Lash[address] 18 number:  PT 5305

## 2021-08-31 ENCOUNTER — APPOINTMENT (OUTPATIENT)
Dept: CT IMAGING | Age: 86
DRG: 186 | End: 2021-08-31
Payer: MEDICARE

## 2021-08-31 LAB
ANION GAP SERPL CALCULATED.3IONS-SCNC: 11 MMOL/L (ref 7–16)
BASOPHILS ABSOLUTE: 0.02 E9/L (ref 0–0.2)
BASOPHILS RELATIVE PERCENT: 0.2 % (ref 0–2)
BOTTLE TYPE: ABNORMAL
BUN BLDV-MCNC: 26 MG/DL (ref 6–23)
CALCIUM SERPL-MCNC: 9 MG/DL (ref 8.6–10.2)
CANDIDA ALBICANS BY PCR: NOT DETECTED
CANDIDA GLABRATA BY PCR: NOT DETECTED
CANDIDA KRUSEI BY PCR: NOT DETECTED
CANDIDA PARAPSILOSIS BY PCR: NOT DETECTED
CANDIDA TROPICALIS BY PCR: NOT DETECTED
CHLORIDE BLD-SCNC: 100 MMOL/L (ref 98–107)
CO2: 26 MMOL/L (ref 22–29)
CREAT SERPL-MCNC: 1.6 MG/DL (ref 0.5–1)
ENTEROBACTER CLOACAE COMPLEX BY PCR: NOT DETECTED
ENTEROBACTERALES BY PCR: NOT DETECTED
EOSINOPHILS ABSOLUTE: 0.28 E9/L (ref 0.05–0.5)
EOSINOPHILS RELATIVE PERCENT: 2.3 % (ref 0–6)
ESCHERICHIA COLI BY PCR: NOT DETECTED
GFR AFRICAN AMERICAN: 37
GFR NON-AFRICAN AMERICAN: 31 ML/MIN/1.73
GLUCOSE BLD-MCNC: 148 MG/DL (ref 74–99)
HAEMOPHILUS INFLUENZAE BY PCR: NOT DETECTED
HCT VFR BLD CALC: 34 % (ref 34–48)
HEMOGLOBIN: 10.5 G/DL (ref 11.5–15.5)
IMMATURE GRANULOCYTES #: 0.05 E9/L
IMMATURE GRANULOCYTES %: 0.4 % (ref 0–5)
KLEBSIELLA OXYTOCA BY PCR: NOT DETECTED
KLEBSIELLA PNEUMONIAE GROUP BY PCR: NOT DETECTED
L. PNEUMOPHILA SEROGP 1 UR AG: NORMAL
LISTERIA MONOCYTOGENES BY PCR: NOT DETECTED
LYMPHOCYTES ABSOLUTE: 1.73 E9/L (ref 1.5–4)
LYMPHOCYTES RELATIVE PERCENT: 14.2 % (ref 20–42)
MCH RBC QN AUTO: 25.9 PG (ref 26–35)
MCHC RBC AUTO-ENTMCNC: 30.9 % (ref 32–34.5)
MCV RBC AUTO: 84 FL (ref 80–99.9)
METHICILLIN RESISTANCE MECA/C  BY PCR: DETECTED
MONOCYTES ABSOLUTE: 0.64 E9/L (ref 0.1–0.95)
MONOCYTES RELATIVE PERCENT: 5.2 % (ref 2–12)
NEISSERIA MENINGITIDIS BY PCR: NOT DETECTED
NEUTROPHILS ABSOLUTE: 9.5 E9/L (ref 1.8–7.3)
NEUTROPHILS RELATIVE PERCENT: 77.7 % (ref 43–80)
ORDER NUMBER: ABNORMAL
PDW BLD-RTO: 15.9 FL (ref 11.5–15)
PLATELET # BLD: 418 E9/L (ref 130–450)
PMV BLD AUTO: 9.2 FL (ref 7–12)
POTASSIUM SERPL-SCNC: 4.4 MMOL/L (ref 3.5–5)
PROTEUS SPECIES BY PCR: NOT DETECTED
PSEUDOMONAS AERUGINOSA BY PCR: NOT DETECTED
RBC # BLD: 4.05 E12/L (ref 3.5–5.5)
SERRATIA MARCESCENS BY PCR: NOT DETECTED
SODIUM BLD-SCNC: 137 MMOL/L (ref 132–146)
SOURCE OF BLOOD CULTURE: ABNORMAL
STAPHYLOCOCCUS AUREUS BY PCR: NOT DETECTED
STAPHYLOCOCCUS SPECIES BY PCR: DETECTED
STREP PNEUMONIAE ANTIGEN, URINE: NORMAL
STREPTOCOCCUS AGALACTIAE BY PCR: NOT DETECTED
STREPTOCOCCUS PNEUMONIAE BY PCR: NOT DETECTED
STREPTOCOCCUS PYOGENES  BY PCR: NOT DETECTED
STREPTOCOCCUS SPECIES BY PCR: NOT DETECTED
WBC # BLD: 12.2 E9/L (ref 4.5–11.5)

## 2021-08-31 PROCEDURE — 6360000002 HC RX W HCPCS: Performed by: INTERNAL MEDICINE

## 2021-08-31 PROCEDURE — 99233 SBSQ HOSP IP/OBS HIGH 50: CPT | Performed by: INTERNAL MEDICINE

## 2021-08-31 PROCEDURE — 71250 CT THORAX DX C-: CPT

## 2021-08-31 PROCEDURE — 6370000000 HC RX 637 (ALT 250 FOR IP): Performed by: INTERNAL MEDICINE

## 2021-08-31 PROCEDURE — 87081 CULTURE SCREEN ONLY: CPT

## 2021-08-31 PROCEDURE — 85025 COMPLETE CBC W/AUTO DIFF WBC: CPT

## 2021-08-31 PROCEDURE — 2060000000 HC ICU INTERMEDIATE R&B

## 2021-08-31 PROCEDURE — 87040 BLOOD CULTURE FOR BACTERIA: CPT

## 2021-08-31 PROCEDURE — 80048 BASIC METABOLIC PNL TOTAL CA: CPT

## 2021-08-31 PROCEDURE — 36415 COLL VENOUS BLD VENIPUNCTURE: CPT

## 2021-08-31 PROCEDURE — 2580000003 HC RX 258: Performed by: INTERNAL MEDICINE

## 2021-08-31 RX ORDER — HEPARIN SODIUM 10000 [USP'U]/ML
5000 INJECTION, SOLUTION INTRAVENOUS; SUBCUTANEOUS EVERY 8 HOURS SCHEDULED
Status: DISCONTINUED | OUTPATIENT
Start: 2021-08-31 | End: 2021-09-03 | Stop reason: HOSPADM

## 2021-08-31 RX ADMIN — METOPROLOL SUCCINATE 50 MG: 50 TABLET, FILM COATED, EXTENDED RELEASE ORAL at 09:26

## 2021-08-31 RX ADMIN — SERTRALINE HYDROCHLORIDE 50 MG: 50 TABLET ORAL at 09:26

## 2021-08-31 RX ADMIN — CEFEPIME HYDROCHLORIDE 1000 MG: 1 INJECTION, POWDER, FOR SOLUTION INTRAMUSCULAR; INTRAVENOUS at 03:00

## 2021-08-31 RX ADMIN — FERROUS SULFATE TAB 325 MG (65 MG ELEMENTAL FE) 325 MG: 325 (65 FE) TAB at 09:26

## 2021-08-31 RX ADMIN — CEFEPIME HYDROCHLORIDE 1000 MG: 1 INJECTION, POWDER, FOR SOLUTION INTRAMUSCULAR; INTRAVENOUS at 15:26

## 2021-08-31 RX ADMIN — HEPARIN SODIUM 5000 UNITS: 10000 INJECTION, SOLUTION INTRAVENOUS; SUBCUTANEOUS at 15:16

## 2021-08-31 RX ADMIN — HEPARIN SODIUM 5000 UNITS: 10000 INJECTION, SOLUTION INTRAVENOUS; SUBCUTANEOUS at 21:32

## 2021-08-31 RX ADMIN — Medication 10 ML: at 09:26

## 2021-08-31 RX ADMIN — ENOXAPARIN SODIUM 30 MG: 30 INJECTION SUBCUTANEOUS at 09:26

## 2021-08-31 RX ADMIN — DOXYCYCLINE HYCLATE 100 MG: 100 CAPSULE ORAL at 05:53

## 2021-08-31 RX ADMIN — Medication 2000 UNITS: at 09:26

## 2021-08-31 RX ADMIN — METOPROLOL SUCCINATE 50 MG: 50 TABLET, FILM COATED, EXTENDED RELEASE ORAL at 21:33

## 2021-08-31 RX ADMIN — PANTOPRAZOLE SODIUM 40 MG: 40 TABLET, DELAYED RELEASE ORAL at 05:53

## 2021-08-31 RX ADMIN — Medication 10 ML: at 22:11

## 2021-08-31 ASSESSMENT — PAIN SCALES - GENERAL
PAINLEVEL_OUTOF10: 0
PAINLEVEL_OUTOF10: 0

## 2021-08-31 NOTE — CONSULTS
[address] [place] [address] [facility]  Consultation Note     Admit Date: 8/29/2021  9:44 PM    Reason for Consult:   Bacteremia    Attending Physician:  Salbador Aviles DO    HISTORY OF PRESENT ILLNESS:             The history is obtained from extensive review of available past medical records. The patient is a 81 y.o. female who is not previously known to the ID service. The patient was recently discharged from [facility]. She had presented with dyspnea and pneumonia. Treated with Lasix. She was noted to have a pleural effusion and underwent a thoracentesis with 1 L of fluid removed. It was consistent with a transudate. The patient comes back to the ED on 8/29/2021 after a fall. She had been lightheaded. White count was 18.2. Creatinine was 1.5 with a BUN of 24. She was treated with Vancomycin and Cefepime for hospital-acquired pneumonia. Blood cultures returned positive for CoNS in 4 of 4 bottles. The patient has not had any fevers. She says that when she first came 4 weeks ago she was having fevers up to 104 degrees at home. Now she only believes she has been having low-grade fevers but has not actually quantified her temperatures. She denies being dyspneic. She does not have a cough. Denies any pleuritic chest pain.     Past Medical History:        Diagnosis Date    Acute cystitis with hematuria 5/3/2019    Anemia 2008    RECEIVED 4 UNITS BLOOD    Atrial fibrillation (HCC)     CKD (chronic kidney disease) stage 3, GFR 30-59 ml/min ([facility]) 5/3/2019    CPAP (continuous positive airway pressure) dependence     Diabetes mellitus ([address]     Esophageal stricture     Gastric ulcer     Goiter     Heart murmur     Hypertension     SINCE 1994    Kidney stones     Rheumatic fever     POSSIBLY AS A CHILD    Sleep apnea      Past Surgical History:        Procedure Laterality Date    BREAST LUMPECTOMY Left     CALCIUM BUILDUP    CARDIOVASCULAR STRESS TEST  1999    ISCHEMIC NUCLEAR STRESS TEST - PATIENT REFUSED HEART CATHETERIZATION    CHOLECYSTECTOMY      COLONOSCOPY  07/11    W/POLYP REMOVAL    DOPPLER ECHOCARDIOGRAPHY  05/14/03    POSSIBLE MILD MITRAL STENOSIS    DOPPLER ECHOCARDIOGRAPHY  06/09/04    MILD MITRAL STENSIS, MODERATE MITRAL REGURGITATION    FOOT SURGERY      HYSTERECTOMY      POLYPECTOMY      16 POLYPS REMOVED FROM STOMACH AND 1 FROM COLON    UPPER GASTROINTESTINAL ENDOSCOPY  07/11     Current Medications:   Scheduled Meds:   heparin (porcine)  5,000 Units SubCUTAneous 3 times per day    ferrous sulfate  325 mg Oral Daily with breakfast    metoprolol succinate  50 mg Oral BID    pantoprazole  40 mg Oral QAM AC    sertraline  50 mg Oral Daily    Vitamin D  2,000 Units Oral Daily    sodium chloride flush  5-40 mL IntraVENous 2 times per day    cefepime  1,000 mg IntraVENous Q12H    doxycycline hyclate  100 mg Oral 2 times per day     Continuous Infusions:   sodium chloride       PRN Meds:sodium chloride flush, sodium chloride, ondansetron **OR** ondansetron, polyethylene glycol, acetaminophen **OR** acetaminophen    Allergies:  Nitrofuran derivatives, Other, and Sulfa antibiotics    Social History:   Social History     Socioeconomic History    Marital status:       Spouse name: None    Number of children: None    Years of education: None    Highest education level: None   Occupational History    None   Tobacco Use    Smoking status: Never Smoker    Smokeless tobacco: Never Used   Vaping Use    Vaping Use: Never used   Substance and Sexual Activity    Alcohol use: No    Drug use: Never    Sexual activity: None   Other Topics Concern    None   Social History Narrative    None     Social Determinants of Health     Financial Resource Strain:     Difficulty of Paying Living Expenses:    Food Insecurity:     Worried About Running Out of Food in the Last Year:     Juan of Food in the Last Year:    Transportation Needs:     Lack of Transportation (Medical):  Lack of Transportation (Non-Medical):    Physical Activity:     Days of Exercise per Week:     Minutes of Exercise per Session:    Stress:     Feeling of Stress :    Social Connections:     Frequency of Communication with Friends and Family:     Frequency of Social Gatherings with Friends and Family:     Attends Restoration Services:     Active Member of Clubs or Organizations:     Attends Club or Organization Meetings:     Marital Status:    Intimate Partner Violence:     Fear of Current or Ex-Partner:     Emotionally Abused:     Physically Abused:     Sexually Abused:       Pets: Cat  Travel: No  The patient lives at home with her son    Family History:       Problem Relation Age of Onset    Asthma Mother     Diabetes Mother     Alcohol Abuse Father    . Otherwise non-pertinent to the chief complaint. REVIEW OF SYSTEMS:    Constitutional: Negative for fevers, chills, diaphoresis  Neurologic: Negative   Psychiatric: Negative  Rheumatologic: Negative   Endocrine: Negative  Hematologic: Negative  Immunologic: Negative  ENT: Negative  Respiratory: Negative   Cardiovascular: Negative  GI: Negative  : Negative  Musculoskeletal: Negative  Skin: No rashes. PHYSICAL EXAM:    Vitals:   /64   Pulse 78   Temp 98.4 °F (36.9 °C) (Oral)   Resp 18   Ht 5' 6\" (1.676 m)   Wt 139 lb 1.6 oz (63.1 kg)   SpO2 94%   BMI 22.45 kg/m²   Constitutional: The patient is awake, alert, and oriented. Lying in bed. No distress. Skin: Warm and dry. No rashes were noted. HEENT: Eyes show round, and reactive pupils. No jaundice. Moist mucous membranes, no ulcerations, no thrush. Ecchymosis right eyelid and forehead. Neck: Supple to movements. No lymphadenopathy. Chest: No use of accessory muscles to breathe. Symmetrical expansion. Auscultation reveals bronchial sounds with some egophony over the left base posteriorly. Cardiovascular: S1 and S2 are rhythmic and regular.  No 08/30/2021    GLUCOSEU Negative 08/30/2021    GLUCOSEU NEGATIVE 01/23/2012    AMORPHOUS FEW 08/05/2021       Lab Results   Component Value Date    HCO3 19.8 03/05/2021    BE -4.3 03/05/2021    O2SAT 95.1 03/05/2021    PH 7.397 03/05/2021    PCO2 32.9 03/05/2021    PO2 74.1 03/05/2021     Radiology:  Noted    Microbiology:  Pending  Recent Labs     08/30/21  0207   BC Gram stain performed from blood culture bottle media  Gram positive cocci in clusters  Previously positive blood culture called  *     No results for input(s): ORG in the last 72 hours. Recent Labs     08/30/21  0207   BLOODCULT2 Gram stain performed from blood culture bottle media  Gram positive cocci in clusters  *     Recent Labs     08/30/21  0022   STREPNEUMAGU Presumptive negative- suggests no current or recent  pneumococcal infection. Infection due to Strep pneumoniae cannot be  ruled out since the antigen present in the sample  may be below the detection limit of the test.  Normal Range:Presumptive Negative       Recent Labs     08/30/21  0022   LP1UAG Presumptive Negative -suggesting no recent or current infections  with Legionella pneumophila serogroup 1. Infection to Legionella cannot be ruled out since other serogroups  and species may cause infection, antigen may not be present in  early infection, or level of antigen may be below the  detection limit. Normal Range: Presumptive Negative       No results for input(s): ASO in the last 72 hours. No results for input(s): CULTRESP in the last 72 hours. Recent Labs     08/30/21  1107   PROCAL 0.19*       Assessment:  · Persistent left pleural effusion. No loculation to suspect empyema. This was capped last month and was negative for malignancy. Cultures were negative. I do not think the patient has pneumonia  · Status post fall  · Leukocytosis associated to the above  · Bacteremia with CoNS. The patient has no reason to have high-grade bacteremia with this organism.   She has no prosthesis. She has no pacers. I did not hear any murmurs on physical exam    Plan:    · Blood cultures have been repeated  · I do not think antibiotics are warranted at this time  · Check cultures, baseline ESR, CRP  · CT of the chest to look for loculations  · Will follow with you    Thank you for having us see this patient in consultation. I will be discussing this case with the treating physicians.     Jovita Hamm MD  3:10 PM  8/31/2021

## 2021-08-31 NOTE — CARE COORDINATION
PT am-pac 18. + blood clx. Continues on iv/po abxs. Plan remains to return home on discharge- declining [facility]- pt states her sister or son will be with her at all times on discharge.  Will follow Broderick Jay, RN case manager

## 2021-08-31 NOTE — PROGRESS NOTES
[facility] Progress Note    Admitting Date and Time: 8/29/2021  9:44 PM  Admit Dx: Hospital acquired PNA [J18.9, Y95]  HAP (hospital-acquired pneumonia) [J18.9, Y95]  Fall, initial encounter [W19. XXXA]  Sepsis without acute organ dysfunction, due to unspecified organism [facility]) [A41.9]    Subjective:  Patient is being followed for Hospital acquired PNA [J18.9, Y95]  HAP (hospital-acquired pneumonia) [J18.9, Y95]  Fall, initial encounter [W19. XXXA]  Sepsis without acute organ dysfunction, due to unspecified organism [facility]) [A41.9]   Patient denies any complaints at [facility] s time. No cough, fever, of sob    ROS: denies fever, chills, cp, sob, n/v, HA unless stated above.       heparin (porcine)  5,000 Units SubCUTAneous 3 times per day    ferrous sulfate  325 mg Oral Daily with breakfast    metoprolol succinate  50 mg Oral BID    pantoprazole  40 mg Oral QAM AC    sertraline  50 mg Oral Daily    Vitamin D  2,000 Units Oral Daily    sodium chloride flush  5-40 mL IntraVENous 2 times per day    cefepime  1,000 mg IntraVENous Q12H    doxycycline hyclate  100 mg Oral 2 times per day     sodium chloride flush, 5-40 mL, PRN  sodium chloride, 25 mL, PRN  ondansetron, 4 mg, Q8H PRN   Or  ondansetron, 4 mg, Q6H PRN  polyethylene glycol, 17 g, Daily PRN  acetaminophen, 650 mg, Q6H PRN   Or  acetaminophen, 650 mg, Q6H PRN         Objective:    /64   Pulse 78   Temp 98.4 °F (36.9 °C) (Oral)   Resp 18   Ht 5' 6\" (1.676 m)   Wt 139 lb 1.6 oz (63.1 kg)   SpO2 94%   BMI 22.45 kg/m²     General Appearance: alert and oriented to person, place and time and in no acute distress  Skin: warm and dry  Head: normocephalic and atraumatic  Eyes: pupils equal, round, and reactive to light, extraocular eye movements intact, conjunctivae normal  Neck: neck supple and non tender without mass   Pulmonary/Chest: clear to auscultation bilaterally- no wheezes, rales or rhonchi, normal air movement, no respiratory distress  Cardiovascular: normal rate, normal S1 and S2 and no carotid bruits  Abdomen: soft, non-tender, non-distended, normal bowel sounds, no masses or organomegaly  Extremities: no cyanosis, no clubbing and no edema  Neurologic: no cranial nerve deficit and speech normal        Recent Labs     08/29/21 2144 08/30/21  1107 08/31/21  1206    132 137   K 3.9 4.1 4.4   CL 93* 96* 100   CO2 27 27 26   BUN 24* 23 26*   CREATININE 1.5* 1.5* 1.6*   GLUCOSE 165* 156* 148*   CALCIUM 9.5 8.8 9.0       Recent Labs     08/29/21 2144 08/30/21  1107 08/31/21  1206   WBC 18.2* 10.7 12.2*   RBC 4.25 3.81 4.05   HGB 11.2* 9.9* 10.5*   HCT 35.6 32.3* 34.0   MCV 83.8 84.8 84.0   MCH 26.4 26.0 25.9*   MCHC 31.5* 30.7* 30.9*   RDW 16.0* 15.9* 15.9*    340 418   MPV 9.3 9.5 9.2           Assessment:    Active Problems:    Hospital acquired PNA  Resolved Problems:    * No resolved hospital problems. *      Plan:    1. Sepsis secondary to left lower lobe pneumonia and gram-positive cocci bacteremia -sepsis is resolving. Calcitonin from August 30 is 0.19. Will trend procalcitonin in 1 more day. Leukocytosis has resolved. Identification and sensitivities from blood cultures from August 30 are pending. Repeat blood culture has been ordered. Respiratory panel appears to be positive for MRSA. Cefepime as well as doxycycline. ID has been consulted. Order echocardiogram to rule out endocarditis. 2. C-spine lucency -further outpatient work-up  3. Fibrillation status post cardioversion  4. JERICA on CPAP  5. CKD stage III -Baseline appears to be 1.5  6. HFpEF -continue Aldactone, valsartan, Lasix, Toprol  7. DVT prophylaxis -Heparin      NOTE: This report was transcribed using voice recognition software. Every effort was made to ensure accuracy; however, inadvertent computerized transcription errors may be present.   Electronically signed by Luiz Malika CORTES on 8/31/2021 at 3:09 PM

## 2021-09-01 LAB
ANION GAP SERPL CALCULATED.3IONS-SCNC: 7 MMOL/L (ref 7–16)
BASOPHILS ABSOLUTE: 0.03 E9/L (ref 0–0.2)
BASOPHILS RELATIVE PERCENT: 0.5 % (ref 0–2)
BUN BLDV-MCNC: 27 MG/DL (ref 6–23)
C-REACTIVE PROTEIN: 6.4 MG/DL (ref 0–0.4)
CALCIUM SERPL-MCNC: 9 MG/DL (ref 8.6–10.2)
CHLORIDE BLD-SCNC: 102 MMOL/L (ref 98–107)
CO2: 28 MMOL/L (ref 22–29)
CREAT SERPL-MCNC: 1.6 MG/DL (ref 0.5–1)
EOSINOPHILS ABSOLUTE: 0.26 E9/L (ref 0.05–0.5)
EOSINOPHILS RELATIVE PERCENT: 4.1 % (ref 0–6)
GFR AFRICAN AMERICAN: 37
GFR NON-AFRICAN AMERICAN: 31 ML/MIN/1.73
GLUCOSE BLD-MCNC: 110 MG/DL (ref 74–99)
HCT VFR BLD CALC: 32.8 % (ref 34–48)
HEMOGLOBIN: 10 G/DL (ref 11.5–15.5)
IMMATURE GRANULOCYTES #: 0.04 E9/L
IMMATURE GRANULOCYTES %: 0.6 % (ref 0–5)
LV EF: 66 %
LVEF MODALITY: NORMAL
LYMPHOCYTES ABSOLUTE: 1.68 E9/L (ref 1.5–4)
LYMPHOCYTES RELATIVE PERCENT: 26.5 % (ref 20–42)
MCH RBC QN AUTO: 26 PG (ref 26–35)
MCHC RBC AUTO-ENTMCNC: 30.5 % (ref 32–34.5)
MCV RBC AUTO: 85.2 FL (ref 80–99.9)
MONOCYTES ABSOLUTE: 0.57 E9/L (ref 0.1–0.95)
MONOCYTES RELATIVE PERCENT: 9 % (ref 2–12)
NEUTROPHILS ABSOLUTE: 3.76 E9/L (ref 1.8–7.3)
NEUTROPHILS RELATIVE PERCENT: 59.3 % (ref 43–80)
PDW BLD-RTO: 15.9 FL (ref 11.5–15)
PLATELET # BLD: 360 E9/L (ref 130–450)
PMV BLD AUTO: 9.3 FL (ref 7–12)
POTASSIUM SERPL-SCNC: 4.4 MMOL/L (ref 3.5–5)
PROCALCITONIN: 0.12 NG/ML (ref 0–0.08)
RBC # BLD: 3.85 E12/L (ref 3.5–5.5)
SEDIMENTATION RATE, ERYTHROCYTE: 70 MM/HR (ref 0–20)
SODIUM BLD-SCNC: 137 MMOL/L (ref 132–146)
WBC # BLD: 6.3 E9/L (ref 4.5–11.5)

## 2021-09-01 PROCEDURE — 93306 TTE W/DOPPLER COMPLETE: CPT

## 2021-09-01 PROCEDURE — 84145 PROCALCITONIN (PCT): CPT

## 2021-09-01 PROCEDURE — 2580000003 HC RX 258: Performed by: INTERNAL MEDICINE

## 2021-09-01 PROCEDURE — 2060000000 HC ICU INTERMEDIATE R&B

## 2021-09-01 PROCEDURE — 86140 C-REACTIVE PROTEIN: CPT

## 2021-09-01 PROCEDURE — 6360000002 HC RX W HCPCS: Performed by: INTERNAL MEDICINE

## 2021-09-01 PROCEDURE — 80048 BASIC METABOLIC PNL TOTAL CA: CPT

## 2021-09-01 PROCEDURE — 85651 RBC SED RATE NONAUTOMATED: CPT

## 2021-09-01 PROCEDURE — 99233 SBSQ HOSP IP/OBS HIGH 50: CPT | Performed by: INTERNAL MEDICINE

## 2021-09-01 PROCEDURE — 6370000000 HC RX 637 (ALT 250 FOR IP): Performed by: INTERNAL MEDICINE

## 2021-09-01 PROCEDURE — 85025 COMPLETE CBC W/AUTO DIFF WBC: CPT

## 2021-09-01 PROCEDURE — 97530 THERAPEUTIC ACTIVITIES: CPT

## 2021-09-01 PROCEDURE — 36415 COLL VENOUS BLD VENIPUNCTURE: CPT

## 2021-09-01 RX ADMIN — METOPROLOL SUCCINATE 50 MG: 50 TABLET, FILM COATED, EXTENDED RELEASE ORAL at 08:46

## 2021-09-01 RX ADMIN — METOPROLOL SUCCINATE 50 MG: 50 TABLET, FILM COATED, EXTENDED RELEASE ORAL at 20:52

## 2021-09-01 RX ADMIN — HEPARIN SODIUM 5000 UNITS: 10000 INJECTION, SOLUTION INTRAVENOUS; SUBCUTANEOUS at 15:00

## 2021-09-01 RX ADMIN — Medication 2000 UNITS: at 08:46

## 2021-09-01 RX ADMIN — SERTRALINE HYDROCHLORIDE 50 MG: 50 TABLET ORAL at 08:46

## 2021-09-01 RX ADMIN — FERROUS SULFATE TAB 325 MG (65 MG ELEMENTAL FE) 325 MG: 325 (65 FE) TAB at 08:46

## 2021-09-01 RX ADMIN — Medication 10 ML: at 08:46

## 2021-09-01 RX ADMIN — Medication 10 ML: at 20:54

## 2021-09-01 RX ADMIN — PANTOPRAZOLE SODIUM 40 MG: 40 TABLET, DELAYED RELEASE ORAL at 07:29

## 2021-09-01 RX ADMIN — HEPARIN SODIUM 5000 UNITS: 10000 INJECTION, SOLUTION INTRAVENOUS; SUBCUTANEOUS at 20:53

## 2021-09-01 ASSESSMENT — PAIN SCALES - GENERAL
PAINLEVEL_OUTOF10: 0
PAINLEVEL_OUTOF10: 0

## 2021-09-01 NOTE — PROGRESS NOTES
Physician Progress Note      PATIENT:               Karlos DUVALL #:                  [id number]  :                       1935  ADMIT DATE:       2021 9:44 PM  [address] [place] DATE:  RESPONDING  PROVIDER #:        Anthony COLEMAN DO          QUERY TEXT:    Dear Attending Physician,    Patient admitted with Sepsis due to Pneumonia . Noted documentation of Sepsis   in PCP notes and \" I do not think the patient has pneumonia\" per ID . In   order to support the diagnosis of Sepsis, please include additional clinical   indicators in your documentation. Or please document if the diagnosis of   Sepsis has been ruled out after further study    The medical record reflects the following:  Risk Factors: Hx pleura; effusion , CHF, CKD,JERICA  Clinical Indicators: Per PCP notes,\". Poly Hornick Poly Hornick Sepsis due to pneumonia with   leukocytosis at 18.2 and tachycardia, patient was hypotensive on presentation   at 88/48 responded to IV fluids. .\"    Per ID ,\". ... Persistent left pleural   effusion. No loculation to suspect empyema. This was capped last month and   was negative for malignancy. Cultures were negative. I do not think the   patient has pneumonia. .? Bacteremia with CoNS. The patient has no reason to   have high-grade bacteremia with this organism. She has no prosthesis. She   has no pacers. I did not hear any murmurs on physical  Treatment: IV ATB discontinued    Thank you,  Juan F Rosario RN  [facility]  Clinical Documentation Improvement Specialist  [phone]  Options provided:  -- Sepsis present as evidenced by, Please document evidence.   -- Sepsis was ruled out after study  -- Other - I will add my own diagnosis  -- Disagree - Not applicable / Not valid  -- Disagree - Clinically unable to determine / Unknown  -- Refer to Clinical Documentation Reviewer    PROVIDER RESPONSE TEXT:    NO SEPSIS AS THERE IS NO PNEUMONIA OR BACTEREMIA AS PER INFECTIOUS DISEASE    Query created by: Akash Fernandez on 2021 3:50 PM      Electronically signed by:  Jose Calloway DO 9/1/2021 6:31 PM

## 2021-09-01 NOTE — CARE COORDINATION
Repeat blood clx pending- off abxs. ID following. Plan remains to return home w/ son on discharge- [facility]  No needs Swati Norman, RN case manager

## 2021-09-01 NOTE — PROGRESS NOTES
Physical Therapy  Facility/Department: [address] INTERNAL MEDICINE 2  Daily Treatment Note  NAME: Rosalina Baker  : 1935  MRN: [id number]    Date of Service: 2021    Patient Diagnosis(es): The primary encounter diagnosis was HAP (hospital-acquired pneumonia). Diagnoses of Fall, initial encounter and Sepsis without acute organ dysfunction, due to unspecified organism [facility]) were also pertinent to this visit. has a past medical history of Acute cystitis with hematuria, Anemia, Atrial fibrillation ([facility] CKD (chronic kidney disease) stage 3, GFR 30-59 ml/min ([facility]), CPAP (continuous positive airway pressure) dependence, Diabetes mellitus ([facility] Esophageal stricture, Gastric ulcer, Goiter, Heart murmur, Hypertension, Kidney stones, Rheumatic fever, and Sleep apnea. has a past surgical history that includes cardiovascular stress test (); doppler echocardiography (03); doppler echocardiography (04); Hysterectomy; Cholecystectomy; polypectomy; Upper gastrointestinal endoscopy (); Colonoscopy (); Breast lumpectomy (Left); and Foot surgery. Evaluating Therapist: Addie Wells PT      Referring Provider:  Jose Aguayo DO     PT order : Pt eval and treat      Room #:  417  DIAGNOSIS:  PNA   Additional Pertinent History:recent fall   PRECAUTIONS:  Falls , [place]      Social:  Pt lives with  son in a  1  floor plan  With ramp  to enter. Prior to admission pt walked with  No AD . Has angus kennedy, w/c        Initial Evaluation  Date:  2021  Treatment      Short Term/ Long Term   Goals   Was pt agreeable to Eval/treatment?   yes  yes      Does pt have pain?  none reported   none reported     Bed Mobility  Rolling:  NT  Supine to sit: SBA   Sit to supine:  NT   Scooting:  BSA   rolling:  Supervision  Supine to sit:  Supervision  Sit to supine:  Supervision  Scooting:  Supervision to sitting EOB  independent    Transfers Sit to stand:  CGA   Stand to sit: SBA   Stand pivot: NT  sit to stand:  SBA  Stand to sit:  SBA  Stand pivot:  SBA with w/w  independent    Ambulation     160  feet with  ww  with  SBA   250 feet with w/w SBA  200  feet with  No AD/AAD  with  Independent    LE ROM  WFL       LE strength  4-/ 5        AM- PAC RAW score   18/ 24 18/24        Patient education  Pt educated on PT objectives during treatment session, hand placement during transfers, calling for assistance. Patient response to education:   Pt verbalized understanding Pt demonstrated skill Pt requires further education in this area   yes With cueing yes     ASSESSMENT:    Comments:  Pt found and left in bed with call light in reach and bed alarm on. Treatment:  Patient practiced and was instructed in the following treatment:    Functional mobility performed as documented above. No report of dizziness during functional mobility. Cueing required for hand placement during transfers. No LOB during ambulation. PLAN:    Patient is making good progress towards established goals. Will continue with current POC.      Time in  1414  Time out  1426    Total Treatment Time  12 minutes     CPT codes:    [x] Therapeutic activities 90848  12 minutes  [] Therapeutic exercises 27944  minutes      Selwyn Bowie, [phone]

## 2021-09-01 NOTE — PROGRESS NOTES
[address] [place] [address] [facility]  Progress Note    SUBJECTIVE:  Chief Complaint   Patient presents with    Fall    Head Injury     denies loc and denies thinners      The patient feels well. No fever. No nausea, vomiting or diarrhea. No pain. Review of systems:  As stated above in the chief complaint, otherwise negative. Medications:  Scheduled Meds:   heparin (porcine)  5,000 Units SubCUTAneous 3 times per day    ferrous sulfate  325 mg Oral Daily with breakfast    metoprolol succinate  50 mg Oral BID    pantoprazole  40 mg Oral QAM AC    sertraline  50 mg Oral Daily    Vitamin D  2,000 Units Oral Daily    sodium chloride flush  5-40 mL IntraVENous 2 times per day     Continuous Infusions:   sodium chloride       PRN Meds:sodium chloride flush, sodium chloride, ondansetron **OR** ondansetron, polyethylene glycol, acetaminophen **OR** acetaminophen    OBJECTIVE:  BP (!) 106/54   Pulse 69   Temp 97.8 °F (36.6 °C) (Oral)   Resp 18   Ht 5' 6\" (1.676 m)   Wt 139 lb 1.6 oz (63.1 kg)   SpO2 96%   BMI 22.45 kg/m²   Temp  Av.3 °F (36.8 °C)  Min: 97.8 °F (36.6 °C)  Max: 98.7 °F (37.1 °C)  Constitutional: The patient is awake, alert, and oriented. No distress. Skin: Warm and dry. No rashes were noted. HEENT: Round and reactive pupils. Ecchymosis right eyelid and forehead. Moist mucous membranes. No ulcerations or thrush. Neck: Supple to movements. Chest: No use of accessory muscles to breathe. Symmetrical expansion. No wheezing, crackles or rhonchi. Cardiovascular: S1 and S2 are rhythmic and regular. No murmurs appreciated. Abdomen: Positive bowel sounds to auscultation. Benign to palpation. Extremities: No edema.   Lines: peripheral    Laboratory and Tests Review:  Lab Results   Component Value Date    WBC 12.2 (H) 2021    WBC 10.7 2021    WBC 18.2 (H) 2021    HGB 10.5 (L) 2021    HCT 34.0 2021    MCV 84.0 2021     08/31/2021     Lab Results   Component Value Date    NEUTROABS 9.50 (H) 08/31/2021    NEUTROABS 8.26 (H) 08/30/2021    NEUTROABS 15.32 (H) 08/29/2021     No results found for: CRPHS  Lab Results   Component Value Date    ALT 9 08/29/2021    AST 14 08/29/2021    ALKPHOS 90 08/29/2021    BILITOT 0.3 08/29/2021     Lab Results   Component Value Date     08/31/2021    K 4.4 08/31/2021    K 4.1 08/30/2021     08/31/2021    CO2 26 08/31/2021    BUN 26 08/31/2021    CREATININE 1.6 08/31/2021    CREATININE 1.5 08/30/2021    CREATININE 1.5 08/29/2021    GFRAA 37 08/31/2021    LABGLOM 31 08/31/2021    GLUCOSE 148 08/31/2021    GLUCOSE 108 02/27/2012    PROT 7.2 08/29/2021    LABALBU 3.4 08/29/2021    LABALBU 4.3 02/27/2012    LABALBU 3.5 02/27/2012    CALCIUM 9.0 08/31/2021    BILITOT 0.3 08/29/2021    ALKPHOS 90 08/29/2021    AST 14 08/29/2021    ALT 9 08/29/2021     No results found for: CRP  Lab Results   Component Value Date    SEDRATE 20 02/27/2012     Radiology:      Microbiology:   Blood cultures 8/30/2021: CoNS in 4 of 4 bottles  Blood cultures 8/31/2021: Negative so far  Nares screen MRSA: Pending  Streptococcus pneumoniae/Legionella urine Ag: negative  Rapid SARS-CoV-2: Negative    Recent Labs     08/30/21  1107   PROCAL 0.19*       ASSESSMENT:  · Left pleural effusion  · Status post fall  · Leukocytosis  · CoNS bacteremia. Probable contaminant    PLAN:  · Observe off antibiotic  · Check final and repeat blood cultures.   If the blood cultures are discordant, i.e. a different species or different phenotypes, this is a contaminant    Spoke with nursing    Eleuterio Paula MD  11:27 AM  9/1/2021

## 2021-09-01 NOTE — PROGRESS NOTES
[facility] Progress Note    Admitting Date and Time: 8/29/2021  9:44 PM  Admit Dx: Hospital acquired PNA [J18.9, Y95]  HAP (hospital-acquired pneumonia) [J18.9, Y95]  Fall, initial encounter [W19. XXXA]  Sepsis without acute organ dysfunction, due to unspecified organism [facility]) [A41.9]    Subjective:  Patient is being followed for Hospital acquired PNA [J18.9, Y95]  HAP (hospital-acquired pneumonia) [J18.9, Y95]  Fall, initial encounter [W19. XXXA]  Sepsis without acute organ dysfunction, due to unspecified organism [facility]) [A41.9]     Patient has some ecchymosis from her fall however otherwise she has no complaints. Patient denies any fevers, chills. She states that she feels very well. ROS: denies fever, chills, cp, sob, n/v, HA unless stated above.       heparin (porcine)  5,000 Units SubCUTAneous 3 times per day    ferrous sulfate  325 mg Oral Daily with breakfast    metoprolol succinate  50 mg Oral BID    pantoprazole  40 mg Oral QAM AC    sertraline  50 mg Oral Daily    Vitamin D  2,000 Units Oral Daily    sodium chloride flush  5-40 mL IntraVENous 2 times per day     sodium chloride flush, 5-40 mL, PRN  sodium chloride, 25 mL, PRN  ondansetron, 4 mg, Q8H PRN   Or  ondansetron, 4 mg, Q6H PRN  polyethylene glycol, 17 g, Daily PRN  acetaminophen, 650 mg, Q6H PRN   Or  acetaminophen, 650 mg, Q6H PRN         Objective:    BP (!) 106/54   Pulse 69   Temp 97.8 °F (36.6 °C) (Oral)   Resp 18   Ht 5' 6\" (1.676 m)   Wt 139 lb 1.6 oz (63.1 kg)   SpO2 96%   BMI 22.45 kg/m²     General Appearance: alert and oriented to person, place and time and in no acute distress  Skin: warm and dry  Head: normocephalic and atraumatic  Eyes: pupils equal, round, and reactive to light, extraocular eye movements intact, conjunctivae normal  Neck: neck supple and non tender without mass   Pulmonary/Chest: clear to auscultation bilaterally- no wheezes, rales or rhonchi, normal air movement, no respiratory distress  Cardiovascular: normal rate, normal S1 and S2 and no carotid bruits  Abdomen: soft, non-tender, non-distended, normal bowel sounds, no masses or organomegaly  Extremities: no cyanosis, no clubbing and no edema  Neurologic: no cranial nerve deficit and speech normal        Recent Labs     08/30/21  1107 08/31/21  1206 09/01/21  1103    137 137   K 4.1 4.4 4.4   CL 96* 100 102   CO2 27 26 28   BUN 23 26* 27*   CREATININE 1.5* 1.6* 1.6*   GLUCOSE 156* 148* 110*   CALCIUM 8.8 9.0 9.0       Recent Labs     08/30/21  1107 08/31/21  1206 09/01/21  1103   WBC 10.7 12.2* 6.3   RBC 3.81 4.05 3.85   HGB 9.9* 10.5* 10.0*   HCT 32.3* 34.0 32.8*   MCV 84.8 84.0 85.2   MCH 26.0 25.9* 26.0   MCHC 30.7* 30.9* 30.5*   RDW 15.9* 15.9* 15.9*    418 360   MPV 9.5 9.2 9.3       Radiology:  CT chest without contrast  Cardiomegaly with coronary artery calcification and moderate pericardial   effusion without change. Large left pleural effusion with atelectasis/infiltrates in the left lower   lobe and lingula with marginal improvement and development of small right   pleural effusion with atelectasis.  Pneumonia and or CHF are considered.  The   evaluation of developing empyema is suboptimal due to lack of intravenous   contrast.  However, empyema is less likely and surveillance recommended. 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 Assessment:    Active Problems:    Hospital acquired PNA  Resolved Problems:    * No resolved hospital problems. *      Plan:    1. Questionable MRSA bacteremia and left lower lobe pneumonia  - As per infectious disease blood cultures appear to be a contaminant. Repeat blood cultures from August 31 have been collected. ESR and CRP have been ordered as well. CRP is currently pending and ESR is elevated at 70. CT of the chest without contrast does not indicate any loculation presents.   Antibiotics have been DC'd since yesterday. ID recommends observing patient off of antibiotics. 2. C-spine lucency -further outpatient work-up  3. Fibrillation status post cardioversion  4. JERICA on CPAP  5. CKD stage III -Baseline appears to be 1.5  6. HFpEF -continue Aldactone, valsartan, Lasix, Toprol  7. DVT prophylaxis -Heparin    NOTE: This report was transcribed using voice recognition software. Every effort was made to ensure accuracy; however, inadvertent computerized transcription errors may be present.   Electronically signed by Sara Reed DO  on 9/1/2021 at 2:13 PM

## 2021-09-02 ENCOUNTER — APPOINTMENT (OUTPATIENT)
Dept: ULTRASOUND IMAGING | Age: 86
DRG: 186 | End: 2021-09-02
Payer: MEDICARE

## 2021-09-02 ENCOUNTER — APPOINTMENT (OUTPATIENT)
Dept: GENERAL RADIOLOGY | Age: 86
DRG: 186 | End: 2021-09-02
Payer: MEDICARE

## 2021-09-02 LAB
AMYLASE FLUID: 32 U/L
ANION GAP SERPL CALCULATED.3IONS-SCNC: 11 MMOL/L (ref 7–16)
BASOPHILS ABSOLUTE: 0.03 E9/L (ref 0–0.2)
BASOPHILS RELATIVE PERCENT: 0.4 % (ref 0–2)
BLOOD CULTURE, ROUTINE: ABNORMAL
BLOOD CULTURE, ROUTINE: ABNORMAL
BUN BLDV-MCNC: 33 MG/DL (ref 6–23)
CALCIUM SERPL-MCNC: 8.7 MG/DL (ref 8.6–10.2)
CHLORIDE BLD-SCNC: 103 MMOL/L (ref 98–107)
CHOLESTEROL FLUID: 105 MG/DL
CO2: 25 MMOL/L (ref 22–29)
CREAT SERPL-MCNC: 1.4 MG/DL (ref 0.5–1)
CULTURE, BLOOD 2: ABNORMAL
EOSINOPHILS ABSOLUTE: 0.29 E9/L (ref 0.05–0.5)
EOSINOPHILS RELATIVE PERCENT: 3.8 % (ref 0–6)
FLUID TYPE: NORMAL
GFR AFRICAN AMERICAN: 43
GFR NON-AFRICAN AMERICAN: 36 ML/MIN/1.73
GLUCOSE BLD-MCNC: 106 MG/DL (ref 74–99)
GLUCOSE, FLUID: 171 MG/DL
HCT VFR BLD CALC: 33.1 % (ref 34–48)
HEMOGLOBIN: 10.1 G/DL (ref 11.5–15.5)
IMMATURE GRANULOCYTES #: 0.05 E9/L
IMMATURE GRANULOCYTES %: 0.7 % (ref 0–5)
INR BLD: 1.1
LACTATE DEHYDROGENASE: 153 U/L (ref 135–214)
LD, FLUID: 123 U/L
LYMPHOCYTES ABSOLUTE: 2.22 E9/L (ref 1.5–4)
LYMPHOCYTES RELATIVE PERCENT: 29.1 % (ref 20–42)
MCH RBC QN AUTO: 25.8 PG (ref 26–35)
MCHC RBC AUTO-ENTMCNC: 30.5 % (ref 32–34.5)
MCV RBC AUTO: 84.4 FL (ref 80–99.9)
MONOCYTES ABSOLUTE: 0.64 E9/L (ref 0.1–0.95)
MONOCYTES RELATIVE PERCENT: 8.4 % (ref 2–12)
MRSA CULTURE ONLY: NORMAL
NEUTROPHILS ABSOLUTE: 4.41 E9/L (ref 1.8–7.3)
NEUTROPHILS RELATIVE PERCENT: 57.6 % (ref 43–80)
ORGANISM: ABNORMAL
ORGANISM: ABNORMAL
PDW BLD-RTO: 16 FL (ref 11.5–15)
PLATELET # BLD: 370 E9/L (ref 130–450)
PMV BLD AUTO: 9.4 FL (ref 7–12)
POTASSIUM SERPL-SCNC: 4.4 MMOL/L (ref 3.5–5)
PRO-BNP: 4241 PG/ML (ref 0–450)
PROTEIN FLUID: 3.5 G/DL
PROTHROMBIN TIME: 12.4 SEC (ref 9.3–12.4)
RBC # BLD: 3.92 E12/L (ref 3.5–5.5)
SODIUM BLD-SCNC: 139 MMOL/L (ref 132–146)
TOTAL PROTEIN: 6 G/DL (ref 6.4–8.3)
WBC # BLD: 7.6 E9/L (ref 4.5–11.5)

## 2021-09-02 PROCEDURE — 80048 BASIC METABOLIC PNL TOTAL CA: CPT

## 2021-09-02 PROCEDURE — 82947 ASSAY GLUCOSE BLOOD QUANT: CPT

## 2021-09-02 PROCEDURE — 84999 UNLISTED CHEMISTRY PROCEDURE: CPT

## 2021-09-02 PROCEDURE — 82150 ASSAY OF AMYLASE: CPT

## 2021-09-02 PROCEDURE — 2060000000 HC ICU INTERMEDIATE R&B

## 2021-09-02 PROCEDURE — 85610 PROTHROMBIN TIME: CPT

## 2021-09-02 PROCEDURE — 6360000002 HC RX W HCPCS: Performed by: INTERNAL MEDICINE

## 2021-09-02 PROCEDURE — 6370000000 HC RX 637 (ALT 250 FOR IP): Performed by: INTERNAL MEDICINE

## 2021-09-02 PROCEDURE — 0W9B3ZZ DRAINAGE OF LEFT PLEURAL CAVITY, PERCUTANEOUS APPROACH: ICD-10-PCS | Performed by: RADIOLOGY

## 2021-09-02 PROCEDURE — 87070 CULTURE OTHR SPECIMN AEROBIC: CPT

## 2021-09-02 PROCEDURE — 87205 SMEAR GRAM STAIN: CPT

## 2021-09-02 PROCEDURE — 99233 SBSQ HOSP IP/OBS HIGH 50: CPT | Performed by: INTERNAL MEDICINE

## 2021-09-02 PROCEDURE — 89051 BODY FLUID CELL COUNT: CPT

## 2021-09-02 PROCEDURE — 85025 COMPLETE CBC W/AUTO DIFF WBC: CPT

## 2021-09-02 PROCEDURE — 71045 X-RAY EXAM CHEST 1 VIEW: CPT

## 2021-09-02 PROCEDURE — 36415 COLL VENOUS BLD VENIPUNCTURE: CPT

## 2021-09-02 PROCEDURE — 84157 ASSAY OF PROTEIN OTHER: CPT

## 2021-09-02 PROCEDURE — 2580000003 HC RX 258: Performed by: INTERNAL MEDICINE

## 2021-09-02 PROCEDURE — 83880 ASSAY OF NATRIURETIC PEPTIDE: CPT

## 2021-09-02 PROCEDURE — 84155 ASSAY OF PROTEIN SERUM: CPT

## 2021-09-02 PROCEDURE — 32555 ASPIRATE PLEURA W/ IMAGING: CPT

## 2021-09-02 PROCEDURE — 83615 LACTATE (LD) (LDH) ENZYME: CPT

## 2021-09-02 PROCEDURE — 97530 THERAPEUTIC ACTIVITIES: CPT

## 2021-09-02 RX ORDER — FUROSEMIDE 40 MG/1
40 TABLET ORAL DAILY
Status: DISCONTINUED | OUTPATIENT
Start: 2021-09-02 | End: 2021-09-02

## 2021-09-02 RX ORDER — FUROSEMIDE 10 MG/ML
40 INJECTION INTRAMUSCULAR; INTRAVENOUS DAILY
Status: DISCONTINUED | OUTPATIENT
Start: 2021-09-02 | End: 2021-09-03

## 2021-09-02 RX ORDER — SPIRONOLACTONE 25 MG/1
25 TABLET ORAL DAILY
Status: DISCONTINUED | OUTPATIENT
Start: 2021-09-02 | End: 2021-09-03 | Stop reason: HOSPADM

## 2021-09-02 RX ORDER — VALSARTAN 80 MG/1
80 TABLET ORAL DAILY
Status: DISCONTINUED | OUTPATIENT
Start: 2021-09-02 | End: 2021-09-03 | Stop reason: HOSPADM

## 2021-09-02 RX ORDER — POTASSIUM CHLORIDE 20 MEQ/1
20 TABLET, EXTENDED RELEASE ORAL DAILY
Status: DISCONTINUED | OUTPATIENT
Start: 2021-09-02 | End: 2021-09-03 | Stop reason: HOSPADM

## 2021-09-02 RX ADMIN — FUROSEMIDE 40 MG: 40 TABLET ORAL at 14:56

## 2021-09-02 RX ADMIN — METOPROLOL SUCCINATE 50 MG: 50 TABLET, FILM COATED, EXTENDED RELEASE ORAL at 08:30

## 2021-09-02 RX ADMIN — METOPROLOL SUCCINATE 50 MG: 50 TABLET, FILM COATED, EXTENDED RELEASE ORAL at 21:11

## 2021-09-02 RX ADMIN — Medication 10 ML: at 21:11

## 2021-09-02 RX ADMIN — Medication 10 ML: at 08:31

## 2021-09-02 RX ADMIN — SPIRONOLACTONE 25 MG: 25 TABLET ORAL at 14:55

## 2021-09-02 RX ADMIN — POTASSIUM CHLORIDE 20 MEQ: 1500 TABLET, EXTENDED RELEASE ORAL at 14:56

## 2021-09-02 RX ADMIN — HEPARIN SODIUM 5000 UNITS: 10000 INJECTION, SOLUTION INTRAVENOUS; SUBCUTANEOUS at 05:17

## 2021-09-02 RX ADMIN — HEPARIN SODIUM 5000 UNITS: 10000 INJECTION, SOLUTION INTRAVENOUS; SUBCUTANEOUS at 14:56

## 2021-09-02 RX ADMIN — PANTOPRAZOLE SODIUM 40 MG: 40 TABLET, DELAYED RELEASE ORAL at 05:17

## 2021-09-02 RX ADMIN — HEPARIN SODIUM 5000 UNITS: 10000 INJECTION, SOLUTION INTRAVENOUS; SUBCUTANEOUS at 21:11

## 2021-09-02 RX ADMIN — VALSARTAN 80 MG: 80 TABLET, FILM COATED ORAL at 17:43

## 2021-09-02 RX ADMIN — SERTRALINE HYDROCHLORIDE 50 MG: 50 TABLET ORAL at 08:30

## 2021-09-02 RX ADMIN — Medication 2000 UNITS: at 08:30

## 2021-09-02 RX ADMIN — FERROUS SULFATE TAB 325 MG (65 MG ELEMENTAL FE) 325 MG: 325 (65 FE) TAB at 08:30

## 2021-09-02 ASSESSMENT — PAIN SCALES - GENERAL
PAINLEVEL_OUTOF10: 0
PAINLEVEL_OUTOF10: 4
PAINLEVEL_OUTOF10: 0

## 2021-09-02 ASSESSMENT — PAIN - FUNCTIONAL ASSESSMENT: PAIN_FUNCTIONAL_ASSESSMENT: ACTIVITIES ARE NOT PREVENTED

## 2021-09-02 ASSESSMENT — PAIN DESCRIPTION - LOCATION: LOCATION: OTHER (COMMENT)

## 2021-09-02 ASSESSMENT — PAIN DESCRIPTION - DESCRIPTORS: DESCRIPTORS: ACHING

## 2021-09-02 NOTE — PROGRESS NOTES
Physical Therapy  Facility/Department: [address] INTERNAL MEDICINE 2  Daily Treatment Note  NAME: Oscar Granger  : 1935  MRN: [id number]    Date of Service: 2021    Patient Diagnosis(es): The primary encounter diagnosis was HAP (hospital-acquired pneumonia). Diagnoses of Fall, initial encounter and Sepsis without acute organ dysfunction, due to unspecified organism [facility]) were also pertinent to this visit. has a past medical history of Acute cystitis with hematuria, Anemia, Atrial fibrillation ([facility] [address] CKD (chronic kidney disease) stage 3, GFR 30-59 ml/min ([facility]), CPAP (continuous positive airway pressure) dependence, Diabetes mellitus ([facility] [address] Esophageal stricture, Gastric ulcer, Goiter, Heart murmur, Hypertension, Kidney stones, Rheumatic fever, and Sleep apnea. has a past surgical history that includes cardiovascular stress test (); doppler echocardiography (03); doppler echocardiography (04); Hysterectomy; Cholecystectomy; polypectomy; Upper gastrointestinal endoscopy (); Colonoscopy (); Breast lumpectomy (Left); and Foot surgery. Evaluating Therapist: Halima Jones PT      Referring Natalia Levy DO     PT order : Pt eval and treat      Room #:  417  DIAGNOSIS:  PNA   Additional Pertinent History:recent fall   PRECAUTIONS:  Falls , [place]      Social:  Pt lives with [address] a  1  floor plan  With ramp  to enter. Prior to admission pt walked with  No AD .  Has angus kennedy, w/c        Initial Evaluation  Date:  2021  Treatment   Short Term/ Long Term   Goals   Was pt agreeable to Eval/treatment?  yes  yes      Does pt have pain?  none reported  Pain in side from thorocentesis     Bed Mobility  Rolling:  NT  Supine to sit: SBA   Sit to supine:  NT   Scooting:  BSA   rolling:  Supervision  Supine to sit:  Supervision  Sit to supine:  Supervision  Scooting:  Supervision to sitting EOB  independent    Transfers Sit to stand:  CGA   Stand to sit: SBA   Stand pivot: NT   sit to stand:  SBA  Stand to sit:  SBA  Stand pivot:  SBA with w/w  independent    Ambulation     160  feet with  ww  with  SBA  100 feet with w/w SB/Supervision  200  feet with  No AD/AAD  with  Independent    LE ROM  WFL       LE strength  4-/ 5        AM- PAC RAW score   18/ 24 18/24            Patient education  Pt educated on PT objectives during treatment session, hand placement during transfers    Patient response to education:   Pt verbalized understanding Pt demonstrated skill Pt requires further education in this area    yes    yes     ASSESSMENT:    Comments:  Pt found and left in bed with call light in reach. Treatment:  Patient practiced and was instructed in the following treatment:    Functional mobility performed as documented above. No report of dizziness during functional mobility. Pt unable to ambulate further distance due to fatigue. PLAN:    Patient is making good progress towards established goals. Will continue with current POC.      Time in  1407  Time out  1416    Total Treatment Time  9 minutes     CPT codes:    [x] Therapeutic activities 93914 9minutes  [] Therapeutic exercises 00529  minutes      Alberta Botello, [address]

## 2021-09-02 NOTE — CONSULTS
Yarely Herrera M.D.,[facility]  Dalila Son, D.O., CELSO, Jacky Jimenez M.D. Daisy Altamirano M.D. Olga Pulliam D.O. Patient:  Jb Benavides 81 y.o. female MRN: [id number]     Date of Service: 9/2/2021      PULMONARY CONSULTATION    Reason for Consultation: large left pleural effusion  Referring Physician: Ethan Patel with the referring physician will be sent via the electronic medical record. Chief Complaint: fall    CODE STATUS: FULL    SUBJECTIVE:  HPI:  Jb Benavides is a 81 y.o. female known to our practice from previous hospital stay with a past medical history of anemia, atrial fibrillation, CKD, JERICA treated with CPAP, diabetes mellitus, esophageal stricture, gastric ulcer, goiter, heart murmur, essential hypertension, kidney stones, rheumatic fever who presented to the ED for cough and shortness of breath. She has a history of CHF. She wears a CPAP at night which is managed by her PCP. She fell last month and was admitted to the hospital 8/6 for shortness of breath and was not vaccinated for COVID-19. She had a left pleural effusion at that time and required a thoracentesis with Dr. Candace Kwon 8/6. 1L of clear yellow fluid was removed. Cytology negative, transudate fluid. She presented to the ER 8/29 for fall. Patient states she was lightheaded throughout the day which made her fall twice. The second fall she admits to hitting the front of her head. She admits that her PCP recently stopped her antiHTN medications d/t low Bp. Admission labs: sodium 134, potassium 3.9, CO2 27, creatinine 1.5, WBC 18.2, hgb 11.2, lymphocytes 6.8,  Admission imaging: CXR showed opacification of the LL lung, likely combo infiltrates, atelectasis, and effusion. CT chest WO contrast showed moderate pericardial effusion without change.  Large left pleural effusion with atelectasis/infiltrates in the LLL and lingula with improvement and development of small right pleural effusion with atelectasis. Pneumonia or CHF considered. Patient seen and examined. Lying in bed 98% on room air without distress. Patient had a left thoracentesis today with interventional radiology that was ordered per her PCP. 400 cc was removed. Fluid studies ordered per PCP       Past Medical History:   Diagnosis Date    Acute cystitis with hematuria 5/3/2019    Anemia 2008    RECEIVED 4 UNITS BLOOD    Atrial fibrillation (HCC)     CKD (chronic kidney disease) stage 3, GFR 30-59 ml/min ([facility]) 5/3/2019    CPAP (continuous positive airway pressure) dependence     Diabetes mellitus ([address]     Esophageal stricture     Gastric ulcer     Goiter     Heart murmur     Hypertension     SINCE 1994    Kidney stones     Rheumatic fever     POSSIBLY AS A CHILD    Sleep apnea        Past Surgical History:   Procedure Laterality Date    BREAST LUMPECTOMY Left     CALCIUM BUILDUP    CARDIOVASCULAR STRESS TEST  1999    ISCHEMIC NUCLEAR STRESS TEST - PATIENT REFUSED HEART CATHETERIZATION    CHOLECYSTECTOMY      COLONOSCOPY  07/11    W/POLYP REMOVAL    DOPPLER ECHOCARDIOGRAPHY  05/14/03    POSSIBLE MILD MITRAL STENOSIS    DOPPLER ECHOCARDIOGRAPHY  06/09/04    MILD MITRAL STENSIS, MODERATE MITRAL REGURGITATION    FOOT SURGERY      HYSTERECTOMY      POLYPECTOMY      16 POLYPS REMOVED FROM STOMACH AND 1 FROM COLON    UPPER GASTROINTESTINAL ENDOSCOPY  07/11       Family History   Problem Relation Age of Onset    Asthma Mother     Diabetes Mother     Alcohol Abuse Father        Social History:   Social History     Socioeconomic History    Marital status:       Spouse name: Not on file    Number of children: Not on file    Years of education: Not on file    Highest education level: Not on file   Occupational History    Not on file   Tobacco Use    Smoking status: Never Smoker    Smokeless tobacco: Never Used   Vaping Use    Vaping Use: Never used   Substance and Sexual Activity    Alcohol use: No    Drug use: Never    Sexual activity: Not on file   Other Topics Concern    Not on file   Social History Narrative    Not on file     Social Determinants of Health     Financial Resource Strain:     Difficulty of Paying Living Expenses:    Food Insecurity:     Worried About Running Out of Food in the Last Year:     [address] in the Last Year:    Transportation Needs:     Lack of Transportation (Medical):  Lack of Transportation (Non-Medical):    Physical Activity:     Days of Exercise per Week:     Minutes of Exercise per Session:    Stress:     Feeling of Stress :    Social Connections:     Frequency of Communication with Friends and Family:     Frequency of Social Gatherings with Friends and Family:     Attends [employer] Services:     Active Member of Clubs or Organizations:     Attends Club or Organization Meetings:     Marital Status:    Intimate Partner Violence:     Fear of Current or Ex-Partner:     Emotionally Abused:     Physically Abused:     Sexually Abused:      Smoking history: The patient is a never smoker    ETOH:   reports no history of alcohol use.     Exposures: There  is not history of TB or TB exposure. There is not asbestos or silica dust exposure. The patient reports does not have coal, foundry, quarry or [employer] exposure. Recent travel history none. There is not  history of recreational or IV drug use. There is not hot tub exposure.  The patient does not have any exotic pets, turtles or exotic birds.           Vaccines:    Immunization History   Administered Date(s) Administered    Influenza 11/13/2013    Influenza Vaccine, unspecified formulation 10/05/2018    Influenza, High Dose (Fluzone 65 yrs and older) 10/07/2015    Pneumococcal Conjugate 13-valent ([id number]) 06/05/2015    Pneumococcal Conjugate 7-valent (Prevnar7) 12/06/2005    Pneumococcal Polysaccharide ([id number]) 04/03/2019        Home Meds: Medications Prior to Admission: spironolactone (ALDACTONE) 25 MG tablet, Take 25 mg by mouth daily  valsartan (DIOVAN) 80 MG tablet, Take 1 tablet by mouth daily  furosemide (LASIX) 40 MG tablet, Take 1 tablet by mouth daily  potassium chloride (KLOR-CON M) 20 MEQ extended release tablet, Take 20 mEq by mouth daily  ferrous sulfate (IRON 325) 325 (65 Fe) MG tablet, Take 325 mg by mouth daily (with breakfast)  metoprolol succinate (TOPROL XL) 50 MG extended release tablet, Take 50 mg by mouth 2 times daily  omeprazole (PRILOSEC) 40 MG delayed release capsule, Take 1 capsule by mouth every morning  sertraline (ZOLOFT) 50 MG tablet, Take 50 mg by mouth daily   CPAP Machine MISC, Inhale into the lungs nightly   vitamin D3 (CHOLECALCIFEROL) 1000 UNITS TABS tablet, Take 1 tablet by mouth daily. (Patient taking differently: Take 2,000 Units by mouth daily )    CURRENT MEDS :  Scheduled Meds:   [Held by provider] heparin (porcine)  5,000 Units SubCUTAneous 3 times per day    ferrous sulfate  325 mg Oral Daily with breakfast    metoprolol succinate  50 mg Oral BID    pantoprazole  40 mg Oral QAM AC    sertraline  50 mg Oral Daily    Vitamin D  2,000 Units Oral Daily    sodium chloride flush  5-40 mL IntraVENous 2 times per day       Continuous Infusions:   sodium chloride         Allergies   Allergen Reactions    Nitrofuran Derivatives Other (See Comments)     Causes fever and chills.  Other Other (See Comments)     HONEYDEW: Sore Throat    Sulfa Antibiotics        REVIEW OF SYSTEMS:  Constitutional: Denies fever, weight loss, night sweats, and fatigue  Skin: Denies pigmentation, dark lesions, and rashes   HEENT: Denies hearing loss, tinnitus, ear drainage, epistaxis, sore throat, and hoarseness. Cardiovascular: Denies palpitations, chest pain, and chest pressure. Respiratory: Denies cough, dyspnea at rest, hemoptysis, apnea, and choking.   Gastrointestinal: Denies nausea, vomiting, poor appetite, diarrhea, heartburn or reflux  Genitourinary: Denies  However, empyema is less likely and surveillance recommended.       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 Echo 9/1/21   Technically sub-optimal images. Normal left ventricular chamber size. Normal left ventricular systolic function, LVEF 34%. Mild left ventricular concentric hypertrophy noted. Stage I diastolic dysfunction. The left atrium is mildly dilated. Mildly increased left atrial volume. Interatrial septum not well visualized but appears intact. Normal right ventricle size and function. There is trace aortic regurgitation. Normal aortic root size. No evidence of pericardial effusion. No intra cardiac mass or thrombus. Compared to prior echo from March 2021, MR and TR appears to be less. Signature      ----------------------------------------------------------------   Electronically signed by Charles Morgan MD(Interpreting   physician) on 09/01/2021 07:47 PM      Labs:  Lab Results   Component Value Date    WBC 7.6 09/02/2021    HGB 10.1 09/02/2021    HCT 33.1 09/02/2021    MCV 84.4 09/02/2021    MCH 25.8 09/02/2021    MCHC 30.5 09/02/2021    RDW 16.0 09/02/2021     09/02/2021    MPV 9.4 09/02/2021     Lab Results   Component Value Date     09/02/2021    K 4.4 09/02/2021    K 4.1 08/30/2021     09/02/2021    CO2 25 09/02/2021    BUN 33 09/02/2021    CREATININE 1.4 09/02/2021    LABALBU 3.4 08/29/2021    LABALBU 4.3 02/27/2012    LABALBU 3.5 02/27/2012    CALCIUM 8.7 09/02/2021    GFRAA 43 09/02/2021    LABGLOM 36 09/02/2021     Lab Results   Component Value Date    PROTIME 23.0 03/05/2021    PROTIME 12.7 06/29/2011    INR 2.1 03/05/2021     No results for input(s): PROBNP in the last 72 hours. No results for input(s): TROPONINI in the last 72 hours. Recent Labs     09/01/21  1103   PROCAL 0.12*     This SmartLink has not been configured with any valid records.        Micro:  8/30 blood cultures positive for staphylococcus hominis ssp hominis  8/31 blood cultures negative  No results for input(s): CULTRESP in the last 72 hours. No results for input(s): LABGRAM in the last 72 hours. No results for input(s): LEGUR in the last 72 hours. No results for input(s): STREPNEUMAGU in the last 72 hours. No results for input(s): LP1UAG in the last 72 hours. Results for Karina Dominguez (MRN [id number]) as of 9/2/2021 10:45   Ref.  Range 8/30/2021 02:07   Bottle Type Unknown Aerobic   Source of Blood Culture Unknown No site given   Haemophilus Influenzae by PCR Latest Ref Range: Not Detected  Not Detected   Listeria monocytogenes by PCR Latest Ref Range: Not Detected  Not Detected   Neisseria meningitidis by PCR Latest Ref Range: Not Detected  Not Detected   Methicillin Resistance mecA/C  by PCR Latest Ref Range: Not Detected  DETECTED (AA)   Staphylococcus species by PCR Latest Ref Range: Not Detected  DETECTED (AA)   Staphylococcus aureus by PCR Latest Ref Range: Not Detected  Not Detected   Streptococcus species by PCR Latest Ref Range: Not Detected  Not Detected   Streptococcus agalactiae by PCR Latest Ref Range: Not Detected  Not Detected   Streptococcus pneumoniae by PCR Latest Ref Range: Not Detected  Not Detected   Streptococcus pyogenes  by PCR Latest Ref Range: Not Detected  Not Detected   Enterobacteriaceae by PCR Latest Ref Range: Not Detected  Not Detected   Enterobacter cloacae complex by PCR Latest Ref Range: Not Detected  Not Detected   Escherichia coli by PCR Latest Ref Range: Not Detected  Not Detected   Klebsiella oxytoca by PCR Latest Ref Range: Not Detected  Not Detected   Klebsiella pneumoniae group by PCR Latest Ref Range: Not Detected  Not Detected   Serratia marcescens by PCR Latest Ref Range: Not Detected  Not Detected   Pseudomonas aeruginosa by PCR Latest Ref Range: Not Detected  Not Detected   Candida albicans by PCR Latest Ref Range: Not Detected  Not Detected   Candida glabrata by PCR Latest Ref Range: Not Detected  Not Detected   Candida krusei by PCR Latest Ref Range: Not Detected  Not Detected   Candida parapsilosis by PCR Latest Ref Range: Not Detected  Not Detected   Candida tropicalis by PCR Latest Ref Range: Not Detected  Not Detected   SARS-CoV-2, NAAT Latest Ref Range: Not Detected  Not Detected   Proteus species by PCR Latest Ref Range: Not Detected  Not Detected         Assessment:  1. Large left pleural effusion v atelectasis v infiltrate  2. History of left pleural effusion, thoracentesis 8/6, transudate, negative cytology. 1L removed  3. Lymphopenia   4. Paroxysmal A. Fib  5. Stage I diastolic dysfunction  6. Leukopenia- RESOLVED  7. Essential hypertension  8. Diastolic heart failure  9. Multiple falls  10. Moderate mitral regurgitation  11. JERICA treated with CPAP    Plan:  1. Monitor off oxygen, on RA  2. Consult cardiology for CHF evaluation  3. GI prophylaxis- Protonix  4. DVT prophylaxis, Heparin on hold per PCP for thoracentesis. Restart heparin drip after thoracentesis  5. IR consulted per PCP for left thoracentesis today. 400cc removed  6. Orders placed for fluid studies for PCP  7. BNP 4241, diuresis Aldactone and Lasix 40 mg daily per PCP. Continue to monitor  8. Monitor off ATB per ID. Previously positive blood cultures thought to be contaminate. New blood cultures negative      Thank you for allowing me to participate in the care of Thierno Dumont. Please feel free to call with questions. This plan of care was reviewed in collaboration with Dr. Nancy Miner    Electronically signed by YOHANA Brennan CNP on 9/2/2021 at 10:39 AM      Note: This report was completed utilizing computer voice recognition software. Every effort has been made to ensure accuracy, however; inadvertent computerized transcription errors may be present      I personally saw, examined, and cared for the patient. Labs, medications, radiographs reviewed.  I agree with history exam and plans detailed in NP note. Patient with recurrent left pleural effusion. Previously was transudate. Now with borderline exudate, suspect over diuresed transudate. Monitor off antibiotics, ID following. Consult cardiology for diuresis. If pleural effusion reaccumulate then may benefit from a Pleurx catheter. Discussed this with patient.     Rosanne Jacobs, DO

## 2021-09-02 NOTE — PROGRESS NOTES
Consulted for CHF, full consult and will be seen tomorrow  We will change Lasix to IV for now and monitor kidney function

## 2021-09-02 NOTE — PROGRESS NOTES
[address] [place] [address] [facility]  Progress Note    SUBJECTIVE:  Chief Complaint   Patient presents with    Fall    Head Injury     denies loc and denies thinners      No new complaints. No fever. She did have some chills last night. Review of systems:  As stated above in the chief complaint, otherwise negative. Medications:  Scheduled Meds:   [Held by provider] heparin (porcine)  5,000 Units SubCUTAneous 3 times per day    ferrous sulfate  325 mg Oral Daily with breakfast    metoprolol succinate  50 mg Oral BID    pantoprazole  40 mg Oral QAM AC    sertraline  50 mg Oral Daily    Vitamin D  2,000 Units Oral Daily    sodium chloride flush  5-40 mL IntraVENous 2 times per day     Continuous Infusions:   sodium chloride       PRN Meds:sodium chloride flush, sodium chloride, ondansetron **OR** ondansetron, polyethylene glycol, acetaminophen **OR** acetaminophen    OBJECTIVE:  /76   Pulse 66   Temp 98 °F (36.7 °C) (Oral)   Resp 16   Ht 5' 6\" (1.676 m)   Wt 139 lb 1.6 oz (63.1 kg)   SpO2 98%   BMI 22.45 kg/m²   Temp  Av.2 °F (36.8 °C)  Min: 98 °F (36.7 °C)  Max: 98.5 °F (36.9 °C)  Constitutional: The patient is awake, alert, and oriented. No distress. Skin: Warm and dry. No rashes were noted. HEENT: Round and reactive pupils. Ecchymosis right eyelid and forehead. Moist mucous membranes. No ulcerations or thrush. Neck: Supple to movements. Chest: No use of accessory muscles to breathe. Symmetrical expansion. No wheezing, crackles or rhonchi. Cardiovascular: S1 and S2 are rhythmic and regular. No murmurs appreciated. Abdomen: Positive bowel sounds to auscultation. Benign to palpation. Extremities: No edema.   Lines: peripheral    Laboratory and Tests Review:  Lab Results   Component Value Date    WBC 7.6 2021    WBC 6.3 2021    WBC 12.2 (H) 2021    HGB 10.1 (L) 2021    HCT 33.1 (L) 2021    MCV 84.4 2021     09/02/2021     Lab Results   Component Value Date    NEUTROABS 4.41 09/02/2021    NEUTROABS 3.76 09/01/2021    NEUTROABS 9.50 (H) 08/31/2021     No results found for: CRPHS  Lab Results   Component Value Date    ALT 9 08/29/2021    AST 14 08/29/2021    ALKPHOS 90 08/29/2021    BILITOT 0.3 08/29/2021     Lab Results   Component Value Date     09/02/2021    K 4.4 09/02/2021    K 4.1 08/30/2021     09/02/2021    CO2 25 09/02/2021    BUN 33 09/02/2021    CREATININE 1.4 09/02/2021    CREATININE 1.6 09/01/2021    CREATININE 1.6 08/31/2021    GFRAA 43 09/02/2021    LABGLOM 36 09/02/2021    GLUCOSE 106 09/02/2021    GLUCOSE 108 02/27/2012    PROT 7.2 08/29/2021    LABALBU 3.4 08/29/2021    LABALBU 4.3 02/27/2012    LABALBU 3.5 02/27/2012    CALCIUM 8.7 09/02/2021    BILITOT 0.3 08/29/2021    ALKPHOS 90 08/29/2021    AST 14 08/29/2021    ALT 9 08/29/2021     Lab Results   Component Value Date    CRP 6.4 (H) 09/01/2021     Lab Results   Component Value Date    SEDRATE 70 (H) 09/01/2021    SEDRATE 20 02/27/2012     Radiology:      Microbiology:   Blood cultures 8/30/2021: Staphylococcus hominis in 4 of 4 bottles  Blood cultures 8/31/2021: Negative so far  Nares screen MRSA: Pending  Streptococcus pneumoniae/Legionella urine Ag: negative  Rapid SARS-CoV-2: Negative    Recent Labs     09/01/21  1103   PROCAL 0.12*       ASSESSMENT:  · Left pleural effusion  · Status post fall  · Leukocytosis  · CoNS bacteremia. Probable contaminant, in spite of all 4 bottles being positive.   There were most likely all drawn at the same time and from the same site    PLAN:  · Observe off antibiotic  · Thoracentesis today    Spoke with nursing    Tsering Camarillo MD  11:48 AM  9/2/2021

## 2021-09-02 NOTE — PROGRESS NOTES
[facility] Progress Note    Admitting Date and Time: 8/29/2021  9:44 PM  Admit Dx: Hospital acquired PNA [J18.9, Y95]  HAP (hospital-acquired pneumonia) [J18.9, Y95]  Fall, initial encounter [W19. XXXA]  Sepsis without acute organ dysfunction, due to unspecified organism [facility]) [A41.9]    Subjective:  Patient is being followed for Hospital acquired PNA [J18.9, Y95]  HAP (hospital-acquired pneumonia) [J18.9, Y95]  Fall, initial encounter [W19. XXXA]  Sepsis without acute organ dysfunction, due to unspecified organism [facility]) [A41.9]     Patient states that she is feeling well. She has no complaints of fevers, chills, or sob. ROS: denies fever, chills, cp, sob, n/v, HA unless stated above.       [Held by provider] heparin (porcine)  5,000 Units SubCUTAneous 3 times per day    ferrous sulfate  325 mg Oral Daily with breakfast    metoprolol succinate  50 mg Oral BID    pantoprazole  40 mg Oral QAM AC    sertraline  50 mg Oral Daily    Vitamin D  2,000 Units Oral Daily    sodium chloride flush  5-40 mL IntraVENous 2 times per day     sodium chloride flush, 5-40 mL, PRN  sodium chloride, 25 mL, PRN  ondansetron, 4 mg, Q8H PRN   Or  ondansetron, 4 mg, Q6H PRN  polyethylene glycol, 17 g, Daily PRN  acetaminophen, 650 mg, Q6H PRN   Or  acetaminophen, 650 mg, Q6H PRN         Objective:    BP (!) 166/60   Pulse 65   Temp 98 °F (36.7 °C) (Oral)   Resp 16   Ht 5' 6\" (1.676 m)   Wt 139 lb 1.6 oz (63.1 kg)   SpO2 98%   BMI 22.45 kg/m²     General Appearance: alert and oriented to person, place and time and in no acute distress  Skin: warm and dry  Head: normocephalic and atraumatic  Eyes: pupils equal, round, and reactive to light, extraocular eye movements intact, conjunctivae normal  Neck: neck supple and non tender without mass   Pulmonary/Chest: clear to auscultation bilaterally- no wheezes, rales or rhonchi, normal air movement, no respiratory distress  Cardiovascular: normal rate, normal S1 from the same site. No antibiotics at this time. 2.  Large left pleural effusion - Pulmonary has been consulted. Patient did have a left sided diagnostic and therapeutic thoracentesis done was done on August 6. At that time 1 L of transudative fluid was removed. Thoracentesis panel has been ordered. Hold heparin. 3. Hypodense cystic lesion of the left kidney - Outpatient follow-up  4. C-spine lucency - Further outpatient work-up  5.  Atrial Fibrillation status post cardioversion  6.  JERICA on CPAP  7.  CKD stage III - Baseline appears to be 1.5.   8.  HFpEF - Continue Aldactone, valsartan, Lasix, Toprol  9.  DVT prophylaxis - Heparin      NOTE: This report was transcribed using voice recognition software. Every effort was made to ensure accuracy; however, inadvertent computerized transcription errors may be present.   Electronically signed by Cynthia Reina DO on 9/2/2021 at 12:50 PM

## 2021-09-02 NOTE — FLOWSHEET NOTE
IRFLOWSHEET    Date: 9/2/2021    Time: 12:09 PM     Exam: Ultrasound guided left thoracentesis     Radiologist Performing Procedure: Dr. Rodolfo Rodríguez    Nurse Reporting/Phone: [phone]     Nurse Receiving: Omayra Bowie RN    Permit signed:      Yes    ID Band Checklist: Yes    Allergies: Nitrofuran derivatives, Other, and Sulfa antibiotics     TIME OUT: 1226    PROCEDURE START TIME: 7261    Puncture Site: left posterior chest     Puncture Time: 1227    Catheters: 5 fr 15 cm one step     LABS:   Lab Results   Component Value Date    INR 1.1 09/02/2021    PROTIME 12.4 09/02/2021           Lab Results   Component Value Date    CREATININE 1.4 (H) 09/02/2021    BUN 33 (H) 09/02/2021          Lab Results   Component Value Date    HGB 10.1 (L) 09/02/2021    HCT 33.1 (L) 09/02/2021     09/02/2021         PROCEDURE END TIME: 1233    Total Contrast: NA    Fluoroscopy Time: NA    Complications:  None    Comments: Patient arrival to radiology from room 417 for left thoracentesis. Consent signed and vitals taken, Dr. Rodolfo Rodríguez in to speak with the patient about the procedure. Patient placed upright dangling at the side of the bed, images taken using ultrasound and reviewed. Site prepped and draped, catheter inserted to left posterior chest by Dr. Rodolfo Rodríguez @ [id number] [phone]. 400 ml of cloudy yellow colored pleural fluid drained. Procedure completed @ 1590, site cleansed and dry dressing applied. Specimen sent to lab per physician orders. Chest xray completed. Nurse handoff report called to floor, patient transported back to room.

## 2021-09-02 NOTE — CARE COORDINATION
Pulm. consulted for large pleural effusion-await eval. Plan remains to return home w/ son on discharge- declining [facility]- son or her sister will be with her.  Will follow Steve Archuleta RN case manager

## 2021-09-03 VITALS
OXYGEN SATURATION: 96 % | WEIGHT: 139.3 LBS | SYSTOLIC BLOOD PRESSURE: 131 MMHG | HEART RATE: 67 BPM | DIASTOLIC BLOOD PRESSURE: 63 MMHG | BODY MASS INDEX: 22.39 KG/M2 | RESPIRATION RATE: 16 BRPM | HEIGHT: 66 IN | TEMPERATURE: 98.4 F

## 2021-09-03 LAB
ANION GAP SERPL CALCULATED.3IONS-SCNC: 10 MMOL/L (ref 7–16)
APPEARANCE FLUID: CLEAR
BASOPHILS ABSOLUTE: 0.03 E9/L (ref 0–0.2)
BASOPHILS RELATIVE PERCENT: 0.3 % (ref 0–2)
BUN BLDV-MCNC: 29 MG/DL (ref 6–23)
C-REACTIVE PROTEIN: 2.8 MG/DL (ref 0–0.4)
CALCIUM SERPL-MCNC: 8.4 MG/DL (ref 8.6–10.2)
CELL COUNT FLUID TYPE: NORMAL
CHLORIDE BLD-SCNC: 102 MMOL/L (ref 98–107)
CO2: 25 MMOL/L (ref 22–29)
COLOR FLUID: NORMAL
CREAT SERPL-MCNC: 1.3 MG/DL (ref 0.5–1)
EOSINOPHILS ABSOLUTE: 0.28 E9/L (ref 0.05–0.5)
EOSINOPHILS RELATIVE PERCENT: 3 % (ref 0–6)
GFR AFRICAN AMERICAN: 47
GFR NON-AFRICAN AMERICAN: 39 ML/MIN/1.73
GLUCOSE BLD-MCNC: 204 MG/DL (ref 74–99)
GRAM STAIN ORDERABLE: NORMAL
HCT VFR BLD CALC: 29.5 % (ref 34–48)
HEMOGLOBIN: 9.3 G/DL (ref 11.5–15.5)
IMMATURE GRANULOCYTES #: 0.09 E9/L
IMMATURE GRANULOCYTES %: 1 % (ref 0–5)
LYMPHOCYTES ABSOLUTE: 2.42 E9/L (ref 1.5–4)
LYMPHOCYTES RELATIVE PERCENT: 26 % (ref 20–42)
MCH RBC QN AUTO: 26.6 PG (ref 26–35)
MCHC RBC AUTO-ENTMCNC: 31.5 % (ref 32–34.5)
MCV RBC AUTO: 84.3 FL (ref 80–99.9)
MONOCYTE, FLUID: 73 %
MONOCYTES ABSOLUTE: 0.6 E9/L (ref 0.1–0.95)
MONOCYTES RELATIVE PERCENT: 6.5 % (ref 2–12)
NEUTROPHIL, FLUID: 27 %
NEUTROPHILS ABSOLUTE: 5.87 E9/L (ref 1.8–7.3)
NEUTROPHILS RELATIVE PERCENT: 63.2 % (ref 43–80)
NUCLEATED CELLS FLUID: 695 /UL
PDW BLD-RTO: 16.5 FL (ref 11.5–15)
PLATELET # BLD: 276 E9/L (ref 130–450)
PMV BLD AUTO: 11 FL (ref 7–12)
POTASSIUM SERPL-SCNC: 4 MMOL/L (ref 3.5–5)
RBC # BLD: 3.5 E12/L (ref 3.5–5.5)
RBC FLUID: <2000 /UL
REASON FOR REJECTION: NORMAL
REJECTED TEST: NORMAL
SEDIMENTATION RATE, ERYTHROCYTE: 56 MM/HR (ref 0–20)
SODIUM BLD-SCNC: 137 MMOL/L (ref 132–146)
WBC # BLD: 9.3 E9/L (ref 4.5–11.5)

## 2021-09-03 PROCEDURE — 86140 C-REACTIVE PROTEIN: CPT

## 2021-09-03 PROCEDURE — APPSS45 APP SPLIT SHARED TIME 31-45 MINUTES: Performed by: NURSE PRACTITIONER

## 2021-09-03 PROCEDURE — 80048 BASIC METABOLIC PNL TOTAL CA: CPT

## 2021-09-03 PROCEDURE — 6370000000 HC RX 637 (ALT 250 FOR IP): Performed by: INTERNAL MEDICINE

## 2021-09-03 PROCEDURE — 6360000002 HC RX W HCPCS: Performed by: INTERNAL MEDICINE

## 2021-09-03 PROCEDURE — 85651 RBC SED RATE NONAUTOMATED: CPT

## 2021-09-03 PROCEDURE — 36415 COLL VENOUS BLD VENIPUNCTURE: CPT

## 2021-09-03 PROCEDURE — 85025 COMPLETE CBC W/AUTO DIFF WBC: CPT

## 2021-09-03 PROCEDURE — 99223 1ST HOSP IP/OBS HIGH 75: CPT | Performed by: INTERNAL MEDICINE

## 2021-09-03 PROCEDURE — 6360000002 HC RX W HCPCS: Performed by: NURSE PRACTITIONER

## 2021-09-03 PROCEDURE — 2580000003 HC RX 258: Performed by: INTERNAL MEDICINE

## 2021-09-03 PROCEDURE — 99239 HOSP IP/OBS DSCHRG MGMT >30: CPT | Performed by: INTERNAL MEDICINE

## 2021-09-03 RX ORDER — FUROSEMIDE 10 MG/ML
40 INJECTION INTRAMUSCULAR; INTRAVENOUS DAILY
Status: DISCONTINUED | OUTPATIENT
Start: 2021-09-04 | End: 2021-09-03 | Stop reason: HOSPADM

## 2021-09-03 RX ORDER — ASPIRIN 81 MG/1
81 TABLET ORAL ONCE
Status: COMPLETED | OUTPATIENT
Start: 2021-09-03 | End: 2021-09-03

## 2021-09-03 RX ORDER — FUROSEMIDE 10 MG/ML
60 INJECTION INTRAMUSCULAR; INTRAVENOUS DAILY
Status: DISCONTINUED | OUTPATIENT
Start: 2021-09-04 | End: 2021-09-03

## 2021-09-03 RX ADMIN — Medication 10 ML: at 09:30

## 2021-09-03 RX ADMIN — VALSARTAN 80 MG: 80 TABLET, FILM COATED ORAL at 09:29

## 2021-09-03 RX ADMIN — POTASSIUM CHLORIDE 20 MEQ: 1500 TABLET, EXTENDED RELEASE ORAL at 09:30

## 2021-09-03 RX ADMIN — FERROUS SULFATE TAB 325 MG (65 MG ELEMENTAL FE) 325 MG: 325 (65 FE) TAB at 09:29

## 2021-09-03 RX ADMIN — Medication 2000 UNITS: at 09:29

## 2021-09-03 RX ADMIN — METOPROLOL SUCCINATE 50 MG: 50 TABLET, FILM COATED, EXTENDED RELEASE ORAL at 09:30

## 2021-09-03 RX ADMIN — PANTOPRAZOLE SODIUM 40 MG: 40 TABLET, DELAYED RELEASE ORAL at 05:56

## 2021-09-03 RX ADMIN — SPIRONOLACTONE 25 MG: 25 TABLET ORAL at 09:30

## 2021-09-03 RX ADMIN — FUROSEMIDE 40 MG: 10 INJECTION, SOLUTION INTRAMUSCULAR; INTRAVENOUS at 09:30

## 2021-09-03 RX ADMIN — SERTRALINE HYDROCHLORIDE 50 MG: 50 TABLET ORAL at 09:30

## 2021-09-03 RX ADMIN — HEPARIN SODIUM 5000 UNITS: 10000 INJECTION, SOLUTION INTRAVENOUS; SUBCUTANEOUS at 05:56

## 2021-09-03 RX ADMIN — ASPIRIN 81 MG: 81 TABLET, COATED ORAL at 18:47

## 2021-09-03 ASSESSMENT — PAIN SCALES - GENERAL: PAINLEVEL_OUTOF10: 0

## 2021-09-03 NOTE — PROGRESS NOTES
[address] [place] [address] [facility]  Progress Note    SUBJECTIVE:  Chief Complaint   Patient presents with    Fall    Head Injury     denies loc and denies thinners      The patient feels well. Wants to go home. No nausea or vomiting. No fevers. Review of systems:  As stated above in the chief complaint, otherwise negative. Medications:  Scheduled Meds:   [START ON 2021] furosemide  40 mg IntraVENous Daily    potassium chloride  20 mEq Oral Daily    spironolactone  25 mg Oral Daily    valsartan  80 mg Oral Daily    heparin (porcine)  5,000 Units SubCUTAneous 3 times per day    ferrous sulfate  325 mg Oral Daily with breakfast    metoprolol succinate  50 mg Oral BID    pantoprazole  40 mg Oral QAM AC    sertraline  50 mg Oral Daily    Vitamin D  2,000 Units Oral Daily    sodium chloride flush  5-40 mL IntraVENous 2 times per day     Continuous Infusions:   sodium chloride       PRN Meds:sodium chloride flush, sodium chloride, ondansetron **OR** ondansetron, polyethylene glycol, acetaminophen **OR** acetaminophen    OBJECTIVE:  BP (!) 124/57   Pulse 67   Temp 98 °F (36.7 °C) (Oral)   Resp 16   Ht 5' 6\" (1.676 m)   Wt 139 lb 4.8 oz (63.2 kg)   SpO2 97%   BMI 22.48 kg/m²   Temp  Av °F (36.7 °C)  Min: 98 °F (36.7 °C)  Max: 98.1 °F (36.7 °C)  Constitutional: The patient is awake, alert, and oriented. No distress. Skin: Warm and dry. No rashes were noted. HEENT: Round and reactive pupils. Ecchymosis right eyelid and forehead. Moist mucous membranes. No ulcerations or thrush. Neck: Supple to movements. Chest: No respiratory distress. No crackles. Cardiovascular: Heart sounds rhythmic and regular. Abdomen: Positive bowel sounds to auscultation. Benign to palpation. Extremities: No edema.   Lines: peripheral    Laboratory and Tests Review:  Lab Results   Component Value Date    WBC 9.3 2021    WBC 7.6 2021    WBC 6.3 2021    HGB 9.3 (L) 09/03/2021    HCT 29.5 (L) 09/03/2021    MCV 84.3 09/03/2021     09/03/2021     Lab Results   Component Value Date    NEUTROABS 5.87 09/03/2021    NEUTROABS 4.41 09/02/2021    NEUTROABS 3.76 09/01/2021     No results found for: CRPHS  Lab Results   Component Value Date    ALT 9 08/29/2021    AST 14 08/29/2021    ALKPHOS 90 08/29/2021    BILITOT 0.3 08/29/2021     Lab Results   Component Value Date     09/03/2021    K 4.0 09/03/2021    K 4.1 08/30/2021     09/03/2021    CO2 25 09/03/2021    BUN 29 09/03/2021    CREATININE 1.3 09/03/2021    CREATININE 1.4 09/02/2021    CREATININE 1.6 09/01/2021    GFRAA 47 09/03/2021    LABGLOM 39 09/03/2021    GLUCOSE 204 09/03/2021    GLUCOSE 108 02/27/2012    PROT 6.0 09/02/2021    LABALBU 3.4 08/29/2021    LABALBU 4.3 02/27/2012    LABALBU 3.5 02/27/2012    CALCIUM 8.4 09/03/2021    BILITOT 0.3 08/29/2021    ALKPHOS 90 08/29/2021    AST 14 08/29/2021    ALT 9 08/29/2021     Lab Results   Component Value Date    CRP 2.8 (H) 09/03/2021    CRP 6.4 (H) 09/01/2021     Lab Results   Component Value Date    SEDRATE 56 (H) 09/03/2021    SEDRATE 70 (H) 09/01/2021    SEDRATE 20 02/27/2012     Radiology:      Microbiology:   Blood cultures 8/30/2021: Staphylococcus hominis in 4 of 4 bottles  Blood cultures 8/31/2021: Negative so far  Nares screen MRSA: Pending  Streptococcus pneumoniae/Legionella urine Ag: negative  Rapid SARS-CoV-2: Negative    Recent Labs     09/01/21  1103   PROCAL 0.12*       ASSESSMENT:  · Left pleural effusion. Status post thoracentesis. Cultures negative so far. Gram stain did not show organisms  · Status post fall  · Leukocytosis, resolved  · CoNS bacteremia. This is a contaminant.   Repeat cultures negative so far    PLAN:  · Observe off antibiotic  · The patient can be discharged from [address] [place] [address] with nursing    Jace De Anda MD  12:14 PM  9/3/2021

## 2021-09-03 NOTE — DISCHARGE SUMMARY
[facility] Physician Discharge Summary       [address] [id number]    In 4 weeks  cxr      Activity level: As tolerated     Dispo: Home    Condition on discharge: Stable     Patient ID:  Shon Kelly  [id number]  75 y.o.  1935    Admit date: 8/29/2021    Discharge date and time:  9/3/2021  4:24 PM    Admission Diagnoses: Active Problems:    Hospital acquired PNA  Resolved Problems:    * No resolved hospital problems. *      Discharge Diagnoses: Active Problems:    Hospital acquired PNA  Resolved Problems:    * No resolved hospital problems. *      Consults:  IP CONSULT TO SOCIAL WORK  IP CONSULT TO INFECTIOUS DISEASES  IP CONSULT TO PULMONOLOGY  IP CONSULT TO CARDIOLOGY  IP CONSULT TO HEART FAILURE NURSE/COORDINATOR    Procedures: Left Thoracentesis    Hospital Course:   Patient Shon Kelly is a 81 y.o. presented with Hospital acquired PNA [J18.9, Y95]  HAP (hospital-acquired pneumonia) [J18.9, Y95]  Fall, initial encounter [W19. XXXA]  Sepsis without acute organ dysfunction, due to unspecified organism [facility]) [A399]    79-year-old female presents to the hospital with shortness of breath. Initial blood cultures from August 30 were positive. Repeat blood cultures from August 31 are thus far negative. Infectious disease was consulted for bacteremia as well as potential left lower lobe pneumonia. As per ID there was likely contaminant. Antibiotics were stopped. Patient continued to do well. Patient was seen by pulmonology and IR did a left thoracentesis. Patient will follow up with primary care doctor for final pleural fluid culture results from September 2 as well as final blood culture results from August 31.     Discharge Exam:    General Appearance: alert and oriented to person, place and time and in no acute distress  Skin: warm and dry  Head: normocephalic and atraumatic  Eyes: pupils equal, round, and reactive to light, extraocular eye movements intact, conjunctivae normal  Neck: neck supple and non tender without mass   Pulmonary/Chest: clear to auscultation bilaterally- no wheezes, rales or rhonchi, normal air movement, no respiratory distress  Cardiovascular: normal rate, normal S1 and S2 and no carotid bruits  Abdomen: soft, non-tender, non-distended, normal bowel sounds, no masses or organomegaly  Extremities: no cyanosis, no clubbing and no edema  Neurologic: no cranial nerve deficit and speech normal    I/O last 3 completed shifts: In: 600 [P.O.:600]  Out: -   No intake/output data recorded. LABS:  Recent Labs     09/01/21  1103 09/02/21  0529 09/03/21  0830    139 137   K 4.4 4.4 4.0    103 102   CO2 28 25 25   BUN 27* 33* 29*   CREATININE 1.6* 1.4* 1.3*   GLUCOSE 110* 106* 204*   CALCIUM 9.0 8.7 8.4*       Recent Labs     09/01/21  1103 09/02/21  0529 09/03/21  0441   WBC 6.3 7.6 9.3   RBC 3.85 3.92 3.50   HGB 10.0* 10.1* 9.3*   HCT 32.8* 33.1* 29.5*   MCV 85.2 84.4 84.3   MCH 26.0 25.8* 26.6   MCHC 30.5* 30.5* 31.5*   RDW 15.9* 16.0* 16.5*    370 276   MPV 9.3 9.4 11.0       No results for input(s): POCGLU in the last 72 hours. Imaging:  XR CHEST (2 VW)    Result Date: 8/29/2021  EXAMINATION: TWO XRAY VIEWS OF THE CHEST 8/29/2021 10:12 pm COMPARISON: None. HISTORY: ORDERING SYSTEM PROVIDED HISTORY: fall pain TECHNOLOGIST PROVIDED HISTORY: Reason for exam:->fall pain FINDINGS: There is opacification of the left lung base. The right lung is clear. No pneumothorax. The heart is not enlarged. Opacification of the left lower lung, likely combination infiltrates, atelectasis and effusion.      CT Head WO Contrast    Result Date: 8/29/2021  EXAMINATION: CT OF THE HEAD WITHOUT CONTRAST; CT OF THE CERVICAL SPINE WITHOUT CONTRAST 8/29/2021 10:25 pm TECHNIQUE: CT of the head was performed without the administration of intravenous contrast. Dose modulation, iterative reconstruction, and/or weight based adjustment of the mA/kV was utilized to reduce the radiation dose to as low as reasonably achievable.; CT of the cervical spine was performed without the administration of intravenous contrast. Multiplanar reformatted images are provided for review. Dose modulation, iterative reconstruction, and/or weight based adjustment of the mA/kV was utilized to reduce the radiation dose to as low as reasonably achievable. COMPARISON: None. HISTORY: ORDERING SYSTEM PROVIDED HISTORY: fall TECHNOLOGIST PROVIDED HISTORY: Reason for exam:->fall Has a \"code stroke\" or \"stroke alert\" been called? ->No Decision Support Exception - unselect if not a suspected or confirmed emergency medical condition->Emergency Medical Condition (MA) FINDINGS: CERVICAL SPINE: BONES/ALIGNMENT: There is no acute fracture or traumatic malalignment. Scattered lucencies are noted throughout the cervical spine which are nonspecific. Possible consideration could include osteopenia, metabolic disorder, or metastatic disease. DEGENERATIVE CHANGES: Multilevel degenerative changes are noted and are worse in the lower cervical levels. SOFT TISSUES: There is no prevertebral soft tissue swelling. HEAD: BRAIN/VENTRICLES: There is no acute intracranial hemorrhage, mass effect or midline shift. No abnormal extra-axial fluid collection. The gray-white differentiation is maintained without evidence of an acute infarct. There is no evidence of hydrocephalus. Chronic and age related changes including age-appropriate cerebral volume loss and scattered nonspecific white matter hypodensities which are likely the sequela of chronic small vessel ischemic disease. ORBITS: The visualized portion of the orbits demonstrate no acute abnormality. SINUSES: The visualized paranasal sinuses and mastoid air cells demonstrate no acute abnormality. SOFT TISSUES/SKULL: Small right frontal scalp hematoma. No CT evidence of an acute intracranial abnormality.  No evidence of acute fracture or traumatic malalignment of the cervical spine. Scattered lucencies are noted throughout the cervical spine which are nonspecific. Possible consideration could include osteopenia, metabolic disorder, or metastatic disease. Additional chronic and age related changes in the head and cervical spine, as noted above. CT Cervical Spine WO Contrast    Result Date: 8/29/2021  EXAMINATION: CT OF THE HEAD WITHOUT CONTRAST; CT OF THE CERVICAL SPINE WITHOUT CONTRAST 8/29/2021 10:25 pm TECHNIQUE: CT of the head was performed without the administration of intravenous contrast. Dose modulation, iterative reconstruction, and/or weight based adjustment of the mA/kV was utilized to reduce the radiation dose to as low as reasonably achievable.; CT of the cervical spine was performed without the administration of intravenous contrast. Multiplanar reformatted images are provided for review. Dose modulation, iterative reconstruction, and/or weight based adjustment of the mA/kV was utilized to reduce the radiation dose to as low as reasonably achievable. COMPARISON: None. HISTORY: ORDERING SYSTEM PROVIDED HISTORY: fall TECHNOLOGIST PROVIDED HISTORY: Reason for exam:->fall Has a \"code stroke\" or \"stroke alert\" been called? ->No Decision Support Exception - unselect if not a suspected or confirmed emergency medical condition->Emergency Medical Condition (MA) FINDINGS: CERVICAL SPINE: BONES/ALIGNMENT: There is no acute fracture or traumatic malalignment. Scattered lucencies are noted throughout the cervical spine which are nonspecific. Possible consideration could include osteopenia, metabolic disorder, or metastatic disease. DEGENERATIVE CHANGES: Multilevel degenerative changes are noted and are worse in the lower cervical levels. SOFT TISSUES: There is no prevertebral soft tissue swelling. HEAD: BRAIN/VENTRICLES: There is no acute intracranial hemorrhage, mass effect or midline shift.   No abnormal extra-axial fluid collection. The gray-white differentiation is maintained without evidence of an acute infarct. There is no evidence of hydrocephalus. Chronic and age related changes including age-appropriate cerebral volume loss and scattered nonspecific white matter hypodensities which are likely the sequela of chronic small vessel ischemic disease. ORBITS: The visualized portion of the orbits demonstrate no acute abnormality. SINUSES: The visualized paranasal sinuses and mastoid air cells demonstrate no acute abnormality. SOFT TISSUES/SKULL: Small right frontal scalp hematoma. No CT evidence of an acute intracranial abnormality. No evidence of acute fracture or traumatic malalignment of the cervical spine. Scattered lucencies are noted throughout the cervical spine which are nonspecific. Possible consideration could include osteopenia, metabolic disorder, or metastatic disease. Additional chronic and age related changes in the head and cervical spine, as noted above. Patient Instructions:      Medication List      CHANGE how you take these medications    vitamin D 25 MCG (1000 UT) Tabs tablet  Commonly known as: CHOLECALCIFEROL  Take 1 tablet by mouth daily.   What changed: how much to take        CONTINUE taking these medications    CPAP Machine Misc     ferrous sulfate 325 (65 Fe) MG tablet  Commonly known as: IRON 325     furosemide 40 MG tablet  Commonly known as: LASIX  Take 1 tablet by mouth daily     metoprolol succinate 50 MG extended release tablet  Commonly known as: TOPROL XL     omeprazole 40 MG delayed release capsule  Commonly known as: PRILOSEC     potassium chloride 20 MEQ extended release tablet  Commonly known as: KLOR-CON M     sertraline 50 MG tablet  Commonly known as: ZOLOFT     spironolactone 25 MG tablet  Commonly known as: ALDACTONE     valsartan 80 MG tablet  Commonly known as: DIOVAN  Take 1 tablet by mouth daily              Note that more than 30 minutes was spent in preparing discharge papers, discussing discharge with patient, medication review, etc.    Signed:  Electronically signed by Igor Olson DO on 9/3/2021 at 4:24 PM

## 2021-09-03 NOTE — PROGRESS NOTES
Jojo Aguilera M.D.,[facility]  Mounika Gonsalez D.O., F.A.C.O.I., Sara Harry M.D. Skye Obrien M.D. Donovan Gallardo D.O. Daily Pulmonary Progress Note    Patient:  Marianne Nicholas 81 y.o. female MRN: [id number]     Date of Service: 9/3/2021      Synopsis     We are following patient for large left pleural effusion    \"CC\" fall    Code status: Full      Subjective      Patient was seen and examined. Laying in bed on room air without distress. Daughter at bedside. Patient states per her PCP she will be discharged tomorrow. Okay from pulmonary standpoint. She is to have a follow-up in 4 weeks with  And a chest x-ray. Patient denies cough, shortness of breath, chest pain, chest pressure. She states her breathing has improved since having her left thoracentesis yesterday. Review of Systems:  Constitutional: Denies fever, weight loss, night sweats, and fatigue  Skin: Denies pigmentation, dark lesions, and rashes   HEENT: Denies hearing loss, tinnitus, ear drainage, epistaxis, sore throat, and hoarseness. Cardiovascular: Denies palpitations, chest pain, and chest pressure. Respiratory: Denies cough, dyspnea at rest, hemoptysis, apnea, and choking.   Gastrointestinal: Denies nausea, vomiting, poor appetite, diarrhea, heartburn or reflux  Genitourinary: Denies dysuria, frequency, urgency or hematuria  Musculoskeletal: Denies myalgias, muscle weakness, and bone pain  Neurological: Denies dizziness, vertigo, headache, and focal weakness  Psychological: Denies anxiety and depression  Endocrine: Denies heat intolerance and cold intolerance  Hematopoietic/Lymphatic: Denies bleeding problems and blood transfusions    24-hour events:  Left thora     Objective   Vitals: BP (!) 124/57   Pulse 67   Temp 98 °F (36.7 °C) (Oral)   Resp 16   Ht 5' 6\" (1.676 m)   Wt 139 lb 4.8 oz (63.2 kg)   SpO2 97%   BMI 22.48 kg/m²     I/O:    Intake/Output Summary (Last 24 hours) at 9/3/2021 1438  Last data Large left pleural effusion with atelectasis/infiltrates in the left lower   lobe and lingula with marginal improvement and development of small right   pleural effusion with atelectasis.  Pneumonia and or CHF are considered.  The   evaluation of developing empyema is suboptimal due to lack of intravenous   contrast.  However, empyema is less likely and surveillance recommended.       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 ECHO 9/1/21   Technically sub-optimal images. Normal left ventricular chamber size. Normal left ventricular systolic function, LVEF 95%. Mild left ventricular concentric hypertrophy noted. Stage I diastolic dysfunction. The left atrium is mildly dilated. Mildly increased left atrial volume. Interatrial septum not well visualized but appears intact. Normal right ventricle size and function. There is trace aortic regurgitation. Normal aortic root size. No evidence of pericardial effusion. No intra cardiac mass or thrombus. Compared to prior echo from March 2021, MR and TR appears to be less.       Signature      ----------------------------------------------------------------   Electronically signed by Smith Hodge MD(Interpreting   physician) on 09/01/2021 07:47 PM      Labs:  Lab Results   Component Value Date    WBC 9.3 09/03/2021    HGB 9.3 09/03/2021    HCT 29.5 09/03/2021    MCV 84.3 09/03/2021    MCH 26.6 09/03/2021    MCHC 31.5 09/03/2021    RDW 16.5 09/03/2021     09/03/2021    MPV 11.0 09/03/2021     Lab Results   Component Value Date     09/03/2021    K 4.0 09/03/2021    K 4.1 08/30/2021     09/03/2021    CO2 25 09/03/2021    BUN 29 09/03/2021    CREATININE 1.3 09/03/2021    LABALBU 3.4 08/29/2021    LABALBU 4.3 02/27/2012    LABALBU 3.5 02/27/2012    CALCIUM 8.4 09/03/2021    GFRAA 47 09/03/2021    LABGLOM 39 09/03/2021     Lab Results   Component Value Date    PROTIME 12.4 09/02/2021    PROTIME 12.7 06/29/2011    INR 1.1 09/02/2021     Recent Labs     09/02/21  1122   PROBNP 4,241*     Recent Labs     09/01/21  1103   PROCAL 0.12*     This SmartLink has not been configured with any valid records. Micro:  No results for input(s): CULTRESP in the last 72 hours. Recent Labs     09/02/21  1232   LABGRAM Refer to ordered Gram stain for results     No results for input(s): LEGUR in the last 72 hours. No results for input(s): STREPNEUMAGU in the last 72 hours. No results for input(s): LP1UAG in the last 72 hours. Assessment:    1. Large left pleural effusion v atelectasis v infiltrate  2. History of left pleural effusion, thoracentesis 8/6, transudate, negative cytology. 1L removed  3. Lymphopenia   4. Paroxysmal A. Fib  5. Stage I diastolic dysfunction  6. Leukopenia- RESOLVED  7. Essential hypertension  8. Diastolic heart failure  9. Multiple falls  10. Moderate mitral regurgitation  11. JERICA treated with CPAP      Plan:   1. Monitor off oxygen, on RA  2. Cardiology following for CHF evaluation. Patient considering following with heart failure clinic as outpatient   3. GI/DVT prophylaxis  4. Follow-up chest x-ray s/p thoracentesis shows no pneumothorax, improved lung. 5. Left thora 9/2 400cc removed. Fluid studies and cytology pending  6. Diuresis- Aldactone and Lasix 40 mg daily per PCP. Continue to monitor  7. Monitor off ATB per ID. Previously positive blood cultures thought to be contaminate. New blood cultures negative  8. Okay to discharge from pulmonary point of view when okay with others. Follow-up in 4 weeks with CXR. Message routed to office      This plan of care was reviewed in collaboration with Dr. Iris Santiago  Electronically signed by YOHANA Carbone CNP on 9/3/2021 at 2:38 PM        I personally saw, examined, and cared for the patient. Labs, medications, radiographs reviewed. I agree with history exam and plans detailed in NP note.     Follow-up with CHF clinic. If fluid continues to reaccumulate would benefit from Pleurx.     Radha Huffman, DO

## 2021-09-03 NOTE — PROGRESS NOTES
[facility] Progress Note    Admitting Date and Time: 8/29/2021  9:44 PM  Admit Dx: Hospital acquired PNA [J18.9, Y95]  HAP (hospital-acquired pneumonia) [J18.9, Y95]  Fall, initial encounter [W19. XXXA]  Sepsis without acute organ dysfunction, due to unspecified organism [facility]) [A41.9]    Subjective:  Patient is being followed for Hospital acquired PNA [J18.9, Y95]  HAP (hospital-acquired pneumonia) [J18.9, Y95]  Fall, initial encounter [W19. XXXA]  Sepsis without acute organ dysfunction, due to unspecified organism ([address] [A41.9]   Pt feels ***  Per RN: ***    ROS: denies fever, chills, cp, sob, n/v, HA unless stated above.       [START ON 9/4/2021] furosemide  40 mg IntraVENous Daily    potassium chloride  20 mEq Oral Daily    spironolactone  25 mg Oral Daily    valsartan  80 mg Oral Daily    heparin (porcine)  5,000 Units SubCUTAneous 3 times per day    ferrous sulfate  325 mg Oral Daily with breakfast    metoprolol succinate  50 mg Oral BID    pantoprazole  40 mg Oral QAM AC    sertraline  50 mg Oral Daily    Vitamin D  2,000 Units Oral Daily    sodium chloride flush  5-40 mL IntraVENous 2 times per day     sodium chloride flush, 5-40 mL, PRN  sodium chloride, 25 mL, PRN  ondansetron, 4 mg, Q8H PRN   Or  ondansetron, 4 mg, Q6H PRN  polyethylene glycol, 17 g, Daily PRN  acetaminophen, 650 mg, Q6H PRN   Or  acetaminophen, 650 mg, Q6H PRN         Objective:    BP (!) 124/57   Pulse 67   Temp 98 °F (36.7 °C) (Oral)   Resp 16   Ht 5' 6\" (1.676 m)   Wt 139 lb 4.8 oz (63.2 kg)   SpO2 97%   BMI 22.48 kg/m²   ***  General Appearance: alert and oriented to person, place and time and in no acute distress  Skin: warm and dry  Head: normocephalic and atraumatic  Eyes: pupils equal, round, and reactive to light, extraocular eye movements intact, conjunctivae normal  Neck: neck supple and non tender without mass   Pulmonary/Chest: clear to auscultation bilaterally- no wheezes, rales or Every effort was made to ensure accuracy; however, inadvertent computerized transcription errors may be present.   Electronically signed by Laurel Payne DO on 9/3/2021 at 12:32 PM

## 2021-09-03 NOTE — CONSULTS
Inpatient Cardiology Consultation      Reason for Consult: Congestive heart failure    Consulting Physician: Dr. Mary Cardoso    Requesting Physician: Dr. Rolanda Martinez    Date of Consultation: 9/3/2021    HISTORY OF PRESENT ILLNESS:     This 81-year-old female is known to [address] [place] [address] and is followed by Dr. Farheen Infante. She was last evaluated as an outpatient in our office on July 27, 2021. She has a history of mitral regurgitation, diastolic heart failure, paroxysmal atrial fibrillation but was in sinus rhythm at her office visit. She has been falling at home. She states she fell once in July when she had pneumonia. She then fell again on Sunday. She does get dizzy and the falls occur from a standing position. She denies actual syncope. On Sunday morning she states her son caught her and she did not fall to the ground. However Sunday evening she fell again and this time fell and hit her face. Her family brought her to the emergency room. She denied any unusual shortness of breath or chest pain. She had no injuries. Blood pressure on admission was 122/70 with a heart rate of 72. EKG atrial fibrillation with sinus tachycardia with LVH and nonspecific ST changes in the lateral leads and T wave inversion more evident in the lateral leads. Chest x-ray showed opacification of the left lung with infiltrates and a CT of the chest showed moderate pericardial effusion and a large left pleural effusion and pneumonia and CHF were to be considered. A proBNP was 4241. A prior proBNP on August 5, 2021 was 21,994    Per ID the bacteremia was a contaminant. She underwent a thoracentesis yesterday by interventional radiology and 400 cc was removed. Heparin drip is to be resumed. H&H today is 9.3 and 29.5. Her baseline creatinine is 1.5 and she is currently 1.4-1.6    Cardiology has been consulted for diuresis  Past Medical History:  1. Hypertension. 2. Type II diabetes mellitus. Non-insulin-requiring  3. Hyperlipidemia. 4. Obstructive sleep apnea. 5. Exercise MPS, 12/29/2014. 1 minute, 51 seconds Henrique protocol. 109% MPHR. Terminated for dyspnea. No diagnostic electrocardiographic changes. Normal myocardial perfusion images with ejection fraction 90%. No evidence for ischemia. Low risk study. 6. Echo, interpreted by Dr. Julio C Rodriguez, 02/12/2015. Normal LV size with borderline global hypokinesis. EF 50-55%. Mild CLVH. Moderate LAE. Moderate-severe MI. Mild AI. Mild-moderate TI. Mild PHTN. 7. Echo, 01/08/2016. Normal LV size with moderate CLVH. Normal regional wall motion and systolic function with Stage II diastolic dysfunction. Aortic sclerosis without stenosis, but with mild aortic insufficiency. Thickening of the mitral leaflets with decreased leaflet excursion associated with mild mitral stenosis. Mean transvalvular gradient 7 mmHg and moderate mitral insufficiency with a centrally directed jet. Pulmonary venous flow pattern appeared normal.   8. Pharmacologic MPS, 04/19/2016. EF 75%. No ischemia. Low risk exam.   9. Pharmacologic stress test May 3, 2019 no reversible perfusion defect and EF 70% and no wall motion abnormality    10. Hospital admission, thoracentesis August 6 2021 transudate of with a negative cytology, large left pleural effusion  11. Anemia  12. Esophageal stricture  13. Goiter  14. Gastric ulcer   15. Anemia  16. possible rheumatic fever  17. Renal calculi  18. Cholecystectomy and polypectomy (polyps in stomach and colon)  19. 2D echocardiogram March 3, 2021   Normal left ventricle size and systolic function. Ejection fraction is visually estimated at 60-65%. Atrial fibrillation may   affect the evaluation of LV systolic function. No regional wall motion abnormalities seen. Normal left ventricle wall thickness. Abnormal LV diastolic function with an indeterminate grade (E/e' > 11). The left atrium is severely dilated.    Normal right ventricular size and function. Mild mitral regurgitation with centrally directed jet. No hemodynamically significant aortic stenosis is present. Mild tricuspid regurgitation. RVSP is 56 mmHg. Pulmonary hypertension is moderate . No evidence for hemodynamically significant pericardial effusion. 20. Chronic kidney disease with a creatinine of 1.5 August 30, 2021  21. Frequent falls  22. 2D echocardiogram August 29, 2021    Technically sub-optimal images. Normal left ventricular chamber size. Normal left ventricular systolic function, LVEF 95%. Mild left ventricular concentric hypertrophy noted. Stage I diastolic dysfunction. The left atrium is mildly dilated. Mildly increased left atrial volume. Interatrial septum not well visualized but appears intact. Normal right ventricle size and function. There is trace aortic regurgitation. Normal aortic root size. No evidence of pericardial effusion. No intra cardiac mass or thrombus. Compared to prior echo from March 2021, MR and TR appears to be less. 23.        Medications Prior to admit  Prior to Admission medications    Medication Sig Start Date End Date Taking?  Authorizing Provider   spironolactone (ALDACTONE) 25 MG tablet Take 25 mg by mouth daily    Historical Provider, MD   valsartan (DIOVAN) 80 MG tablet Take 1 tablet by mouth daily 3/11/21   Bora Guardado, DO   furosemide (LASIX) 40 MG tablet Take 1 tablet by mouth daily 3/11/21   Trevon Guardado, DO   potassium chloride (KLOR-CON M) 20 MEQ extended release tablet Take 20 mEq by mouth daily    Historical Provider, MD   ferrous sulfate (IRON 325) 325 (65 Fe) MG tablet Take 325 mg by mouth daily (with breakfast)    Historical Provider, MD   metoprolol succinate (TOPROL XL) 50 MG extended release tablet Take 50 mg by mouth 2 times daily    Historical Provider, MD   omeprazole (PRILOSEC) 40 MG delayed release capsule Take 1 capsule by mouth every morning 10/17/19   Historical Provider, MD sertraline (ZOLOFT) 50 MG tablet Take 50 mg by mouth daily  9/26/19   Historical Provider, MD   CPAP Machine MISC Inhale into the lungs nightly     Historical Provider, MD   vitamin D3 (CHOLECALCIFEROL) 1000 UNITS TABS tablet Take 1 tablet by mouth daily.   Patient taking differently: Take 2,000 Units by mouth daily  9/17/14   Antoniette Fuel, DO       Current Medications:    Current Facility-Administered Medications: potassium chloride (KLOR-CON M) extended release tablet 20 mEq, 20 mEq, Oral, Daily  spironolactone (ALDACTONE) tablet 25 mg, 25 mg, Oral, Daily  valsartan (DIOVAN) tablet 80 mg, 80 mg, Oral, Daily  furosemide (LASIX) injection 40 mg, 40 mg, IntraVENous, Daily  heparin (porcine) injection 5,000 Units, 5,000 Units, SubCUTAneous, 3 times per day  ferrous sulfate (IRON 325) tablet 325 mg, 325 mg, Oral, Daily with breakfast  metoprolol succinate (TOPROL XL) extended release tablet 50 mg, 50 mg, Oral, BID  pantoprazole (PROTONIX) tablet 40 mg, 40 mg, Oral, QAM AC  sertraline (ZOLOFT) tablet 50 mg, 50 mg, Oral, Daily  vitamin D (CHOLECALCIFEROL) tablet 2,000 Units, 2,000 Units, Oral, Daily  sodium chloride flush 0.9 % injection 5-40 mL, 5-40 mL, IntraVENous, 2 times per day  sodium chloride flush 0.9 % injection 5-40 mL, 5-40 mL, IntraVENous, PRN  0.9 % sodium chloride infusion, 25 mL, IntraVENous, PRN  ondansetron (ZOFRAN-ODT) disintegrating tablet 4 mg, 4 mg, Oral, Q8H PRN **OR** ondansetron (ZOFRAN) injection 4 mg, 4 mg, IntraVENous, Q6H PRN  polyethylene glycol (GLYCOLAX) packet 17 g, 17 g, Oral, Daily PRN  acetaminophen (TYLENOL) tablet 650 mg, 650 mg, Oral, Q6H PRN **OR** acetaminophen (TYLENOL) suppository 650 mg, 650 mg, Rectal, Q6H PRN    Allergies:  Nitrofuran derivatives, Other, and Sulfa antibiotics    Social History: Non-smoker nondrinker and is a       Family History:   Family History   Problem Relation Age of Onset    Asthma Mother     Diabetes Mother     Alcohol Abuse Father REVIEW OF SYSTEMS:     · Constitutional: Denies fatigue, fevers, chills or night sweats  · Eyes: Denies visual changes or drainage  · ENT: Denies headaches or hearing loss. No mouth sores or sore throat. No epistaxis   · Cardiovascular: Denies chest pain, pressure or palpitations. No lower extremity swelling. · Respiratory: Denies LEE, cough, orthopnea or PND. No hemoptysis   · Gastrointestinal: Denies hematemesis or anorexia. No hematochezia or melena    · Genitourinary: Denies urgency, dysuria or hematuria. · Musculoskeletal: Denies gait disturbance, weakness or joint complaints  · Integumentary: Denies rash, hives or pruritis   · Neurological: Denies dizziness, headaches or seizures. No numbness or tingling  · Psychiatric: Denies anxiety or depression. · Endocrine: Denies temperature intolerance. No recent weight change. .  · Hematologic/Lymphatic: Denies abnormal bruising or bleeding. No swollen lymph nodes    PHYSICAL EXAM:   BP (!) 145/66   Pulse 66   Temp 98.1 °F (36.7 °C) (Oral)   Resp 16   Ht 5' 6\" (1.676 m)   Wt 139 lb 4.8 oz (63.2 kg)   SpO2 97%   BMI 22.48 kg/m²   CONST:  Well developed, well nourished who appears of stated age. Awake, alert and cooperative. No apparent distress. HEENT:   Head- Normocephalic, right face is ecchymotic including the orbit  Eyes- Conjunctivae pink, anicteric  Throat- Oral mucosa pink and moist  Neck-  No stridor, trachea midline, no jugular venous distention. No carotid bruit. CHEST: Chest symmetrical and non-tender to palpation. No accessory muscle use or intercostal retractions  RESPIRATORY: Lung sounds -  crackles in the bases  CARDIOVASCULAR:     Heart Inspection- shows no noted pulsations  Heart Palpation- no heaves or thrills; PMI is non-displaced   Heart Ausculation- Regular rate and rhythm, no murmur. No s3, s4 or rub   PV: No lower extremity edema. No varicosities.  Pedal pulses palpable, no clubbing or cyanosis   ABDOMEN: Soft, non-tender to light palpation. Bowel sounds present. No palpable masses no organomegaly; no abdominal bruit  MS: Good muscle strength and tone. No atrophy or abnormal movements. : Deferred  SKIN: Warm and dry no statis dermatitis or ulcers   NEURO / PSYCH: Oriented to person, place and time. Speech clear and appropriate. Follows all commands. Pleasant affect     DATA:    ECG / Tele strips: Sinus rhythm  Diagnostic:      Intake/Output Summary (Last 24 hours) at 9/3/2021 0855  Last data filed at 9/2/2021 2023  Gross per 24 hour   Intake 180 ml   Output --   Net 180 ml       Labs:   CBC:   Recent Labs     09/02/21  0529 09/03/21  0441   WBC 7.6 9.3   HGB 10.1* 9.3*   HCT 33.1* 29.5*    276     BMP:   Recent Labs     09/01/21  1103 09/02/21  0529    139   K 4.4 4.4   CO2 28 25   BUN 27* 33*   CREATININE 1.6* 1.4*   LABGLOM 31 36   CALCIUM 9.0 8.7     Mag: No results for input(s): MG in the last 72 hours. Phos: No results for input(s): PHOS in the last 72 hours. TFT:   Lab Results   Component Value Date    TSH 1.800 03/03/2021    T4FREE 1.46 03/03/2021      HgA1c:   Lab Results   Component Value Date    LABA1C 8.6 (H) 03/03/2021     No results found for: EAG  proBNP:   Recent Labs     09/02/21  1122   PROBNP 4,241*     PT/INR:   Recent Labs     09/02/21  1122   PROTIME 12.4   INR 1.1     APTT:No results for input(s): APTT in the last 72 hours. CARDIAC ENZYMES:No results for input(s): CKTOTAL, CKMB, CKMBINDEX, TROPHS in the last 72 hours. FASTING LIPID PANEL:  Lab Results   Component Value Date    CHOL 138 03/03/2021    HDL 36 03/03/2021    LDLCALC 80 03/03/2021    TRIG 112 03/03/2021     LIVER PROFILE:No results for input(s): AST, ALT, LABALBU in the last 72 hours.     Electronically signed by YOHANA Vee CNP on 9/3/2021 at 8:55 AM

## 2021-09-03 NOTE — CARE COORDINATION
S/p L thoracentesis 9/2. Cardio consulted for CHF. On Laisx iv daily. Plan remains to return home  discharge- states she is going to stay with her sister- still declining C. States she would consider talking with Heart Failure Coordinator but is undecided if she would go to the clinic- Heart failure coordinator consulted.  Will follow Naheed Hitchcock, RN case manager

## 2021-09-05 LAB
BLOOD CULTURE, ROUTINE: NORMAL
BODY FLUID CULTURE, STERILE: NORMAL
CULTURE, BLOOD 2: NORMAL
GRAM STAIN RESULT: NORMAL

## 2021-09-16 ENCOUNTER — OFFICE VISIT (OUTPATIENT)
Dept: CARDIOLOGY CLINIC | Age: 86
End: 2021-09-16
Payer: MEDICARE

## 2021-09-16 VITALS
DIASTOLIC BLOOD PRESSURE: 58 MMHG | RESPIRATION RATE: 16 BRPM | SYSTOLIC BLOOD PRESSURE: 106 MMHG | OXYGEN SATURATION: 96 % | WEIGHT: 129.8 LBS | BODY MASS INDEX: 20.86 KG/M2 | HEIGHT: 66 IN | HEART RATE: 119 BPM

## 2021-09-16 DIAGNOSIS — I48.0 PAF (PAROXYSMAL ATRIAL FIBRILLATION) (HCC): ICD-10-CM

## 2021-09-16 DIAGNOSIS — I50.32 CHRONIC HEART FAILURE WITH PRESERVED EJECTION FRACTION (HCC): Primary | ICD-10-CM

## 2021-09-16 PROCEDURE — 99214 OFFICE O/P EST MOD 30 MIN: CPT | Performed by: NURSE PRACTITIONER

## 2021-09-16 PROCEDURE — 1123F ACP DISCUSS/DSCN MKR DOCD: CPT | Performed by: NURSE PRACTITIONER

## 2021-09-16 PROCEDURE — G8420 CALC BMI NORM PARAMETERS: HCPCS | Performed by: NURSE PRACTITIONER

## 2021-09-16 PROCEDURE — 93000 ELECTROCARDIOGRAM COMPLETE: CPT | Performed by: INTERNAL MEDICINE

## 2021-09-16 PROCEDURE — 1090F PRES/ABSN URINE INCON ASSESS: CPT | Performed by: NURSE PRACTITIONER

## 2021-09-16 PROCEDURE — 1036F TOBACCO NON-USER: CPT | Performed by: NURSE PRACTITIONER

## 2021-09-16 PROCEDURE — 1111F DSCHRG MED/CURRENT MED MERGE: CPT | Performed by: NURSE PRACTITIONER

## 2021-09-16 PROCEDURE — G8400 PT W/DXA NO RESULTS DOC: HCPCS | Performed by: NURSE PRACTITIONER

## 2021-09-16 PROCEDURE — G8427 DOCREV CUR MEDS BY ELIG CLIN: HCPCS | Performed by: NURSE PRACTITIONER

## 2021-09-16 PROCEDURE — 4040F PNEUMOC VAC/ADMIN/RCVD: CPT | Performed by: NURSE PRACTITIONER

## 2021-09-16 RX ORDER — METOPROLOL SUCCINATE 50 MG/1
50 TABLET, EXTENDED RELEASE ORAL NIGHTLY
Qty: 30 TABLET | Refills: 3 | Status: ON HOLD
Start: 2021-09-16 | End: 2022-07-08 | Stop reason: HOSPADM

## 2021-09-16 NOTE — Clinical Note
Please call patient on Monday to get updated vitals (patient has blood pressure cuff and pulse ox at home).   Thank you

## 2021-09-16 NOTE — PROGRESS NOTES
[facility]   Office Appointment           Reason for Visit: Heart Failure    Primary Cardiologist: Dr. Theodore Lima         History of Present Illness:     Ms. Iris Garcia is a 81year old female with a PMHx of chronic HFpEF, PAF, HTN, HLD, T2DM, CKD, JERICA (noncompliant), iron deficiency anemia, [place] and history of medical noncompliance likely due to short-term memory loss. He recently presented to the emergency room with frequent falls and dizziness. While in the emergency room CXR with left lung infiltrate and CT chest with moderate pericardial effusion and a large left pleural effusion. proBNP 4241. She underwent thoracentesis with 400 cc fluid removal.  Anticoagulation was discontinued due to recurrent falls with associated head injuries and she was maintained on baby aspirin only. She was IV diuresed cautiously and transition to p.o. upon discharge. She had positive orthostatic hypotension during hospitalization with recommendation for thigh-high compression stockings. She presents today in post acute heart failure hospitalization follow-up, since discharge from the hospital she reports that her PCP had taken her off of all of her current cardiac medications (Toprol and valsartan). She has had no return of lightheadedness or dizziness since discharge from the hospital.  She has been compliant with oral furosemide and reports excellent urinary response. She has noted some palpitations/pounding in her chest before bed when she lays down. Today in office she is tachycardic and again has been off of her beta-blocker therapy since discharge from the hospital.    She reports dyspnea with exertion, denies worsening shortness of breath, or decline in overall functional capacity. She denies orthopnea, PND, nocturnal cough or hemoptysis. She denies abdominal distention or bloating, early satiety, anorexia/change in appetite, unintentional weight loss. She does not lower extremity edema.  She denies exertional lightheadedness. She denies palpitations, syncope or near syncope. Review of systems is negative for chest pain, pressure, discomfort. When ambulating on an incline, She does not leg claudication. History is negative for neurological symptoms including transient loss of vision, asymmetric weakness, aphasia, dysphasia, numbness, tingling. Past medical, surgical, family and social histories have been reviewed. Any changes in the past medical history, social history or family history have been made and are reflected in the electronic medical record. Patient Active Problem List    Diagnosis Date Noted    HTN (hypertension) 11/13/2013     Priority: High    Hyperlipidemia 11/13/2013     Priority: High    Anemia 11/13/2013     Priority: High    GERD (gastroesophageal reflux disease) 11/13/2013     Priority: High    JERICA on CPAP 11/13/2013     Priority: High    HAP (hospital-acquired pneumonia) 08/30/2021    Pleural effusion on left 08/05/2021    Chronic heart failure with preserved ejection fraction ([address] 03/24/2021    Atrial fibrillation with RVR ([address] 03/03/2021    PAF (paroxysmal atrial fibrillation) (HCC)     Congestive heart failure ([address]     Valvular heart disease     Chest pain 05/03/2019    CKD (chronic kidney disease) stage 3, GFR 30-59 ml/min ([address] 05/03/2019    Acute cystitis without hematuria 05/03/2019    Non-rheumatic mitral valve stenosis 07/31/2018    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 Rheumatic fever as child  2. [place] wears hearing aides  3. Hypertension.   4. Type II diabetes mellitus. 5. Hyperlipidemia. 6. Obstructive sleep apnea non compliant C-pap  7. Exercise MPS, 12/29/2014. 1 minute, 51 seconds Henrique protocol. 109% MPHR. Terminated for dyspnea. No diagnostic electrocardiographic changes. Normal myocardial perfusion images with ejection fraction 90%. No evidence for ischemia. Low risk study. 8. Echo, interpreted by Dr. Allyssa Hackett, 02/12/2015. Normal LV size with borderline global hypokinesis. EF 50-55%. Mild CLVH. Moderate LAE. Moderate-severe MI. Mild AI. Mild-moderate TI. Mild PHTN. 9. TTE 01/08/2016. Normal LV size with moderate CLVH. Normal regional wall motion and systolic function with Stage II diastolic dysfunction. Aortic sclerosis without stenosis, but with mild aortic insufficiency. Thickening of the mitral leaflets with decreased leaflet excursion associated with mild mitral stenosis. Mean transvalvular gradient 7 mmHg and moderate mitral insufficiency with a centrally directed jet. Pulmonary venous flow pattern appeared normal.   10. Pharmacologic MPS, 04/19/2016. EF 75%. No ischemia. Low risk exam.   11. CKD stage III  12. PUD  13. Anemia on iron supplementation  14. Chest Pain-->Pharmacologic MPS, 04/19/2016. EF 75%. No ischemia. Low risk exam.   15. R foot fracture s/p surgical repair at [facility] 4/2019--> NWB RLE on Lovenox QD  16. Thyroid nodules diagnosed in 2019  17. 5/2019 Lexiscan MPS: Non ischemic. EF 70%. NWM.  18. 12/2019 TTE EF 55-60%. Mild to moderate MR/MS/AI. Mild TR.         Past Surgical History:    Hx esophageal dilatation, polypectomy, EDITH/BSO age 29, EGD, L breast lumpectomy (benign), cholecystectomy    Social History:    Lifelong non smoker  Denies ETOH/Illicit Drugs  Caffeine: Decaf Tea  Activity; Lives with son in one story home with basement. + chronic LEE but no CP with ADL's.    Code Status: Full Code        Family History: Non contributory secondary to age      Past Medical History:   Diagnosis Date    [facility]HFpEF) [facility]     Acute cystitis with hematuria 05/03/2019    Anemia 2008    RECEIVED 4 UNITS BLOOD    Atrial fibrillation [facility]     CKD (chronic kidney disease) stage 3, GFR 30-59 ml/min ([facility]) 05/03/2019    CPAP (continuous positive airway pressure) dependence     Diabetes mellitus ([facility])     Esophageal stricture     Gastric ulcer     Goiter     Heart murmur     Hypertension     SINCE 1994    Kidney stones     Rheumatic fever     POSSIBLY AS A CHILD    Sleep apnea          Past Surgical History:   Procedure Laterality Date    BREAST LUMPECTOMY Left     CALCIUM BUILDUP    CARDIOVASCULAR STRESS TEST  1999    ISCHEMIC NUCLEAR STRESS TEST - PATIENT REFUSED HEART CATHETERIZATION    CHOLECYSTECTOMY      COLONOSCOPY  07/11    W/POLYP REMOVAL    DOPPLER ECHOCARDIOGRAPHY  05/14/03    POSSIBLE MILD MITRAL STENOSIS    DOPPLER ECHOCARDIOGRAPHY  06/09/04    MILD MITRAL STENSIS, MODERATE MITRAL REGURGITATION    FOOT SURGERY      HYSTERECTOMY      POLYPECTOMY      16 POLYPS REMOVED FROM STOMACH AND 1 FROM COLON    UPPER GASTROINTESTINAL ENDOSCOPY  07/11       Family History   Problem Relation Age of Onset    Asthma Mother     Diabetes Mother     Alcohol Abuse Father        Social History     Socioeconomic History    Marital status:       Spouse name: None    Number of children: None    Years of education: None    Highest education level: None   Occupational History    None   Tobacco Use    Smoking status: Never Smoker    Smokeless tobacco: Never Used   Vaping Use    Vaping Use: Never used   Substance and Sexual Activity    Alcohol use: Not Currently    Drug use: Never    Sexual activity: None   Other Topics Concern    None   Social History Narrative    Drinks decaf tea     Social Determinants of Health     Financial Resource Strain:     Difficulty of Paying Living Expenses:    Food Insecurity:     Worried About Running Out of Food in the Last Year:     Ran Out of Food in the Last Year:    Transportation Needs:     Lack of Transportation (Medical):  Lack of Transportation (Non-Medical):    Physical Activity:     Days of Exercise per Week:     Minutes of Exercise per Session:    Stress:     Feeling of Stress :    Social Connections:     Frequency of Communication with Friends and Family:     Frequency of Social Gatherings with Friends and Family:     Attends Jainism Services:     Active Member of Clubs or Organizations:     Attends Club or Organization Meetings:     Marital Status:    Intimate Partner Violence:     Fear of Current or Ex-Partner:     Emotionally Abused:     Physically Abused:     Sexually Abused: Allergies   Allergen Reactions    Nitrofuran Derivatives Other (See Comments)     Causes fever and chills.  Other Other (See Comments)     [place]YDE[place]: Sore Throat    Sulfa Antibiotics          Outpatient Medications Marked as Taking for the 9/16/21 encounter (Office Visit) with YOHANA Kelley CNP   Medication Sig Dispense Refill    metoprolol succinate (TOPROL XL) 50 MG extended release tablet Take 1 tablet by mouth nightly 30 tablet 3    furosemide (LASIX) 40 MG tablet Take 1 tablet by mouth daily 30 tablet 0    potassium chloride (KLOR-CON M) 20 MEQ extended release tablet Take 20 mEq by mouth daily      ferrous sulfate (IRON 325) 325 (65 Fe) MG tablet Take 325 mg by mouth daily (with breakfast)      omeprazole (PRILOSEC) 40 MG delayed release capsule Take 1 capsule by mouth every morning  0    sertraline (ZOLOFT) 50 MG tablet Take 50 mg by mouth daily   3    CPAP Machine MISC Inhale into the lungs nightly       vitamin D3 (CHOLECALCIFEROL) 1000 UNITS TABS tablet Take 1 tablet by mouth daily.  (Patient taking differently: Take 2,000 Units by mouth daily ) 30 tablet 0       Review of Systems:   Cardiac: As per HPI  General: No fever, chills, rigors  Pulmonary: As per HPI  HEENT: No visual disturbances, difficult swallowing  GI: No nausea, vomiting, abdominal pain  : No dysuria or hematuria  Endocrine: No thyroid disease or diabetes  Musculoskeletal: NIXON x 4, no focal motor deficits  Skin: Intact, no rashes  Neuro/Psych: No headache or seizures        Weights: Wt Readings from Last 3 Encounters:   09/16/21 129 lb 12.8 oz (58.9 kg)   09/03/21 139 lb 4.8 oz (63.2 kg)   08/07/21 140 lb 5 oz (63.6 kg)           Physical Examination:     Vitals:    09/16/21 1352   BP: (!) 106/58   Pulse: 119   Resp: 16   SpO2: 96%       CONSTITUTIONAL: Alert and oriented times 3, no acute distress and cooperative to examination with proper mood and affect. SKIN: Skin color, texture, turgor normal. No rashes or lesions. LYMPH: no cervical nodes, no inguinal nodes  HEENT: Head is normocephalic, atraumatic. EOMI, PERRLA. NECK: Supple, symmetrical, trachea midline, no adenopathy, thyroid symmetric, not enlarged and no tenderness, skin normal.  CHEST/LUNGS: chest symmetric with normal A/P diameter, normal respiratory rate and rhythm, lungs clear to auscultation without wheezes, rales or rhonchi. No accessory muscle use. Scars None   CARDIOVASCULAR: Heart sounds are normal.  Regular rate and rhythm without murmur, gallop or rub. Normal S1 and S2. Carotid and femoral pulses 2+/4 and equal bilaterally. ABDOMEN: Normal shape. No and Laparoscopic scar(s) present. Normal bowel sounds. No bruits. soft, nondistended, no masses or organomegaly. no evidence of hernia. Percussion: Normal without hepatosplenomegally. Tenderness: absent. RECTAL: deferred, not clinically indicated  NEUROLOGIC: There are no focalizing motor or sensory deficits. CN II-XII are grossly intact. EXTREMITIES: no cyanosis, no clubbing and no edema. Warm and well perfused. All the following diagnostics were personally reviewed and interpreted by me.      LAB DATA:     9/2/2021 05:29 9/2/2021 11:22 9/3/2021 04:41 9/3/2021 08:30   Sodium 139   137   Potassium 4.4   4.0   Chloride 103   102 CO2 25   25   BUN 33 (H)   29 (H)   Creatinine 1.4 (H)   1.3 (H)   Anion Gap 11   10   GFR Non- 36   39   GFR  43   47   Glucose 106 (H)   204 (H)   Calcium 8.7   8.4 (L)   Total Protein  6.0 (L)     CRP   2.8 (H)    LD  153     Pro-BNP  4,241 (H)     WBC 7.6  9.3    RBC 3.92  3.50    Hemoglobin Quant 10.1 (L)  9.3 (L)    Hematocrit 33.1 (L)  29.5 (L)    MCV 84.4  84.3    MCH 25.8 (L)  26.6    MCHC 30.5 (L)  31.5 (L)    MPV 9.4  11.0    RDW 16.0 (H)  16.5 (H)    Platelet Count 099  236        IMAGING:    CXR (9/2/2021)  FINDINGS:  There is been interval decrease in the volume of left pleural fluid following thoracentesis.  No evidence of a pneumothorax.  Mild left basilar atelectasis remains.  Heart size is unable to be accurately assessed on this single portable view of the chest, but appears to be stable.  Bones are osteopenic. Impression:  Interval reduction in volume of left pleural fluid following thoracentesis. No pneumothorax. CARDIAC TESTING:    TTE (3/4/2021)   Summary:   Normal left ventricle size and systolic function. Ejection fraction is visually estimated at 60-65%. Atrial fibrillation may   affect the evaluation of LV systolic function. No regional wall motion abnormalities seen. Normal left ventricle wall thickness. Abnormal LV diastolic function with an indeterminate grade (E/e' > 11). The left atrium is severely dilated. Normal right ventricular size and function. Mild mitral regurgitation with centrally directed jet. No hemodynamically significant aortic stenosis is present. Mild tricuspid regurgitation. RVSP is 56 mmHg. Pulmonary hypertension is moderate . No evidence for hemodynamically significant pericardial effusion. ISABELLE (3/5/2021)   Summary   Normal left ventricle size and systolic function. Ejection fraction is visually estimated at 60-65%. Atrial fibrillation may   affect the evaluation of LV systolic function.    No regional wall motion abnormalities seen. Normal left ventricle wall thickness. No evidence of thrombus within left atrium or appendage. Normal right ventricular size and function. Moderate mitral regurgitation is present. No hemodynamically significant aortic stenosis is present. Mild aortic regurgitation is noted. Mild tricuspid regurgitation. RVSP is 41 mmHg. Pulmonary hypertension is mild . No evidence for hemodynamically significant pericardial effusion. TTE (9/1/2021)   Summary:   Technically sub-optimal images. Normal left ventricular chamber size. Normal left ventricular systolic function, LVEF 88%. Mild left ventricular concentric hypertrophy noted. Stage I diastolic dysfunction. The left atrium is mildly dilated. Mildly increased left atrial volume. Interatrial septum not well visualized but appears intact. Normal right ventricle size and function. There is trace aortic regurgitation. Normal aortic root size. No evidence of pericardial effusion. No intra cardiac mass or thrombus. Compared to prior echo from March 2021, MR and TR appears to be less. EKG  Sinus Tachycardia   Occasional PAC     LVH  Poor R-wave progression   Nonspecific ST depression  Nonspecific T-abnormality      ASSESSMENT:  1. Chronic HFpEF  2. ACC stage C / NYHA class III  3. Appears euvolemic has some LEE  4. Pleural effusion s/p recent thoracentesis   5. PAF s/p DCCV to SR (3/5/2021) - [address] with Eliquis discontinued given frequent falls with head injury. Maintained on ASA 81 mg daily. 6. Hx of digoxin toxicity   7. Mild/moderate MR  8. HTN  9. Orthostatic hypotension  10. T2DM - hgba1c 8.6  11. CKD  12. HLD  13. JERICA - inconsistent use of CPAP  14. Hx of PUD, esophageal strictures and dilations  15. Iron deficiency anemia   16. [place]  17. Depression  18. Hx of medical noncompliance       PLAN:  1.  Restart metoprolol (Toprol) 50 mg nightly - take this tonight    2. Update office on Monday of your heart rate and blood pressure    3. Referral to CHF clinic at [address] [place] [address] in 1 week     4. Continue to wear compression stockings     5. Follow up with Dr. Theodore Lima in 1 month given high risk of readmission     6. Weigh yourself daily    -Stay Hydrated    -Diet should sodium restricted to 2 grams    -Again watch your daily weight trends and if you gain water weight please follow below instructions.    -If you gain 3-5 pounds in 2-3 days OR notice that you are retaining fluid in anyway just like you did before then take an extra dose of your water pill (furosemide/Lasix) every day until you lose the weight or feel better.     -If you notice that you have taken more than 2 extra doses in 1 week then please call and let us know. -If at any time you feel that you are retaining fluid, your medications are not working, or you feel ill in anyway, then please call us for either same day appointment or the next day, and for instructions. Our goal is to keep you out of the emergency room and the hospital and we have ways to do it. You just need to call us in a timely manner.     -If you become sick for other reasons, and notice that you are not urinating as much, the urine is very dark, you have significant diarrhea or vomiting, then please DO NOT take your water pill and CALL US immediately. Nadya MULLER-CNP  [facility].

## 2021-09-16 NOTE — PATIENT INSTRUCTIONS
1. Restart metoprolol (Toprol) 50 mg nightly - take this tonight    2. Update office on Monday of your heart rate and blood pressure    3. Referral to CHF clinic at [address] [place] [address] in 1 week     4. Continue to wear compression stockings     5. Follow up with Dr. Sandra Turpin in 1 month    6. Weigh yourself daily    -Stay Hydrated    -Diet should sodium restricted to 2 grams    -Again watch your daily weight trends and if you gain water weight please follow below instructions.    -If you gain 3-5 pounds in 2-3 days OR notice that you are retaining fluid in anyway just like you did before then take an extra dose of your water pill (furosemide/Lasix) every day until you lose the weight or feel better.     -If you notice that you have taken more than 2 extra doses in 1 week then please call and let us know. -If at any time you feel that you are retaining fluid, your medications are not working, or you feel ill in anyway, then please call us for either same day appointment or the next day, and for instructions. Our goal is to keep you out of the emergency room and the hospital and we have ways to do it. You just need to call us in a timely manner.     -If you become sick for other reasons, and notice that you are not urinating as much, the urine is very dark, you have significant diarrhea or vomiting, then please DO NOT take your water pill and CALL US immediately.

## 2021-09-24 ENCOUNTER — TELEPHONE (OUTPATIENT)
Dept: CARDIOLOGY CLINIC | Age: 86
End: 2021-09-24

## 2021-09-24 NOTE — LETTER
[facility]  [address] [place] [address] Drive  Phone: [phone]  Fax: [phone]    YOHANA Daley CNP        September 30, 2021    [address] [facility] [address]      Dear Lenore :    Please call our office at [phone] with your blood pressure, pulse and pulse ox. Also how are you feeling: any dizzy/lightheaded, weight gain, more trouble breathing or increase in swelling? Keep hydrated and continue to take your medications as directed. If you have any questions or concerns, please don't hesitate to call.     Sincerely,        YOHANA Daley CNP

## 2021-09-24 NOTE — TELEPHONE ENCOUNTER
Has not done vitals. But willing to do while on phone. 9:04 AM   unit not working right cant obtain them now. She will call back with vitals when obtains them.      BP  HR  Sat

## 2021-09-30 NOTE — TELEPHONE ENCOUNTER
[phone] (home)  rings with no answer or no VM    Will mail letter with provider instructions.      [address] [facility] [address]   Mailed letter 9/30/2021

## 2021-09-30 NOTE — TELEPHONE ENCOUNTER
[facility] please call patient in a week to obtain vitals (Blood pressure and pulse ox and heart rate). Send to [facility] when obtained.

## 2021-10-07 ENCOUNTER — OFFICE VISIT (OUTPATIENT)
Dept: CARDIOLOGY CLINIC | Age: 86
End: 2021-10-07
Payer: MEDICARE

## 2021-10-07 VITALS
BODY MASS INDEX: 21.69 KG/M2 | WEIGHT: 135 LBS | HEIGHT: 66 IN | DIASTOLIC BLOOD PRESSURE: 58 MMHG | SYSTOLIC BLOOD PRESSURE: 116 MMHG | RESPIRATION RATE: 20 BRPM | HEART RATE: 86 BPM

## 2021-10-07 DIAGNOSIS — I48.0 PAF (PAROXYSMAL ATRIAL FIBRILLATION) (HCC): Primary | ICD-10-CM

## 2021-10-07 PROCEDURE — 1123F ACP DISCUSS/DSCN MKR DOCD: CPT | Performed by: INTERNAL MEDICINE

## 2021-10-07 PROCEDURE — G8484 FLU IMMUNIZE NO ADMIN: HCPCS | Performed by: INTERNAL MEDICINE

## 2021-10-07 PROCEDURE — 4040F PNEUMOC VAC/ADMIN/RCVD: CPT | Performed by: INTERNAL MEDICINE

## 2021-10-07 PROCEDURE — G8427 DOCREV CUR MEDS BY ELIG CLIN: HCPCS | Performed by: INTERNAL MEDICINE

## 2021-10-07 PROCEDURE — 93000 ELECTROCARDIOGRAM COMPLETE: CPT | Performed by: INTERNAL MEDICINE

## 2021-10-07 PROCEDURE — 1090F PRES/ABSN URINE INCON ASSESS: CPT | Performed by: INTERNAL MEDICINE

## 2021-10-07 PROCEDURE — G8420 CALC BMI NORM PARAMETERS: HCPCS | Performed by: INTERNAL MEDICINE

## 2021-10-07 PROCEDURE — G8400 PT W/DXA NO RESULTS DOC: HCPCS | Performed by: INTERNAL MEDICINE

## 2021-10-07 PROCEDURE — 1036F TOBACCO NON-USER: CPT | Performed by: INTERNAL MEDICINE

## 2021-10-07 PROCEDURE — 99214 OFFICE O/P EST MOD 30 MIN: CPT | Performed by: INTERNAL MEDICINE

## 2021-10-07 RX ORDER — SPIRONOLACTONE 25 MG/1
TABLET ORAL
COMMUNITY
Start: 2021-09-22 | End: 2021-10-29

## 2021-10-07 RX ORDER — VALSARTAN 80 MG/1
80 TABLET ORAL DAILY
COMMUNITY
End: 2021-10-29

## 2021-10-07 RX ORDER — LEVOFLOXACIN 500 MG/1
500 TABLET, FILM COATED ORAL DAILY
COMMUNITY
End: 2021-10-29

## 2021-10-07 NOTE — PROGRESS NOTES
CHIEF COMPLAINT: VHD/CP    HISTORY OF PRESENT ILLNESS: Patient is a 81 y.o. female seen at the request of [facility] MD Deisy.      No CP or SOB. Past Medical History:  1. Hypertension. 2. Type II diabetes mellitus. 3. Hyperlipidemia. 4. Obstructive sleep apnea. 5. Exercise MPS, 12/29/2014. 1 minute, 51 seconds Henrique protocol. 109% MPHR. Terminated for dyspnea. No diagnostic electrocardiographic changes. Normal myocardial perfusion images with ejection fraction 90%. No evidence for ischemia. Low risk study. 6. Echo, interpreted by Dr. Doris Donaldson, 02/12/2015. Normal LV size with borderline global hypokinesis. EF 50-55%. Mild CLVH. Moderate LAE. Moderate-severe MI. Mild AI. Mild-moderate TI. Mild PHTN. 7. Echo, 01/08/2016. Normal LV size with moderate CLVH. Normal regional wall motion and systolic function with Stage II diastolic dysfunction. Aortic sclerosis without stenosis, but with mild aortic insufficiency. Thickening of the mitral leaflets with decreased leaflet excursion associated with mild mitral stenosis. Mean transvalvular gradient 7 mmHg and moderate mitral insufficiency with a centrally directed jet. Pulmonary venous flow pattern appeared normal.   8. Pharmacologic MPS, 04/19/2016. EF 75%. No ischemia.  Low risk exam.     Past Medical History:   Diagnosis Date    (HFpEF) heart failure with preserved ejection fraction (HCC)     Acute cystitis with hematuria 05/03/2019    Anemia 2008    RECEIVED 4 UNITS BLOOD    Atrial fibrillation (HCC)     CKD (chronic kidney disease) stage 3, GFR 30-59 ml/min ([facility]) 05/03/2019    CPAP (continuous positive airway pressure) dependence     Diabetes mellitus ([address]     Esophageal stricture     Gastric ulcer     Goiter     Heart murmur     Hypertension     SINCE 1994    Kidney stones     Rheumatic fever     POSSIBLY AS A CHILD    Sleep apnea        Patient Active Problem List   Diagnosis    HTN (hypertension)    Hyperlipidemia    Anemia    GERD (gastroesophageal reflux disease)    JERICA on CPAP    Vitamin D deficiency    Abnormal bone density screening    PVC's (premature ventricular contractions)    Mitral insufficiency    Acute on chronic diastolic CHF (congestive heart failure), NYHA class 1 ([facility])    Renal insufficiency    Atypical chest pain    Rheumatic mitral stenosis    Non-rheumatic mitral valve stenosis    Chest pain    CKD (chronic kidney disease) stage 3, GFR 30-59 ml/min ([facility])    Acute cystitis without hematuria    Valvular heart disease    PAF (paroxysmal atrial fibrillation) ([facility])    Congestive heart failure ([facility])    Atrial fibrillation with RVR ([facility])    Chronic heart failure with preserved ejection fraction ([facility])    Pleural effusion on left    HAP (hospital-acquired pneumonia)       Allergies   Allergen Reactions    Nitrofuran Derivatives Other (See Comments)     Causes fever and chills.  Other Other (See Comments)     HONEYDEW: Sore Throat    Sulfa Antibiotics        Current Outpatient Medications   Medication Sig Dispense Refill    spironolactone (ALDACTONE) 25 MG tablet TAKE ONE TABLET BY MOUTH EVERY DAY      valsartan (DIOVAN) 80 MG tablet Take 80 mg by mouth daily      levoFLOXacin (LEVAQUIN) 500 MG tablet Take 500 mg by mouth daily      metoprolol succinate (TOPROL XL) 50 MG extended release tablet Take 1 tablet by mouth nightly 30 tablet 3    furosemide (LASIX) 40 MG tablet Take 1 tablet by mouth daily 30 tablet 0    potassium chloride (KLOR-CON M) 20 MEQ extended release tablet Take 20 mEq by mouth daily      ferrous sulfate (IRON 325) 325 (65 Fe) MG tablet Take 325 mg by mouth daily (with breakfast)      omeprazole (PRILOSEC) 40 MG delayed release capsule Take 1 capsule by mouth every morning  0    sertraline (ZOLOFT) 50 MG tablet Take 50 mg by mouth daily   3    CPAP Machine MISC Inhale into the lungs nightly       vitamin D3 (CHOLECALCIFEROL) 1000 UNITS TABS tablet Take 1 tablet by mouth daily. (Patient taking differently: Take 2,000 Units by mouth daily ) 30 tablet 0     No current facility-administered medications for this visit. Social History     Socioeconomic History    Marital status:      Spouse name: Not on file    Number of children: Not on file    Years of education: Not on file    Highest education level: Not on file   Occupational History    Not on file   Tobacco Use    Smoking status: Never Smoker    Smokeless tobacco: Never Used   Vaping Use    Vaping Use: Never used   Substance and Sexual Activity    Alcohol use: Not Currently    Drug use: Never    Sexual activity: Not on file   Other Topics Concern    Not on file   Social History Narrative    Drinks decaf tea     Social Determinants of Health     Financial Resource Strain:     Difficulty of Paying Living Expenses:    Food Insecurity:     Worried About Running Out of Food in the Last Year:     [address] in the Last Year:    Transportation Needs:     Lack of Transportation (Medical):  Lack of Transportation (Non-Medical):    Physical Activity:     Days of Exercise per Week:     Minutes of Exercise per Session:    Stress:     Feeling of Stress :    Social Connections:     Frequency of Communication with Friends and Family:     Frequency of Social Gatherings with Friends and Family:     Attends [employer] Services:     Active Member of Clubs or Organizations:     Attends Club or Organization Meetings:     Marital Status:    Intimate Partner Violence:     Fear of Current or Ex-Partner:     Emotionally Abused:     Physically Abused:     Sexually Abused:        Family History   Problem Relation Age of Onset    Asthma Mother     Diabetes Mother     Alcohol Abuse Father        Review of Systems:  Heart: as above   Lungs: as above   Eyes: denies changes in vision or discharge. Ears: denies changes in hearing or pain.    Nose: denies epistaxis or masses   Throat: denies sore throat or trouble swallowing. Neuro: denies numbness, tingling, tremors. Skin: denies rashes or itching. : denies hematuria, dysuria   GI: denies vomiting, diarrhea   Psych: denies mood changed, anxiety, depression. All other systems negative. Physical Exam   BP (!) 116/58   Pulse 86   Resp 20   Ht 5' 6\" (1.676 m)   Wt 135 lb (61.2 kg)   BMI 21.79 kg/m²   Constitutional: Oriented to person, place, and time. Well-developed and well-nourished. No distress. Head: Normocephalic and atraumatic. Eyes: EOM are normal. Pupils are equal, round, and reactive to light. Neck: Normal range of motion. Neck supple. No hepatojugular reflux and no JVD present. Carotid bruit is not present. No tracheal deviation present. No thyromegaly present. Cardiovascular: Normal rate, regular rhythm, normal heart sounds and intact distal pulses. Exam reveals no gallop and no friction rub. No murmur heard. Pulmonary/Chest: Effort normal and breath sounds normal. No respiratory distress. No wheezes. No rales. No tenderness. Abdominal: Soft. Bowel sounds are normal. No distension and no mass. No tenderness. No rebound and no guarding. Musculoskeletal: Normal range of motion. No edema and no tenderness. Lymphadenopathy:   No cervical adenopathy. No groin adenopathy. Neurological: Alert and oriented to person, place, and time. Skin: Skin is warm and dry. No rash noted. Not diaphoretic. No erythema. Psychiatric: Normal mood and affect. Behavior is normal.     EKG:  normal sinus rhythm, LVH, nonspecific ST and T waves changes. TTE (3/4/2021)   Summary:   Normal left ventricle size and systolic function.   Ejection fraction is visually estimated at 60-65%. Atrial fibrillation may   affect the evaluation of LV systolic function.   No regional wall motion abnormalities seen.   Normal left ventricle wall thickness.   Abnormal LV diastolic function with an indeterminate grade (E/e' > 11).    The left atrium is severely dilated.   Normal right ventricular size and function.   Mild mitral regurgitation with centrally directed jet.   No hemodynamically significant aortic stenosis is present.   Mild tricuspid regurgitation.  RVSP is 56 mmHg. Pulmonary hypertension is moderate .   No evidence for hemodynamically significant pericardial effusion.     ISABELLE (3/5/2021)   Summary   Normal left ventricle size and systolic function. Ejection fraction is visually estimated at 60-65%. Atrial fibrillation may   affect the evaluation of LV systolic function. No regional wall motion abnormalities seen. Normal left ventricle wall thickness. No evidence of thrombus within left atrium or appendage. Normal right ventricular size and function. Moderate mitral regurgitation is present. No hemodynamically significant aortic stenosis is present. Mild aortic regurgitation is noted. Mild tricuspid regurgitation. RVSP is 41 mmHg. Pulmonary hypertension is mild .    No evidence for hemodynamically significant pericardial effusion.     TTE (9/1/2021)   Summary:   Technically sub-optimal images.   Normal left ventricular chamber size.   Normal left ventricular systolic function, LVEF 31%.   Mild left ventricular concentric hypertrophy noted.   Stage I diastolic dysfunction.   The left atrium is mildly dilated.  Jose Luis Hammock increased left atrial volume.   Interatrial septum not well visualized but appears intact.   Normal right ventricle size and function.   There is trace aortic regurgitation.   Normal aortic root size.   No evidence of pericardial effusion.   No intra cardiac mass or thrombus.   Compared to prior echo from March 2021, MR and TR appears to be less.       ASSESSMENT AND PLAN:  Patient Active Problem List   Diagnosis    HTN (hypertension)    Hyperlipidemia    Anemia    GERD (gastroesophageal reflux disease)    JERICA on CPAP    Vitamin D deficiency    Abnormal bone density screening    PVC's (premature ventricular contractions)    Mitral insufficiency    Acute on chronic diastolic CHF (congestive heart failure), NYHA class 1 (HCC)    Renal insufficiency    Atypical chest pain    Rheumatic mitral stenosis    Non-rheumatic mitral valve stenosis    Chest pain    CKD (chronic kidney disease) stage 3, GFR 30-59 ml/min (HCC)    Acute cystitis without hematuria    Valvular heart disease    PAF (paroxysmal atrial fibrillation) (HCC)    Congestive heart failure (HCC)    Atrial fibrillation with RVR (HCC)    Chronic heart failure with preserved ejection fraction (HCC)    Pleural effusion on left    HAP (hospital-acquired pneumonia)     1. PAfib:  Post DCCV 3/7/2021. In sinus. BB. Eliquis stopped due to falls. 2. Chronic Diastolic CHF:     Monitor volume. BB/ARB/lasix/aldactone. 3. VHD: See echo above. Observe. 4. HTN: Observe. 5. Lipids     6. DM    7. JERICA    8. CKD    Luba Franks D.O.   Cardiologist  Cardiology, [facility]

## 2021-10-13 ENCOUNTER — HOSPITAL ENCOUNTER (INPATIENT)
Dept: HOSPITAL 83 - ED | Age: 86
LOS: 2 days | Discharge: HOME HEALTH | DRG: 312 | End: 2021-10-15
Attending: INTERNAL MEDICINE | Admitting: INTERNAL MEDICINE
Payer: MEDICARE

## 2021-10-13 VITALS — SYSTOLIC BLOOD PRESSURE: 126 MMHG | DIASTOLIC BLOOD PRESSURE: 58 MMHG

## 2021-10-13 VITALS — BODY MASS INDEX: 21.26 KG/M2 | WEIGHT: 132.28 LBS | HEIGHT: 66.14 IN

## 2021-10-13 VITALS — DIASTOLIC BLOOD PRESSURE: 73 MMHG

## 2021-10-13 VITALS — DIASTOLIC BLOOD PRESSURE: 55 MMHG | SYSTOLIC BLOOD PRESSURE: 97 MMHG

## 2021-10-13 DIAGNOSIS — E44.1: ICD-10-CM
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
## 2021-10-13 DIAGNOSIS — Z88.8: ICD-10-CM
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
## 2021-10-13 DIAGNOSIS — F41.1: ICD-10-CM
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
## 2021-10-13 DIAGNOSIS — Z79.899: ICD-10-CM

## 2021-10-13 DIAGNOSIS — F33.0: ICD-10-CM

## 2021-10-13 DIAGNOSIS — Z82.5: ICD-10-CM

## 2021-10-13 DIAGNOSIS — Z90.710: ICD-10-CM

## 2021-10-13 DIAGNOSIS — I48.0: ICD-10-CM

## 2021-10-13 DIAGNOSIS — E78.2: ICD-10-CM

## 2021-10-13 DIAGNOSIS — Z90.49: ICD-10-CM

## 2021-10-13 DIAGNOSIS — I50.9: ICD-10-CM

## 2021-10-13 DIAGNOSIS — N17.0: ICD-10-CM

## 2021-10-13 DIAGNOSIS — N18.32: ICD-10-CM

## 2021-10-13 DIAGNOSIS — I95.1: Primary | ICD-10-CM

## 2021-10-13 DIAGNOSIS — R26.2: ICD-10-CM

## 2021-10-13 DIAGNOSIS — Z20.822: ICD-10-CM

## 2021-10-13 DIAGNOSIS — Z88.2: ICD-10-CM

## 2021-10-13 DIAGNOSIS — Z83.3: ICD-10-CM
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
## 2021-10-13 DIAGNOSIS — I13.0: ICD-10-CM

## 2021-10-13 LAB
ALBUMIN SERPL-MCNC: 3 GM/DL (ref 3.1–4.5)
ALP SERPL-CCNC: 85 U/L (ref 45–117)
ALT SERPL W P-5'-P-CCNC: 18 U/L (ref 12–78)
APTT PPP: 24.9 SECONDS (ref 20–32.1)
AST SERPL-CCNC: 17 IU/L (ref 3–35)
BASOPHILS # BLD AUTO: 0 10*3/UL (ref 0–0.1)
BASOPHILS NFR BLD AUTO: 0.3 % (ref 0–1)
BUN SERPL-MCNC: 35 MG/DL (ref 7–24)
CHLORIDE SERPL-SCNC: 101 MMOL/L (ref 98–107)
CREAT SERPL-MCNC: 2.1 MG/DL (ref 0.55–1.02)
EOSINOPHIL # BLD AUTO: 0.2 10*3/UL (ref 0–0.4)
EOSINOPHIL # BLD AUTO: 1.3 % (ref 1–4)
ERYTHROCYTE [DISTWIDTH] IN BLOOD BY AUTOMATED COUNT: 19.9 % (ref 0–14.5)
HCT VFR BLD AUTO: 37.5 % (ref 37–47)
INR BLD: 1.1 (ref 2–3.5)
LIPASE SERPL-CCNC: 119 U/L (ref 73–393)
LYMPHOCYTES # BLD AUTO: 1.5 10*3/UL (ref 1.3–4.4)
LYMPHOCYTES NFR BLD AUTO: 12 % (ref 27–41)
MCH RBC QN AUTO: 25 PG (ref 27–31)
MCHC RBC AUTO-ENTMCNC: 30.7 G/DL (ref 33–37)
MCV RBC AUTO: 81.5 FL (ref 81–99)
MONOCYTES # BLD AUTO: 0.8 10*3/UL (ref 0.1–1)
MONOCYTES NFR BLD MANUAL: 6.4 % (ref 3–9)
NEUT #: 9.7 10*3/UL (ref 2.3–7.9)
NEUT %: 78.9 % (ref 47–73)
NRBC BLD QL AUTO: 0 % (ref 0–0)
PLATELET # BLD AUTO: 386 10*3/UL (ref 130–400)
PMV BLD AUTO: 8.7 FL (ref 9.6–12.3)
POTASSIUM SERPL-SCNC: 4.6 MMOL/L (ref 3.5–5.1)
PROT SERPL-MCNC: 7.2 GM/DL (ref 6.4–8.2)
RBC # BLD AUTO: 4.6 10*6/UL (ref 4.1–5.1)
SODIUM SERPL-SCNC: 136 MMOL/L (ref 136–145)
TROPONIN I SERPL-MCNC: < 0.015 NG/ML (ref ?–0.04)
WBC NRBC COR # BLD AUTO: 12.3 10*3/UL (ref 4.8–10.8)

## 2021-10-14 VITALS — DIASTOLIC BLOOD PRESSURE: 77 MMHG

## 2021-10-14 VITALS — SYSTOLIC BLOOD PRESSURE: 112 MMHG | DIASTOLIC BLOOD PRESSURE: 61 MMHG

## 2021-10-14 VITALS — DIASTOLIC BLOOD PRESSURE: 67 MMHG

## 2021-10-14 LAB
BUN SERPL-MCNC: 32 MG/DL (ref 7–24)
CHLORIDE SERPL-SCNC: 108 MMOL/L (ref 98–107)
CHLORIDE UR-SCNC: 55 MMOL/L
CREAT SERPL-MCNC: 1.67 MG/DL (ref 0.55–1.02)
CREAT UR-MCNC: 170 MG/DL
POTASSIUM SERPL-SCNC: 4.4 MMOL/L (ref 3.5–5.1)
SODIUM SERPL-SCNC: 142 MMOL/L (ref 136–145)

## 2021-10-15 VITALS — SYSTOLIC BLOOD PRESSURE: 130 MMHG | DIASTOLIC BLOOD PRESSURE: 49 MMHG

## 2021-10-15 LAB
BUN BLDV-MCNC: 26 MG/DL (ref 7–24)
BUN SERPL-MCNC: 26 MG/DL (ref 7–24)
CALCIUM SERPL-MCNC: 9 MG/DL (ref 8.5–10.5)
CHLORIDE BLD-SCNC: 107 MMOL/L (ref 98–107)
CHLORIDE SERPL-SCNC: 107 MMOL/L (ref 98–107)
CO2: 33 MMOL/L (ref 21–32)
CREAT SERPL-MCNC: 1.45 MG/DL (ref 0.55–1.02)
CREAT SERPL-MCNC: 1.45 MG/DL (ref 0.55–1.02)
GFR AFRICAN AMERICAN: 41 ML/MIN
GFR SERPL CREATININE-BSD FRML MDRD: 34 ML/MIN/
GLUCOSE: 114 MG/DL (ref 65–99)
POTASSIUM SERPL-SCNC: 4 MMOL/L (ref 3.5–5.1)
POTASSIUM SERPL-SCNC: 4 MMOL/L (ref 3.5–5.1)
SODIUM BLD-SCNC: 141 MMOL/L (ref 136–145)
SODIUM SERPL-SCNC: 141 MMOL/L (ref 136–145)

## 2021-10-29 ENCOUNTER — TELEPHONE (OUTPATIENT)
Dept: CARDIOLOGY CLINIC | Age: 86
End: 2021-10-29

## 2021-10-29 ENCOUNTER — HOSPITAL ENCOUNTER (OUTPATIENT)
Dept: OTHER | Age: 86
Setting detail: THERAPIES SERIES
Discharge: HOME | End: 2021-10-29
Payer: MEDICARE

## 2021-10-29 VITALS
SYSTOLIC BLOOD PRESSURE: 114 MMHG | WEIGHT: 134.13 LBS | RESPIRATION RATE: 20 BRPM | OXYGEN SATURATION: 92 % | HEART RATE: 81 BPM | DIASTOLIC BLOOD PRESSURE: 60 MMHG | BODY MASS INDEX: 21.65 KG/M2

## 2021-10-29 LAB
ANION GAP SERPL CALCULATED.3IONS-SCNC: 10 MMOL/L (ref 7–16)
BUN BLDV-MCNC: 22 MG/DL (ref 6–23)
CALCIUM SERPL-MCNC: 9.2 MG/DL (ref 8.6–10.2)
CHLORIDE BLD-SCNC: 100 MMOL/L (ref 98–107)
CO2: 30 MMOL/L (ref 22–29)
CREAT SERPL-MCNC: 1.2 MG/DL (ref 0.5–1)
GFR AFRICAN AMERICAN: 52
GFR NON-AFRICAN AMERICAN: 43 ML/MIN/1.73
GLUCOSE BLD-MCNC: 129 MG/DL (ref 74–99)
POTASSIUM SERPL-SCNC: 3.4 MMOL/L (ref 3.5–5)
PRO-BNP: 2031 PG/ML (ref 0–450)
SODIUM BLD-SCNC: 140 MMOL/L (ref 132–146)

## 2021-10-29 PROCEDURE — 36415 COLL VENOUS BLD VENIPUNCTURE: CPT

## 2021-10-29 PROCEDURE — 83880 ASSAY OF NATRIURETIC PEPTIDE: CPT

## 2021-10-29 PROCEDURE — 99204 OFFICE O/P NEW MOD 45 MIN: CPT

## 2021-10-29 PROCEDURE — 80048 BASIC METABOLIC PNL TOTAL CA: CPT

## 2021-10-29 RX ORDER — SPIRONOLACTONE 25 MG/1
25 TABLET ORAL DAILY
COMMUNITY
End: 2022-04-12

## 2021-10-29 ASSESSMENT — EJECTION FRACTION
EF_SOURCE: 2D ECHO
EF_VALUE: 55-60%

## 2021-10-29 NOTE — TELEPHONE ENCOUNTER
----- Message from Osito Menchaca DO sent at 10/29/2021  2:21 PM EDT -----  Labs okay except potassium a bit low. Please start Kdur 10 meq daily. Osito Menchaca D.O.   Cardiologist  Cardiology, [facility]

## 2021-10-29 NOTE — PROGRESS NOTES
antagonist:  Yes        Physical Examination:     /60 Comment: 126/62 R arm  Pulse 81   Resp 20   Wt 134 lb 2 oz (60.8 kg)   SpO2 92%   BMI 21.65 kg/m²     Assessment  Charting Type: Admission (NYHA Class II, ambulatory with cane )    Neurological  Level of Consciousness: Alert (0)         HEENT  HEENT (WDL): Within Defined Limits    Respiratory  Respiratory Pattern: Regular  Respiratory Depth: Normal  Respiratory Quality/Effort: Unlabored  Chest Assessment: Chest expansion symmetrical, Trachea midline  L Breath Sounds: Diminished (left post lung field diminished)  R Breath Sounds: Clear         Cough/Sputum  Cough: Productive (in am when wakes up and also in afternoon coughs at times.)  Frequency: Frequent  Sputum Amount: Small  Sputum Color: Clear  Tenacity: Thin    Cardiac  Cardiac Regularity: Regular  Heart Sounds: S1, S2  Cardiac Rhythm: Sinus rhythm    Rhythm Interpretation  Pulse: 81  RN Validation: Agree with rhythm interpretation and measurements    Cardiac Monitor  Bedside Cardiac Monitor On: Yes    Gastrointestinal  Abdominal (WDL): Exceptions to WDL  Abdomen Inspection: Rounded, Soft               Peripheral Vascular  Peripheral Vascular (WDL): Within Defined Limits (dry flaky skin. )  Edema: None (bilateral knee high rebecca hose )                   Genitourinary  Genitourinary (WDL): Within Defined Limits (yellow urine, nocturia 3x. and voids about 4 x in day)                        Cough Description  Sputum Amount: Small  Sputum Color: Clear  Tenacity:  Thin    Pulse: 81                     LAB DATA:  Results for Valentín Brown (MRN [id number]) as of 10/29/2021 15:37   9/3/2021 08:30 10/15/2021 13:30 10/29/2021 12:49   Sodium 137 141 140   Potassium 4.0 4.0 3.4 (L)   Chloride 102 107 100   CO2 25 33 (H) 30 (H)   BUN 29 (H) 26 (H) 22   Creatinine 1.3 (H) 1.45 (H) 1.2 (H)   Anion Gap 10  10   Est, Glom Filt Rate  34 (L)    GFR Non- 39  43   GFR  47 41 (L) 52 Glucose 204 (H) 114 (H) 129 (H)   Calcium 8.4 (L) 9.0 9.2   Pro-BNP   2,031 (H)         No results found for: BMP    No results found for: CMPWITHGFR     No results found for: CMP    Pro-BNP   Date Value Ref Range Status   09/02/2021 4,241 (H) 0 - 450 pg/mL Final   08/05/2021 21,994 (H) 0 - 450 pg/mL Final   03/08/2021 15,399 (H) 0 - 450 pg/mL Final               WEIGHTS:    Wt Readings from Last 3 Encounters:   10/29/21 134 lb 2 oz (60.8 kg)   10/07/21 135 lb (61.2 kg)   09/16/21 129 lb 12.8 oz (58.9 kg)         TELEMETRY:  Cardiac Regularity: Regular  Cardiac Rhythm/Interpretation: sinus        ASSESSMENT:  Maricruz Hernandez is evolemic with stable weights     Interventions completed this visit:  IV diuretics given no  Lab work obtained yes, BMP/BNP   Reviewed currently prescribed medications with patient, educated on importance of compliance and answered any questions regarding their medication  Educated on signs and symptoms of HF  Educated on low sodium diet  Education packet for CHF provided    PLAN:  Scheduled to follow up in CHF clinic    Given clinic phone number and aware of signs and symptoms to call with any HF change in symptoms. Ambulatory discharge to self. DR Gonzalez Fearing office is ordering Kdur 10meq daily. For low K+ today.

## 2021-10-29 NOTE — PLAN OF CARE
Problem:  Activity:  Goal: Will verbalize the importance of balancing activity with adequate rest periods  Description: Will verbalize the importance of balancing activity with adequate rest periods  Outcome: Ongoing     Problem: Cardiac:  Goal: Hemodynamic stability will improve  Description: Hemodynamic stability will improve  Outcome: Ongoing  Goal: Ability to maintain an adequate cardiac output will improve  Description: Ability to maintain an adequate cardiac output will improve  Outcome: Ongoing     Problem: Fluid Volume:  Goal: Risk for excess fluid volume will decrease  Description: Risk for excess fluid volume will decrease  Outcome: Ongoing  Goal: Will show no signs and symptoms of electrolyte imbalance  Description: Will show no signs and symptoms of electrolyte imbalance  Outcome: Ongoing     Continue plan of care

## 2021-11-01 RX ORDER — POTASSIUM CHLORIDE 750 MG/1
10 TABLET, EXTENDED RELEASE ORAL DAILY
COMMUNITY
End: 2021-11-01 | Stop reason: SDUPTHER

## 2021-11-01 RX ORDER — POTASSIUM CHLORIDE 750 MG/1
10 TABLET, EXTENDED RELEASE ORAL DAILY
Qty: 30 TABLET | Refills: 5 | Status: SHIPPED
Start: 2021-11-01 | End: 2022-05-26

## 2021-11-03 ENCOUNTER — HOSPITAL ENCOUNTER (OUTPATIENT)
Dept: OTHER | Age: 86
Setting detail: THERAPIES SERIES
Discharge: HOME | End: 2021-11-03
Payer: MEDICARE

## 2021-11-03 VITALS
WEIGHT: 133 LBS | DIASTOLIC BLOOD PRESSURE: 70 MMHG | OXYGEN SATURATION: 94 % | TEMPERATURE: 98.3 F | SYSTOLIC BLOOD PRESSURE: 132 MMHG | BODY MASS INDEX: 21.38 KG/M2 | RESPIRATION RATE: 18 BRPM | HEIGHT: 66 IN | HEART RATE: 79 BPM

## 2021-11-03 LAB
ANION GAP SERPL CALCULATED.3IONS-SCNC: 9 MMOL/L (ref 7–16)
BUN BLDV-MCNC: 28 MG/DL (ref 6–23)
CALCIUM SERPL-MCNC: 9.2 MG/DL (ref 8.6–10.2)
CHLORIDE BLD-SCNC: 99 MMOL/L (ref 98–107)
CO2: 28 MMOL/L (ref 22–29)
CREAT SERPL-MCNC: 1.2 MG/DL (ref 0.5–1)
GFR AFRICAN AMERICAN: 52
GFR NON-AFRICAN AMERICAN: 43 ML/MIN/1.73
GLUCOSE BLD-MCNC: 119 MG/DL (ref 74–99)
POTASSIUM SERPL-SCNC: 3.5 MMOL/L (ref 3.5–5)
PRO-BNP: 1509 PG/ML (ref 0–450)
SODIUM BLD-SCNC: 136 MMOL/L (ref 132–146)

## 2021-11-03 PROCEDURE — 83880 ASSAY OF NATRIURETIC PEPTIDE: CPT

## 2021-11-03 PROCEDURE — 36415 COLL VENOUS BLD VENIPUNCTURE: CPT

## 2021-11-03 PROCEDURE — 80048 BASIC METABOLIC PNL TOTAL CA: CPT

## 2021-11-03 PROCEDURE — 99214 OFFICE O/P EST MOD 30 MIN: CPT

## 2021-11-03 ASSESSMENT — EJECTION FRACTION
EF_VALUE: 55-60%
EF_SOURCE: 2D ECHO

## 2021-11-03 NOTE — PLAN OF CARE
Problem:  Activity:  Goal: Will verbalize the importance of balancing activity with adequate rest periods  Outcome: Ongoing     Problem: Cardiac:  Goal: Hemodynamic stability will improve  Outcome: Ongoing  Goal: Ability to maintain an adequate cardiac output will improve  Outcome: Ongoing     Problem: Fluid Volume:  Goal: Risk for excess fluid volume will decrease  Outcome: Ongoing  Goal: Will show no signs and symptoms of electrolyte imbalance  Outcome: Ongoing     Problem: Respiratory:  Goal: Respiratory status will improve  Outcome: Ongoing    Continue plan of care

## 2021-11-03 NOTE — PROGRESS NOTES
Congestive Heart Failure [place] [address] CHF clinic  [address] [facility]   1935          Referring Provider: Arielle Palacios  Primary Care Physician: Dr. Yael Hall   Cardiologist:  Jamari Narvaez  Nephrologist:         History of Present Illness:     Mat Hussein is a 81 y.o. female with a history of HFpEF, most recent EF 66%     Patient Story:    She does  have dyspnea with exertion, shortness of breath, or decline in overall functional capacity. She does not have orthopnea, PND, nocturnal cough or hemoptysis. She does not have abdominal distention or bloating, early satiety, anorexia/change in appetite. She does has a good urinary response to  oral diuretic. She does not have  lower extremity edema. She denies lightheadedness, dizziness. She denies palpitations, syncope or near syncope. She does not complain of chest pain, pressure, discomfort. Allergies   Allergen Reactions    Nitrofuran Derivatives Other (See Comments)     Causes fever and chills.     Other Other (See Comments)     HONEYDEW: Sore Throat    Sulfa Antibiotics          Outpatient Medications Marked as Taking for the 11/3/21 encounter [facility] Encounter) with [facility] CHF ROOM 1   Medication Sig Dispense Refill    potassium chloride (KLOR-CON M) 10 MEQ extended release tablet Take 1 tablet by mouth daily 30 tablet 5    apixaban (ELIQUIS) 5 MG TABS tablet Take 2.5 mg by mouth 2 times daily      spironolactone (ALDACTONE) 25 MG tablet Take 25 mg by mouth daily      metoprolol succinate (TOPROL XL) 50 MG extended release tablet Take 1 tablet by mouth nightly 30 tablet 3    furosemide (LASIX) 40 MG tablet Take 1 tablet by mouth daily 30 tablet 0    ferrous sulfate (IRON 325) 325 (65 Fe) MG tablet Take 325 mg by mouth daily (with breakfast)      omeprazole (PRILOSEC) 40 MG delayed release capsule Take 1 capsule by mouth every morning  0    sertraline (ZOLOFT) 50 MG tablet Take 50 mg by mouth daily   3    CPAP Machine MISC Inhale into the lungs nightly       vitamin D3 (CHOLECALCIFEROL) 1000 UNITS TABS tablet Take 1 tablet by mouth daily. (Patient taking differently: Take 2,000 Units by mouth daily ) 30 tablet 0           Guideline directed medical:  ARNI/ACE I/ARB: No  Beta blocker: Yes  Aldosterone antagonist:  Yes        Physical Examination:     /70 Comment: repeat bp after sitting   Pulse 79   Temp 98.3 °F (36.8 °C) (Oral)   Resp 18   Ht 5' 6\" (1.676 m)   Wt 133 lb (60.3 kg)   SpO2 94%   BMI 21.47 kg/m²     Assessment  Charting Type: Reassessment (NYHA Class II, ambulatory with cane )    Neurological  Level of Consciousness: Alert (0)         HEENT  HEENT (WDL): Within Defined Limits    Respiratory  Respiratory Depth: Normal  Respiratory Quality/Effort: Unlabored  Chest Assessment: Chest expansion symmetrical, Trachea midline  L Breath Sounds: Diminished (left post lung field diminished base)  R Breath Sounds: Clear         Cough/Sputum  Cough: Productive (in am when wakes up and also in afternoon coughs at times.)  Frequency: Occasional  Sputum Amount: Small  Sputum Color: Clear  Tenacity: Thin    Cardiac  Cardiac Regularity: Regular  Heart Sounds: S1, S2  Cardiac Rhythm: Sinus rhythm    Rhythm Interpretation  Pulse: 79    Cardiac Monitor  Bedside Cardiac Monitor On: Yes    Gastrointestinal  Abdominal (WDL): Exceptions to WDL  Abdomen Inspection: Rounded, Soft  Last BM (including prior to admit): 11/02/21     Gastrointestinal  Last BM (including prior to admit): 11/02/21         Peripheral Vascular  Peripheral Vascular (WDL): Within Defined Limits (dry flaky skin. )  Edema: None (bilateral knee high rbeecca hose )                   Genitourinary  Genitourinary (WDL): Within Defined Limits (yellow urine, nocturia 2x. and voids about 10 x in day)                        Cough Description  Sputum Amount: Small  Sputum Color: Clear  Tenacity:  Thin    Pulse: 79 LAB DATA:    Results for Chalo Kimbrough (MRN [id number]) as of 11/3/2021 17:08   10/15/2021 13:30 10/15/2021 15:56 10/29/2021 12:49 11/3/2021 14:50   Sodium 141  140 136   Potassium 4.0  3.4 (L) 3.5   Chloride 107  100 99   CO2 33 (H)  30 (H) 28   BUN 26 (H)  22 28 (H)   Creatinine 1.45 (H)  1.2 (H) 1.2 (H)   Anion Gap   10 9   Est, Glom Filt Rate 34 (L)      GFR Non-   43 43   GFR  41 (L)  52 52   Glucose 114 (H)  129 (H) 119 (H)   Calcium 9.0  9.2 9.2   Pro-BNP   2,031 (H) 1,509 (H)   ECHO COMPLETE  Rpt             No results found for: BMP    No results found for: CMPWITHGFR     No results found for: CMP    Pro-BNP   Date Value Ref Range Status   11/03/2021 1,509 (H) 0 - 450 pg/mL Final   10/29/2021 2,031 (H) 0 - 450 pg/mL Final   09/02/2021 4,241 (H) 0 - 450 pg/mL Final               WEIGHTS:    Wt Readings from Last 3 Encounters:   11/03/21 133 lb (60.3 kg)   10/29/21 134 lb 2 oz (60.8 kg)   10/07/21 135 lb (61.2 kg)         TELEMETRY:  Cardiac Regularity: Regular  Cardiac Rhythm/Interpretation: sinus rhythm        ASSESSMENT:  Sid Clemons is evolemic with stable weights   Her potassium has improved since starting her Kdur 10meq daily. She is scheduled next week to continue close follow up. 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 She is urinating about 12 times in a 24 hour period and it is a light yellow color. Son Mohini Santiago currently resides with her and brings her to her appointments.     Interventions completed this visit:  IV diuretics given no  Lab work obtained yes, BMP/BNP   Reviewed currently prescribed medications with patient, educated on importance of compliance and answered any questions regarding their medication  Educated on signs and symptoms of HF  Educated on low sodium diet  Education packet for CHF provided    PLAN:  Scheduled to follow up in CHF clinic on 11/17/21    Given clinic phone number [phone] and aware of signs and symptoms to call with any HF change in symptoms.     Electronically signed by Tal Danielle RN on 11/3/2021 at 5:13 PM

## 2021-11-10 ENCOUNTER — HOSPITAL ENCOUNTER (OUTPATIENT)
Dept: OTHER | Age: 86
Setting detail: THERAPIES SERIES
Discharge: HOME | End: 2021-11-10
Payer: MEDICARE

## 2021-11-10 VITALS
DIASTOLIC BLOOD PRESSURE: 66 MMHG | OXYGEN SATURATION: 97 % | RESPIRATION RATE: 18 BRPM | SYSTOLIC BLOOD PRESSURE: 154 MMHG | HEART RATE: 76 BPM | WEIGHT: 132 LBS | BODY MASS INDEX: 21.31 KG/M2

## 2021-11-10 LAB
ANION GAP SERPL CALCULATED.3IONS-SCNC: 11 MMOL/L (ref 7–16)
BUN BLDV-MCNC: 30 MG/DL (ref 6–23)
CALCIUM SERPL-MCNC: 9.2 MG/DL (ref 8.6–10.2)
CHLORIDE BLD-SCNC: 103 MMOL/L (ref 98–107)
CO2: 26 MMOL/L (ref 22–29)
CREAT SERPL-MCNC: 1.2 MG/DL (ref 0.5–1)
GFR AFRICAN AMERICAN: 52
GFR NON-AFRICAN AMERICAN: 43 ML/MIN/1.73
GLUCOSE BLD-MCNC: 197 MG/DL (ref 74–99)
POTASSIUM SERPL-SCNC: 3.8 MMOL/L (ref 3.5–5)
PRO-BNP: 1043 PG/ML (ref 0–450)
SODIUM BLD-SCNC: 140 MMOL/L (ref 132–146)

## 2021-11-10 PROCEDURE — 36415 COLL VENOUS BLD VENIPUNCTURE: CPT

## 2021-11-10 PROCEDURE — 99214 OFFICE O/P EST MOD 30 MIN: CPT

## 2021-11-10 PROCEDURE — 80048 BASIC METABOLIC PNL TOTAL CA: CPT

## 2021-11-10 PROCEDURE — 83880 ASSAY OF NATRIURETIC PEPTIDE: CPT

## 2021-11-10 NOTE — PLAN OF CARE
Problem: Fluid Volume:  Goal: Risk for excess fluid volume will decrease  11/10/2021 1241 by Enrique Castillo RN  Outcome: Ongoing    Goal: Will show no signs and symptoms of electrolyte imbalance  11/10/2021 1241 by Enrique Castillo RN  Outcome: Ongoing       Problem: Fluid Volume:  Goal: Will show no signs and symptoms of electrolyte imbalance  11/10/2021 1241 by Enrique Castillo RN  Outcome: Ongoing     Continue plan of care

## 2021-11-10 NOTE — PROGRESS NOTES
by mouth daily   3    CPAP Machine MISC Inhale into the lungs nightly       vitamin D3 (CHOLECALCIFEROL) 1000 UNITS TABS tablet Take 1 tablet by mouth daily. (Patient taking differently: Take 2,000 Units by mouth daily ) 30 tablet 0           Guideline directed medical:  ARNI/ACE I/ARB: No  Beta blocker:   Yes  Aldosterone antagonist:  Yes        Physical Examination:     BP (!) 154/66   Pulse 76   Resp 18   Wt 132 lb (59.9 kg)   SpO2 97%   BMI 21.31 kg/m²     Assessment  Charting Type: Shift assessment    Neurological  Level of Consciousness: Alert (0)         HEENT  HEENT (WDL): Exceptions to WDL  Right Eye: Impaired vision  Left Eye: Impaired vision    Respiratory  Respiratory Pattern: Regular  Respiratory Depth: Normal  Respiratory Quality/Effort: Dyspnea with exertion  L Breath Sounds: Clear  R Breath Sounds: Clear              Cardiac  Cardiac Regularity: Regular  Heart Sounds: S1, S2  Cardiac Rhythm: Sinus rhythm    Rhythm Interpretation  Pulse: 76         Gastrointestinal  Abdominal (WDL): Exceptions to WDL  Abdomen Inspection: Rounded, Soft  RUQ Bowel Sounds: Present  LUQ Bowel Sounds: Present  RLQ Bowel Sounds: Present  LLQ Bowel Sounds: Present          Bowel Sounds  RUQ Bowel Sounds: Present  LUQ Bowel Sounds: Present  RLQ Bowel Sounds: Present  LLQ Bowel Sounds: Present    Peripheral Vascular  Peripheral Vascular (WDL): Exceptions to WDL  Edema: Right lower extremity, Left lower extremity  RLE Edema: +1, Pitting  LLE Edema: +1, Pitting    Skin Color/Condition  Skin Condition/Temp: Dry, Flaky              Genitourinary  Genitourinary (WDL): Within Defined Limits                             Pulse: 76                     LAB DATA:  Results for Marc Shelby (MRN [id number]) as of 11/10/2021 15:27   10/29/2021 12:49 11/3/2021 14:50 11/10/2021 12:40   Sodium 140 136 140   Potassium 3.4 (L) 3.5 3.8   Chloride 100 99 103   CO2 30 (H) 28 26   BUN 22 28 (H) 30 (H)   Creatinine 1.2 (H) 1.2 (H) 1.2 (H) Anion Gap 10 9 11   GFR Non- 43 43 43   GFR African American 52 52 52   Glucose 129 (H) 119 (H) 197 (H)   Calcium 9.2 9.2 9.2   Pro-BNP 2,031 (H) 1,509 (H) 1,043 (H)       No results found for: BMP    No results found for: CMPWITHGFR     No results found for: CMP    Pro-BNP   Date Value Ref Range Status   11/10/2021 1,043 (H) 0 - 450 pg/mL Final   11/03/2021 1,509 (H) 0 - 450 pg/mL Final   10/29/2021 2,031 (H) 0 - 450 pg/mL Final               WEIGHTS:    Wt Readings from Last 3 Encounters:   11/10/21 132 lb (59.9 kg)   11/03/21 133 lb (60.3 kg)   10/29/21 134 lb 2 oz (60.8 kg)         TELEMETRY:  Cardiac Regularity: Regular  Cardiac Rhythm/Interpretation: NSR        ASSESSMENT:  Deepa Quinones is evolemic with stable weights   She denies dyspnea on exertion. She is sleeping on 1 pillow. Interventions completed this visit:  IV diuretics given no  Lab work obtained yes, BMP/BNP   Reviewed currently prescribed medications with patient, educated on importance of compliance and answered any questions regarding their medication  Educated on signs and symptoms of HF  Educated on low sodium diet  Education packet for CHF provided    PLAN:  Scheduled to follow up in CHF clinic on 11/17/2021  Given clinic phone number [phone] and aware of signs and symptoms to call with any HF change in symptoms.

## 2021-11-17 ENCOUNTER — HOSPITAL ENCOUNTER (OUTPATIENT)
Dept: OTHER | Age: 86
Setting detail: THERAPIES SERIES
Discharge: HOME | End: 2021-11-17
Payer: MEDICARE

## 2021-11-17 VITALS
SYSTOLIC BLOOD PRESSURE: 135 MMHG | OXYGEN SATURATION: 96 % | HEART RATE: 79 BPM | DIASTOLIC BLOOD PRESSURE: 60 MMHG | RESPIRATION RATE: 18 BRPM | BODY MASS INDEX: 20.82 KG/M2 | WEIGHT: 129 LBS

## 2021-11-17 LAB
ANION GAP SERPL CALCULATED.3IONS-SCNC: 11 MMOL/L (ref 7–16)
BUN BLDV-MCNC: 29 MG/DL (ref 6–23)
CALCIUM SERPL-MCNC: 9.6 MG/DL (ref 8.6–10.2)
CHLORIDE BLD-SCNC: 98 MMOL/L (ref 98–107)
CO2: 28 MMOL/L (ref 22–29)
CREAT SERPL-MCNC: 1.6 MG/DL (ref 0.5–1)
GFR AFRICAN AMERICAN: 37
GFR NON-AFRICAN AMERICAN: 31 ML/MIN/1.73
GLUCOSE BLD-MCNC: 214 MG/DL (ref 74–99)
POTASSIUM SERPL-SCNC: 4.4 MMOL/L (ref 3.5–5)
PRO-BNP: 602 PG/ML (ref 0–450)
SODIUM BLD-SCNC: 137 MMOL/L (ref 132–146)

## 2021-11-17 PROCEDURE — 83880 ASSAY OF NATRIURETIC PEPTIDE: CPT

## 2021-11-17 PROCEDURE — 80048 BASIC METABOLIC PNL TOTAL CA: CPT

## 2021-11-17 PROCEDURE — 36415 COLL VENOUS BLD VENIPUNCTURE: CPT

## 2021-11-17 PROCEDURE — 99214 OFFICE O/P EST MOD 30 MIN: CPT

## 2021-11-17 ASSESSMENT — EJECTION FRACTION
EF_SOURCE: 2D ECHO
EF_VALUE: 55-60%

## 2021-11-17 NOTE — PROGRESS NOTES
Congestive Heart Failure [id number] [place]   1935    Referring Provider: Nick Root  Primary Care Physician: Reza Valero   Cardiologist: Atul Marcano   Nephrologist:     History of Present Illness:     Jcarlos Mccormick is a 81 y.o. female with a history of HFpEF, most recent EF 66%. Patient Story:    She does  have dyspnea with exertion, shortness of breath, or decline in overall functional capacity. She does not have orthopnea, PND, nocturnal cough or hemoptysis. She does not have abdominal distention or bloating, early satiety, anorexia/change in appetite. She does has a good urinary response to  oral diuretic. She does not have  lower extremity edema. She denies lightheadedness, dizziness. She denies palpitations, syncope or near syncope. She does not complain of chest pain, pressure, discomfort. Allergies   Allergen Reactions    Nitrofuran Derivatives Other (See Comments)     Causes fever and chills.     Other Other (See Comments)     HONEYDEW: Sore Throat    Sulfa Antibiotics        Outpatient Medications Marked as Taking for the 11/17/21 encounter [facility] Encounter) with HERNESTO CHF ROOM 1   Medication Sig Dispense Refill    potassium chloride (KLOR-CON M) 10 MEQ extended release tablet Take 1 tablet by mouth daily 30 tablet 5    apixaban (ELIQUIS) 5 MG TABS tablet Take 2.5 mg by mouth 2 times daily      spironolactone (ALDACTONE) 25 MG tablet Take 25 mg by mouth daily      metoprolol succinate (TOPROL XL) 50 MG extended release tablet Take 1 tablet by mouth nightly 30 tablet 3    furosemide (LASIX) 40 MG tablet Take 1 tablet by mouth daily 30 tablet 0    ferrous sulfate (IRON 325) 325 (65 Fe) MG tablet Take 325 mg by mouth daily (with breakfast)      omeprazole (PRILOSEC) 40 MG delayed release capsule Take 1 capsule by mouth every morning  0    sertraline (ZOLOFT) 50 MG tablet Take 50 mg by mouth daily   3    CPAP Machine MISC Inhale into the lungs nightly       vitamin D3 (CHOLECALCIFEROL) 1000 UNITS TABS tablet Take 1 tablet by mouth daily. (Patient taking differently: Take 2,000 Units by mouth daily ) 30 tablet 0       Guideline directed medical:  ARNI/ACE I/ARB: No  Beta blocker: Yes  Aldosterone antagonist:  No    Physical Examination:     /60   Pulse 79   Resp 18   Wt 129 lb (58.5 kg)   SpO2 96%   BMI 20.82 kg/m²     Assessment  Charting Type: Shift assessment    Neurological  Level of Consciousness: Alert (0)     HEENT  HEENT (WDL): Exceptions to WDL  Right Eye: Impaired vision  Left Eye: Impaired vision    Respiratory  Respiratory Pattern: Regular  Respiratory Depth: Normal  Respiratory Quality/Effort: Dyspnea with exertion  Chest Assessment: Chest expansion symmetrical  L Breath Sounds: Clear  R Breath Sounds: Clear        Cardiac  Cardiac Regularity: Regular  Heart Sounds: S1, S2  Cardiac Rhythm: Sinus rhythm    Rhythm Interpretation  Pulse: 79     Gastrointestinal  Abdominal (WDL): Exceptions to WDL  Abdomen Inspection: Rounded, Soft  RUQ Bowel Sounds: Active  LUQ Bowel Sounds: Active  RLQ Bowel Sounds: Active  LLQ Bowel Sounds: Active      Bowel Sounds  RUQ Bowel Sounds: Active  LUQ Bowel Sounds: Active  RLQ Bowel Sounds: Active  LLQ Bowel Sounds:  Active    Peripheral Vascular  Peripheral Vascular (WDL): Exceptions to WDL  Edema: Right lower extremity, Left lower extremity  RLE Edema: +1  LLE Edema: +1    Skin Color/Condition  Skin Condition/Temp: Dry        Genitourinary  Genitourinary (WDL): Within Defined Limits                 Pulse: 79           LAB DATA:    Last 3 BMP      Sodium (mmol/L)   Date Value   11/17/2021 137   11/10/2021 140   11/03/2021 136     Potassium (mmol/L)   Date Value   11/17/2021 4.4   11/10/2021 3.8   11/03/2021 3.5     Potassium reflex Magnesium (mmol/L)   Date Value   08/30/2021 4.1   08/29/2021 3.9   08/07/2021 3.9     Chloride (mmol/L)   Date Value   11/17/2021 98   11/10/2021 103   11/03/2021 99 CO2 (mmol/L)   Date Value   11/17/2021 28   11/10/2021 26   11/03/2021 28     BUN (mg/dL)   Date Value   11/17/2021 29 (H)   11/10/2021 30 (H)   11/03/2021 28 (H)     Glucose (mg/dL)   Date Value   11/17/2021 214 (H)   11/10/2021 197 (H)   11/03/2021 119 (H)   10/15/2021 114 (H)   02/27/2012 108   07/01/2011 79     Calcium (mg/dL)   Date Value   11/17/2021 9.6   11/10/2021 9.2   11/03/2021 9.2       Last 3 BNP       Pro-BNP (pg/mL)   Date Value   11/17/2021 602 (H)   11/10/2021 1,043 (H)   11/03/2021 1,509 (H)          CBC: No results for input(s): WBC, HGB, PLT in the last 72 hours. BMP:    Recent Labs     11/17/21  1250      K 4.4   CL 98   CO2 28   BUN 29*   CREATININE 1.6*   GLUCOSE 214*     Hepatic: No results for input(s): AST, ALT, ALB, BILITOT, ALKPHOS in the last 72 hours. Troponin: No results for input(s): TROPONINI in the last 72 hours. BNP: No results for input(s): BNP in the last 72 hours. Lipids: No results for input(s): CHOL, HDL in the last 72 hours. Invalid input(s): LDLCALCU  INR: No results for input(s): INR in the last 72 hours.     WEIGHTS:    Wt Readings from Last 3 Encounters:   11/17/21 129 lb (58.5 kg)   11/10/21 132 lb (59.9 kg)   11/03/21 133 lb (60.3 kg)     TELEMETRY:  Cardiac Regularity: Regular  Cardiac Rhythm/Interpretation: SR     ASSESSMENT:  Jenifer Harrington is evolemic with stable weights     Interventions completed this visit:  IV diuretics given no  Lab work obtained yes, BNP, BMP   Reviewed currently prescribed medications with patient, educated on importance of compliance and answered any questions regarding their medication  Educated on signs and symptoms of HF  Educated on low sodium diet    PLAN:  Scheduled to follow up in CHF clinic on   Future Appointments   Date Time Provider Santos Oconnor   12/3/2021 12:00 PM [facility] ROOM 1 [facility] Torito HOD   1/18/2022 12:15 PM Keysha Alcala, [address]     Given clinic phone number and aware of signs and symptoms to call with any HF change in symptoms.

## 2021-11-17 NOTE — PLAN OF CARE
Problem: Fluid Volume:  Goal: Risk for excess fluid volume will decrease  Description: Risk for excess fluid volume will decrease  Outcome: Ongoing  Goal: Will show no signs and symptoms of electrolyte imbalance  Description: Will show no signs and symptoms of electrolyte imbalance  Outcome: Ongoing     Problem:  Activity:  Goal: Will verbalize the importance of balancing activity with adequate rest periods  Description: Will verbalize the importance of balancing activity with adequate rest periods  Outcome: Ongoing     Problem: Cardiac:  Goal: Hemodynamic stability will improve  Description: Hemodynamic stability will improve  Outcome: Ongoing  Goal: Ability to maintain an adequate cardiac output will improve  Description: Ability to maintain an adequate cardiac output will improve  Outcome: Ongoing     Problem: Respiratory:  Goal: Respiratory status will improve  Description: Respiratory status will improve  Outcome: Ongoing

## 2022-01-14 ENCOUNTER — HOSPITAL ENCOUNTER (OUTPATIENT)
Dept: OTHER | Age: 87
Setting detail: THERAPIES SERIES
Discharge: HOME | End: 2022-01-14
Payer: MEDICARE

## 2022-01-14 VITALS
DIASTOLIC BLOOD PRESSURE: 57 MMHG | OXYGEN SATURATION: 95 % | RESPIRATION RATE: 18 BRPM | HEART RATE: 74 BPM | WEIGHT: 133 LBS | BODY MASS INDEX: 21.47 KG/M2 | SYSTOLIC BLOOD PRESSURE: 123 MMHG

## 2022-01-14 LAB
ANION GAP SERPL CALCULATED.3IONS-SCNC: 9 MMOL/L (ref 7–16)
BUN BLDV-MCNC: 32 MG/DL (ref 6–23)
CALCIUM SERPL-MCNC: 9.3 MG/DL (ref 8.6–10.2)
CHLORIDE BLD-SCNC: 100 MMOL/L (ref 98–107)
CO2: 25 MMOL/L (ref 22–29)
CREAT SERPL-MCNC: 1.6 MG/DL (ref 0.5–1)
GFR AFRICAN AMERICAN: 37
GFR NON-AFRICAN AMERICAN: 31 ML/MIN/1.73
GLUCOSE BLD-MCNC: 109 MG/DL (ref 74–99)
POTASSIUM SERPL-SCNC: 4.7 MMOL/L (ref 3.5–5)
PRO-BNP: 832 PG/ML (ref 0–450)
SODIUM BLD-SCNC: 134 MMOL/L (ref 132–146)

## 2022-01-14 PROCEDURE — 83880 ASSAY OF NATRIURETIC PEPTIDE: CPT

## 2022-01-14 PROCEDURE — 99214 OFFICE O/P EST MOD 30 MIN: CPT

## 2022-01-14 PROCEDURE — 80048 BASIC METABOLIC PNL TOTAL CA: CPT

## 2022-01-14 PROCEDURE — 36415 COLL VENOUS BLD VENIPUNCTURE: CPT

## 2022-01-14 NOTE — PROGRESS NOTES
Congestive Heart Failure [id number] [place]   1935    Referring Provider: Rollo Canavan  Primary Care Physician: Mariya Pabon   Cardiologist: Pebbles Farr   Nephrologist:     History of Present Illness:     Edi rPadhan is a 81 y.o. female with a history of HFpEF, most recent EF 66%. Patient Story:    She does  have dyspnea with exertion, shortness of breath, or decline in overall functional capacity. She does not have orthopnea, PND, nocturnal cough or hemoptysis. She does not have abdominal distention or bloating, early satiety, anorexia/change in appetite. She does has a good urinary response to  oral diuretic. She does not have  lower extremity edema. She denies lightheadedness, dizziness. She denies palpitations, syncope or near syncope. She does not complain of chest pain, pressure, discomfort. Allergies   Allergen Reactions    Nitrofuran Derivatives Other (See Comments)     Causes fever and chills.     Other Other (See Comments)     HONEYDEW: Sore Throat    Sulfa Antibiotics        Outpatient Medications Marked as Taking for the 1/14/22 encounter [facility] Encounter) with HERNESTO CHF ROOM 1   Medication Sig Dispense Refill    potassium chloride (KLOR-CON M) 10 MEQ extended release tablet Take 1 tablet by mouth daily 30 tablet 5    spironolactone (ALDACTONE) 25 MG tablet Take 25 mg by mouth daily      metoprolol succinate (TOPROL XL) 50 MG extended release tablet Take 1 tablet by mouth nightly 30 tablet 3    furosemide (LASIX) 40 MG tablet Take 1 tablet by mouth daily 30 tablet 0    ferrous sulfate (IRON 325) 325 (65 Fe) MG tablet Take 325 mg by mouth daily (with breakfast)      omeprazole (PRILOSEC) 40 MG delayed release capsule Take 1 capsule by mouth every morning  0    sertraline (ZOLOFT) 50 MG tablet Take 75 mg by mouth daily   3    CPAP Machine MISC Inhale into the lungs nightly       vitamin D3 (CHOLECALCIFEROL) 1000 UNITS TABS tablet Take 1 results for input(s): NA, K, CL, CO2, BUN, CREATININE, GLUCOSE in the last 72 hours. Hepatic: No results for input(s): AST, ALT, ALB, BILITOT, ALKPHOS in the last 72 hours. Troponin: No results for input(s): TROPONINI in the last 72 hours. BNP: No results for input(s): BNP in the last 72 hours. Lipids: No results for input(s): CHOL, HDL in the last 72 hours. Invalid input(s): LDLCALCU  INR: No results for input(s): INR in the last 72 hours. WEIGHTS:    Wt Readings from Last 3 Encounters:   01/14/22 133 lb (60.3 kg)   12/03/21 129 lb 9.6 oz (58.8 kg)   11/17/21 129 lb (58.5 kg)     TELEMETRY:  Cardiac Regularity: Regular  Cardiac Rhythm/Interpretation: SR     ASSESSMENT:  Marcie Naik is evolemic with stable weights and states she feels good/ to see Dr. Ingrid Ruiz 1-18-22    Interventions completed this visit:  IV diuretics given no  Lab work obtained yes, BNP, BMP   Reviewed currently prescribed medications with patient, educated on importance of compliance and answered any questions regarding their medication  Educated on signs and symptoms of HF  Educated on low sodium diet    PLAN:  Scheduled to follow up in CHF clinic on   Future Appointments   Date Time Provider Santos Oconnor   1/18/2022 12:15 PM Winston Martin, [facility] [address] [place] [address]   3/4/2022 12:00 PM [facility] ROOM 1 [facility] Torito HOD     Given clinic phone number and aware of signs and symptoms to call with any HF change in symptoms.

## 2022-03-04 ENCOUNTER — HOSPITAL ENCOUNTER (OUTPATIENT)
Dept: OTHER | Age: 87
Setting detail: THERAPIES SERIES
Discharge: HOME | End: 2022-03-04
Payer: MEDICARE

## 2022-03-04 VITALS
HEART RATE: 68 BPM | WEIGHT: 141 LBS | SYSTOLIC BLOOD PRESSURE: 160 MMHG | OXYGEN SATURATION: 98 % | BODY MASS INDEX: 22.76 KG/M2 | DIASTOLIC BLOOD PRESSURE: 78 MMHG | RESPIRATION RATE: 16 BRPM

## 2022-03-04 LAB
ANION GAP SERPL CALCULATED.3IONS-SCNC: 10 MMOL/L (ref 7–16)
BUN BLDV-MCNC: 27 MG/DL (ref 6–23)
CALCIUM SERPL-MCNC: 8.9 MG/DL (ref 8.6–10.2)
CHLORIDE BLD-SCNC: 100 MMOL/L (ref 98–107)
CO2: 29 MMOL/L (ref 22–29)
CREAT SERPL-MCNC: 1.3 MG/DL (ref 0.5–1)
GFR AFRICAN AMERICAN: 47
GFR NON-AFRICAN AMERICAN: 39 ML/MIN/1.73
GLUCOSE BLD-MCNC: 195 MG/DL (ref 74–99)
POTASSIUM SERPL-SCNC: 3.4 MMOL/L (ref 3.5–5)
PRO-BNP: 3619 PG/ML (ref 0–450)
SODIUM BLD-SCNC: 139 MMOL/L (ref 132–146)

## 2022-03-04 PROCEDURE — 80048 BASIC METABOLIC PNL TOTAL CA: CPT

## 2022-03-04 PROCEDURE — 36415 COLL VENOUS BLD VENIPUNCTURE: CPT

## 2022-03-04 PROCEDURE — 99214 OFFICE O/P EST MOD 30 MIN: CPT

## 2022-03-04 PROCEDURE — 83880 ASSAY OF NATRIURETIC PEPTIDE: CPT

## 2022-03-04 RX ORDER — AMLODIPINE BESYLATE 5 MG/1
5 TABLET ORAL DAILY
Qty: 30 TABLET | Refills: 3 | Status: SHIPPED | OUTPATIENT
Start: 2022-03-04 | End: 2022-04-01

## 2022-03-04 NOTE — PROGRESS NOTES
Called patient and gave her the following instructions: Take an extra potassium supplement today and tomorrow. Start Norvasc 5mg daily. Again watch your daily weight trends and if you gain water weight please follow below instructions.     If you gain 3-5 pounds in 2-3 days OR notice that you are retaining fluid in anyway just like you did before then take an extra dose of your water pill (furosemide/Lasix) every day until you lose the weight or feel better. If you take an extra water pill then take an additional potassium on that day as well. Future Appointments   Date Time Provider Santos Oconnor   3/16/2022 12:30 PM [facility] ROOM 1 [facility] DANIELLE   6/3/2022 12:00 PM [facility] ROOM 1 [facility]     Patient able to repeat back the instructions given.     Electronically signed by Niru Pop RN on 3/4/2022 at 3:08 PM

## 2022-03-04 NOTE — RESULT ENCOUNTER NOTE
Labs and CHF clinic note reviewed  Vitals in CHF clinic : BP (!) 160/78 / Pulse 68      Pro-bnp 832 > 3619    Have her take and extra 10 meq KDUR today and tomorrow  Start norvasc 5 mg daily   Return to CHF clinic in 7 - 10 days   Above instructions were relayed to Beverly Briscoe in CHF clinic and she informed patient.      Thank you

## 2022-03-04 NOTE — PLAN OF CARE
Problem: Cardiac:  Goal: Hemodynamic stability will improve  Outcome: Ongoing  Goal: Ability to maintain an adequate cardiac output will improve  Outcome: Ongoing     Problem: Fluid Volume:  Goal: Risk for excess fluid volume will decrease  Outcome: Ongoing  Goal: Will show no signs and symptoms of electrolyte imbalance  Outcome: Ongoing   Continue plan of care

## 2022-03-04 NOTE — PROGRESS NOTES
Congestive Heart Failure [id number] [place]   1935    Referring Provider: Cedrick Euceda  Primary Care Physician: Zach Herrera   Cardiologist: Amanda Herman   Nephrologist:     History of Present Illness:     Linda Jeffries is a 81 y.o. female with a history of HFpEF, most recent EF 66%. Patient Story:    She does  have dyspnea with exertion, shortness of breath, or decline in overall functional capacity. She does not have orthopnea, PND, nocturnal cough or hemoptysis. She does not have abdominal distention or bloating, early satiety, anorexia/change in appetite. She does has a good urinary response to  oral diuretic. She does not have  lower extremity edema. She denies lightheadedness, dizziness. She denies palpitations, syncope or near syncope. She does not complain of chest pain, pressure, discomfort. Allergies   Allergen Reactions    Nitrofuran Derivatives Other (See Comments)     Causes fever and chills.     Other Other (See Comments)     HONEYDEW: Sore Throat    Sulfa Antibiotics        Outpatient Medications Marked as Taking for the 3/4/22 encounter [facility] Encounter) with HERNESTO CHF ROOM 1   Medication Sig Dispense Refill    potassium chloride (KLOR-CON M) 10 MEQ extended release tablet Take 1 tablet by mouth daily 30 tablet 5    spironolactone (ALDACTONE) 25 MG tablet Take 25 mg by mouth daily      metoprolol succinate (TOPROL XL) 50 MG extended release tablet Take 1 tablet by mouth nightly (Patient taking differently: Take 50 mg by mouth 2 times daily ) 30 tablet 3    furosemide (LASIX) 40 MG tablet Take 1 tablet by mouth daily 30 tablet 0    ferrous sulfate (IRON 325) 325 (65 Fe) MG tablet Take 325 mg by mouth daily (with breakfast)      omeprazole (PRILOSEC) 40 MG delayed release capsule Take 1 capsule by mouth every morning  0    sertraline (ZOLOFT) 50 MG tablet Take 75 mg by mouth daily   3    CPAP Machine MISC Inhale into the lungs nightly (H)   12/03/2021 45 (H)     Glucose (mg/dL)   Date Value   03/04/2022 195 (H)   01/14/2022 109 (H)   12/03/2021 123 (H)   10/15/2021 114 (H)   02/27/2012 108   07/01/2011 79     Calcium (mg/dL)   Date Value   03/04/2022 8.9   01/14/2022 9.3   12/03/2021 10.0       Last 3 BNP       Pro-BNP (pg/mL)   Date Value   03/04/2022 3,619 (H)   01/14/2022 832 (H)   12/03/2021 446          CBC: No results for input(s): WBC, HGB, PLT in the last 72 hours. BMP:    Recent Labs     03/04/22  1228      K 3.4*      CO2 29   BUN 27*   CREATININE 1.3*   GLUCOSE 195*     Hepatic: No results for input(s): AST, ALT, ALB, BILITOT, ALKPHOS in the last 72 hours. Troponin: No results for input(s): TROPONINI in the last 72 hours. BNP: No results for input(s): BNP in the last 72 hours. Lipids: No results for input(s): CHOL, HDL in the last 72 hours. Invalid input(s): LDLCALCU  INR: No results for input(s): INR in the last 72 hours. WEIGHTS:    Wt Readings from Last 3 Encounters:   03/04/22 141 lb (64 kg)   01/14/22 133 lb (60.3 kg)   12/03/21 129 lb 9.6 oz (58.8 kg)     TELEMETRY:  Cardiac Regularity: Regular  Cardiac Rhythm/Interpretation: SR     ASSESSMENT:  Mahin Crum is here for CHF follow up. She says she has more energy and has been able to go grocery shopping and load her car and also load groceries into her home, something she was not able to do previously. She says she has been cooking more and eating more and attributes that to her weight gain. She has some shortness of breath with activity but it is not as often. Sleeping in bed on one pillow. Hydrates with approximately 44 ounces of fluids daily. She urinates overnight 2 times and during the day about 6 times.     SHE DID NOT TAKE HER MEDICATIONS TODAY BEFORE APPOINTMENT    Interventions completed this visit:  IV diuretics given no  Lab work obtained yes, proBNP, BMP   Reviewed currently prescribed medications with patient, educated on importance of compliance and answered any questions regarding their medication  Educated on signs and symptoms of HF  Educated on low sodium diet    PLAN:  Scheduled to follow up in CHF clinic on   Future Appointments   Date Time Provider Santos Oconnor   3/16/2022 12:30 PM [facility] ROOM 1 [facility] Torito SANTOS   6/3/2022 12:00 PM [facility] ROOM 1 [facility]     Given clinic phone number [phone] and aware of signs and symptoms to call with any HF change in symptoms.

## 2022-03-16 ENCOUNTER — HOSPITAL ENCOUNTER (OUTPATIENT)
Dept: OTHER | Age: 87
Setting detail: THERAPIES SERIES
Discharge: HOME | End: 2022-03-16
Payer: MEDICARE

## 2022-03-16 VITALS
SYSTOLIC BLOOD PRESSURE: 118 MMHG | WEIGHT: 142 LBS | BODY MASS INDEX: 22.92 KG/M2 | OXYGEN SATURATION: 96 % | HEART RATE: 70 BPM | RESPIRATION RATE: 18 BRPM | DIASTOLIC BLOOD PRESSURE: 64 MMHG

## 2022-03-16 LAB
ANION GAP SERPL CALCULATED.3IONS-SCNC: 9 MMOL/L (ref 7–16)
BUN BLDV-MCNC: 28 MG/DL (ref 6–23)
CALCIUM SERPL-MCNC: 9.4 MG/DL (ref 8.6–10.2)
CHLORIDE BLD-SCNC: 101 MMOL/L (ref 98–107)
CO2: 26 MMOL/L (ref 22–29)
CREAT SERPL-MCNC: 1.2 MG/DL (ref 0.5–1)
GFR AFRICAN AMERICAN: 52
GFR NON-AFRICAN AMERICAN: 43 ML/MIN/1.73
GLUCOSE BLD-MCNC: 169 MG/DL (ref 74–99)
POTASSIUM SERPL-SCNC: 4.3 MMOL/L (ref 3.5–5)
PRO-BNP: 767 PG/ML (ref 0–450)
SODIUM BLD-SCNC: 136 MMOL/L (ref 132–146)

## 2022-03-16 PROCEDURE — 99214 OFFICE O/P EST MOD 30 MIN: CPT

## 2022-03-16 PROCEDURE — 2580000003 HC RX 258

## 2022-03-16 PROCEDURE — 6360000002 HC RX W HCPCS

## 2022-03-16 PROCEDURE — 80048 BASIC METABOLIC PNL TOTAL CA: CPT

## 2022-03-16 PROCEDURE — 96374 THER/PROPH/DIAG INJ IV PUSH: CPT

## 2022-03-16 PROCEDURE — 36415 COLL VENOUS BLD VENIPUNCTURE: CPT

## 2022-03-16 PROCEDURE — 83880 ASSAY OF NATRIURETIC PEPTIDE: CPT

## 2022-03-16 RX ORDER — FUROSEMIDE 10 MG/ML
INJECTION INTRAMUSCULAR; INTRAVENOUS
Status: COMPLETED
Start: 2022-03-16 | End: 2022-03-16

## 2022-03-16 RX ORDER — SODIUM CHLORIDE 0.9 % (FLUSH) 0.9 %
10 SYRINGE (ML) INJECTION ONCE
Status: COMPLETED | OUTPATIENT
Start: 2022-03-16 | End: 2022-03-16

## 2022-03-16 RX ORDER — FUROSEMIDE 10 MG/ML
40 INJECTION INTRAMUSCULAR; INTRAVENOUS ONCE
Status: COMPLETED | OUTPATIENT
Start: 2022-03-16 | End: 2022-03-16

## 2022-03-16 RX ORDER — SODIUM CHLORIDE 0.9 % (FLUSH) 0.9 %
SYRINGE (ML) INJECTION
Status: COMPLETED
Start: 2022-03-16 | End: 2022-03-16

## 2022-03-16 RX ADMIN — FUROSEMIDE 40 MG: 10 INJECTION, SOLUTION INTRAVENOUS at 13:22

## 2022-03-16 RX ADMIN — Medication 10 ML: at 13:22

## 2022-03-16 RX ADMIN — FUROSEMIDE 40 MG: 10 INJECTION INTRAMUSCULAR; INTRAVENOUS at 13:22

## 2022-03-16 RX ADMIN — SODIUM CHLORIDE, PRESERVATIVE FREE 10 ML: 5 INJECTION INTRAVENOUS at 13:22

## 2022-03-16 NOTE — PROGRESS NOTES
Congestive Heart Failure [id number] [employer]   1935    Referring Provider: Rei Jones  Primary Care Physician: Amelie Anderson   Cardiologist: Cintia Lea   Nephrologist:     History of Present Illness:     Margareth Everett is a 81 y.o. female with a history of HFpEF, most recent EF 66%. Patient Story:    She does  have dyspnea with exertion, shortness of breath, or decline in overall functional capacity. She does not have orthopnea, PND, nocturnal cough or hemoptysis. She does not have abdominal distention or bloating, early satiety, anorexia/change in appetite. She does has a good urinary response to  oral diuretic. She does have  lower extremity edema. She denies lightheadedness, dizziness. She denies palpitations, syncope or near syncope. She does not complain of chest pain, pressure, discomfort. Allergies   Allergen Reactions    Nitrofuran Derivatives Other (See Comments)     Causes fever and chills.  Other Other (See Comments)     HONEYDEW: Sore Throat    Sulfa Antibiotics        No outpatient medications have been marked as taking for the 3/16/22 encounter [facility] Encounter) with [facility] CHF ROOM 1.       Guideline directed medical:  ARNI/ACE I/ARB: No  Beta blocker:   Yes  Aldosterone antagonist: yes      Physical Examination:     /64   Pulse 70   Resp 18   Wt 142 lb (64.4 kg)   SpO2 96%   BMI 22.92 kg/m²     Assessment  Charting Type: Shift assessment (chf clinic)    Neurological  Level of Consciousness: Alert (0)     HEENT  HEENT (WDL): Exceptions to WDL  Right Eye: Impaired vision  Left Eye: Impaired vision    Respiratory  Respiratory Pattern: Regular  Respiratory Quality/Effort: Dyspnea with exertion  L Breath Sounds: Clear  R Breath Sounds: Clear        Cardiac  Cardiac Regularity: Regular  Cardiac Rhythm: Sinus rhythm    Rhythm Interpretation  Pulse: 70     Gastrointestinal  Abdominal (WDL): Within Defined Limits  Abdomen Inspection: Soft  RUQ Bowel Sounds: Active  LUQ Bowel Sounds: Active  RLQ Bowel Sounds: Active  LLQ Bowel Sounds: Active      Bowel Sounds  RUQ Bowel Sounds: Active  LUQ Bowel Sounds: Active  RLQ Bowel Sounds: Active  LLQ Bowel Sounds: Active    Peripheral Vascular  Peripheral Vascular (WDL): Exceptions to WDL  Edema: Right lower extremity,Left lower extremity  RLE Edema: +1,Pitting  LLE Edema: +1,Pitting             Genitourinary  Genitourinary (WDL): Within Defined Limits  Psychosocial  Psychosocial (WDL): Within Defined Limits              Pulse: 70           LAB DATA:    Last 3 BMP      Sodium (mmol/L)   Date Value   03/16/2022 136   03/04/2022 139   01/14/2022 134     Potassium (mmol/L)   Date Value   03/16/2022 4.3   03/04/2022 3.4 (L)   01/14/2022 4.7     Potassium reflex Magnesium (mmol/L)   Date Value   08/30/2021 4.1   08/29/2021 3.9   08/07/2021 3.9     Chloride (mmol/L)   Date Value   03/16/2022 101   03/04/2022 100   01/14/2022 100     CO2 (mmol/L)   Date Value   03/16/2022 26   03/04/2022 29   01/14/2022 25     BUN (mg/dL)   Date Value   03/16/2022 28 (H)   03/04/2022 27 (H)   01/14/2022 32 (H)     Glucose (mg/dL)   Date Value   03/16/2022 169 (H)   03/04/2022 195 (H)   01/14/2022 109 (H)   10/15/2021 114 (H)   02/27/2012 108   07/01/2011 79     Calcium (mg/dL)   Date Value   03/16/2022 9.4   03/04/2022 8.9   01/14/2022 9.3       Last 3 BNP       Pro-BNP (pg/mL)   Date Value   03/16/2022 767 (H)   03/04/2022 3,619 (H)   01/14/2022 832 (H)          CBC: No results for input(s): WBC, HGB, PLT in the last 72 hours. BMP:    Recent Labs     03/16/22  1325      K 4.3      CO2 26   BUN 28*   CREATININE 1.2*   GLUCOSE 169*     Hepatic: No results for input(s): AST, ALT, ALB, BILITOT, ALKPHOS in the last 72 hours. Troponin: No results for input(s): TROPONINI in the last 72 hours. BNP: No results for input(s): BNP in the last 72 hours.   Lipids: No results for input(s): CHOL, HDL in the last 72 hours.    Invalid input(s): LDLCALCU  INR: No results for input(s): INR in the last 72 hours. WEIGHTS:    Wt Readings from Last 3 Encounters:   03/16/22 142 lb (64.4 kg)   03/04/22 141 lb (64 kg)   01/14/22 133 lb (60.3 kg)     TELEMETRY:  Cardiac Regularity: Regular  Cardiac Rhythm/Interpretation: SR     ASSESSMENT:  Jam Berrios is here for CHF follow up. She does have dyspnea with exertion and 1+ pitting edema in BLE. She admits that she was eating soup a few times a week. She is agreeable to IV diuretic and takinga n extra potassium supplement today due to IV diuresis with 40mg IV lasix. Amlodipine 5mg daily started and BP has improved today-118/64. She denies any new dizziness or lightheadedness since starting the amlodipine. Interventions completed this visit:  IV diuretics given no  Lab work obtained yes, proBNP, BMP   Reviewed currently prescribed medications with patient, educated on importance of compliance and answered any questions regarding their medication  Educated on signs and symptoms of HF  Educated on low sodium diet    PLAN:  Scheduled to follow up in CHF clinic on   Future Appointments   Date Time Provider Santos Oconnor   4/1/2022  2:30 PM [facility] ROOM 1 [facility]   4/12/2022  2:30 PM DO [facility]   6/3/2022 12:00 PM [facility] ROOM 1 [facility]     Given clinic phone number [phone] and aware of signs and symptoms to call with any HF change in symptoms.

## 2022-03-18 NOTE — RESULT ENCOUNTER NOTE
Labs and CHF clinic note reviewed  Vitals at CHF clinic reviewed: /64   Pulse 70    Much improved BP on norvasc that was started last week   Improved pro-bnp and stable renal function  Continue current management    Thank you

## 2022-04-01 ENCOUNTER — HOSPITAL ENCOUNTER (OUTPATIENT)
Dept: OTHER | Age: 87
Setting detail: THERAPIES SERIES
Discharge: HOME | End: 2022-04-01
Payer: MEDICARE

## 2022-04-01 VITALS
HEART RATE: 93 BPM | BODY MASS INDEX: 22.14 KG/M2 | DIASTOLIC BLOOD PRESSURE: 54 MMHG | RESPIRATION RATE: 18 BRPM | OXYGEN SATURATION: 94 % | SYSTOLIC BLOOD PRESSURE: 100 MMHG | WEIGHT: 137.2 LBS

## 2022-04-01 LAB
ANION GAP SERPL CALCULATED.3IONS-SCNC: 15 MMOL/L (ref 7–16)
BUN BLDV-MCNC: 45 MG/DL (ref 6–23)
CALCIUM SERPL-MCNC: 9.8 MG/DL (ref 8.6–10.2)
CHLORIDE BLD-SCNC: 97 MMOL/L (ref 98–107)
CO2: 24 MMOL/L (ref 22–29)
CREAT SERPL-MCNC: 1.9 MG/DL (ref 0.5–1)
GFR AFRICAN AMERICAN: 30
GFR NON-AFRICAN AMERICAN: 25 ML/MIN/1.73
GLUCOSE BLD-MCNC: 167 MG/DL (ref 74–99)
POTASSIUM SERPL-SCNC: 4.3 MMOL/L (ref 3.5–5)
PRO-BNP: 244 PG/ML (ref 0–450)
SODIUM BLD-SCNC: 136 MMOL/L (ref 132–146)

## 2022-04-01 PROCEDURE — 36415 COLL VENOUS BLD VENIPUNCTURE: CPT

## 2022-04-01 PROCEDURE — 80048 BASIC METABOLIC PNL TOTAL CA: CPT

## 2022-04-01 PROCEDURE — 83880 ASSAY OF NATRIURETIC PEPTIDE: CPT

## 2022-04-01 PROCEDURE — 99214 OFFICE O/P EST MOD 30 MIN: CPT

## 2022-04-01 NOTE — PROGRESS NOTES
Spoke with Lolis Chauhan regarding symptomatic positive orthos. Stop norvasc, hold lasix all weekend restart Monday. Labs monday.

## 2022-04-01 NOTE — PROGRESS NOTES
[facility]  [address] [facility]   1935    Referring Provider: Barbara Faye  Primary Care Physician: Jennifer Aden   Cardiologist: Tsering Cerda   Nephrologist:     History of Present Illness:     Sofi Shin is a 81 y.o. female with a history of HFpEF, most recent EF 66%. Patient Story:    She does  have dyspnea with exertion, shortness of breath, or decline in overall functional capacity. She does not have orthopnea, PND, nocturnal cough or hemoptysis. She does not have abdominal distention or bloating, early satiety, anorexia/change in appetite. She does has a good urinary response to  oral diuretic. She does not have lower extremity edema. She does have lightheadedness, dizziness. She denies palpitations, syncope or near syncope. She does not complain of chest pain, pressure, discomfort. Allergies   Allergen Reactions    Nitrofuran Derivatives Other (See Comments)     Causes fever and chills.  Other Other (See Comments)     HONEYDEW: Sore Throat    Sulfa Antibiotics        Prior to Visit Medications    Medication Sig Taking?  Authorizing Provider   potassium chloride (KLOR-CON M) 10 MEQ extended release tablet Take 1 tablet by mouth daily  Kevin Maher MD   spironolactone (ALDACTONE) 25 MG tablet Take 25 mg by mouth daily  Historical Provider, MD   metoprolol succinate (TOPROL XL) 50 MG extended release tablet Take 1 tablet by mouth nightly  Patient taking differently: Take 50 mg by mouth 2 times daily   Yefri Pulido, APRN - CNP   furosemide (LASIX) 40 MG tablet Take 1 tablet by mouth daily  Trevon Guardado DO   ferrous sulfate (IRON 325) 325 (65 Fe) MG tablet Take 325 mg by mouth daily (with breakfast)  Historical Provider, MD   omeprazole (PRILOSEC) 40 MG delayed release capsule Take 1 capsule by mouth every morning  Historical Provider, MD   sertraline (ZOLOFT) 50 MG tablet Take 75 mg by mouth daily   Historical Provider, MD   CPAP Machine MISC Inhale into the lungs nightly   Historical Provider, MD   vitamin D3 (CHOLECALCIFEROL) 1000 UNITS TABS tablet Take 1 tablet by mouth daily. Patient taking differently: Take 2,000 Units by mouth daily   Arlyne Eisenmenger, DO       Guideline directed medical:  ARNI/ACE I/ARB: No  Beta blocker: Yes  Aldosterone antagonist: yes      Physical Examination:     BP (!) 100/54   Pulse 93   Resp 18   Wt 137 lb 3.2 oz (62.2 kg)   SpO2 94%   BMI 22.14 kg/m²     Assessment  Charting Type: Shift assessment (chf clinic)    Neurological  Level of Consciousness: Alert (0)     HEENT  HEENT (WDL): Exceptions to WDL  Right Eye: Impaired vision  Left Eye: Impaired vision    Respiratory  Respiratory Pattern: Regular  Respiratory Quality/Effort: Dyspnea with exertion  L Breath Sounds: Clear  R Breath Sounds: Clear        Cardiac  Cardiac Regularity: Regular  Cardiac Rhythm: Sinus rhythm    Rhythm Interpretation  Pulse: 93     Gastrointestinal  Abdominal (WDL): Within Defined Limits  Abdomen Inspection: Soft  RUQ Bowel Sounds: Active  LUQ Bowel Sounds: Active  RLQ Bowel Sounds: Active  LLQ Bowel Sounds: Active      Bowel Sounds  RUQ Bowel Sounds: Active  LUQ Bowel Sounds: Active  RLQ Bowel Sounds: Active  LLQ Bowel Sounds:  Active    Peripheral Vascular  Peripheral Vascular (WDL): Exceptions to WDL  Edema: Right lower extremity,Left lower extremity  RLE Edema: +1,Pitting  LLE Edema: +1,Pitting             Genitourinary  Genitourinary (WDL): Within Defined Limits  Psychosocial  Psychosocial (WDL): Within Defined Limits              Pulse: 93           LAB DATA:    Last 3 BMP      Sodium (mmol/L)   Date Value   04/01/2022 136   03/16/2022 136   03/04/2022 139     Potassium (mmol/L)   Date Value   04/01/2022 4.3   03/16/2022 4.3   03/04/2022 3.4 (L)     Potassium reflex Magnesium (mmol/L)   Date Value   08/30/2021 4.1   08/29/2021 3.9   08/07/2021 3.9     Chloride (mmol/L)   Date Value 04/01/2022 97 (L)   03/16/2022 101   03/04/2022 100     CO2 (mmol/L)   Date Value   04/01/2022 24   03/16/2022 26   03/04/2022 29     BUN (mg/dL)   Date Value   04/01/2022 45 (H)   03/16/2022 28 (H)   03/04/2022 27 (H)     Glucose (mg/dL)   Date Value   04/01/2022 167 (H)   03/16/2022 169 (H)   03/04/2022 195 (H)   10/15/2021 114 (H)   02/27/2012 108   07/01/2011 79     Calcium (mg/dL)   Date Value   04/01/2022 9.8   03/16/2022 9.4   03/04/2022 8.9       Last 3 BNP       Pro-BNP (pg/mL)   Date Value   04/01/2022 244   03/16/2022 767 (H)   03/04/2022 3,619 (H)          CBC: No results for input(s): WBC, HGB, PLT in the last 72 hours. BMP:    Recent Labs     04/01/22  1300      K 4.3   CL 97*   CO2 24   BUN 45*   CREATININE 1.9*   GLUCOSE 167*     Hepatic: No results for input(s): AST, ALT, ALB, BILITOT, ALKPHOS in the last 72 hours. Troponin: No results for input(s): TROPONINI in the last 72 hours. BNP: No results for input(s): BNP in the last 72 hours. Lipids: No results for input(s): CHOL, HDL in the last 72 hours. Invalid input(s): LDLCALCU  INR: No results for input(s): INR in the last 72 hours. WEIGHTS:    Wt Readings from Last 3 Encounters:   04/01/22 137 lb 3.2 oz (62.2 kg)   03/16/22 142 lb (64.4 kg)   03/04/22 141 lb (64 kg)     TELEMETRY:  Cardiac Regularity: Regular  Cardiac Rhythm/Interpretation: SR     ASSESSMENT:  Patt Durant is euvolemic with stable weight. She has complaint of lightheadedness and feeling very unsteady and her legs giving out at times. We discussed changing positions slowly and maintaining 64 ounces of fluid intake daily as well as staggering medications and not taking them all together. Also reinforced not taking medications on an empty stomach to help with preventing lightheadedness. Orthos obtained today as follows:     04/01/22 1310   Vitals   Orthostatic B/P and Pulse?  Yes   Blood Pressure Lying 120/63   Pulse Lying 79 PER MINUTE   Blood Pressure Sitting 115/47   Pulse Sitting 98 PER MINUTE   Blood Pressure Standing 103/50   Pulse Standing 105 PER MINUTE       Interventions completed this visit:  IV diuretics given no  Lab work obtained yes, proBNP, BMP   Reviewed currently prescribed medications with patient, educated on importance of compliance and answered any questions regarding their medication  Educated on signs and symptoms of HF  Educated on low sodium diet    PLAN:  Scheduled to follow up in CHF clinic on   Future Appointments   Date Time Provider Santos Oconnor   4/1/2022  2:30 PM [facility] ROOM 1 [facility]   4/4/2022 10:45 AM [facility] ROOM 1 [facility]   4/12/2022  2:30 PM DO [facility]   4/20/2022 12:30 PM [facility] ROOM 1 [facility]   6/3/2022 12:00 PM [facility] ROOM 1 [facility]     Given clinic phone number [phone] and aware of signs and symptoms to call with any HF change in symptoms.

## 2022-04-04 ENCOUNTER — HOSPITAL ENCOUNTER (OUTPATIENT)
Dept: OTHER | Age: 87
Setting detail: THERAPIES SERIES
Discharge: HOME | End: 2022-04-04
Payer: MEDICARE

## 2022-04-04 VITALS
RESPIRATION RATE: 18 BRPM | HEART RATE: 68 BPM | SYSTOLIC BLOOD PRESSURE: 134 MMHG | OXYGEN SATURATION: 98 % | DIASTOLIC BLOOD PRESSURE: 64 MMHG | WEIGHT: 140 LBS | BODY MASS INDEX: 22.6 KG/M2

## 2022-04-04 LAB
ANION GAP SERPL CALCULATED.3IONS-SCNC: 8 MMOL/L (ref 7–16)
BUN BLDV-MCNC: 32 MG/DL (ref 6–23)
CALCIUM SERPL-MCNC: 9.5 MG/DL (ref 8.6–10.2)
CHLORIDE BLD-SCNC: 101 MMOL/L (ref 98–107)
CO2: 27 MMOL/L (ref 22–29)
CREAT SERPL-MCNC: 1.4 MG/DL (ref 0.5–1)
GFR AFRICAN AMERICAN: 43
GFR NON-AFRICAN AMERICAN: 36 ML/MIN/1.73
GLUCOSE BLD-MCNC: 158 MG/DL (ref 74–99)
POTASSIUM SERPL-SCNC: 4.3 MMOL/L (ref 3.5–5)
PRO-BNP: 1091 PG/ML (ref 0–450)
SODIUM BLD-SCNC: 136 MMOL/L (ref 132–146)

## 2022-04-04 PROCEDURE — 36415 COLL VENOUS BLD VENIPUNCTURE: CPT

## 2022-04-04 PROCEDURE — 99214 OFFICE O/P EST MOD 30 MIN: CPT

## 2022-04-04 PROCEDURE — 80048 BASIC METABOLIC PNL TOTAL CA: CPT

## 2022-04-04 PROCEDURE — 83880 ASSAY OF NATRIURETIC PEPTIDE: CPT

## 2022-04-04 NOTE — PROGRESS NOTES
Congestive Heart Failure [address]  [address] [facility] [address]   1935    Referring Provider: Fabienne De La Vega  Primary Care Physician: Jace Crain   Cardiologist: Michael Huston   Nephrologist:     History of Present Illness:     Dwayne Olvera is a 81 y.o. female with a history of HFpEF, most recent EF 66%. Patient Story:    She does  have dyspnea with exertion, shortness of breath, or decline in overall functional capacity. She does not have orthopnea, PND, nocturnal cough or hemoptysis. She does not have abdominal distention or bloating, early satiety, anorexia/change in appetite. She does has a good urinary response to oral diuretic (lasix currently on hold since 4/1). She does not have lower extremity edema. She does not have lightheadedness, dizziness. She denies palpitations, syncope or near syncope. She does not complain of chest pain, pressure, discomfort. Allergies   Allergen Reactions    Nitrofuran Derivatives Other (See Comments)     Causes fever and chills.  Other Other (See Comments)     HONEYDEW: Sore Throat    Sulfa Antibiotics        Prior to Visit Medications    Medication Sig Taking?  Authorizing Provider   potassium chloride (KLOR-CON M) 10 MEQ extended release tablet Take 1 tablet by mouth daily  Loco Wolfe MD   spironolactone (ALDACTONE) 25 MG tablet Take 25 mg by mouth daily Last taken 3-31-22  Historical Provider, MD   metoprolol succinate (TOPROL XL) 50 MG extended release tablet Take 1 tablet by mouth nightly  Patient taking differently: Take 50 mg by mouth 2 times daily   Julita Crenshaw APRN - CNP   furosemide (LASIX) 40 MG tablet Take 1 tablet by mouth daily  Patient taking differently: Take 40 mg by mouth daily Last taken  Thursday 3-31-22  Trevon Guardado DO   ferrous sulfate (IRON 325) 325 (65 Fe) MG tablet Take 325 mg by mouth daily (with breakfast)  Historical Provider, MD   omeprazole (PRILOSEC) 40 MG delayed release capsule Take 1 capsule by mouth every morning  Historical Provider, MD   sertraline (ZOLOFT) 50 MG tablet Take 75 mg by mouth daily   Historical Provider, MD   CPAP Machine MISC Inhale into the lungs nightly   Historical Provider, MD   vitamin D3 (CHOLECALCIFEROL) 1000 UNITS TABS tablet Take 1 tablet by mouth daily. Patient taking differently: Take 2,000 Units by mouth daily   Yoel Davies DO       Guideline directed medical:  ARNI/ACE I/ARB: No  Beta blocker: Yes  Aldosterone antagonist: yes      Physical Examination:     /64   Pulse 68   Resp 18   Wt 140 lb (63.5 kg)   SpO2 98%   BMI 22.60 kg/m²     Assessment  Charting Type: Shift assessment    Neurological  Level of Consciousness: Alert (0)     HEENT  HEENT (WDL): Exceptions to WDL  Right Eye: Impaired vision  Left Eye: Impaired vision    Respiratory  Respiratory Pattern: Regular  Respiratory Quality/Effort: Dyspnea with exertion  L Breath Sounds: Clear  R Breath Sounds: Clear        Cardiac  Cardiac Regularity: Regular  Cardiac Rhythm: Sinus rhythm    Rhythm Interpretation  Pulse: 68     Gastrointestinal  Abdominal (WDL): Within Defined Limits  Abdomen Inspection: Soft  RUQ Bowel Sounds: Active  LUQ Bowel Sounds: Active  RLQ Bowel Sounds: Active  LLQ Bowel Sounds: Active      Bowel Sounds  RUQ Bowel Sounds: Active  LUQ Bowel Sounds: Active  RLQ Bowel Sounds: Active  LLQ Bowel Sounds:  Active    Peripheral Vascular  Peripheral Vascular (WDL): Exceptions to WDL  RLE Edema: Trace  LLE Edema: Trace             Genitourinary  Genitourinary (WDL): Within Defined Limits  Psychosocial  Psychosocial (WDL): Within Defined Limits              Pulse: 68           LAB DATA:    Last 3 BMP      Sodium (mmol/L)   Date Value   04/01/2022 136   03/16/2022 136   03/04/2022 139     Potassium (mmol/L)   Date Value   04/01/2022 4.3   03/16/2022 4.3   03/04/2022 3.4 (L)     Potassium reflex Magnesium (mmol/L)   Date Value   08/30/2021 4.1   08/29/2021 3.9   08/07/2021 3.9     Chloride (mmol/L)   Date Value   04/01/2022 97 (L)   03/16/2022 101   03/04/2022 100     CO2 (mmol/L)   Date Value   04/01/2022 24   03/16/2022 26   03/04/2022 29     BUN (mg/dL)   Date Value   04/01/2022 45 (H)   03/16/2022 28 (H)   03/04/2022 27 (H)     Glucose (mg/dL)   Date Value   04/01/2022 167 (H)   03/16/2022 169 (H)   03/04/2022 195 (H)   10/15/2021 114 (H)   02/27/2012 108   07/01/2011 79     Calcium (mg/dL)   Date Value   04/01/2022 9.8   03/16/2022 9.4   03/04/2022 8.9       Last 3 BNP       Pro-BNP (pg/mL)   Date Value   04/01/2022 244   03/16/2022 767 (H)   03/04/2022 3,619 (H)          CBC: No results for input(s): WBC, HGB, PLT in the last 72 hours. BMP:    Recent Labs     04/01/22  1300      K 4.3   CL 97*   CO2 24   BUN 45*   CREATININE 1.9*   GLUCOSE 167*     Hepatic: No results for input(s): AST, ALT, ALB, BILITOT, ALKPHOS in the last 72 hours. Troponin: No results for input(s): TROPONINI in the last 72 hours. BNP: No results for input(s): BNP in the last 72 hours. Lipids: No results for input(s): CHOL, HDL in the last 72 hours. Invalid input(s): LDLCALCU  INR: No results for input(s): INR in the last 72 hours. WEIGHTS:    Wt Readings from Last 3 Encounters:   04/04/22 140 lb (63.5 kg)   04/01/22 137 lb 3.2 oz (62.2 kg)   03/16/22 142 lb (64.4 kg)     TELEMETRY:  Cardiac Regularity: Regular  Cardiac Rhythm/Interpretation: SR     ASSESSMENT:  Florencia López has returned for close follow-up since appointment on Friday 4/1. Patient stating she feels significantly better and did not have to use rollator today for ambulation. Feels lightheadedness has gone. Patient had a weight increase of 3# however Lasix has been put on hold due to symptomatic orthostatic hypotension friday at CHF appointment. Upon assessment patient does not have pitting edema, crackles, or shortness of breath. Repeat orthostatics obtained since discontinuation of Norvasc and Lasix. Patient also admits to holding spirolactone since Thursday (3/31) due to being in with the lasix and not knowing. Advised patient to take spirolactone as prescribed and further recommendations on lasix and norvasc medication will be advised by cardiology. Patient following up with Dr. Michael Huston 4/14 and CHF clinic 4/20. Orthos obtained today as follows:     04/04/22 1310   Vitals   Orthostatic B/P and Pulse? Yes   Blood Pressure Lying 125/60   Pulse Lying  64 PER MINUTE   Blood Pressure Sitting 126/60   Pulse Sitting  67 PER MINUTE   Blood Pressure Standing 116/56   Pulse Standing  75 PER MINUTE       Interventions completed this visit:  IV diuretics given no  Lab work obtained yes, proBNP, BMP   Reviewed currently prescribed medications with patient, educated on importance of compliance and answered any questions regarding their medication  Educated on signs and symptoms of HF  Educated on low sodium diet    PLAN:  Scheduled to follow up in CHF clinic on   Future Appointments   Date Time Provider Santos Oconnor   4/12/2022  2:30 PM DO [facility]   4/20/2022 12:30 PM [facility] ROOM 1 [facility]   6/3/2022 12:00 PM [facility] ROOM 1 [facility]     Given clinic phone number [phone] and aware of signs and symptoms to call with any HF change in symptoms.

## 2022-04-05 NOTE — RESULT ENCOUNTER NOTE
Labs and CHF Clinic note reviewed  Vitals at CHF clinic: /64   Pulse 68    Improved symptoms with holding norvasc and lasix over the weekend  Had a 3 lb weight gain and rise in pro-bnp    Please have her start lasix 20 mg daily   Continue to hold / stop norvasc  Stay hydrated    Not due to follow up with CHF clinic for 2 weeks  Please have her call any symptom changes    Thank you

## 2022-04-06 ENCOUNTER — TELEPHONE (OUTPATIENT)
Dept: CARDIOLOGY CLINIC | Age: 87
End: 2022-04-06

## 2022-04-06 DIAGNOSIS — I50.32 CHRONIC HEART FAILURE WITH PRESERVED EJECTION FRACTION (HCC): Primary | ICD-10-CM

## 2022-04-06 RX ORDER — FUROSEMIDE 20 MG/1
20 TABLET ORAL DAILY
Qty: 90 TABLET | Refills: 1 | Status: SHIPPED | OUTPATIENT
Start: 2022-04-06

## 2022-04-06 NOTE — TELEPHONE ENCOUNTER
----- Message from YOHANA Fisher CNP sent at 4/5/2022  9:06 AM EDT -----  Labs and CHF Clinic note reviewed  Vitals at CHF clinic: /64   Pulse 68    Improved symptoms with holding norvasc and lasix over the weekend  Had a 3 lb weight gain and rise in pro-bnp    Please have her start lasix 20 mg daily   Continue to hold / stop norvasc  Stay hydrated    Not due to follow up with CHF clinic for 2 weeks  Please have her call any symptom changes    Thank you

## 2022-04-06 NOTE — TELEPHONE ENCOUNTER
Left provider instructions in VM.  Requested call back to office to confirm understanding    Sonido Ramirez RN

## 2022-04-07 NOTE — TELEPHONE ENCOUNTER
I have reviewed the provider's instructions with the patient, answering all questions to her satisfaction.     Future Appointments   Date Time Provider Santos Oconnor   4/12/2022  2:30 PM DO [facility]   4/20/2022 12:30 PM [facility] ROOM 1 [facility]   6/3/2022 12:00 PM [facility] ROOM 1 [facility]

## 2022-04-12 ENCOUNTER — OFFICE VISIT (OUTPATIENT)
Dept: CARDIOLOGY CLINIC | Age: 87
End: 2022-04-12
Payer: MEDICARE

## 2022-04-12 VITALS
SYSTOLIC BLOOD PRESSURE: 104 MMHG | HEIGHT: 66 IN | HEART RATE: 68 BPM | DIASTOLIC BLOOD PRESSURE: 56 MMHG | WEIGHT: 140 LBS | BODY MASS INDEX: 22.5 KG/M2 | RESPIRATION RATE: 18 BRPM

## 2022-04-12 DIAGNOSIS — I10 PRIMARY HYPERTENSION: Primary | ICD-10-CM

## 2022-04-12 PROCEDURE — 93000 ELECTROCARDIOGRAM COMPLETE: CPT | Performed by: INTERNAL MEDICINE

## 2022-04-12 PROCEDURE — G8427 DOCREV CUR MEDS BY ELIG CLIN: HCPCS | Performed by: INTERNAL MEDICINE

## 2022-04-12 PROCEDURE — 99214 OFFICE O/P EST MOD 30 MIN: CPT | Performed by: INTERNAL MEDICINE

## 2022-04-12 PROCEDURE — 1123F ACP DISCUSS/DSCN MKR DOCD: CPT | Performed by: INTERNAL MEDICINE

## 2022-04-12 PROCEDURE — G8420 CALC BMI NORM PARAMETERS: HCPCS | Performed by: INTERNAL MEDICINE

## 2022-04-12 PROCEDURE — 1036F TOBACCO NON-USER: CPT | Performed by: INTERNAL MEDICINE

## 2022-04-12 PROCEDURE — G8400 PT W/DXA NO RESULTS DOC: HCPCS | Performed by: INTERNAL MEDICINE

## 2022-04-12 PROCEDURE — 4040F PNEUMOC VAC/ADMIN/RCVD: CPT | Performed by: INTERNAL MEDICINE

## 2022-04-12 PROCEDURE — 1090F PRES/ABSN URINE INCON ASSESS: CPT | Performed by: INTERNAL MEDICINE

## 2022-04-20 ENCOUNTER — HOSPITAL ENCOUNTER (OUTPATIENT)
Dept: OTHER | Age: 87
Setting detail: THERAPIES SERIES
Discharge: HOME | End: 2022-04-20
Payer: MEDICARE

## 2022-04-20 VITALS
SYSTOLIC BLOOD PRESSURE: 122 MMHG | RESPIRATION RATE: 16 BRPM | BODY MASS INDEX: 22.6 KG/M2 | DIASTOLIC BLOOD PRESSURE: 60 MMHG | WEIGHT: 140 LBS | HEART RATE: 65 BPM | OXYGEN SATURATION: 95 %

## 2022-04-20 LAB
ANION GAP SERPL CALCULATED.3IONS-SCNC: 9 MMOL/L (ref 7–16)
BUN BLDV-MCNC: 36 MG/DL (ref 6–23)
CALCIUM SERPL-MCNC: 9.3 MG/DL (ref 8.6–10.2)
CHLORIDE BLD-SCNC: 101 MMOL/L (ref 98–107)
CO2: 24 MMOL/L (ref 22–29)
CREAT SERPL-MCNC: 1.5 MG/DL (ref 0.5–1)
GFR AFRICAN AMERICAN: 40
GFR NON-AFRICAN AMERICAN: 33 ML/MIN/1.73
GLUCOSE BLD-MCNC: 131 MG/DL (ref 74–99)
POTASSIUM SERPL-SCNC: 4.6 MMOL/L (ref 3.5–5)
PRO-BNP: 1069 PG/ML (ref 0–450)
SODIUM BLD-SCNC: 134 MMOL/L (ref 132–146)

## 2022-04-20 PROCEDURE — 80048 BASIC METABOLIC PNL TOTAL CA: CPT

## 2022-04-20 PROCEDURE — 83880 ASSAY OF NATRIURETIC PEPTIDE: CPT

## 2022-04-20 PROCEDURE — 99214 OFFICE O/P EST MOD 30 MIN: CPT

## 2022-04-20 PROCEDURE — 36415 COLL VENOUS BLD VENIPUNCTURE: CPT

## 2022-04-20 RX ORDER — SPIRONOLACTONE 25 MG/1
25 TABLET ORAL DAILY
COMMUNITY

## 2022-04-20 NOTE — PROGRESS NOTES
Congestive Heart Failure [facility] 34 [facility]  [address] [facility] Xena Abdullahi   1935    Referring Provider: Lyndsey Lopez  Primary Care Physician: Yesi Tadeo   Cardiologist: Dalila Candelaria   Nephrologist:     History of Present Illness:     Rakan Mccall is a 81 y.o. female with a history of HFpEF, most recent EF 66%. Patient Story:    She does  have dyspnea with exertion, shortness of breath, or decline in overall functional capacity. She does not have orthopnea, PND, nocturnal cough or hemoptysis. She does not have abdominal distention or bloating, early satiety, anorexia/change in appetite. She does has a good urinary response to oral  Diuretic. She does not have lower extremity edema. She does not have lightheadedness, dizziness. She denies palpitations, syncope or near syncope. She does not complain of chest pain, pressure, discomfort. Allergies   Allergen Reactions    Nitrofuran Derivatives Other (See Comments)     Causes fever and chills.  Norvasc [Amlodipine]      Dizziness, light headed    Other Other (See Comments)     HONEYDEW: Sore Throat    Sulfa Antibiotics        Prior to Visit Medications    Medication Sig Taking?  Authorizing Provider   spironolactone (ALDACTONE) 25 MG tablet Take 25 mg by mouth daily Yes Historical Provider, MD   furosemide (LASIX) 20 MG tablet Take 1 tablet by mouth daily  YOHANA Menjivar CNP   potassium chloride (KLOR-CON M) 10 MEQ extended release tablet Take 1 tablet by mouth daily  Larry Murcia MD   metoprolol succinate (TOPROL XL) 50 MG extended release tablet Take 1 tablet by mouth nightly  Patient taking differently: Take 50 mg by mouth daily   YOHANA Menjivar CNP   ferrous sulfate (IRON 325) 325 (65 Fe) MG tablet Take 325 mg by mouth daily (with breakfast)  Historical Provider, MD   omeprazole (PRILOSEC) 40 MG delayed release capsule Take 1 capsule by mouth every morning  Historical Provider, MD sertraline (ZOLOFT) 50 MG tablet Take 75 mg by mouth daily   Historical Provider, MD   CPAP Machine MISC Inhale into the lungs nightly   Historical Provider, MD   vitamin D3 (CHOLECALCIFEROL) 1000 UNITS TABS tablet Take 1 tablet by mouth daily. Patient taking differently: Take 2,000 Units by mouth daily   Sreedhar Oro DO       Guideline directed medical:  ARNI/ACE I/ARB: No  Beta blocker: Yes  Aldosterone antagonist: yes      Physical Examination:     /60   Pulse 65   Resp 16   Wt 140 lb (63.5 kg)   SpO2 95%   BMI 22.60 kg/m²     Assessment  Charting Type: Shift assessment               Respiratory  Respiratory Pattern: Regular  Respiratory Depth: Normal  Respiratory Quality/Effort: Unlabored  Chest Assessment: Chest expansion symmetrical  L Breath Sounds: Clear  R Breath Sounds: Clear        Cardiac  Cardiac Regularity: Regular  Heart Sounds: S1, S2  Cardiac Rhythm: Sinus rhythm    Rhythm Interpretation  Pulse: 65     Gastrointestinal  Abdominal (WDL): Within Defined Limits  Abdomen Inspection: Soft  RUQ Bowel Sounds: Active  LUQ Bowel Sounds: Active  RLQ Bowel Sounds: Active  LLQ Bowel Sounds: Active      Bowel Sounds  RUQ Bowel Sounds: Active  LUQ Bowel Sounds: Active  RLQ Bowel Sounds: Active  LLQ Bowel Sounds:  Active    Peripheral Vascular  Peripheral Vascular (WDL): Within Defined Limits             Genitourinary  Genitourinary (WDL): Within Defined Limits                 Pulse: 65           LAB DATA:    Last 3 BMP      Sodium (mmol/L)   Date Value   04/20/2022 134   04/04/2022 136   04/01/2022 136     Potassium (mmol/L)   Date Value   04/20/2022 4.6   04/04/2022 4.3   04/01/2022 4.3     Potassium reflex Magnesium (mmol/L)   Date Value   08/30/2021 4.1   08/29/2021 3.9   08/07/2021 3.9     Chloride (mmol/L)   Date Value   04/20/2022 101   04/04/2022 101   04/01/2022 97 (L)     CO2 (mmol/L)   Date Value   04/20/2022 24   04/04/2022 27   04/01/2022 24     BUN (mg/dL)   Date Value   04/20/2022 36 (H)   04/04/2022 32 (H)   04/01/2022 45 (H)     Glucose (mg/dL)   Date Value   04/20/2022 131 (H)   04/04/2022 158 (H)   04/01/2022 167 (H)   10/15/2021 114 (H)   02/27/2012 108   07/01/2011 79     Calcium (mg/dL)   Date Value   04/20/2022 9.3   04/04/2022 9.5   04/01/2022 9.8       Last 3 BNP       Pro-BNP (pg/mL)   Date Value   04/20/2022 1,069 (H)   04/04/2022 1,091 (H)   04/01/2022 244          CBC: No results for input(s): WBC, HGB, PLT in the last 72 hours. BMP:    Recent Labs     04/20/22  1300      K 4.6      CO2 24   BUN 36*   CREATININE 1.5*   GLUCOSE 131*     Hepatic: No results for input(s): AST, ALT, ALB, BILITOT, ALKPHOS in the last 72 hours. Troponin: No results for input(s): TROPONINI in the last 72 hours. BNP: No results for input(s): BNP in the last 72 hours. Lipids: No results for input(s): CHOL, HDL in the last 72 hours. Invalid input(s): LDLCALCU  INR: No results for input(s): INR in the last 72 hours. WEIGHTS:    Wt Readings from Last 3 Encounters:   04/20/22 140 lb (63.5 kg)   04/12/22 140 lb (63.5 kg)   04/04/22 140 lb (63.5 kg)     TELEMETRY:  Cardiac Regularity: Regular  Cardiac Rhythm/Interpretation: SR     ASSESSMENT:  Mehreen Espana presented today with stable weights. On assessment patient's abdomen was soft, lungs clear, and no swelling present in extremities. Patient denies feeling lightheaded since Norvasc has been stopped. Follow up appointment scheduled with CHF clinic for 6/3/22.     Interventions completed this visit:  IV diuretics given no  Lab work obtained yes, proBNP, BMP   Reviewed currently prescribed medications with patient, educated on importance of compliance and answered any questions regarding their medication  Educated on signs and symptoms of HF  Educated on low sodium diet    PLAN:  Scheduled to follow up in CHF clinic on   Future Appointments   Date Time Provider Santos Oconnor   6/3/2022 12:00 PM [facility] ROOM 1 [facility] Olmito HOD Given clinic phone number [phone] and aware of signs and symptoms to call with any HF change in symptoms.

## 2022-05-26 RX ORDER — POTASSIUM CHLORIDE 750 MG/1
TABLET, EXTENDED RELEASE ORAL
Qty: 30 TABLET | Refills: 11 | Status: ON HOLD
Start: 2022-05-26 | End: 2022-07-08 | Stop reason: HOSPADM

## 2022-06-03 ENCOUNTER — HOSPITAL ENCOUNTER (OUTPATIENT)
Dept: OTHER | Age: 87
Setting detail: THERAPIES SERIES
Discharge: HOME | End: 2022-06-03
Payer: MEDICARE

## 2022-06-03 VITALS
DIASTOLIC BLOOD PRESSURE: 59 MMHG | HEART RATE: 78 BPM | OXYGEN SATURATION: 94 % | SYSTOLIC BLOOD PRESSURE: 124 MMHG | RESPIRATION RATE: 16 BRPM | WEIGHT: 145 LBS | BODY MASS INDEX: 23.4 KG/M2

## 2022-06-03 LAB
ANION GAP SERPL CALCULATED.3IONS-SCNC: 12 MMOL/L (ref 7–16)
BUN BLDV-MCNC: 36 MG/DL (ref 6–23)
CALCIUM SERPL-MCNC: 9.2 MG/DL (ref 8.6–10.2)
CHLORIDE BLD-SCNC: 101 MMOL/L (ref 98–107)
CO2: 24 MMOL/L (ref 22–29)
CREAT SERPL-MCNC: 1.8 MG/DL (ref 0.5–1)
GFR AFRICAN AMERICAN: 32
GFR NON-AFRICAN AMERICAN: 27 ML/MIN/1.73
GLUCOSE BLD-MCNC: 194 MG/DL (ref 74–99)
POTASSIUM SERPL-SCNC: 4.4 MMOL/L (ref 3.5–5)
PRO-BNP: 826 PG/ML (ref 0–450)
SODIUM BLD-SCNC: 137 MMOL/L (ref 132–146)

## 2022-06-03 PROCEDURE — 36415 COLL VENOUS BLD VENIPUNCTURE: CPT

## 2022-06-03 PROCEDURE — 6360000002 HC RX W HCPCS

## 2022-06-03 PROCEDURE — 2580000003 HC RX 258

## 2022-06-03 PROCEDURE — 96374 THER/PROPH/DIAG INJ IV PUSH: CPT

## 2022-06-03 PROCEDURE — 80048 BASIC METABOLIC PNL TOTAL CA: CPT

## 2022-06-03 PROCEDURE — 99214 OFFICE O/P EST MOD 30 MIN: CPT

## 2022-06-03 PROCEDURE — 83880 ASSAY OF NATRIURETIC PEPTIDE: CPT

## 2022-06-03 RX ORDER — SODIUM CHLORIDE 0.9 % (FLUSH) 0.9 %
10 SYRINGE (ML) INJECTION ONCE
Status: DISCONTINUED | OUTPATIENT
Start: 2022-06-03 | End: 2022-06-04 | Stop reason: HOSPADM

## 2022-06-03 RX ORDER — BUMETANIDE 0.25 MG/ML
INJECTION, SOLUTION INTRAMUSCULAR; INTRAVENOUS
Status: DISCONTINUED
Start: 2022-06-03 | End: 2022-06-04 | Stop reason: HOSPADM

## 2022-06-03 RX ORDER — SODIUM CHLORIDE 0.9 % (FLUSH) 0.9 %
SYRINGE (ML) INJECTION
Status: COMPLETED
Start: 2022-06-03 | End: 2022-06-03

## 2022-06-03 RX ORDER — SODIUM CHLORIDE 0.9 % (FLUSH) 0.9 %
SYRINGE (ML) INJECTION
Status: DISCONTINUED
Start: 2022-06-03 | End: 2022-06-04 | Stop reason: HOSPADM

## 2022-06-03 RX ORDER — FUROSEMIDE 10 MG/ML
INJECTION INTRAMUSCULAR; INTRAVENOUS
Status: COMPLETED
Start: 2022-06-03 | End: 2022-06-03

## 2022-06-03 RX ORDER — FUROSEMIDE 10 MG/ML
40 INJECTION INTRAMUSCULAR; INTRAVENOUS ONCE
Status: DISCONTINUED | OUTPATIENT
Start: 2022-06-03 | End: 2022-06-04 | Stop reason: HOSPADM

## 2022-06-03 RX ADMIN — FUROSEMIDE 40 MG: 10 INJECTION, SOLUTION INTRAVENOUS at 13:03

## 2022-06-03 RX ADMIN — SODIUM CHLORIDE, PRESERVATIVE FREE 10 ML: 5 INJECTION INTRAVENOUS at 13:04

## 2022-06-03 ASSESSMENT — PAIN SCALES - GENERAL: PAINLEVEL_OUTOF10: 0

## 2022-06-03 NOTE — PROGRESS NOTES
Congestive Heart Failure [facility] 34 [facility]  [address] [facility] Kaylen Munguia   1935    Referring Provider: Aroldo Mendoza  Primary Care Physician: Rasheeda Mccloud   Cardiologist: Elizabeth Mejia   Nephrologist:     History of Present Illness:     Jb Benavides is a 81 y.o. female with a history of HFpEF, most recent EF 66%. Patient Story:    She does  have dyspnea with exertion, shortness of breath, or decline in overall functional capacity. She does not have orthopnea, PND, nocturnal cough or hemoptysis. She does not have abdominal distention or bloating, early satiety, anorexia/change in appetite. She does has a good urinary response to oral  Diuretic. She does not have lower extremity edema. She does not have lightheadedness, dizziness. She denies palpitations, syncope or near syncope. She does not complain of chest pain, pressure, discomfort. Allergies   Allergen Reactions    Nitrofuran Derivatives Other (See Comments)     Causes fever and chills.  Norvasc [Amlodipine]      Dizziness, light headed    Other Other (See Comments)     HONEYDEW: Sore Throat    Sulfa Antibiotics        Prior to Visit Medications    Medication Sig Taking?  Authorizing Provider   potassium chloride (KLOR-CON M) 10 MEQ extended release tablet TAKE ONE TABLET BY MOUTH DAILY  David Singleton DO   spironolactone (ALDACTONE) 25 MG tablet Take 25 mg by mouth daily  Historical Provider, MD   furosemide (LASIX) 20 MG tablet Take 1 tablet by mouth daily  YOHANA Piña CNP   metoprolol succinate (TOPROL XL) 50 MG extended release tablet Take 1 tablet by mouth nightly  Patient taking differently: Take 50 mg by mouth daily   YOHANA Piña CNP   ferrous sulfate (IRON 325) 325 (65 Fe) MG tablet Take 325 mg by mouth daily (with breakfast)  Historical Provider, MD   omeprazole (PRILOSEC) 40 MG delayed release capsule Take 1 capsule by mouth every morning  Historical Provider, MD sertraline (ZOLOFT) 50 MG tablet Take 75 mg by mouth daily   Historical Provider, MD   CPAP Machine MISC Inhale into the lungs nightly   Historical Provider, MD   vitamin D3 (CHOLECALCIFEROL) 1000 UNITS TABS tablet Take 1 tablet by mouth daily. Cecilia Like,        Guideline directed medical:  ARNI/ACE I/ARB: No  Beta blocker:   Yes  Aldosterone antagonist: yes      Physical Examination:     BP (!) 124/59   Pulse 78   Resp 16   Wt 145 lb (65.8 kg)   SpO2 94%   BMI 23.40 kg/m²     Assessment  Charting Type: Shift assessment (chf clinic)    Neurological  Level of Consciousness: Alert (0)          Respiratory  Respiratory Quality/Effort: Dyspnea with exertion  Chest Assessment: Chest expansion symmetrical        Cardiac  Cardiac Rhythm: Sinus rhythm    Rhythm Interpretation  Heart Rate: 78     Gastrointestinal  Abdominal (WDL): Within Defined Limits  Abdomen Inspection: Soft           Peripheral Vascular  Peripheral Vascular (WDL): Within Defined Limits  Edema: Right lower extremity,Left lower extremity  RLE Edema: None  LLE Edema: None             Genitourinary  Genitourinary (WDL): Within Defined Limits  Psychosocial  Psychosocial (WDL): Within Defined Limits  Pain Assessment  Pain Assessment: 0-10  Pain Level: 0           Heart Rate: 78           LAB DATA:    Last 3 BMP      Sodium (mmol/L)   Date Value   04/20/2022 134   04/04/2022 136   04/01/2022 136     Potassium (mmol/L)   Date Value   04/20/2022 4.6   04/04/2022 4.3   04/01/2022 4.3     Potassium reflex Magnesium (mmol/L)   Date Value   08/30/2021 4.1   08/29/2021 3.9   08/07/2021 3.9     Chloride (mmol/L)   Date Value   04/20/2022 101   04/04/2022 101   04/01/2022 97 (L)     CO2 (mmol/L)   Date Value   04/20/2022 24   04/04/2022 27   04/01/2022 24     BUN (mg/dL)   Date Value   04/20/2022 36 (H)   04/04/2022 32 (H)   04/01/2022 45 (H)     Glucose (mg/dL)   Date Value   04/20/2022 131 (H)   04/04/2022 158 (H)   04/01/2022 167 (H)   10/15/2021 114 (H)   02/27/2012 108   07/01/2011 79     Calcium (mg/dL)   Date Value   04/20/2022 9.3   04/04/2022 9.5   04/01/2022 9.8       Last 3 BNP       Pro-BNP (pg/mL)   Date Value   04/20/2022 1,069 (H)   04/04/2022 1,091 (H)   04/01/2022 244          CBC: No results for input(s): WBC, HGB, PLT in the last 72 hours. BMP:    No results for input(s): NA, K, CL, CO2, BUN, CREATININE, GLUCOSE in the last 72 hours. Hepatic: No results for input(s): AST, ALT, ALB, BILITOT, ALKPHOS in the last 72 hours. Troponin: No results for input(s): TROPONINI in the last 72 hours. BNP: No results for input(s): BNP in the last 72 hours. Lipids: No results for input(s): CHOL, HDL in the last 72 hours. Invalid input(s): LDLCALCU  INR: No results for input(s): INR in the last 72 hours. WEIGHTS:    Wt Readings from Last 3 Encounters:   06/03/22 145 lb (65.8 kg)   04/20/22 140 lb (63.5 kg)   04/12/22 140 lb (63.5 kg)     TELEMETRY:  Cardiac Regularity: Regular  Cardiac Rhythm/Interpretation: SR     ASSESSMENT:  Jb Benavides presented today with weight gain 5lbs and more SOB. On assessment patient's abdomen was soft, lungs clear, and no swelling present in extremities. Interventions completed this visit:  IV diuretics given Lasix IVP 40mg for increased SOB weight gain  Lab work obtained yes, proBNP, BMP   Reviewed currently prescribed medications with patient, educated on importance of compliance and answered any questions regarding their medication  Educated on signs and symptoms of HF  Educated on low sodium diet    PLAN:  Scheduled to follow up in CHF clinic on   Future Appointments   Date Time Provider Santos Oconnor   6/15/2022  8:00 AM [facility] ROOM 1 [facility] [address]. [address]     Given clinic phone number [phone] and aware of signs and symptoms to call with any HF change in symptoms.

## 2022-06-15 ENCOUNTER — TELEPHONE (OUTPATIENT)
Dept: CARDIOLOGY CLINIC | Age: 87
End: 2022-06-15

## 2022-06-15 ENCOUNTER — HOSPITAL ENCOUNTER (OUTPATIENT)
Dept: OTHER | Age: 87
Setting detail: THERAPIES SERIES
Discharge: HOME | End: 2022-06-15
Payer: MEDICARE

## 2022-06-15 VITALS
BODY MASS INDEX: 23.57 KG/M2 | HEART RATE: 81 BPM | SYSTOLIC BLOOD PRESSURE: 106 MMHG | DIASTOLIC BLOOD PRESSURE: 57 MMHG | WEIGHT: 146 LBS | RESPIRATION RATE: 16 BRPM | OXYGEN SATURATION: 96 %

## 2022-06-15 DIAGNOSIS — I48.0 PAF (PAROXYSMAL ATRIAL FIBRILLATION) (HCC): Primary | ICD-10-CM

## 2022-06-15 LAB
ANION GAP SERPL CALCULATED.3IONS-SCNC: 15 MMOL/L (ref 7–16)
BUN BLDV-MCNC: 50 MG/DL (ref 6–23)
CALCIUM SERPL-MCNC: 9.3 MG/DL (ref 8.6–10.2)
CHLORIDE BLD-SCNC: 103 MMOL/L (ref 98–107)
CO2: 19 MMOL/L (ref 22–29)
CREAT SERPL-MCNC: 2.2 MG/DL (ref 0.5–1)
GFR AFRICAN AMERICAN: 26
GFR NON-AFRICAN AMERICAN: 21 ML/MIN/1.73
GLUCOSE BLD-MCNC: 104 MG/DL (ref 74–99)
POTASSIUM SERPL-SCNC: 3.8 MMOL/L (ref 3.5–5)
PRO-BNP: 619 PG/ML (ref 0–450)
SODIUM BLD-SCNC: 137 MMOL/L (ref 132–146)

## 2022-06-15 PROCEDURE — 83880 ASSAY OF NATRIURETIC PEPTIDE: CPT

## 2022-06-15 PROCEDURE — 80048 BASIC METABOLIC PNL TOTAL CA: CPT

## 2022-06-15 PROCEDURE — 99214 OFFICE O/P EST MOD 30 MIN: CPT

## 2022-06-15 PROCEDURE — 36415 COLL VENOUS BLD VENIPUNCTURE: CPT

## 2022-06-15 RX ORDER — LEVOFLOXACIN 500 MG/1
500 TABLET, FILM COATED ORAL DAILY
Status: ON HOLD | COMMUNITY
End: 2022-07-08 | Stop reason: HOSPADM

## 2022-06-15 ASSESSMENT — PAIN SCALES - GENERAL: PAINLEVEL_OUTOF10: 0

## 2022-06-15 NOTE — PROGRESS NOTES
Congestive Heart Failure [facility] 34 [facility]  [address] [facility] Tee Vuong   1935    Referring Provider: Saray Lemus  Primary Care Physician: Malika Vaca   Cardiologist: Machelle Freeman   Nephrologist:     History of Present Illness:     Angeles Montemayor is a 81 y.o. female with a history of HFpEF, most recent EF 66%. Patient Story:    She does  have dyspnea with exertion, shortness of breath, or decline in overall functional capacity. She does not have orthopnea, PND, nocturnal cough or hemoptysis. She does not have abdominal distention or bloating, early satiety, anorexia/change in appetite. She does has a good urinary response to oral  Diuretic. She does not have lower extremity edema. She does not have lightheadedness, dizziness. She denies palpitations, syncope or near syncope. She does not complain of chest pain, pressure, discomfort. Allergies   Allergen Reactions    Nitrofuran Derivatives Other (See Comments)     Causes fever and chills.  Norvasc [Amlodipine]      Dizziness, light headed    Other Other (See Comments)     HONEYDEW: Sore Throat    Sulfa Antibiotics        Prior to Visit Medications    Medication Sig Taking?  Authorizing Provider   empagliflozin (JARDIANCE) 10 MG tablet Take 10 mg by mouth daily Yes Historical Provider, MD   levoFLOXacin (LEVAQUIN) 500 MG tablet Take 500 mg by mouth daily Yes Historical Provider, MD   potassium chloride (KLOR-CON M) 10 MEQ extended release tablet TAKE ONE TABLET BY MOUTH DAILY  Tu Bach DO   spironolactone (ALDACTONE) 25 MG tablet Take 25 mg by mouth daily  Historical Provider, MD   furosemide (LASIX) 20 MG tablet Take 1 tablet by mouth daily  YOHANA Woods - CNP   metoprolol succinate (TOPROL XL) 50 MG extended release tablet Take 1 tablet by mouth nightly  Patient taking differently: Take 25 mg by mouth daily 25mg daily  YOHANA Woods - CNP   ferrous sulfate (IRON 325) 325 (65 Fe) MG tablet Take 325 mg by mouth daily (with breakfast)  Historical Provider, MD   omeprazole (PRILOSEC) 40 MG delayed release capsule Take 1 capsule by mouth every morning  Historical Provider, MD   sertraline (ZOLOFT) 50 MG tablet Take 75 mg by mouth daily   Historical Provider, MD   CPAP Machine MISC Inhale into the lungs nightly   Historical Provider, MD   vitamin D3 (CHOLECALCIFEROL) 1000 UNITS TABS tablet Take 1 tablet by mouth daily. Cecilia Like, DO       Guideline directed medical:  ARNI/ACE I/ARB: No  Beta blocker:   Yes  Aldosterone antagonist: yes      Physical Examination:     BP (!) 106/57   Pulse 81   Resp 16   Wt 146 lb (66.2 kg)   SpO2 96%   BMI 23.57 kg/m²     Assessment  Charting Type: Shift assessment (CHF Clinic)    Neurological  Level of Consciousness: Alert (0)          Respiratory  Respiratory Quality/Effort: Dyspnea with exertion  Chest Assessment: Chest expansion symmetrical  Breath Sounds  Right Upper Lobe: Clear  Right Middle Lobe: Clear  Right Lower Lobe: Clear  Left Upper Lobe: Clear  Left Lower Lobe: Clear     Cardiac  Cardiac Rhythm: Sinus rhythm    Rhythm Interpretation  Heart Rate: 81     Gastrointestinal  Abdominal (WDL): Within Defined Limits  Abdomen Inspection: Soft           Peripheral Vascular  Peripheral Vascular (WDL): Within Defined Limits  Edema: Right lower extremity,Left lower extremity  RLE Edema: None  LLE Edema: None             Genitourinary  Genitourinary (WDL): Within Defined Limits  Psychosocial  Psychosocial (WDL): Within Defined Limits  Pain Assessment  Pain Assessment: 0-10  Pain Level: 0           Heart Rate: 81           LAB DATA:    Last 3 BMP      Sodium (mmol/L)   Date Value   06/15/2022 137   06/03/2022 137   04/20/2022 134     Potassium (mmol/L)   Date Value   06/15/2022 3.8   06/03/2022 4.4   04/20/2022 4.6     Potassium reflex Magnesium (mmol/L)   Date Value   08/30/2021 4.1   08/29/2021 3.9   08/07/2021 3.9     Chloride (mmol/L)   Date Value 06/15/2022 103   06/03/2022 101   04/20/2022 101     CO2 (mmol/L)   Date Value   06/15/2022 19 (L)   06/03/2022 24   04/20/2022 24     BUN (mg/dL)   Date Value   06/15/2022 50 (H)   06/03/2022 36 (H)   04/20/2022 36 (H)     Glucose (mg/dL)   Date Value   06/15/2022 104 (H)   06/03/2022 194 (H)   04/20/2022 131 (H)   10/15/2021 114 (H)   02/27/2012 108   07/01/2011 79     Calcium (mg/dL)   Date Value   06/15/2022 9.3   06/03/2022 9.2   04/20/2022 9.3       Last 3 BNP       Pro-BNP (pg/mL)   Date Value   06/15/2022 619 (H)   06/03/2022 826 (H)   04/20/2022 1,069 (H)          CBC: No results for input(s): WBC, HGB, PLT in the last 72 hours. BMP:    Recent Labs     06/15/22  0818      K 3.8      CO2 19*   BUN 50*   CREATININE 2.2*   GLUCOSE 104*     Hepatic: No results for input(s): AST, ALT, ALB, BILITOT, ALKPHOS in the last 72 hours. Troponin: No results for input(s): TROPONINI in the last 72 hours. BNP: No results for input(s): BNP in the last 72 hours. Lipids: No results for input(s): CHOL, HDL in the last 72 hours. Invalid input(s): LDLCALCU  INR: No results for input(s): INR in the last 72 hours. WEIGHTS:    Wt Readings from Last 3 Encounters:   06/15/22 146 lb (66.2 kg)   06/03/22 145 lb (65.8 kg)   04/20/22 140 lb (63.5 kg)     TELEMETRY:  Cardiac Regularity: Regular  Cardiac Rhythm/Interpretation: SR     ASSESSMENT Maria C Donato has stable weights Patient states she was SOB yesterday in heat was outside doing errands however today denies SOB . On assessment patient's abdomen was soft, lungs clear, and no swelling present in extremities. Patient states she has great response to oral diuretic urinates approx 10 times per day. She has been hydrating \"well\"per patient.     Interventions completed this visit:  IV diuretics given No  Lab work obtained yes, proBNP, BMP   Reviewed currently prescribed medications with patient, educated on importance of compliance and answered any questions regarding their medication  Educated on signs and symptoms of HF  Educated on low sodium diet    PLAN:  Scheduled to follow up in CHF clinic on   Future Appointments   Date Time Provider Santos Oconnor   [facility] Tor[place] HOD     Given clinic phone number [phone] and aware of signs and symptoms to call with any HF change in symptoms.

## 2022-06-15 NOTE — TELEPHONE ENCOUNTER
I called patient again and there was no answer so I called her daughter Lise Hinkle, she said she will call the patient and give her the message to call us back

## 2022-06-15 NOTE — TELEPHONE ENCOUNTER
Make lasix M-W-F. BMP 1 week. Nanette Calero D.O.   Cardiologist  Cardiology, [facility]

## 2022-06-15 NOTE — PROGRESS NOTES
Message left at Dr Ham Current office of results of BMP BUN 50 Creatinine of 2.2 and that patient was started on Jardiance per PCP

## 2022-06-15 NOTE — PROGRESS NOTES
Dr Alivia Montenegro office called and I notified Jay Alonso of results of BUN 50 Creatinine of 2.2 (patient was started on Jardiance per PCP) she states she will notify Dr. Jennifer Aden

## 2022-06-23 ENCOUNTER — TELEPHONE (OUTPATIENT)
Dept: CARDIOLOGY CLINIC | Age: 87
End: 2022-06-23

## 2022-06-23 DIAGNOSIS — I48.0 PAF (PAROXYSMAL ATRIAL FIBRILLATION) (HCC): ICD-10-CM

## 2022-06-23 NOTE — TELEPHONE ENCOUNTER
Labs okay. Continue same unless swelling worsens. Alivia Pang D.O.   Cardiologist  Cardiology, [address] [facility]

## 2022-06-23 NOTE — TELEPHONE ENCOUNTER
----- Message from Radha Damon DO sent at 6/23/2022 11:54 AM EDT -----  Labs okay. Radha Damon D.O.   Cardiologist  Cardiology, [facility]

## 2022-06-23 NOTE — TELEPHONE ENCOUNTER
Bmp in chart for review,on 6/15 patient was instructed to take lasix 20mg  M-W-F instead of daily, please advise

## 2022-07-06 ENCOUNTER — APPOINTMENT (OUTPATIENT)
Dept: GENERAL RADIOLOGY | Age: 87
DRG: 194 | End: 2022-07-06
Payer: MEDICARE

## 2022-07-06 ENCOUNTER — APPOINTMENT (OUTPATIENT)
Dept: CT IMAGING | Age: 87
DRG: 194 | End: 2022-07-06
Payer: MEDICARE

## 2022-07-06 ENCOUNTER — HOSPITAL ENCOUNTER (INPATIENT)
Age: 87
LOS: 2 days | Discharge: HOME | DRG: 194 | End: 2022-07-08
Attending: EMERGENCY MEDICINE | Admitting: INTERNAL MEDICINE
Payer: MEDICARE

## 2022-07-06 DIAGNOSIS — N18.9 CHRONIC KIDNEY DISEASE, UNSPECIFIED CKD STAGE: ICD-10-CM

## 2022-07-06 DIAGNOSIS — R07.9 CHEST PAIN, UNSPECIFIED TYPE: ICD-10-CM

## 2022-07-06 DIAGNOSIS — J18.9 PNEUMONIA OF BOTH LOWER LOBES DUE TO INFECTIOUS ORGANISM: Primary | ICD-10-CM

## 2022-07-06 PROBLEM — J16.0 CAP (COMMUNITY ACQUIRED PNEUMONIA) DUE TO CHLAMYDIA SPECIES: Status: ACTIVE | Noted: 2022-07-06

## 2022-07-06 PROBLEM — N17.9 ACUTE KIDNEY INJURY SUPERIMPOSED ON CHRONIC KIDNEY DISEASE (HCC): Status: ACTIVE | Noted: 2022-07-06

## 2022-07-06 LAB
ADENOVIRUS BY PCR: NOT DETECTED
ANION GAP SERPL CALCULATED.3IONS-SCNC: 9 MMOL/L (ref 7–16)
BASOPHILS ABSOLUTE: 0.02 E9/L (ref 0–0.2)
BASOPHILS RELATIVE PERCENT: 0.2 % (ref 0–2)
BORDETELLA PARAPERTUSSIS BY PCR: NOT DETECTED
BORDETELLA PERTUSSIS BY PCR: NOT DETECTED
BUN BLDV-MCNC: 34 MG/DL (ref 6–23)
CALCIUM SERPL-MCNC: 9.3 MG/DL (ref 8.6–10.2)
CHLAMYDOPHILIA PNEUMONIAE BY PCR: NOT DETECTED
CHLORIDE BLD-SCNC: 104 MMOL/L (ref 98–107)
CO2: 23 MMOL/L (ref 22–29)
CORONAVIRUS 229E BY PCR: NOT DETECTED
CORONAVIRUS HKU1 BY PCR: NOT DETECTED
CORONAVIRUS NL63 BY PCR: NOT DETECTED
CORONAVIRUS OC43 BY PCR: NOT DETECTED
CREAT SERPL-MCNC: 2 MG/DL (ref 0.5–1)
EOSINOPHILS ABSOLUTE: 0.13 E9/L (ref 0.05–0.5)
EOSINOPHILS RELATIVE PERCENT: 1.6 % (ref 0–6)
GFR AFRICAN AMERICAN: 29
GFR NON-AFRICAN AMERICAN: 24 ML/MIN/1.73
GLUCOSE BLD-MCNC: 85 MG/DL (ref 74–99)
HBA1C MFR BLD: 6.2 % (ref 4–5.6)
HCT VFR BLD CALC: 36.2 % (ref 34–48)
HEMOGLOBIN: 11.7 G/DL (ref 11.5–15.5)
HUMAN METAPNEUMOVIRUS BY PCR: NOT DETECTED
HUMAN RHINOVIRUS/ENTEROVIRUS BY PCR: NOT DETECTED
IMMATURE GRANULOCYTES #: 0.04 E9/L
IMMATURE GRANULOCYTES %: 0.5 % (ref 0–5)
INFLUENZA A BY PCR: NOT DETECTED
INFLUENZA B BY PCR: NOT DETECTED
LYMPHOCYTES ABSOLUTE: 1.57 E9/L (ref 1.5–4)
LYMPHOCYTES RELATIVE PERCENT: 19.3 % (ref 20–42)
MCH RBC QN AUTO: 29 PG (ref 26–35)
MCHC RBC AUTO-ENTMCNC: 32.3 % (ref 32–34.5)
MCV RBC AUTO: 89.6 FL (ref 80–99.9)
METER GLUCOSE: 89 MG/DL (ref 74–99)
METER GLUCOSE: 97 MG/DL (ref 74–99)
MONOCYTES ABSOLUTE: 0.67 E9/L (ref 0.1–0.95)
MONOCYTES RELATIVE PERCENT: 8.2 % (ref 2–12)
MYCOPLASMA PNEUMONIAE BY PCR: NOT DETECTED
NEUTROPHILS ABSOLUTE: 5.7 E9/L (ref 1.8–7.3)
NEUTROPHILS RELATIVE PERCENT: 70.2 % (ref 43–80)
PARAINFLUENZA VIRUS 1 BY PCR: NOT DETECTED
PARAINFLUENZA VIRUS 2 BY PCR: NOT DETECTED
PARAINFLUENZA VIRUS 3 BY PCR: NOT DETECTED
PARAINFLUENZA VIRUS 4 BY PCR: NOT DETECTED
PDW BLD-RTO: 15.9 FL (ref 11.5–15)
PLATELET # BLD: 195 E9/L (ref 130–450)
PMV BLD AUTO: 9.6 FL (ref 7–12)
POTASSIUM REFLEX MAGNESIUM: 5.1 MMOL/L (ref 3.5–5)
PRO-BNP: 897 PG/ML (ref 0–450)
RBC # BLD: 4.04 E12/L (ref 3.5–5.5)
RESPIRATORY SYNCYTIAL VIRUS BY PCR: NOT DETECTED
SARS-COV-2, NAAT: NOT DETECTED
SARS-COV-2, PCR: NOT DETECTED
SODIUM BLD-SCNC: 136 MMOL/L (ref 132–146)
TROPONIN, HIGH SENSITIVITY: 10 NG/L (ref 0–9)
TROPONIN, HIGH SENSITIVITY: 11 NG/L (ref 0–9)
WBC # BLD: 8.1 E9/L (ref 4.5–11.5)

## 2022-07-06 PROCEDURE — 93005 ELECTROCARDIOGRAM TRACING: CPT | Performed by: NURSE PRACTITIONER

## 2022-07-06 PROCEDURE — 2500000003 HC RX 250 WO HCPCS: Performed by: EMERGENCY MEDICINE

## 2022-07-06 PROCEDURE — 94640 AIRWAY INHALATION TREATMENT: CPT

## 2022-07-06 PROCEDURE — 82962 GLUCOSE BLOOD TEST: CPT

## 2022-07-06 PROCEDURE — APPSS45 APP SPLIT SHARED TIME 31-45 MINUTES: Performed by: NURSE PRACTITIONER

## 2022-07-06 PROCEDURE — 1200000000 HC SEMI PRIVATE

## 2022-07-06 PROCEDURE — 6360000002 HC RX W HCPCS: Performed by: EMERGENCY MEDICINE

## 2022-07-06 PROCEDURE — 80048 BASIC METABOLIC PNL TOTAL CA: CPT

## 2022-07-06 PROCEDURE — 36415 COLL VENOUS BLD VENIPUNCTURE: CPT

## 2022-07-06 PROCEDURE — 0202U NFCT DS 22 TRGT SARS-COV-2: CPT

## 2022-07-06 PROCEDURE — 87635 SARS-COV-2 COVID-19 AMP PRB: CPT

## 2022-07-06 PROCEDURE — 2580000003 HC RX 258: Performed by: NURSE PRACTITIONER

## 2022-07-06 PROCEDURE — 96374 THER/PROPH/DIAG INJ IV PUSH: CPT

## 2022-07-06 PROCEDURE — 6360000002 HC RX W HCPCS: Performed by: NURSE PRACTITIONER

## 2022-07-06 PROCEDURE — 71046 X-RAY EXAM CHEST 2 VIEWS: CPT

## 2022-07-06 PROCEDURE — 84484 ASSAY OF TROPONIN QUANT: CPT

## 2022-07-06 PROCEDURE — 83880 ASSAY OF NATRIURETIC PEPTIDE: CPT

## 2022-07-06 PROCEDURE — 85025 COMPLETE CBC W/AUTO DIFF WBC: CPT

## 2022-07-06 PROCEDURE — 99285 EMERGENCY DEPT VISIT HI MDM: CPT

## 2022-07-06 PROCEDURE — 2580000003 HC RX 258: Performed by: EMERGENCY MEDICINE

## 2022-07-06 PROCEDURE — 6370000000 HC RX 637 (ALT 250 FOR IP): Performed by: NURSE PRACTITIONER

## 2022-07-06 PROCEDURE — 87040 BLOOD CULTURE FOR BACTERIA: CPT

## 2022-07-06 PROCEDURE — 71250 CT THORAX DX C-: CPT

## 2022-07-06 PROCEDURE — 83036 HEMOGLOBIN GLYCOSYLATED A1C: CPT

## 2022-07-06 RX ORDER — FUROSEMIDE 20 MG/1
20 TABLET ORAL DAILY
Status: DISCONTINUED | OUTPATIENT
Start: 2022-07-06 | End: 2022-07-08 | Stop reason: HOSPADM

## 2022-07-06 RX ORDER — ALBUTEROL SULFATE 2.5 MG/3ML
2.5 SOLUTION RESPIRATORY (INHALATION) EVERY 4 HOURS PRN
Status: DISCONTINUED | OUTPATIENT
Start: 2022-07-06 | End: 2022-07-08 | Stop reason: HOSPADM

## 2022-07-06 RX ORDER — ACETAMINOPHEN 650 MG/1
650 SUPPOSITORY RECTAL EVERY 6 HOURS PRN
Status: DISCONTINUED | OUTPATIENT
Start: 2022-07-06 | End: 2022-07-08 | Stop reason: HOSPADM

## 2022-07-06 RX ORDER — ONDANSETRON 4 MG/1
4 TABLET, ORALLY DISINTEGRATING ORAL EVERY 8 HOURS PRN
Status: DISCONTINUED | OUTPATIENT
Start: 2022-07-06 | End: 2022-07-08 | Stop reason: HOSPADM

## 2022-07-06 RX ORDER — INSULIN LISPRO 100 [IU]/ML
0-6 INJECTION, SOLUTION INTRAVENOUS; SUBCUTANEOUS
Status: DISCONTINUED | OUTPATIENT
Start: 2022-07-06 | End: 2022-07-08 | Stop reason: HOSPADM

## 2022-07-06 RX ORDER — SODIUM CHLORIDE 9 MG/ML
INJECTION, SOLUTION INTRAVENOUS PRN
Status: DISCONTINUED | OUTPATIENT
Start: 2022-07-06 | End: 2022-07-08 | Stop reason: HOSPADM

## 2022-07-06 RX ORDER — POLYETHYLENE GLYCOL 3350 17 G/17G
17 POWDER, FOR SOLUTION ORAL DAILY PRN
Status: DISCONTINUED | OUTPATIENT
Start: 2022-07-06 | End: 2022-07-08 | Stop reason: HOSPADM

## 2022-07-06 RX ORDER — SPIRONOLACTONE 25 MG/1
25 TABLET ORAL DAILY
Status: DISCONTINUED | OUTPATIENT
Start: 2022-07-07 | End: 2022-07-08 | Stop reason: HOSPADM

## 2022-07-06 RX ORDER — SODIUM CHLORIDE 0.9 % (FLUSH) 0.9 %
5-40 SYRINGE (ML) INJECTION EVERY 12 HOURS SCHEDULED
Status: DISCONTINUED | OUTPATIENT
Start: 2022-07-06 | End: 2022-07-08 | Stop reason: HOSPADM

## 2022-07-06 RX ORDER — PANTOPRAZOLE SODIUM 40 MG/1
40 TABLET, DELAYED RELEASE ORAL
Status: DISCONTINUED | OUTPATIENT
Start: 2022-07-07 | End: 2022-07-08 | Stop reason: HOSPADM

## 2022-07-06 RX ORDER — METOPROLOL SUCCINATE 25 MG/1
25 TABLET, EXTENDED RELEASE ORAL DAILY
Status: DISCONTINUED | OUTPATIENT
Start: 2022-07-06 | End: 2022-07-08 | Stop reason: HOSPADM

## 2022-07-06 RX ORDER — VITAMIN B COMPLEX
1000 TABLET ORAL DAILY
Status: DISCONTINUED | OUTPATIENT
Start: 2022-07-06 | End: 2022-07-08 | Stop reason: HOSPADM

## 2022-07-06 RX ORDER — BUDESONIDE 0.25 MG/2ML
0.25 INHALANT ORAL 2 TIMES DAILY
Status: DISCONTINUED | OUTPATIENT
Start: 2022-07-06 | End: 2022-07-08 | Stop reason: HOSPADM

## 2022-07-06 RX ORDER — HEPARIN SODIUM 10000 [USP'U]/ML
5000 INJECTION, SOLUTION INTRAVENOUS; SUBCUTANEOUS EVERY 8 HOURS SCHEDULED
Status: DISCONTINUED | OUTPATIENT
Start: 2022-07-06 | End: 2022-07-08 | Stop reason: HOSPADM

## 2022-07-06 RX ORDER — INSULIN LISPRO 100 [IU]/ML
0-3 INJECTION, SOLUTION INTRAVENOUS; SUBCUTANEOUS NIGHTLY
Status: DISCONTINUED | OUTPATIENT
Start: 2022-07-06 | End: 2022-07-08 | Stop reason: HOSPADM

## 2022-07-06 RX ORDER — ONDANSETRON 2 MG/ML
4 INJECTION INTRAMUSCULAR; INTRAVENOUS EVERY 6 HOURS PRN
Status: DISCONTINUED | OUTPATIENT
Start: 2022-07-06 | End: 2022-07-08 | Stop reason: HOSPADM

## 2022-07-06 RX ORDER — FERROUS SULFATE 325(65) MG
325 TABLET ORAL
Status: DISCONTINUED | OUTPATIENT
Start: 2022-07-07 | End: 2022-07-08 | Stop reason: HOSPADM

## 2022-07-06 RX ORDER — DEXTROSE MONOHYDRATE 50 MG/ML
100 INJECTION, SOLUTION INTRAVENOUS PRN
Status: DISCONTINUED | OUTPATIENT
Start: 2022-07-06 | End: 2022-07-08 | Stop reason: HOSPADM

## 2022-07-06 RX ORDER — ARFORMOTEROL TARTRATE 15 UG/2ML
15 SOLUTION RESPIRATORY (INHALATION) 2 TIMES DAILY
Status: DISCONTINUED | OUTPATIENT
Start: 2022-07-06 | End: 2022-07-08 | Stop reason: HOSPADM

## 2022-07-06 RX ORDER — SODIUM CHLORIDE 9 MG/ML
INJECTION, SOLUTION INTRAVENOUS CONTINUOUS
Status: DISCONTINUED | OUTPATIENT
Start: 2022-07-06 | End: 2022-07-08 | Stop reason: HOSPADM

## 2022-07-06 RX ORDER — SODIUM CHLORIDE 0.9 % (FLUSH) 0.9 %
5-40 SYRINGE (ML) INJECTION PRN
Status: DISCONTINUED | OUTPATIENT
Start: 2022-07-06 | End: 2022-07-08 | Stop reason: HOSPADM

## 2022-07-06 RX ORDER — ACETAMINOPHEN 325 MG/1
650 TABLET ORAL EVERY 6 HOURS PRN
Status: DISCONTINUED | OUTPATIENT
Start: 2022-07-06 | End: 2022-07-08 | Stop reason: HOSPADM

## 2022-07-06 RX ADMIN — Medication 1000 UNITS: at 19:02

## 2022-07-06 RX ADMIN — SODIUM CHLORIDE: 9 INJECTION, SOLUTION INTRAVENOUS at 18:48

## 2022-07-06 RX ADMIN — METOPROLOL SUCCINATE 25 MG: 25 TABLET, EXTENDED RELEASE ORAL at 18:45

## 2022-07-06 RX ADMIN — DOXYCYCLINE 100 MG: 100 INJECTION, POWDER, LYOPHILIZED, FOR SOLUTION INTRAVENOUS at 15:45

## 2022-07-06 RX ADMIN — WATER 2000 MG: 1 INJECTION INTRAMUSCULAR; INTRAVENOUS; SUBCUTANEOUS at 15:36

## 2022-07-06 RX ADMIN — SODIUM CHLORIDE, PRESERVATIVE FREE 10 ML: 5 INJECTION INTRAVENOUS at 20:24

## 2022-07-06 RX ADMIN — BUDESONIDE 250 MCG: 0.25 SUSPENSION RESPIRATORY (INHALATION) at 20:51

## 2022-07-06 RX ADMIN — ARFORMOTEROL TARTRATE 15 MCG: 15 SOLUTION RESPIRATORY (INHALATION) at 20:50

## 2022-07-06 RX ADMIN — HEPARIN SODIUM 5000 UNITS: 10000 INJECTION INTRAVENOUS; SUBCUTANEOUS at 22:08

## 2022-07-06 RX ADMIN — FUROSEMIDE 20 MG: 20 TABLET ORAL at 18:45

## 2022-07-06 ASSESSMENT — ENCOUNTER SYMPTOMS
ABDOMINAL PAIN: 0
COUGH: 1
ABDOMINAL DISTENTION: 0
CHOKING: 0
SHORTNESS OF BREATH: 1
COLOR CHANGE: 0
CONSTIPATION: 0
BACK PAIN: 0
EYE PAIN: 0
DIARRHEA: 0
PHOTOPHOBIA: 0
VOMITING: 0
BLOOD IN STOOL: 0
NAUSEA: 0
CHEST TIGHTNESS: 0
SINUS PAIN: 0
SORE THROAT: 0
WHEEZING: 0
RHINORRHEA: 1

## 2022-07-06 ASSESSMENT — PAIN SCALES - GENERAL: PAINLEVEL_OUTOF10: 0

## 2022-07-06 ASSESSMENT — PAIN - FUNCTIONAL ASSESSMENT: PAIN_FUNCTIONAL_ASSESSMENT: NONE - DENIES PAIN

## 2022-07-06 NOTE — H&P
[facility]   History and Physical      CHIEF COMPLAINT:   SOB    History of Present Illness:  81 y.o. female who presents from home with worsenig SOB sine Friday. Patient states she saw her PCP Friday with complaints of SOB, chest pain and was started on Levaquin. She wasn't feeling any better so she came to the ER. CT chest showed bilateral lower lobe pneumonia. She states she did have fever while at her PCPs office Friday but they didn't tell her how high it was. Informant(s) for H&P: patient     REVIEW OF SYSTEMS:  Denies subjective chills, cough, congestion, n/v/d, ha, vision/hearing changes, wt changes, hot/cold flashes, other open skin lesions, constipation, dysuria/hematuria unless noted in HPI. Complete ROS performed with the patient and is otherwise negative.       PMH:  Past Medical History:   Diagnosis Date    (HFpEF) heart failure with preserved ejection fraction ([facility]     Acute cystitis with hematuria 05/03/2019    Anemia 2008    RECEIVED 4 UNITS BLOOD    Atrial fibrillation ([facility])     CKD (chronic kidney disease) stage 3, GFR 30-59 ml/min ([facility]) 05/03/2019    CPAP (continuous positive airway pressure) dependence     Diabetes mellitus (HCC)     Esophageal stricture     Gastric ulcer     Goiter     Heart murmur     Hypertension     SINCE 1994    SACHIN (iron deficiency anemia)     Kidney stones     Rheumatic fever     POSSIBLY AS A CHILD    Sleep apnea        Surgical History:  Past Surgical History:   Procedure Laterality Date    BREAST LUMPECTOMY Left     CALCIUM BUILDUP    CARDIOVASCULAR STRESS TEST  1999    ISCHEMIC NUCLEAR STRESS TEST - PATIENT REFUSED HEART CATHETERIZATION    CHOLECYSTECTOMY      COLONOSCOPY  07/11    W/POLYP REMOVAL    DOPPLER ECHOCARDIOGRAPHY  05/14/03    POSSIBLE MILD MITRAL STENOSIS    DOPPLER ECHOCARDIOGRAPHY  06/09/04    MILD MITRAL STENSIS, MODERATE MITRAL REGURGITATION    FOOT SURGERY      HYSTERECTOMY (CERVIX STATUS UNKNOWN)      POLYPECTOMY      16 POLYPS REMOVED FROM STOMACH AND 1 FROM COLON    UPPER GASTROINTESTINAL ENDOSCOPY  07/11       Medications Prior to Admission:    Prior to Admission medications    Medication Sig Start Date End Date Taking? Authorizing Provider   empagliflozin (JARDIANCE) 10 MG tablet Take 10 mg by mouth daily    Historical Provider, MD   levoFLOXacin (LEVAQUIN) 500 MG tablet Take 500 mg by mouth daily 10 day course and she started taking it on Friday July 1st 2022    Historical Provider, MD   potassium chloride (KLOR-CON M) 10 MEQ extended release tablet TAKE ONE TABLET BY MOUTH DAILY 5/26/22   Shahana Kolb DO   spironolactone (ALDACTONE) 25 MG tablet Take 25 mg by mouth daily    Historical Provider, MD   furosemide (LASIX) 20 MG tablet Take 1 tablet by mouth daily  Patient taking differently: Take 20 mg by mouth daily Take M-W-F 4/6/22   Urieleatha YOHANA Domínguez - CNP   metoprolol succinate (TOPROL XL) 50 MG extended release tablet Take 1 tablet by mouth nightly  Patient taking differently: Take 25 mg by mouth daily 25mg daily 9/16/21   Urieleatha YOHANA Domínguez - CNP   ferrous sulfate (IRON 325) 325 (65 Fe) MG tablet Take 325 mg by mouth daily (with breakfast)    Historical Provider, MD   omeprazole (PRILOSEC) 40 MG delayed release capsule Take 1 capsule by mouth every morning 10/17/19   Historical Provider, MD   sertraline (ZOLOFT) 50 MG tablet Take 75 mg by mouth daily  9/26/19   Historical Provider, MD   CPAP Machine MISC Inhale into the lungs nightly     Historical Provider, MD   vitamin D3 (CHOLECALCIFEROL) 1000 UNITS TABS tablet Take 1 tablet by mouth daily. 9/17/14   Crow Machuca DO       Allergies:    Nitrofuran derivatives, Norvasc [amlodipine], Other, and Sulfa antibiotics    Social History:    reports that she has never smoked. She has never used smokeless tobacco. She reports previous alcohol use. She reports that she does not use drugs.       Family History:   family history includes Alcohol Abuse in her father; Asthma in her mother; Diabetes in her mother. PHYSICAL EXAM:  Vitals:  /60   Pulse 77   Temp 98.8 °F (37.1 °C) (Oral)   Resp 16   Ht 5' 6\" (1.676 m)   Wt 150 lb (68 kg)   SpO2 97%   BMI 24.21 kg/m²     General Appearance: alert and oriented to person, place and time and in no acute distress  Skin: warm and dry  Head: normocephalic and atraumatic  Eyes: pupils equal, round, and reactive to light, extraocular eye movements intact, conjunctivae normal  Neck: neck supple and non tender without mass   Pulmonary/Chest: coarse crackles bilaterally, normal air movement, no respiratory distress  Cardiovascular: normal rate, normal S1 and S2 and no carotid bruits  Abdomen: soft, non-tender, non-distended, normal bowel sounds, no masses or organomegaly  Extremities: no cyanosis, no clubbing and no edema  Neurologic: no cranial nerve deficit and speech normal    LABS:  Recent Labs     07/06/22  1300      K 5.1*      CO2 23   BUN 34*   CREATININE 2.0*   GLUCOSE 85   CALCIUM 9.3       Recent Labs     07/06/22  1300   WBC 8.1   RBC 4.04   HGB 11.7   HCT 36.2   MCV 89.6   MCH 29.0   MCHC 32.3   RDW 15.9*      MPV 9.6       No results for input(s): POCGLU in the last 72 hours. I reviewed all labs and diagnostic images        Radiology: XR CHEST (2 VW)    Result Date: 7/6/2022  EXAMINATION: TWO XRAY VIEWS OF THE CHEST 7/6/2022 12:46 pm COMPARISON: 09/02/2021 HISTORY: ORDERING SYSTEM PROVIDED HISTORY: shortness of breath TECHNOLOGIST PROVIDED HISTORY: Reason for exam:->shortness of breath FINDINGS: No evidence of consolidating pneumonia. There is no effusion or pneumothorax. The cardiomediastinal silhouette is without acute process. The osseous structures are without acute process. Minimal linear atelectasis in the lingular segment similar to the prior examination. Minimal linear lingular subsegmental atelectasis. No pleural fluid.      CT CHEST WO CONTRAST    Result Date: 7/6/2022  EXAMINATION: CT OF THE CHEST WITHOUT CONTRAST 7/6/2022 2:12 pm TECHNIQUE: CT of the chest was performed without the administration of intravenous contrast. Multiplanar reformatted images are provided for review. Automated exposure control, iterative reconstruction, and/or weight based adjustment of the mA/kV was utilized to reduce the radiation dose to as low as reasonably achievable. COMPARISON: 08/31/2021 chest CT. HISTORY: ORDERING SYSTEM PROVIDED HISTORY: sob TECHNOLOGIST PROVIDED HISTORY: Reason for exam:->sob Decision Support Exception - unselect if not a suspected or confirmed emergency medical condition->Emergency Medical Condition (MA) FINDINGS: Mediastinum: There is atherosclerotic calcification of the thoracic aorta and coronary artery. No thoracic aortic aneurysm. Cardiac size is enlarged. No mediastinal, hilar or axillary lymphadenopathy. There is a small hiatal hernia. Lungs/pleura: There are interstitial and linear densities at the lung bases. There is mild bronchiectasis and peribronchial thickening of the lower lobe bronchi. No pleural effusions. Linear density in the right middle lobe and lingula is probably due to atelectasis. Upper Abdomen: Bilateral benign adrenal adenomas are not significantly changed. There are bilateral renal cysts. There is a stable cyst in the left hepatic lobe. There are surgical clips in the gallbladder fossa. Soft Tissues/Bones: There is mild thoracic spondylosis and generalized osteopenia. Bilateral lower lobe infiltrates with mild bronchiectasis and peribronchial thickening likely on an inflammatory/infectious basis. Previously the lower lobe infiltrates were more consolidative and there are bilateral pleural effusions. Cardiomegaly and atherosclerosis. Stable benign adrenal adenomas bilaterally. No follow-up imaging is required. Bilateral renal cysts. No imaging follow-up is required.            ASSESSMENT: Principal Problem:    Pneumonia  Active Problems:    HTN (hypertension)    Hyperlipidemia    JERICA on CPAP    PAF (paroxysmal atrial fibrillation) (HCC)    Acute kidney injury superimposed on chronic kidney disease ([facility] [address]    CAP (community acquired pneumonia) due to Chlamydia species  Resolved Problems:    * No resolved hospital problems. *      PLAN:    1. BLL CAP  -failed outpatient treatment levaquin  -continue ceftriaxone and doxycyline   -check procal, resp film panel, strep pneumo, legionella  -Blood cultures pending  -add budesonide, pulmicort, IS      2. PAF  -follows with cardiology- Candelaria Ventura  -metorpolol  -off [address] [place] [address] Road due to frequent falls    3. HFpEF  -resume home aldactone but monitor BMP as potassium is somewhat elevated at 5.1  -10/15/21 echo with EF 60%    4. DM2  - jardiance, add carb control diet, low dose insulin slide scale, hypoglycemia protocol    5. HTN  -metorpolol    6. JERICA  -hasn't been wearing CPAP    7.  Acute on chronic kidney disease  -Cr 2.0, baseline 1.3-1.6        Code Status:  full  DVT prophylaxis: heparin SC         Electronically signed by YOHANA Graves CNP on 7/6/2022 at 4:43 PM

## 2022-07-06 NOTE — PROGRESS NOTES
[facility] Addendum    I have personally participated in the history, exam, medical decision making with Lavonne Singh NP on the date of service, I have personally reviewed objective data and I agree with past medical, family, and social history as well as assessment and plan unless otherwise noted. Objective  Physical Exam  Vitals: /60   Pulse 77   Temp 98.8 °F (37.1 °C) (Oral)   Resp 16   Ht 5' 6\" (1.676 m)   Wt 150 lb (68 kg)   SpO2 97%   BMI 24.21 kg/m²   General: well-developed, well-nourished, no acute distress, cooperative  Skin: generally warm, dry, and intact, with normal color  HEENT: normocephalic, atraumatic, no gross abnormalities  Respiratory: clear to auscultation bilaterally without respiratory distress  Cardiovascular: regular rate and rhythm without murmur / rub / gallop  Abdominal: soft, nontender, nondistended, normoactive bowel sounds  Extremities: no obvious edema or deformity  Neurologic: awake, alert, no gross deficits  Psychiatric: normal affect, cooperative    Assessment / Plan  · Community acquired pneumonia  · NIDDM  · Hx PAF not on anticoagulation, HFpEF, chronic anemia, CKD, sleep apnea    Not septic or hypoxic. CXR showed minimal lingular atelectasis w follow up CT showing bl lower lobe infiltrates w bilateral effusions. Failed outpatient Levaquin, given Rocephin and doxycycline in ED, continue for now and check CRP / procal / viral panel / urine antigens. Add nebs and incentive spirometer. Cr at 2.0 which is either high-normal or just a bit over that for the pt; will continue to monitor. Otherwise as per Pau's note. Please see orders for further plan of care. Time spent on chart review, clinical exam, discussing case and answering questions with staff, consultants, patient, and family was approximately 25 minutes.     Electronically signed by Yon Sam DO on 7/6/2022 at 4:37 PM

## 2022-07-06 NOTE — ED NOTES
This RN called the floor to verify they received pts SBAR. Pt to floor with staff at this time.      Jez Baires, BEVERLY  07/06/22 1425

## 2022-07-06 NOTE — ED NOTES
Department of Emergency Medicine  FIRST PROVIDER TRIAGE NOTE             Independent MLP           7/6/22  11:44 AM EDT    Date of Encounter: 7/6/22   MRN: [id number]      HPI: Patricia Perez is a 81 y.o. female who presents to the ED for Shortness of Breath (saw dr on friday, started on levaquin Friday / 97% RA ), Chest Pain (since friday, left chest intermittent pain), and Cough     Increased shortness of breath, chest pain and cough. She saw her PCP on Friday and suspected pneumonia and started on Levaquin. She states she is not getting any better. Was vaccinated with COVID-19 back in March. She has not had a COVID-19 test.    Vitals:    07/06/22 1142   BP: (!) 109/97   Pulse: 86   Resp: 16   Temp: 98.8 °F (37.1 °C)   SpO2: 98%         ROS: Negative for abd pain, diarrhea or rash. PE: Gen Appearance/Constitutional: alert  CV: regular rate  Pulm: CTA bilat     Initial Plan of Care: All treatment areas with department are currently occupied. Plan to order/Initiate the following while awaiting opening in ED: labs, EKG and imaging studies.   Initiate Treatment-Testing, Proceed toTreatment Area When Bed Available for ED Attending/MLP to Continue Care    Electronically signed by YOHANA Gracia CNP   DD: 7/6/22         YOHANA Gracia CNP  07/06/22 9815

## 2022-07-06 NOTE — PROGRESS NOTES
Database initiated pharmacy and medications verified with the patient. She is A&O from home ambulates with a rollerator and is RA at baseline.

## 2022-07-06 NOTE — ED PROVIDER NOTES
Name: Lupe Babcock   MRN: [id number]     --------------------------------------------- History of Present Illness ---------------------------------------------  7/6/22, Time: 12:31 PM EDT   Chief Complaint   Patient presents with    Shortness of Breath     saw dr on friday, started on levaquin Friday / 97% RA     Chest Pain     since friday, left chest intermittent pain    Cough      HPI    Lupe Babcock is a 81 y.o. female, with hx of stage 3 CKD, JERICA (on CPAP at night), GERD, DM (on metformin), HTN, mitral insufficiency (rheumatic mitral stenosis), paroxysmal a-fib (not on anticoagulant), hyperlipidemia, CHF (on lasix) (Dr. Aditi Toure is cardiologist), HFpEF (recent EF 65%), who presents to the ED today from home (lives with her son) for shortness of breath, which began 5 days ago and has been getting worse. Pt was recently started on Levaquin last Friday by her doctor for suspected pneumonia. Pt relates shortness of breath worse with walking, no orthopnea however, better with rest. Pt relates she also has intermittent mild left sided chest pain as well, denies radiation, denies jaw, neck, arm or back pain. Pt has felt feverish over the past few days, did not take her temp however. Pt concerned as she does not feel any better and she is half way through her abx treatment. The pt denies any associated fever, lightheadedness, dizziness, n/v, abd pain, GI or  complaints. Pt was vaccinated for COVID. Pt recently had UTI 2 weeks ago and was treated for this and is no longer having any urinary symptoms.      Allg: Nitrofuran derivatives, Norvasc [amlodipine], Other, and Sulfa antibiotics   PCP: Zuleyma Norton MD.    Meds:   Current Facility-Administered Medications:     doxycycline (VIBRAMYCIN) 100 mg in dextrose 5 % 100 mL IVPB, 100 mg, IntraVENous, Once, Opal Rosario DO, Last Rate: 100 mL/hr at 07/06/22 1545, 100 mg at 07/06/22 1545    Current Outpatient Medications:     empagliflozin (JARDIANCE) 10 MG Musculoskeletal: Negative for back pain, neck pain and neck stiffness. Skin: Negative for color change, rash and wound. Neurological: Negative for dizziness, syncope, light-headedness and headaches. Psychiatric/Behavioral: Negative for agitation, behavioral problems and confusion. Physical Exam  Constitutional:       General: She is not in acute distress. Appearance: Normal appearance. She is normal weight. She is not ill-appearing, toxic-appearing or diaphoretic. HENT:      Head: Normocephalic and atraumatic. Right Ear: External ear normal.      Left Ear: External ear normal.      Nose: Nose normal. No rhinorrhea. Mouth/Throat:      Pharynx: Oropharynx is clear. Eyes:      General: No scleral icterus. Right eye: No discharge. Left eye: No discharge. Extraocular Movements: Extraocular movements intact. Conjunctiva/sclera: Conjunctivae normal.      Pupils: Pupils are equal, round, and reactive to light. Cardiovascular:      Rate and Rhythm: Normal rate and regular rhythm. Pulses: Normal pulses. Pulmonary:      Effort: Pulmonary effort is normal. No tachypnea, accessory muscle usage or respiratory distress. Breath sounds: Normal breath sounds. No stridor. No decreased breath sounds, wheezing, rhonchi or rales. Chest:      Chest wall: No tenderness or edema. Abdominal:      General: Abdomen is flat. There is no distension. Palpations: Abdomen is soft. Tenderness: There is no abdominal tenderness. There is no guarding. Musculoskeletal:         General: No swelling or tenderness. Normal range of motion. Cervical back: Normal range of motion. Right lower leg: No tenderness. No edema. Left lower leg: No tenderness. No edema. Skin:     General: Skin is warm and dry. Capillary Refill: Capillary refill takes less than 2 seconds. Coloration: Skin is not jaundiced. Findings: No erythema or rash. Neurological:      General: No focal deficit present. Mental Status: She is alert and oriented to person, place, and time. Psychiatric:         Mood and Affect: Mood normal.         Behavior: Behavior normal.          Procedures     MDM  Number of Diagnoses or Management Options  Chest pain, unspecified type  Chronic kidney disease, unspecified CKD stage  Pneumonia of both lower lobes due to infectious organism  Diagnosis management comments: Mrs. Shelly Banks is a 82 y/o F patient who presents today for shortness of breath and left-sided intermittent chest pain x5 days, recently diagnosed with suspected pneumonia and on the Levaquin treatment day 5. Patient concerned that she is not feeling better yet. Patient alert and oriented, /97, other vitals within normal limits. Respirations 18, nonlabored, sats 98% on room air. Shortness of breath - no signs of respiratory distress, patient speaking in full uninterrupted sentences, O2 sats 98% on room air. Lungs C/E bilaterally, no LE edema. No unilateral leg swelling. No tachycardia. No hx of DVT/PE. PE felt unlikely at this time. Chest x-ray shows minimal subsegmental atelectasis, no pleural fluid, no consolidation. No leukocytosis present. No anemia. CT chest showed bilateral lower lobe infiltrates, rocephin and doxy given. Blood cx pending. Chest pain - EKG shows normal sinus rhythm, similar to previous EKG, no signs of ischemic changes. Trops 11>10. Pts chest pain possibly pleuritic vs chest wall pain as she states exacerbated when coughing. CKD - Cr today 2.0, similar to previous lab work last month, however appears from multiple previous labs her normal Cr around 1.5 range. Spoke with pt and sister about results, recommended admission for further treatment. They were amenable to this plan. Spoke with Dr. Beto Meek, hospitalist, who will admit patient for further care.        Amount and/or Complexity of Data Reviewed  Decide to obtain previous medical records or to obtain history from someone other than the patient: yes         EKG Interpretation  Interpreted by emergency department physician. 7/6/22  Time: 1130    Rate: 85  Axis: left  ND: 138  QRS: 88  Qtc: 418  Rhythm: regular  Clinical Impression: NSR, left axis deviation  Comparison to old EKG: Similar to prev 10/7/21          --------------------------------------------- PAST HISTORY ---------------------------------------------  Past Medical History:  has a past medical history of (HFpEF) heart failure with preserved ejection fraction ([facility] Acute cystitis with hematuria, Anemia, Atrial fibrillation ([facility] CKD (chronic kidney disease) stage 3, GFR 30-59 ml/min ([facility]), CPAP (continuous positive airway pressure) dependence, Diabetes mellitus ([facility] Esophageal stricture, Gastric ulcer, Goiter, Heart murmur, Hypertension, SACHIN (iron deficiency anemia), Kidney stones, Rheumatic fever, and Sleep apnea. Past Surgical History:  has a past surgical history that includes cardiovascular stress test (1999); doppler echocardiography (05/14/03); doppler echocardiography (06/09/04); Hysterectomy; Cholecystectomy; polypectomy; Upper gastrointestinal endoscopy (07/11); Colonoscopy (07/11); Breast lumpectomy (Left); and Foot surgery. Social History:  reports that she has never smoked. She has never used smokeless tobacco. She reports previous alcohol use. She reports that she does not use drugs. Family History: family history includes Alcohol Abuse in her father; Asthma in her mother; Diabetes in her mother. The patients home medications have been reviewed.     Allergies: Nitrofuran derivatives, Norvasc [amlodipine], Other, and Sulfa antibiotics    -------------------------------------------------- RESULTS -------------------------------------------------    LABS:  Results for orders placed or performed during the hospital encounter of 07/06/22   COVID-19, Rapid    Specimen: Nasopharyngeal Swab   Result Value Ref Range    SARS-CoV-2, NAAT Not Detected Not Detected   CBC with Auto Differential   Result Value Ref Range    WBC 8.1 4.5 - 11.5 E9/L    RBC 4.04 3.50 - 5.50 E12/L    Hemoglobin 11.7 11.5 - 15.5 g/dL    Hematocrit 36.2 34.0 - 48.0 %    MCV 89.6 80.0 - 99.9 fL    MCH 29.0 26.0 - 35.0 pg    MCHC 32.3 32.0 - 34.5 %    RDW 15.9 (H) 11.5 - 15.0 fL    Platelets 718 926 - 959 E9/L    MPV 9.6 7.0 - 12.0 fL    Neutrophils % 70.2 43.0 - 80.0 %    Immature Granulocytes % 0.5 0.0 - 5.0 %    Lymphocytes % 19.3 (L) 20.0 - 42.0 %    Monocytes % 8.2 2.0 - 12.0 %    Eosinophils % 1.6 0.0 - 6.0 %    Basophils % 0.2 0.0 - 2.0 %    Neutrophils Absolute 5.70 1.80 - 7.30 E9/L    Immature Granulocytes # 0.04 E9/L    Lymphocytes Absolute 1.57 1.50 - 4.00 E9/L    Monocytes Absolute 0.67 0.10 - 0.95 E9/L    Eosinophils Absolute 0.13 0.05 - 0.50 E9/L    Basophils Absolute 0.02 0.00 - 0.20 L7/J   Basic Metabolic Panel w/ Reflex to MG   Result Value Ref Range    Sodium 136 132 - 146 mmol/L    Potassium reflex Magnesium 5.1 (H) 3.5 - 5.0 mmol/L    Chloride 104 98 - 107 mmol/L    CO2 23 22 - 29 mmol/L    Anion Gap 9 7 - 16 mmol/L    Glucose 85 74 - 99 mg/dL    BUN 34 (H) 6 - 23 mg/dL    CREATININE 2.0 (H) 0.5 - 1.0 mg/dL    GFR Non-African American 24 >=60 mL/min/1.73    GFR African American 29     Calcium 9.3 8.6 - 10.2 mg/dL   Troponin   Result Value Ref Range    Troponin, High Sensitivity 11 (H) 0 - 9 ng/L   Brain Natriuretic Peptide   Result Value Ref Range    Pro- (H) 0 - 450 pg/mL   Troponin   Result Value Ref Range    Troponin, High Sensitivity 10 (H) 0 - 9 ng/L       RADIOLOGY:  CT CHEST WO CONTRAST   Final Result   Bilateral lower lobe infiltrates with mild bronchiectasis and peribronchial   thickening likely on an inflammatory/infectious basis. Previously the lower   lobe infiltrates were more consolidative and there are bilateral pleural   effusions. Cardiomegaly and atherosclerosis.       Stable ---------------------------------    Consultations:  Time: 7950. Spoke with Dr. Beto Meek, hospitalist.  Discussed case. They will admit the patient. This patient's ED course included: a personal history and physicial examination, re-evaluation prior to disposition, multiple bedside re-evaluations, IV medications, cardiac monitoring, continuous pulse oximetry and complex medical decision making and emergency management    This patient has remained hemodynamically stable during their ED course. Diagnosis:  1. Pneumonia of both lower lobes due to infectious organism    2. Chest pain, unspecified type    3. Chronic kidney disease, unspecified CKD stage        Disposition:  Patient's disposition: Admit to telemetry  Patient's condition is fair. Carmina Wadsworth DO      *NOTE: This report was transcribed using voice recognition software. Every effort was made to ensure accuracy; however, inadvertent computerized transcription errors may be present.            Carmina Wadsworth DO  Resident  07/06/22 4034

## 2022-07-07 LAB
ANION GAP SERPL CALCULATED.3IONS-SCNC: 9 MMOL/L (ref 7–16)
BASOPHILS ABSOLUTE: 0.02 E9/L (ref 0–0.2)
BASOPHILS RELATIVE PERCENT: 0.3 % (ref 0–2)
BUN BLDV-MCNC: 30 MG/DL (ref 6–23)
CALCIUM SERPL-MCNC: 9.1 MG/DL (ref 8.6–10.2)
CHLORIDE BLD-SCNC: 106 MMOL/L (ref 98–107)
CO2: 22 MMOL/L (ref 22–29)
CREAT SERPL-MCNC: 1.9 MG/DL (ref 0.5–1)
EOSINOPHILS ABSOLUTE: 0.23 E9/L (ref 0.05–0.5)
EOSINOPHILS RELATIVE PERCENT: 3.1 % (ref 0–6)
GFR AFRICAN AMERICAN: 30
GFR NON-AFRICAN AMERICAN: 25 ML/MIN/1.73
GLUCOSE BLD-MCNC: 129 MG/DL (ref 74–99)
HCT VFR BLD CALC: 34.2 % (ref 34–48)
HEMOGLOBIN: 11.2 G/DL (ref 11.5–15.5)
IMMATURE GRANULOCYTES #: 0.02 E9/L
IMMATURE GRANULOCYTES %: 0.3 % (ref 0–5)
LYMPHOCYTES ABSOLUTE: 1.68 E9/L (ref 1.5–4)
LYMPHOCYTES RELATIVE PERCENT: 22.8 % (ref 20–42)
MCH RBC QN AUTO: 28.8 PG (ref 26–35)
MCHC RBC AUTO-ENTMCNC: 32.7 % (ref 32–34.5)
MCV RBC AUTO: 87.9 FL (ref 80–99.9)
METER GLUCOSE: 100 MG/DL (ref 74–99)
METER GLUCOSE: 103 MG/DL (ref 74–99)
METER GLUCOSE: 104 MG/DL (ref 74–99)
METER GLUCOSE: 120 MG/DL (ref 74–99)
METER GLUCOSE: 83 MG/DL (ref 74–99)
MONOCYTES ABSOLUTE: 0.53 E9/L (ref 0.1–0.95)
MONOCYTES RELATIVE PERCENT: 7.2 % (ref 2–12)
NEUTROPHILS ABSOLUTE: 4.89 E9/L (ref 1.8–7.3)
NEUTROPHILS RELATIVE PERCENT: 66.3 % (ref 43–80)
PDW BLD-RTO: 16 FL (ref 11.5–15)
PLATELET # BLD: 170 E9/L (ref 130–450)
PMV BLD AUTO: 9.7 FL (ref 7–12)
POTASSIUM REFLEX MAGNESIUM: 4.5 MMOL/L (ref 3.5–5)
RBC # BLD: 3.89 E12/L (ref 3.5–5.5)
SODIUM BLD-SCNC: 137 MMOL/L (ref 132–146)
WBC # BLD: 7.4 E9/L (ref 4.5–11.5)

## 2022-07-07 PROCEDURE — 80048 BASIC METABOLIC PNL TOTAL CA: CPT

## 2022-07-07 PROCEDURE — 36415 COLL VENOUS BLD VENIPUNCTURE: CPT

## 2022-07-07 PROCEDURE — 92526 ORAL FUNCTION THERAPY: CPT | Performed by: SPEECH-LANGUAGE PATHOLOGIST

## 2022-07-07 PROCEDURE — 6360000002 HC RX W HCPCS: Performed by: NURSE PRACTITIONER

## 2022-07-07 PROCEDURE — 94640 AIRWAY INHALATION TREATMENT: CPT

## 2022-07-07 PROCEDURE — 2580000003 HC RX 258: Performed by: NURSE PRACTITIONER

## 2022-07-07 PROCEDURE — 97165 OT EVAL LOW COMPLEX 30 MIN: CPT

## 2022-07-07 PROCEDURE — 2500000003 HC RX 250 WO HCPCS: Performed by: NURSE PRACTITIONER

## 2022-07-07 PROCEDURE — 97161 PT EVAL LOW COMPLEX 20 MIN: CPT

## 2022-07-07 PROCEDURE — APPSS30 APP SPLIT SHARED TIME 16-30 MINUTES: Performed by: NURSE PRACTITIONER

## 2022-07-07 PROCEDURE — 99233 SBSQ HOSP IP/OBS HIGH 50: CPT | Performed by: INTERNAL MEDICINE

## 2022-07-07 PROCEDURE — 92610 EVALUATE SWALLOWING FUNCTION: CPT | Performed by: SPEECH-LANGUAGE PATHOLOGIST

## 2022-07-07 PROCEDURE — 6370000000 HC RX 637 (ALT 250 FOR IP): Performed by: NURSE PRACTITIONER

## 2022-07-07 PROCEDURE — 1200000000 HC SEMI PRIVATE

## 2022-07-07 PROCEDURE — 82962 GLUCOSE BLOOD TEST: CPT

## 2022-07-07 PROCEDURE — 97530 THERAPEUTIC ACTIVITIES: CPT

## 2022-07-07 PROCEDURE — 85025 COMPLETE CBC W/AUTO DIFF WBC: CPT

## 2022-07-07 RX ADMIN — DOXYCYCLINE 100 MG: 100 INJECTION, POWDER, LYOPHILIZED, FOR SOLUTION INTRAVENOUS at 04:11

## 2022-07-07 RX ADMIN — WATER 1000 MG: 1 INJECTION INTRAMUSCULAR; INTRAVENOUS; SUBCUTANEOUS at 14:35

## 2022-07-07 RX ADMIN — ARFORMOTEROL TARTRATE 15 MCG: 15 SOLUTION RESPIRATORY (INHALATION) at 09:00

## 2022-07-07 RX ADMIN — HEPARIN SODIUM 5000 UNITS: 10000 INJECTION INTRAVENOUS; SUBCUTANEOUS at 05:31

## 2022-07-07 RX ADMIN — SODIUM CHLORIDE: 9 INJECTION, SOLUTION INTRAVENOUS at 11:44

## 2022-07-07 RX ADMIN — HEPARIN SODIUM 5000 UNITS: 10000 INJECTION INTRAVENOUS; SUBCUTANEOUS at 20:45

## 2022-07-07 RX ADMIN — DOXYCYCLINE 100 MG: 100 INJECTION, POWDER, LYOPHILIZED, FOR SOLUTION INTRAVENOUS at 16:34

## 2022-07-07 RX ADMIN — HEPARIN SODIUM 5000 UNITS: 10000 INJECTION INTRAVENOUS; SUBCUTANEOUS at 14:35

## 2022-07-07 RX ADMIN — ARFORMOTEROL TARTRATE 15 MCG: 15 SOLUTION RESPIRATORY (INHALATION) at 20:58

## 2022-07-07 RX ADMIN — FERROUS SULFATE TAB 325 MG (65 MG ELEMENTAL FE) 325 MG: 325 (65 FE) TAB at 08:31

## 2022-07-07 RX ADMIN — SERTRALINE HYDROCHLORIDE 75 MG: 50 TABLET ORAL at 08:31

## 2022-07-07 RX ADMIN — SPIRONOLACTONE 25 MG: 25 TABLET ORAL at 08:31

## 2022-07-07 RX ADMIN — PANTOPRAZOLE SODIUM 40 MG: 40 TABLET, DELAYED RELEASE ORAL at 05:33

## 2022-07-07 RX ADMIN — BUDESONIDE 250 MCG: 0.25 SUSPENSION RESPIRATORY (INHALATION) at 09:00

## 2022-07-07 RX ADMIN — Medication 1000 UNITS: at 08:31

## 2022-07-07 RX ADMIN — EMPAGLIFLOZIN 10 MG: 10 TABLET, FILM COATED ORAL at 08:31

## 2022-07-07 RX ADMIN — BUDESONIDE 250 MCG: 0.25 SUSPENSION RESPIRATORY (INHALATION) at 20:58

## 2022-07-07 RX ADMIN — FUROSEMIDE 20 MG: 20 TABLET ORAL at 08:31

## 2022-07-07 NOTE — CONSULTS
Patient currently admitted with diagnosis of Diastolic heart failure. Patient was awake and alert, sitting in the chair during the consultation. She was engaged and asked appropriate questions throughout the education session. She is agreeable to heart failure education and continued self monitoring after disharge. Scheduling with the CHF clinic Yes. Future Appointments   Date Time Provider Santos Oconnor   7/22/2022  2:30 PM Jem Domínguez APRN - CNP [place] [facility]   7/25/2022 12:00 PM [facility] ROOM 1 [facility]   8/15/2022 12:00 PM [facility] ROOM 1 [facility]          We reviewed the introduction to Heart Failure, the HF zones, signs and symptoms to report on day 1 of onset, medications, medication compliance, the importance of obtaining daily weights, following a low sodium diet, reading food labels for the sodium content, keeping physician appointments, and smoking cessation. We discussed writing / tracking daily weights on a calendar / log because a 5 pound gain in 1 week can sneak up if you are not tracking it. Contributing risk factors for Heart Failure are identified as activity intolerance. I advised patient they can reduce the risk for Heart Failure exacerbations by modifying / controlling the risk factors. We discussed self-managed care which includes the following:  to take medications as prescribed, report any intolerable side effects of medications to the cardiologist / doctor, do not just stop taking the medication; follow a cardiac heart healthy / low sodium diet; weigh yourself daily, exercise regularly- per doctor recommendation and not to smoke or use an excess amount of alcohol. We discussed calling the cardiologist / doctor with a weight gain of 3 pounds in one day or a total of 5 pounds or more in one week.  Also, if you should have a significant weight loss of 3# or more in one day to call the doctor, they may need to decrease or hold the Navigator    CONGESTIVE HEART FAILURE (CHF) AHA GUIDELINES  (Must be completed for Primary Diagnosis CHF or History of CHF)    Discharge Plan:  I placed the Heart Failure Home Instructions in patient's discharge instructions. Per Heart Failure GWTG, the patient should have a follow-up appointment made within 7 days of discharge.     New Diagnosis No    ECHO Results most recent:  Lab Results   Component Value Date    LVEF 66 09/01/2021                                      Social History     Tobacco Use   Smoking Status Never Smoker   Smokeless Tobacco Never Used      Immunization History   Administered Date(s) Administered    Influenza 11/13/2013    Influenza Vaccine, unspecified formulation 10/05/2018    Influenza, High Dose (Fluzone 65 yrs and older) 10/07/2015    Pneumococcal Conjugate 13-valent ([id number]) 06/05/2015    Pneumococcal Conjugate 7-valent (Prevnar7) 12/06/2005    Pneumococcal Polysaccharide ([id number]) 04/03/2019      Angiotensin-Converting-Enzyme (ACE) inhibitor ordered:  [] Yes  [x] No (specify contraindication):    [] Contraindicated  [] Hypotensive patient who was at immediate risk of cardiogenic shock  [] Hospitalized patient who experienced marked azotemia  [] Other Contraindications  [] Not Eligible  [] Not Tolerant  [] Patient Reason  [] System Reason  [x] Other Reason    Angiotensin II receptor blockers (ARB) ordered:  [] Yes  [x] No (specify contraindication):    [] Contraindicated  [] Hypotensive patient who was at immediate risk of cardiogenic shock  [] Hospitalized patient who experienced marked azotemia  [x] Other Contraindications    ARNI - Angiotensin Receptor Neprilysin Inhibitor ordered:  [] Yes  [x] No (specify contraindication):    [] ACE inhibitor use within the prior 36 hours  [] Allergy  [] Hyperkalemia  [] Hypotension  [] Renal dysfunction defined as creatinine > 2.5 mg/dL in men or > 2.0 mg/dL in women  [] Other Contraindications  [] Not Eligible  [] Not Tolerant  [] Patient Reason  []System Reason  [x]Other Reason    Beta Blocker (Carvedilol, Metoprolol Succinate, or Bisoprolol) ordered:    [x] Yes Toprol XL   [] No (specify contraindication):    [] Contraindicated  [] Asthma  [] Fluid Overload  [] Low Blood Pressure  [] Patient recently treated with an intravenous positive inotropic agent  [] Other Contraindications  [] Not Eligible  [] Not Tolerant  [] Patient Reason  [] System Reason    SGLT2 Inhibitor ordered:  [x] Yes Jardiance   [] No (specify contraindication):    [] Contraindicated  [] Patient currently on dialysis  [] Ketoacidosis  [] Known hypersensitivity to the medication  [] Type I diabetes (not approved for use in patients with Type I diabetes due to increased risk of ketoacidosis)  [] Other Contraindications  [] Not Eligible  [] Not Tolerant  [] Patient Reason  [] System Reason  [] Other Reason    Aldosterone Antagonist ordered:  [x] Yes Aldactone   [] No (specify contraindication):    [] Contraindicated  [] Allergy due to aldosterone receptor antagonist  [] Hyperkalemia  [] Renal dysfunction defined as creatinine >2.5 mg/dL in men or >2.0 mg/dL in women.   [] Other contraindications  [] Not Eligible  [] Not Tolerant  [] Patient Reason  [] System Reason  [] Other Reason

## 2022-07-07 NOTE — PLAN OF CARE
Problem: Safety - Adult  Goal: Free from fall injury  7/7/2022 0054 by Stanley Billings RN  Outcome: Progressing  Flowsheets (Taken 7/7/2022 0052)  Free From Fall Injury: Instruct family/caregiver on patient safety  7/6/2022 1906 by Alison Greenwood RN  Outcome: Progressing

## 2022-07-07 NOTE — PLAN OF CARE
Problem: Discharge Planning  Goal: Discharge to home or other facility with appropriate resources  7/7/2022 0055 by Donita López RN  Outcome: Progressing  7/7/2022 0054 by Donita López RN  Outcome: Progressing  Flowsheets  Taken 7/6/2022 2130 by Donita López RN  Discharge to home or other facility with appropriate resources: Refer to discharge planning if patient needs post-hospital services based on physician order or complex needs related to functional status, cognitive ability or social support system  Taken 7/6/2022 1620 by Yumi Robbins RN  Discharge to home or other facility with appropriate resources: Refer to discharge planning if patient needs post-hospital services based on physician order or complex needs related to functional status, cognitive ability or social support system

## 2022-07-07 NOTE — PLAN OF CARE
Patient's chart updated to reflect:      . - HF care plan, HF education points and HF discharge instructions.  -Orders: 2 gram sodium diet, daily weights, I/O.  -PCP and cardiology follow up appointments to be scheduled within 7 days of hospital discharge. -CHF education session will be provided to the patient prior to hospital discharge.     Yohana Gray RN BSN  Heart Failure Navigator

## 2022-07-07 NOTE — CARE COORDINATION
COVID - 7/6. Met w/ patient. Explained role of  and plan of care. Lives w/ her son May Mak in a 1 story house- ramp entrance. Has rollator, cane, BSC, shower bench, WC. Does not drive-son or her sister provides needed transportation. Hx snf in [place]-facility unknown. Hx HHC-agency unknown. PCP is Dr. Caridad Lundberg and pharmacy is [address] [place] [address]. Continues on iv abx-await final abx plan. Heart Failure Coordinator following- to follow @ [facility] 7/25 @ 1200 on discharge. PT am-pac 22. Per pt, plan is to return home w/ her son on discharge- [facility].  Will follow Tito Urban RN case manager

## 2022-07-07 NOTE — PROGRESS NOTES
Occupational Therapy  OCCUPATIONAL THERAPY INITIAL EVALUATION     [address] [facility]  [facility] [place] [place], [address] [facility]                                                  Patient Name: Vanessa Rosales    MRN: [id number]    : 1935    Room: [facility]8318      Evaluating OT: Jayshree Cates OTR/L [id number]      Referring Provider: YOHANA Miller CNP    Specific Provider Orders/Date: OT eval and treat 22      Diagnosis: Pneumonia [J18.9]  Pneumonia of both lower lobes due to infectious organism [J18.9]  Chest pain, unspecified type [R07.9]  CAP (community acquired pneumonia) due to Chlamydia species [J16.0]  Chronic kidney disease, unspecified CKD stage [N18.9]      Pertinent Medical History: DM, HTN      Home Living: Pt lives with son in 1 story house with ramp entry. Bathroom setup: tub/shower with extended tub bench, standard height commode with BSC over top (pt reports she is getting an elevated commode installed soon)   Equipment owned: rollator    Prior Level of Function: independent with ADLs , assist from family with IADLs; functional mobility: rollator \"when not feeling well,\" otherwise, no device    Pain Level: pt did not report pain this date; pt agreeable to therapy  Cognition: A&O: 4/4; WFL command follow demonstrated. Pt pleasant during session.     Memory:  WFL   Sequencing:  WFL   Problem solving:  WFL   Judgement/safety:  WFL     Functional Assessment:  AM-PAC Daily Activity Raw Score: 23/24   Initial Eval Status  Date: 22   Feeding Independent    Grooming Independent  To complete hand hygiene standing at sink   UB Dressing Independent   To manage gown   LB Dressing Independent   To doff/don socks   Bathing Mod I/I   Toileting Independent   For clothing management and seated pericare following urination   Bed Mobility   Sit to supine: independent    Functional Transfers Independent with no device  Sit to stand from EOB  Sit<>stand from commode  Stand to sit to chair   Functional Mobility Sup with no device  To and from bathroom and short distance in room   Balance Sitting:     Static:  Independent     Dynamic:independent   Standing: independent    Activity Tolerance Fair+ with light activity   Visual/  Perceptual Glasses: yes            Additional long-term goal: Pt will increase functional independence to PLOF to allow pt to live in least restrictive environment. Hand Dominance R   AROM (PROM) Strength Additional Info:    RUE  WFL 4/5 good  and wfl FMC/dexterity noted during ADL tasks       LUE WFL 4/5 good  and wfl FMC/dexterity noted during ADL tasks       [facility]   Sensation:  Pt reported some numbness in B feet and fingers   Tone: WFL   Edema: none noted    Comments: Upon arrival patient sitting in chair. At end of session, patient lying in bed with call light and phone within reach, all lines and tubes intact. Overall patient demonstrated decreased independence and safety during completion of ADL/functional transfer/mobility tasks. Pt would benefit from continued skilled OT to increase safety and independence with completion of ADL/IADL tasks for functional independence and quality of life. Rehab Potential: Good for established goals     Patient / Family Goal: to return home    Patient and/or family were instructed on functional diagnosis, prognosis/goals and OT plan of care. Demonstrated good understanding.      Eval Complexity: Low    Time In: 1103  Time Out: 1120    Min Units   OT Eval Low 97165  x  1   OT Eval Medium 95721      OT Eval High 22165      OT Re-Eval [id number]       Therapeutic Ex [id number]       Therapeutic Activities 40334       ADL/Self Care 04727       Orthotic Management 65228       Manual 33035     Neuro Re-Ed 90304       Non-Billable Time          Evaluation Time additionally includes thorough review of current medical information, gathering information on past medical history/social

## 2022-07-07 NOTE — PROGRESS NOTES
SPEECH/LANGUAGE PATHOLOGY  CLINICAL ASSESSMENT OF SWALLOWING FUNCTION   and PLAN OF CARE    PATIENT NAME:  Patricia Perez  (female)     MRN:  [id number]    :  1935  (81 y.o.)  STATUS:  Inpatient: Room [facility]/0535-A    TODAY'S DATE:  2022  REFERRING PROVIDER:  22 1745   Speech Language Pathology clinical swallow screening Start: 22, End: 22, ONE TIME, Standing Count: 1 Occurrences, R    Yakelin Boateng APRN - CNP  REASON FOR REFERRAL: assess for dysphagia/aspiration   EVALUATING THERAPIST: Jaycob Davison, SLP                 RESULTS:    DYSPHAGIA DIAGNOSIS:   Clinical indicators of normal swallow function      DIET RECOMMENDATIONS:  Regular consistency solids (IDDSI level 7) with thin liquids (IDDSI level 0)    MEDICATION ADMINISTRATION: Per patient choice/nursing judgement     FEEDING RECOMMENDATIONS:     Assistance level:  No assistance needed      Compensatory strategies recommended: Small bites/sips      Discussed recommendations with nursing and/or faxed report to referring provider: Yes    SPEECH THERAPY  PLAN OF CARE   The dysphagia POC is established based on physician order, dysphagia diagnosis and results of clinical assessment     Meal time assessment for 1-2 sessions to provide diet modification and compensatory strategy implementation due to need to ensure proper implementation of compensatory strategies during PO intake     Conditions Requiring Skilled Therapeutic Intervention for dysphagia:    Patient is performing below functional baseline d/t  current acute condition, Multiple diagnoses, multiple medications, and increased dependency upon caregivers.     Specific dysphagia interventions to include:     Meal time assessment for 1-2 sessions to provide diet modification and compensatory strategy implementation due to need to ensure proper implementation of compensatory strategies during PO intake     Specific instructions for next treatment:  not applicable Patient Treatment Goals:    Short Term Goals:  Pt will participate in meal time assessment for 1-2 sessions to provide diet modification and compensatory strategy implementation due to need to ensure proper implementation of compensatory strategies during PO intake     Long Term Goals:   Pt will maintain adequate nutrition/hydration via PO intake of the least restrictive oral diet with implementation of safe swallow/ compensatory strategies and decrease signs/symptoms of aspiration to less than 1 x/day. Patient/family Goal:    Did not state. Will further assess during treatment. Plan of care discussed with Patient   The Patient understand(s) the diagnosis, prognosis and plan of care     Rehabilitation Potential/Prognosis: fair                    ADMITTING DIAGNOSIS: Pneumonia [J18.9]  Pneumonia of both lower lobes due to infectious organism [J18.9]  Chest pain, unspecified type [R07.9]  CAP (community acquired pneumonia) due to Chlamydia species [J16.0]  Chronic kidney disease, unspecified CKD stage [N18.9]    VISIT DIAGNOSIS:   Visit Diagnoses       Codes    Pneumonia of both lower lobes due to infectious organism    -  Primary J18.9    Chronic kidney disease, unspecified CKD stage     N18.9           PATIENT REPORT/COMPLAINT: denies difficulty swallowing  RN cleared patient for participation in assessment     yes     PRIOR LEVEL OF SWALLOW FUNCTION:    PAST HISTORY OF DYSPHAGIA?: none reported    Home diet: Regular consistency solids (IDDSI level 7) with  thin liquids (IDDSI level 0)  Current Diet Order:  ADULT DIET; Regular; 4 carb choices (60 gm/meal); Low Fat/Low Chol/High Fiber/ELENI; Low Sodium (2 gm)    PROCEDURE:  Consistencies Administered During the Evaluation   Liquids: thin liquid   Solids:  pureed foods and solid foods      Method of Intake:   cup, straw, spoon  Self fed      Position:   Seated, upright    CLINICAL ASSESSMENT:  Oral Stage:        The oral stage of swallowing was within functional limits for consistencies administered      Pharyngeal Stage:    No signs of aspiration were noted during this evaluation however, silent aspiration cannot be ruled out at bedside. If silent aspiration is suspected, a Videofluoroscopic Study of Swallowing (MBS) is recommended and requires a physician order. Cognition:   Within functional limits for this exam and Hard of hearing    Oral Peripheral Examination   Adequate lingual/labial strength     Current Respiratory Status    room air     Parameters of Speech Production  Respiration:  Adequate for speech production  Quality:   Within functional limits  Intensity: Within functional limits    Volitional Swallow: present     Volitional Cough:   present     Pain: No pain reported. EDUCATION:   The Speech Language Pathologist (SLP) completed education regarding results of evaluation and that intervention is warranted at this time. Learner: Patient  Education: Reviewed results and recommendations of this evaluation  Evaluation of Education:  [employer] understanding    This plan may be re-evaluated and revised as warranted. Evaluation Time includes thorough review of current medical information, gathering information on past medical history/social history and prior level of function, completion of standardized testing/informal observation of tasks, assessment of data and education on plan of care and goals. [x]The admitting diagnosis and active problem list, have been reviewed prior to initiation of this evaluation. INTERVENTION    Pt/family educated on above results and plan of care. Pt/family trained on general compensatory strategies for safe swallow to use as needed and signs and symptoms of aspiration (throat clearing, coughing/choking, wet vocal quality. , etc.) and encouraged to notify staff if observed. Handouts provided as warranted. Pt/family encouraged to engage in question/answer session.  All questions answered and pt/family verbalized understanding of above. ACTIVE PROBLEM LIST:   Patient Active Problem List   Diagnosis    HTN (hypertension)    Hyperlipidemia    Anemia    GERD (gastroesophageal reflux disease)    JERICA on CPAP    Vitamin D deficiency    Abnormal bone density screening    PVC's (premature ventricular contractions)    Mitral insufficiency    Acute on chronic diastolic CHF (congestive heart failure), NYHA class 1 ([facility] [address]    Renal insufficiency    Atypical chest pain    Rheumatic mitral stenosis    Non-rheumatic mitral valve stenosis    Chest pain    CKD (chronic kidney disease) stage 3, GFR 30-59 ml/min ([facility])    Acute cystitis without hematuria    Valvular heart disease    PAF (paroxysmal atrial fibrillation) (HCC)    Congestive heart failure (HCC)    Atrial fibrillation with RVR (HCC)    Chronic heart failure with preserved ejection fraction (HCC)    Pleural effusion on left    HAP (hospital-acquired pneumonia)    Pneumonia    Acute kidney injury superimposed on chronic kidney disease ([facility] [address]    CAP (community acquired pneumonia) due to Chlamydia species         CPT code:  78627  bedside swallow eval, 06068 dysphagia therapy    Tianna TIRADO CCC/SLP [id number]  Speech-Language Pathologist

## 2022-07-07 NOTE — PROGRESS NOTES
[facility] Addendum    I have personally participated in the history, exam, medical decision making with Lyndsey Mustafa NP on the date of service, I have personally reviewed objective data and I agree with past medical, family, and social history as well as assessment and plan unless otherwise noted. Objective  Physical Exam  Vitals: /63   Pulse 78   Temp 98.5 °F (36.9 °C) (Oral)   Resp 14   Ht 5' 6\" (1.676 m)   Wt 149 lb 1 oz (67.6 kg)   SpO2 95%   BMI 24.06 kg/m²   General: well-developed, well-nourished, no acute distress, cooperative  Skin: generally warm, dry, and intact, with normal color  HEENT: normocephalic, atraumatic, no gross abnormalities  Respiratory: clear to auscultation bilaterally without respiratory distress  Cardiovascular: regular rate and rhythm without murmur / rub / gallop  Abdominal: soft, nontender, nondistended, normoactive bowel sounds  Extremities: no obvious edema or deformity  Neurologic: awake, alert, no gross deficits  Psychiatric: normal affect, cooperative    Assessment / Plan  · Community acquired pneumonia  · NIDDM  · Hx PAF not on anticoagulation, HFpEF, chronic anemia, CKD, sleep apnea    Remains afebrile and on room air. Less SOB today, says still some chest tightness. CXR showed minimal lingular atelectasis w follow up CT showing bl lower lobe infiltrates w bilateral effusions. Failed outpatient Levaquin, given Rocephin and doxycycline in ED, continuing for now. CRP and procal ordered yesterday still not back. Blood cx NGTD, viral panel negative, urinary antigens \"no result. \"      W pt improving and no new issues, plan to continue current care and if she continues to follow along her current trajectory, tentatively planning dc tomorrow. and check CRP / procal / viral panel / urine antigens. Add nebs and incentive spirometer. Cr at 2.0 which is either high-normal or just a bit over that for the pt; will continue to monitor. Otherwise as per Abimael's note. Please see orders for further plan of care. Time spent on chart review, clinical exam, discussing case and answering questions with staff, consultants, patient, and family was approximately 25 minutes.     Electronically signed by Ayden Everett DO on 7/7/2022 at 12:14 PM

## 2022-07-07 NOTE — PROGRESS NOTES
Physical Therapy  Facility/Department: [address] MED SURG/TELE  Physical Therapy Initial Assessment    Name: Iain Goldberg  : 1935  MRN: [id number]  Date of Service: 2022      Attending Provider:  Luh Parada DO    Evaluating PT:  Solitario Galo PALY Room #:  7940/9106-X  Diagnosis:  Pneumonia [J18.9]  Pneumonia of both lower lobes due to infectious organism [J18.9]  Chest pain, unspecified type [R07.9]  CAP (community acquired pneumonia) due to Chlamydia species [J16.0]  Chronic kidney disease, unspecified CKD stage [N18.9]  Precautions:  Falls    SUBJECTIVE:    Pt lives with her son in a 1 story home with a ramp to enter. Pt ambulated with a rollator ww PTA. She also has a ww and WC if needed. OBJECTIVE:   Initial Evaluation  Date: 22 Treatment Short Term/ Long Term   Goals   Was pt agreeable to Eval/treatment? yes     Does pt have pain? No c/o pain     Bed Mobility  Rolling: Independent  Supine to sit: Independent  Sit to supine: NA  Scooting: Independent  Independent   Transfers Sit to stand: Independent  Stand to sit: Independent  Stand pivot: Independent  Independent   Ambulation   25+15 feet with no AD supervision and 120 feet with ww supervision  250 feet with ww Independent   Stair negotiation: ascended and descended NA  NA   AM-PAC 6 Clicks 93/56       BLE ROM is WFL. BLE strength is grossly 4+/5.    Sensation:  Pt c/o numbness and tingling to bottom of B feet  Edema:  None noted  Balance: sitting is Independent and standing with no AD is Independent  Endurance: fair+    Vitals:  Heart Rate at rest 63 bpm Heart Rate post session 80 bpm   SPO2 at rest 97% SPO2 post session 95-96%       ASSESSMENT:    Conditions Requiring Skilled Therapeutic Intervention:    [x]Decreased strength     []Decreased ROM  [x]Decreased functional mobility  []Decreased balance   [x]Decreased endurance   []Decreased posture  []Decreased sensation  []Decreased coordination   []Decreased vision  []Decreased safety awareness   []Increased pain     Comments:  Pt was found in bed and agreeable to PT. While sitting EOB she reported need to use BR. She walked from far side of her bed to BR with no AD and transferred on/off commode Independently. She performed self hygiene care and then stood at sink Independently while she washed her hands. Pt walked out of the BR again with no AD and was agreeable to amb in the vargas with ww. She walked in vargas and c/o mild SOB and fatigue that limited amb distance. Treatment:  Patient practiced and was instructed in the following treatment:     Bed mobility, transfers, ADLs, and gait with and without AD to improve functional strength and endurance. Pt was left sitting up in chair with call light left by patient. Pt's/ family goals   1. To go home. Patient and or family understand(s) diagnosis, prognosis, and plan of care. PHYSICAL THERAPY PLAN OF CARE:    PT POC is established based on physician order and patient diagnosis     Referring provider/PT Order:  PT eval and treat  Diagnosis:  Pneumonia [J18.9]  Pneumonia of both lower lobes due to infectious organism [J18.9]  Chest pain, unspecified type [R07.9]  CAP (community acquired pneumonia) due to Chlamydia species [J16.0]  Chronic kidney disease, unspecified CKD stage [N18.9]  Specific instructions for next treatment:  Increase amb distance as pt is able.     Current Treatment Recommendations:     [x] Strengthening to improve independence with functional mobility   [] ROM to improve ROM and decrease spasm and pain which will help promote independence with functional mobility   [] Balance Training to improve static/dynamic balance and to reduce fall risk  [x] Endurance Training to improve activity tolerance during functional mobility   [x] Transfer Training to improve safety and independence with all functional transfers   [x] Gait Training to improve gait mechanics, endurance and assess need for appropriate assistive device  [] Stair Training in preparation for safe discharge home and/or into the community   [] Positioning to prevent skin breakdown and contractures  [] Safety and Education Training   [x] Patient/Caregiver Education   [] HEP  [] Other     PT long term treatment goals are located in above grid    Frequency of treatments: 2-5x/week x 1-2 weeks. Time in  07:15  Time out  07:40    Total Treatment Time 10 minutes     Evaluation Time includes thorough review of current medical information, gathering information on past medical history/social history and prior level of function, completion of standardized testing/informal observation of tasks, assessment of data and education on plan of care and goals. CPT codes:  [x] Low Complexity PT evaluation 39903  [] Moderate Complexity PT evaluation 60236  [] High Complexity PT evaluation 92971  [] PT Re-evaluation 67235  [] Gait training 15846 ** minutes  [] Manual therapy 58982 ** minutes  [x] Therapeutic activities 23898 10 minutes  [] Therapeutic exercises 89215 ** minutes  [] Neuromuscular reeducation 18023 ** minutes     Trevon Degroot., P.T.   License Number: [id number]

## 2022-07-07 NOTE — PROGRESS NOTES
[facility] Progress Note    Admitting Date and Time: 7/6/2022 12:27 PM  Admit Dx: Pneumonia [J18.9]  Pneumonia of both lower lobes due to infectious organism [J18.9]  Chest pain, unspecified type [R07.9]  CAP (community acquired pneumonia) due to Chlamydia species [J16.0]  Chronic kidney disease, unspecified CKD stage [N18.9]      Assessment:    Principal Problem:    Pneumonia  Active Problems:    HTN (hypertension)    Hyperlipidemia    JERICA on CPAP    Acute kidney injury superimposed on chronic kidney disease ([facility] [address]    CAP (community acquired pneumonia) due to Chlamydia species    PAF (paroxysmal atrial fibrillation) ([facility] [address]  Resolved Problems:    * No resolved hospital problems. *      Plan:  1. BLL CAP  -failed outpatient treatment levaquin  -continue ceftriaxone and doxycyline   - respiratory viral panel negative, FLU/COVID negative. Strep PNa and legionella antigens have not been sent  ]procalcitonin not collected/processed  -Blood cultures pending  - continue budesonide, pulmicort, IS    She is afebrile and on room air, anticipate DC home tomorrow      2. PAF  -follows with cardiology- Ronnie Shah  -metoprolol  -off [address] due to frequent falls     3. HFpEF  -continue aldactone, lasix and Toprol XL  -10/15/21 echo with EF 60%     4. DM2  - jardiance, add carb control diet, low dose insulin slide scale, hypoglycemia protocol     5. HTN  -metoprolol     6. JERICA  -hasn't been wearing CPAP     7. Acute on chronic kidney disease  -Cr 1.9, baseline 1.3-1. 6        Code Status:  full  DVT prophylaxis: heparin SC      Subjective:  Patient is being followed for Pneumonia [J18.9]  Pneumonia of both lower lobes due to infectious organism [J18.9]  Chest pain, unspecified type [R07.9]  CAP (community acquired pneumonia) due to Chlamydia species [J16.0]  Chronic kidney disease, unspecified CKD stage [N18.9]     States she is feeling much better overall, less SOB and less chest discomfort.  Still coughing, minimal sputum  Afebrile overnight,    ROS: denies fever, chills, cp, sob, n/v, HA unless stated above.       cefTRIAXone (ROCEPHIN) IV  1,000 mg IntraVENous Q24H    doxycycline (VIBRAMYCIN) IV  100 mg IntraVENous Q12H    insulin lispro  0-6 Units SubCUTAneous TID WC    insulin lispro  0-3 Units SubCUTAneous Nightly    empagliflozin  10 mg Oral Daily    ferrous sulfate  325 mg Oral Daily with breakfast    furosemide  20 mg Oral Daily    metoprolol succinate  25 mg Oral Daily    pantoprazole  40 mg Oral QAM AC    sertraline  75 mg Oral Daily    spironolactone  25 mg Oral Daily    Vitamin D  1,000 Units Oral Daily    sodium chloride flush  5-40 mL IntraVENous 2 times per day    budesonide  0.25 mg Nebulization BID    Arformoterol Tartrate  15 mcg Nebulization BID    heparin (porcine)  5,000 Units SubCUTAneous 3 times per day     glucose, 4 tablet, PRN  dextrose bolus, 125 mL, PRN   Or  dextrose bolus, 250 mL, PRN  glucagon (rDNA), 1 mg, PRN  dextrose, 100 mL/hr, PRN  sodium chloride flush, 5-40 mL, PRN  sodium chloride, , PRN  ondansetron, 4 mg, Q8H PRN   Or  ondansetron, 4 mg, Q6H PRN  polyethylene glycol, 17 g, Daily PRN  acetaminophen, 650 mg, Q6H PRN   Or  acetaminophen, 650 mg, Q6H PRN  albuterol, 2.5 mg, Q4H PRN         Objective:    /63   Pulse 78   Temp 98.5 °F (36.9 °C) (Oral)   Resp 14   Ht 5' 6\" (1.676 m)   Wt 149 lb 1 oz (67.6 kg)   SpO2 95%   BMI 24.06 kg/m²     General Appearance: alert and oriented to person, place and time and in no acute distress  Skin: warm and dry  Head: normocephalic and atraumatic  Eyes: pupils equal, round, and reactive to light, extraocular eye movements intact, conjunctivae normal  Neck: neck supple and non tender without mass   Pulmonary/Chest: clear to auscultation bilaterally- no wheezes, rales or rhonchi, normal air movement, no respiratory distress  Cardiovascular: normal rate, normal S1 and S2 and no carotid bruits  Abdomen: soft, non-tender, non-distended, normal bowel sounds, no masses or organomegaly  Extremities: no cyanosis, no clubbing and no edema  Neurologic: no cranial nerve deficit and speech normal        Recent Labs     07/06/22  1300 07/07/22  0311    137   K 5.1* 4.5    106   CO2 23 22   BUN 34* 30*   CREATININE 2.0* 1.9*   GLUCOSE 85 129*   CALCIUM 9.3 9.1       Recent Labs     07/06/22  1300 07/07/22  0311   WBC 8.1 7.4   RBC 4.04 3.89   HGB 11.7 11.2*   HCT 36.2 34.2   MCV 89.6 87.9   MCH 29.0 28.8   MCHC 32.3 32.7   RDW 15.9* 16.0*    170   MPV 9.6 9.7       Radiology:     NOTE: This report was transcribed using voice recognition software. Every effort was made to ensure accuracy; however, inadvertent computerized transcription errors may be present.   Electronically signed by YOHANA Alfaro CNP on 7/7/2022 at 3:08 PM

## 2022-07-07 NOTE — PLAN OF CARE
Problem: Discharge Planning  Goal: Discharge to home or other facility with appropriate resources  7/7/2022 0055 by Baylee Navarrete RN  Outcome: Progressing  7/7/2022 0054 by Baylee Navarrete RN  Outcome: Progressing  Flowsheets  Taken 7/6/2022 2130 by Baylee Navarrete RN  Discharge to home or other facility with appropriate resources: Refer to discharge planning if patient needs post-hospital services based on physician order or complex needs related to functional status, cognitive ability or social support system  Taken 7/6/2022 1620 by Jeffrey Zuñiga RN  Discharge to home or other facility with appropriate resources: Refer to discharge planning if patient needs post-hospital services based on physician order or complex needs related to functional status, cognitive ability or social support system     Problem: Safety - Adult  Goal: Free from fall injury  7/7/2022 1107 by Cathy Hudson RN  Outcome: Progressing  7/7/2022 0055 by Baylee Navarrete RN  Outcome: Progressing  7/7/2022 0054 by Baylee Navarrete RN  Outcome: Progressing  Flowsheets (Taken 7/7/2022 0052)  Free From Fall Injury: Instruct family/caregiver on patient safety     Problem: ABCDS Injury Assessment  Goal: Absence of physical injury  7/7/2022 1107 by Cathy Hudson RN  Outcome: Progressing  7/7/2022 0055 by Baylee Navarrete RN  Outcome: Progressing  7/7/2022 0054 by Baylee Navarrete RN  Outcome: Progressing  Flowsheets (Taken 7/7/2022 0052)  Absence of Physical Injury: Implement safety measures based on patient assessment

## 2022-07-08 VITALS
HEIGHT: 66 IN | RESPIRATION RATE: 16 BRPM | SYSTOLIC BLOOD PRESSURE: 130 MMHG | TEMPERATURE: 98.2 F | HEART RATE: 70 BPM | BODY MASS INDEX: 24.15 KG/M2 | WEIGHT: 150.25 LBS | OXYGEN SATURATION: 98 % | DIASTOLIC BLOOD PRESSURE: 63 MMHG

## 2022-07-08 LAB
ANION GAP SERPL CALCULATED.3IONS-SCNC: 9 MMOL/L (ref 7–16)
BASOPHILS ABSOLUTE: 0.02 E9/L (ref 0–0.2)
BASOPHILS RELATIVE PERCENT: 0.3 % (ref 0–2)
BUN BLDV-MCNC: 34 MG/DL (ref 6–23)
CALCIUM SERPL-MCNC: 8.7 MG/DL (ref 8.6–10.2)
CHLORIDE BLD-SCNC: 105 MMOL/L (ref 98–107)
CO2: 22 MMOL/L (ref 22–29)
CREAT SERPL-MCNC: 1.8 MG/DL (ref 0.5–1)
EKG ATRIAL RATE: 85 BPM
EKG P AXIS: 26 DEGREES
EKG P-R INTERVAL: 138 MS
EKG Q-T INTERVAL: 352 MS
EKG QRS DURATION: 88 MS
EKG QTC CALCULATION (BAZETT): 418 MS
EKG R AXIS: -47 DEGREES
EKG T AXIS: 44 DEGREES
EKG VENTRICULAR RATE: 85 BPM
EOSINOPHILS ABSOLUTE: 0.24 E9/L (ref 0.05–0.5)
EOSINOPHILS RELATIVE PERCENT: 3.6 % (ref 0–6)
GFR AFRICAN AMERICAN: 32
GFR NON-AFRICAN AMERICAN: 27 ML/MIN/1.73
GLUCOSE BLD-MCNC: 135 MG/DL (ref 74–99)
HCT VFR BLD CALC: 31.6 % (ref 34–48)
HEMOGLOBIN: 10.4 G/DL (ref 11.5–15.5)
IMMATURE GRANULOCYTES #: 0.03 E9/L
IMMATURE GRANULOCYTES %: 0.4 % (ref 0–5)
LYMPHOCYTES ABSOLUTE: 1.89 E9/L (ref 1.5–4)
LYMPHOCYTES RELATIVE PERCENT: 28 % (ref 20–42)
MCH RBC QN AUTO: 29.2 PG (ref 26–35)
MCHC RBC AUTO-ENTMCNC: 32.9 % (ref 32–34.5)
MCV RBC AUTO: 88.8 FL (ref 80–99.9)
METER GLUCOSE: 100 MG/DL (ref 74–99)
METER GLUCOSE: 126 MG/DL (ref 74–99)
MONOCYTES ABSOLUTE: 0.52 E9/L (ref 0.1–0.95)
MONOCYTES RELATIVE PERCENT: 7.7 % (ref 2–12)
NEUTROPHILS ABSOLUTE: 4.05 E9/L (ref 1.8–7.3)
NEUTROPHILS RELATIVE PERCENT: 60 % (ref 43–80)
PDW BLD-RTO: 16.1 FL (ref 11.5–15)
PLATELET # BLD: 160 E9/L (ref 130–450)
PMV BLD AUTO: 9.6 FL (ref 7–12)
POTASSIUM SERPL-SCNC: 4.7 MMOL/L (ref 3.5–5)
RBC # BLD: 3.56 E12/L (ref 3.5–5.5)
SODIUM BLD-SCNC: 136 MMOL/L (ref 132–146)
WBC # BLD: 6.8 E9/L (ref 4.5–11.5)

## 2022-07-08 PROCEDURE — 99239 HOSP IP/OBS DSCHRG MGMT >30: CPT | Performed by: INTERNAL MEDICINE

## 2022-07-08 PROCEDURE — 6370000000 HC RX 637 (ALT 250 FOR IP): Performed by: NURSE PRACTITIONER

## 2022-07-08 PROCEDURE — APPSS30 APP SPLIT SHARED TIME 16-30 MINUTES: Performed by: NURSE PRACTITIONER

## 2022-07-08 PROCEDURE — 2500000003 HC RX 250 WO HCPCS: Performed by: NURSE PRACTITIONER

## 2022-07-08 PROCEDURE — 6360000002 HC RX W HCPCS: Performed by: NURSE PRACTITIONER

## 2022-07-08 PROCEDURE — 82962 GLUCOSE BLOOD TEST: CPT

## 2022-07-08 PROCEDURE — 80048 BASIC METABOLIC PNL TOTAL CA: CPT

## 2022-07-08 PROCEDURE — 36415 COLL VENOUS BLD VENIPUNCTURE: CPT

## 2022-07-08 PROCEDURE — 94640 AIRWAY INHALATION TREATMENT: CPT

## 2022-07-08 PROCEDURE — 85025 COMPLETE CBC W/AUTO DIFF WBC: CPT

## 2022-07-08 PROCEDURE — 2580000003 HC RX 258: Performed by: NURSE PRACTITIONER

## 2022-07-08 RX ORDER — CEFDINIR 300 MG/1
300 CAPSULE ORAL DAILY
Qty: 5 CAPSULE | Refills: 0 | Status: SHIPPED | OUTPATIENT
Start: 2022-07-08 | End: 2022-07-13

## 2022-07-08 RX ORDER — METOPROLOL SUCCINATE 25 MG/1
25 TABLET, EXTENDED RELEASE ORAL DAILY
Qty: 30 TABLET | Refills: 3 | Status: SHIPPED | OUTPATIENT
Start: 2022-07-09

## 2022-07-08 RX ADMIN — HEPARIN SODIUM 5000 UNITS: 10000 INJECTION INTRAVENOUS; SUBCUTANEOUS at 06:03

## 2022-07-08 RX ADMIN — BUDESONIDE 250 MCG: 0.25 SUSPENSION RESPIRATORY (INHALATION) at 09:09

## 2022-07-08 RX ADMIN — PANTOPRAZOLE SODIUM 40 MG: 40 TABLET, DELAYED RELEASE ORAL at 06:02

## 2022-07-08 RX ADMIN — SPIRONOLACTONE 25 MG: 25 TABLET ORAL at 09:13

## 2022-07-08 RX ADMIN — SODIUM CHLORIDE: 9 INJECTION, SOLUTION INTRAVENOUS at 00:36

## 2022-07-08 RX ADMIN — EMPAGLIFLOZIN 10 MG: 10 TABLET, FILM COATED ORAL at 09:14

## 2022-07-08 RX ADMIN — ARFORMOTEROL TARTRATE 15 MCG: 15 SOLUTION RESPIRATORY (INHALATION) at 09:09

## 2022-07-08 RX ADMIN — FERROUS SULFATE TAB 325 MG (65 MG ELEMENTAL FE) 325 MG: 325 (65 FE) TAB at 09:14

## 2022-07-08 RX ADMIN — Medication 1000 UNITS: at 09:13

## 2022-07-08 RX ADMIN — SERTRALINE HYDROCHLORIDE 75 MG: 50 TABLET ORAL at 09:14

## 2022-07-08 RX ADMIN — DOXYCYCLINE 100 MG: 100 INJECTION, POWDER, LYOPHILIZED, FOR SOLUTION INTRAVENOUS at 03:37

## 2022-07-08 RX ADMIN — METOPROLOL SUCCINATE 25 MG: 25 TABLET, EXTENDED RELEASE ORAL at 09:14

## 2022-07-08 RX ADMIN — FUROSEMIDE 20 MG: 20 TABLET ORAL at 09:14

## 2022-07-08 NOTE — PROGRESS NOTES
Discharge instructions and papers given to patient. IV and tele removed. Family at bedside to transport patient home. No questions from patient.

## 2022-07-08 NOTE — PROGRESS NOTES
CLINICAL PHARMACY NOTE: MEDS TO BEDS    Total # of Prescriptions Filled: 1   The following medications were delivered to the patient:  · Cefdinir 300    Additional Documentation:

## 2022-07-08 NOTE — PLAN OF CARE
Problem: Discharge Planning  Goal: Discharge to home or other facility with appropriate resources  Outcome: Progressing     Problem: Safety - Adult  Goal: Free from fall injury  7/7/2022 2338 by Lindsey Hoover RN  Outcome: Progressing  Flowsheets (Taken 7/7/2022 2050)  Free From Fall Injury: Instruct family/caregiver on patient safety  7/7/2022 1107 by Obed Tobar RN  Outcome: Progressing     Problem: ABCDS Injury Assessment  Goal: Absence of physical injury  7/7/2022 2338 by Lindsey Hoover RN  Outcome: Progressing  Flowsheets (Taken 7/7/2022 2050)  Absence of Physical Injury: Implement safety measures based on patient assessment  7/7/2022 1107 by Obed Tobar RN  Outcome: Progressing

## 2022-07-08 NOTE — CARE COORDINATION
Discharge to home w/ her son today- transitioned to po abx. PT am-pac 22. Declining HHC. No needs.   Apolinar Monsivais RN case manager

## 2022-07-08 NOTE — DISCHARGE SUMMARY
[facility] Physician Discharge Summary       Evelio Malloy MD  [address] [facility] [address]  [phone]            Activity level: As tolerated     Dispo: Home      Condition on discharge: Stable     Patient ID:  Maricruz Hernandez  [id number]  81 y.o.  1935    Admit date: 7/6/2022    Discharge date and time:  7/8/2022  11:43 AM    Admission Diagnoses: Principal Problem:    Pneumonia  Active Problems:    HTN (hypertension)    Hyperlipidemia    JERICA on CPAP    Acute kidney injury superimposed on chronic kidney disease ([address]    CAP (community acquired pneumonia) due to Chlamydia species    PAF (paroxysmal atrial fibrillation) ([facility] [address]  Resolved Problems:    * No resolved hospital problems. *      Discharge Diagnoses: Principal Problem:    Pneumonia  Active Problems:    HTN (hypertension)    Hyperlipidemia    JERICA on CPAP    Acute kidney injury superimposed on chronic kidney disease ([facility] [address]    CAP (community acquired pneumonia) due to Chlamydia species    PAF (paroxysmal atrial fibrillation) ([facility] [address]  Resolved Problems:    * No resolved hospital problems. *      Consults:  IP CONSULT TO HEART FAILURE NURSE/COORDINATOR    Hospital Course:   Patient Maricruz Hernandez is a 81 y.o. presented with Pneumonia [J18.9]  Pneumonia of both lower lobes due to infectious organism [J18.9]  Chest pain, unspecified type [R07.9]  CAP (community acquired pneumonia) due to Chlamydia species [J16.0]  Chronic kidney disease, unspecified CKD stage [N18.9]    Ms Meng Torres is an 81year old female with hx of HFpEF Atrial fib, CKD III, DM, GERD, HTN. She had been feeling unwell at home with malaise and SOB. Was prescribed Levaquin for suspected CAP. She continued to feel feverish and generally lousy and presented to ED on 7/6. CT chest revealed bilateral lower lobe infiltrates and suggestion of bronchiectasis  And peribronchial thickening.   She was started on CTX and Doxy IV and admitted for further management. Over the following 48 hours she felt markedly better each day. She remained hemodynamically stable and afebrile throughout, and did not require supplemental oxygen. She is discharged home on 5 more days renally dosed Omnicef. Asked to follow up within next 5 days at PCP    Discharge Exam:    General Appearance: alert and oriented to person, place and time and in no acute distress  Skin: warm and dry  Head: normocephalic and atraumatic  Eyes: pupils equal, round, and reactive to light, extraocular eye movements intact, conjunctivae normal  Neck: neck supple and non tender without mass   Pulmonary/Chest: clear to auscultation bilaterally- no wheezes, rales or rhonchi, normal air movement, no respiratory distress  Cardiovascular: normal rate, normal S1 and S2 and no carotid bruits  Abdomen: soft, non-tender, non-distended, normal bowel sounds, no masses or organomegaly  Extremities: no cyanosis, no clubbing and no edema  Neurologic: no cranial nerve deficit and speech normal    I/O last 3 completed shifts: In: 2325.8 [P.O.:360; I.V.:1865.8; IV Piggyback:100]  Out: -   No intake/output data recorded. LABS:  Recent Labs     07/06/22  1300 07/07/22  0311 07/08/22  0208    137 136   K 5.1* 4.5 4.7    106 105   CO2 23 22 22   BUN 34* 30* 34*   CREATININE 2.0* 1.9* 1.8*   GLUCOSE 85 129* 135*   CALCIUM 9.3 9.1 8.7       Recent Labs     07/06/22  1300 07/07/22  0311 07/08/22  0208   WBC 8.1 7.4 6.8   RBC 4.04 3.89 3.56   HGB 11.7 11.2* 10.4*   HCT 36.2 34.2 31.6*   MCV 89.6 87.9 88.8   MCH 29.0 28.8 29.2   MCHC 32.3 32.7 32.9   RDW 15.9* 16.0* 16.1*    170 160   MPV 9.6 9.7 9.6       No results for input(s): POCGLU in the last 72 hours.     Imaging:  XR CHEST (2 VW)    Result Date: 7/6/2022  EXAMINATION: TWO XRAY VIEWS OF THE CHEST 7/6/2022 12:46 pm COMPARISON: 09/02/2021 HISTORY: ORDERING SYSTEM PROVIDED HISTORY: shortness of breath TECHNOLOGIST PROVIDED HISTORY: Reason for exam:->shortness of breath FINDINGS: No evidence of consolidating pneumonia. There is no effusion or pneumothorax. The cardiomediastinal silhouette is without acute process. The osseous structures are without acute process. Minimal linear atelectasis in the lingular segment similar to the prior examination. Minimal linear lingular subsegmental atelectasis. No pleural fluid. CT CHEST WO CONTRAST    Result Date: 7/6/2022  EXAMINATION: CT OF THE CHEST WITHOUT CONTRAST 7/6/2022 2:12 pm TECHNIQUE: CT of the chest was performed without the administration of intravenous contrast. Multiplanar reformatted images are provided for review. Automated exposure control, iterative reconstruction, and/or weight based adjustment of the mA/kV was utilized to reduce the radiation dose to as low as reasonably achievable. COMPARISON: 08/31/2021 chest CT. HISTORY: ORDERING SYSTEM PROVIDED HISTORY: sob TECHNOLOGIST PROVIDED HISTORY: Reason for exam:->sob Decision Support Exception - unselect if not a suspected or confirmed emergency medical condition->Emergency Medical Condition (MA) FINDINGS: Mediastinum: There is atherosclerotic calcification of the thoracic aorta and coronary artery. No thoracic aortic aneurysm. Cardiac size is enlarged. No mediastinal, hilar or axillary lymphadenopathy. There is a small hiatal hernia. Lungs/pleura: There are interstitial and linear densities at the lung bases. There is mild bronchiectasis and peribronchial thickening of the lower lobe bronchi. No pleural effusions. Linear density in the right middle lobe and lingula is probably due to atelectasis. Upper Abdomen: Bilateral benign adrenal adenomas are not significantly changed. There are bilateral renal cysts. There is a stable cyst in the left hepatic lobe. There are surgical clips in the gallbladder fossa. Soft Tissues/Bones: There is mild thoracic spondylosis and generalized osteopenia.      Bilateral lower lobe infiltrates with mild bronchiectasis and peribronchial thickening likely on an inflammatory/infectious basis. Previously the lower lobe infiltrates were more consolidative and there are bilateral pleural effusions. Cardiomegaly and atherosclerosis. Stable benign adrenal adenomas bilaterally. No follow-up imaging is required. Bilateral renal cysts. No imaging follow-up is required. Patient Instructions:      Medication List      START taking these medications    cefdinir 300 MG capsule  Commonly known as: OMNICEF  Take 1 capsule by mouth daily for 5 days        CHANGE how you take these medications    furosemide 20 MG tablet  Commonly known as: LASIX  Take 1 tablet by mouth daily  What changed: additional instructions     metoprolol succinate 25 MG extended release tablet  Commonly known as: TOPROL XL  Take 1 tablet by mouth daily  Start taking on: July 9, 2022  What changed:   · medication strength  · how much to take  · when to take this        CONTINUE taking these medications    CPAP Machine Misc     ferrous sulfate 325 (65 Fe) MG tablet  Commonly known as: IRON 325     Jardiance 10 MG tablet  Generic drug: empagliflozin     omeprazole 40 MG delayed release capsule  Commonly known as: PRILOSEC     sertraline 50 MG tablet  Commonly known as: ZOLOFT     spironolactone 25 MG tablet  Commonly known as: ALDACTONE     vitamin D 25 MCG (1000 UT) Tabs tablet  Commonly known as: CHOLECALCIFEROL  Take 1 tablet by mouth daily.         STOP taking these medications    levoFLOXacin 500 MG tablet  Commonly known as: LEVAQUIN     potassium chloride 10 MEQ extended release tablet  Commonly known as: KLOR-CON M           Where to Get Your Medications      These medications were sent to [address] [place] [address]    Phone: [phone]   · cefdinir 300 MG capsule  · metoprolol succinate 25 MG extended release tablet           Note that more than 30 minutes was spent in preparing discharge papers, discussing discharge with patient, medication review, etc.    Signed:  Electronically signed by YOHANA Baumann CNP on 7/8/2022 at 11:43 AM

## 2022-07-11 LAB
BLOOD CULTURE, ROUTINE: NORMAL
CULTURE, BLOOD 2: NORMAL

## 2022-07-12 NOTE — PROGRESS NOTES
"  Letter of Medical Necessity    Khalif Levy at [facility]     7/12/2022   Insurance Company Name:  [employer] [id number] coverage  Address:  Gabbi Chung [address], [address]   RE: Harriett De Santiago  YOB: 1984   Group #:  [id number] ID#:  [id number]     To Whom It May Concern:     Ms. Harriett De Santiago has been a patient of mine this years. Patient is 5'7"" and weighs 371 lbs. with a BMI of 58.11 kg. Patient has been excessively overweight for some time now and will benefit from Bariatric Surgery. In addition to morbid obesity, the patient is suffering from the following comorbid conditions: Hypertension, Asthma/COPD, Degenerative Joint Disease and Hyperlipidemia. Felix Han has tried many methods of weight loss including diet pills for 3 years with 8-9 pounds lost which was re-gained, Physician administered diets for 4 years with 2 pounds lost which was re-gained , Weightwatchers, etc. The patient is limited due to her comorbidities in her ability to exercise but has tried walking, biking, joining a gym, swimming and exercise classes. This patientâs history indicates, continued weight gain is predictable, thus exacerbating existing conditions, shortening life expectancy and greatly increasing the risk factor of developing debilitating, even life threatening disease and co-morbidities. Historical figures support the fact that the expense in treating these co-morbidities increases with time if not alleviated. Family medical history is positive for (e.g. Obesity, hypertension, diabetes, hypercholesterolemia, etc). The recommendation is for this patient to undergo bariatric surgery.  In 1998, the [facility] reported: âWeight loss surgery is an option for carefully selected patients with clinically severe obesity (BMI >40 or >35 with co-morbid conditions) when less invasive methods of weight loss have failed and the patient is at high risk for " Dr. Christensen Baltimore aware of patient arrival to unit. obesity-associated morbidity or mortality. â     Surgery is pending pre-determination of benefits, medical necessity, and pre-certification of hospitalization. Your prompt reply would be appreciated. A written response is required. The patient is aware of their right to a third party review based on a denial of medical necessity. Thank you for your kind consideration in this matter.    Sincerely,       Inocencio Da Silva MD

## 2022-07-22 ENCOUNTER — OFFICE VISIT (OUTPATIENT)
Dept: CARDIOLOGY CLINIC | Age: 87
End: 2022-07-22
Payer: MEDICARE

## 2022-07-22 VITALS
WEIGHT: 145.2 LBS | RESPIRATION RATE: 16 BRPM | BODY MASS INDEX: 23.33 KG/M2 | HEIGHT: 66 IN | SYSTOLIC BLOOD PRESSURE: 102 MMHG | DIASTOLIC BLOOD PRESSURE: 60 MMHG

## 2022-07-22 DIAGNOSIS — I10 PRIMARY HYPERTENSION: Primary | ICD-10-CM

## 2022-07-22 PROCEDURE — G8427 DOCREV CUR MEDS BY ELIG CLIN: HCPCS | Performed by: NURSE PRACTITIONER

## 2022-07-22 PROCEDURE — 1090F PRES/ABSN URINE INCON ASSESS: CPT | Performed by: NURSE PRACTITIONER

## 2022-07-22 PROCEDURE — 93000 ELECTROCARDIOGRAM COMPLETE: CPT | Performed by: INTERNAL MEDICINE

## 2022-07-22 PROCEDURE — 1036F TOBACCO NON-USER: CPT | Performed by: NURSE PRACTITIONER

## 2022-07-22 PROCEDURE — G8400 PT W/DXA NO RESULTS DOC: HCPCS | Performed by: NURSE PRACTITIONER

## 2022-07-22 PROCEDURE — 1123F ACP DISCUSS/DSCN MKR DOCD: CPT | Performed by: NURSE PRACTITIONER

## 2022-07-22 PROCEDURE — G8420 CALC BMI NORM PARAMETERS: HCPCS | Performed by: NURSE PRACTITIONER

## 2022-07-22 PROCEDURE — 99213 OFFICE O/P EST LOW 20 MIN: CPT | Performed by: NURSE PRACTITIONER

## 2022-07-22 PROCEDURE — 1111F DSCHRG MED/CURRENT MED MERGE: CPT | Performed by: NURSE PRACTITIONER

## 2022-07-22 NOTE — PATIENT INSTRUCTIONS
Continue lasix 3 times weekly     2. Continue rest of current cardiac medications     3. Stay hydrated     4. Follow up with Dr. Aurelio Ward 3 months    5. Weigh yourself daily    -Stay Hydrated    -Diet should sodium restricted to 2 grams    -Again watch your daily weight trends and if you gain water weight please follow below instructions.    -If you gain 3-5 pounds in 2-3 days OR notice that you are retaining fluid in anyway just like you did before then take an extra dose of your water pill (furosemide/Lasix) every day until you lose the weight or feel better.     -If you notice that you have taken more than 2 extra doses in 1 week then please call and let us know. -If at any time you feel that you are retaining fluid, your medications are not working, or you feel ill in anyway, then please call us for either same day appointment or the next day, and for instructions. Our goal is to keep you out of the emergency room and the hospital and we have ways to do it. You just need to call us in a timely manner.     -If you become sick for other reasons, and notice that you are not urinating as much, the urine is very dark, you have significant diarrhea or vomiting, then please DO NOT take your water pill and CALL US immediately.

## 2022-07-22 NOTE — PROGRESS NOTES
[id number] [facility]   Office Appointment           Reason for Visit: Heart Failure    Primary Cardiologist: Dr. Swenson          History of Present Illness:     Ms. Inge Burris is a 81year old female with a PMHx of chronic HFpEF, PAF, HTN, HLD, T2DM, CKD, JERICA (noncompliant), iron deficiency anemia, [place] and history of medical noncompliance likely due to short-term memory loss. She recently presented to the emergency room with complaints of shortness of breath and malaise. She was started on Levaquin for suspected CAP as an outpatient however had ongoing generalized fatigue and febrile prompting presentation to the ED. Upon arrival CT chest demonstrated bilateral lower lobe infiltrates and she was admitted to the hospital for pneumonia. She was treated with IV antibiotics and discharged to home with subjective improvement. She presents today in hospital follow-up, since discharge from the hospital she has been compliant with all of her current cardiac medications and tolerating the reduction in her potassium to 3 times a week. She denies dyspnea with exertion, shortness of breath, or decline in overall functional capacity. She denies orthopnea, PND, nocturnal cough or hemoptysis. She denies abdominal distention or bloating, early satiety, anorexia/change in appetite, unintentional weight loss. She does lower extremity edema. She 1 episode of lightheadedness since discharge however states that she did not eat or drink much that day, and has not had recurrent symptoms. She denies palpitations, syncope or near syncope. Review of systems is negative for chest pain, pressure, discomfort. When ambulating on an incline, She does not leg claudication. History is negative for neurological symptoms including transient loss of vision, asymmetric weakness, aphasia, dysphasia, numbness, tingling. Past medical, surgical, family and social histories have been reviewed.  Any changes in the past medical history, social history or family history have been made and are reflected in the electronic medical record. Patient Active Problem List    Diagnosis Date Noted    HTN (hypertension) 11/13/2013     Priority: High    Hyperlipidemia 11/13/2013     Priority: High    Anemia 11/13/2013     Priority: High    GERD (gastroesophageal reflux disease) 11/13/2013     Priority: High    JERICA on CPAP 11/13/2013     Priority: High    Pneumonia 07/06/2022     Priority: Medium    Acute kidney injury superimposed on chronic kidney disease ([address] 07/06/2022     Priority: Medium    CAP (community acquired pneumonia) due to Chlamydia species 07/06/2022     Priority: Medium    HAP (hospital-acquired pneumonia) 08/30/2021    Pleural effusion on left 08/05/2021    Chronic heart failure with preserved ejection fraction ([address] 03/24/2021    Atrial fibrillation with RVR ([address] 03/03/2021    PAF (paroxysmal atrial fibrillation) (HCC)     Congestive heart failure ([address]     Valvular heart disease     Chest pain 05/03/2019    CKD (chronic kidney disease) stage 3, GFR 30-59 ml/min ([address] 05/03/2019    Acute cystitis without hematuria 05/03/2019    Non-rheumatic mitral valve stenosis 07/31/2018    Rheumatic mitral stenosis 07/18/2017    Atypical chest pain 06/09/2016    Renal insufficiency 10/07/2015    Mitral insufficiency 02/27/2015    Acute on chronic diastolic CHF (congestive heart failure), NYHA class 1 ([address] 02/27/2015    PVC's (premature ventricular contractions) 12/22/2014    Abnormal bone density screening 06/04/2014    Vitamin D deficiency 02/21/2014     Past Medical History:    Rheumatic fever as child  [place] wears hearing aides  Hypertension. Type II diabetes mellitus. Hyperlipidemia. Obstructive sleep apnea non compliant C-pap  Exercise MPS, 12/29/2014. 1 minute, 51 seconds Henrique protocol. 109% MPHR. Terminated for dyspnea. No diagnostic electrocardiographic changes. Normal myocardial perfusion images with ejection fraction 90%.  No evidence for ischemia. Low risk study. Echo, interpreted by Dr. Susy Saeed, 02/12/2015. Normal LV size with borderline global hypokinesis. EF 50-55%. Mild CLVH. Moderate LAE. Moderate-severe MI. Mild AI. Mild-moderate TI. Mild PHTN. TTE 01/08/2016. Normal LV size with moderate CLVH. Normal regional wall motion and systolic function with Stage II diastolic dysfunction. Aortic sclerosis without stenosis, but with mild aortic insufficiency. Thickening of the mitral leaflets with decreased leaflet excursion associated with mild mitral stenosis. Mean transvalvular gradient 7 mmHg and moderate mitral insufficiency with a centrally directed jet. Pulmonary venous flow pattern appeared normal.   Pharmacologic MPS, 04/19/2016. EF 75%. No ischemia. Low risk exam.   CKD stage III  PUD  Anemia on iron supplementation  Chest Pain-->Pharmacologic MPS, 04/19/2016. EF 75%. No ischemia. Low risk exam.   R foot fracture s/p surgical repair at [facility] 4/2019--> NWB RLE on Lovenox QD  Thyroid nodules diagnosed in 2019 5/2019 Lexiscan MPS: Non ischemic. EF 70%. NWM.  12/2019 TTE EF 55-60%. Mild to moderate MR/MS/AI. Mild TR. Past Surgical History:    Hx esophageal dilatation, polypectomy, EDITH/BSO age 29, EGD, L breast lumpectomy (benign), cholecystectomy    Social History:    Lifelong non smoker  Denies ETOH/Illicit Drugs  Caffeine: Decaf Tea  Activity; Lives with son in one story home with basement. + chronic LEE but no CP with ADL's.    Code Status: Full Code        Family History: Non contributory secondary to age      Past Medical History:   Diagnosis Date    (HFpEF) heart failure with preserved ejection fraction [address]     Acute cystitis with hematuria 05/03/2019    Anemia 2008    RECEIVED 4 UNITS BLOOD    Atrial fibrillation (HCC)     CKD (chronic kidney disease) stage 3, GFR 30-59 ml/min (HCC) 05/03/2019    CPAP (continuous positive airway pressure) dependence     Diabetes mellitus (HCC)     Esophageal stricture     Gastric ulcer Goiter     Heart murmur     Hypertension     SINCE 1994    SACHIN (iron deficiency anemia)     Kidney stones     Rheumatic fever     POSSIBLY AS A CHILD    Sleep apnea          Past Surgical History:   Procedure Laterality Date    BREAST LUMPECTOMY Left     CALCIUM BUILDUP    CARDIOVASCULAR STRESS TEST  1999    ISCHEMIC NUCLEAR STRESS TEST - PATIENT REFUSED HEART CATHETERIZATION    CHOLECYSTECTOMY      COLONOSCOPY  07/11    W/POLYP REMOVAL    DOPPLER ECHOCARDIOGRAPHY  05/14/03    POSSIBLE MILD MITRAL STENOSIS    DOPPLER ECHOCARDIOGRAPHY  06/09/04    MILD MITRAL STENSIS, MODERATE MITRAL REGURGITATION    FOOT SURGERY      HYSTERECTOMY (CERVIX STATUS UNKNOWN)      POLYPECTOMY      16 POLYPS REMOVED FROM STOMACH AND 1 FROM COLON    UPPER GASTROINTESTINAL ENDOSCOPY  07/11       Family History   Problem Relation Age of Onset    Asthma Mother     Diabetes Mother     Alcohol Abuse Father        Social History     Socioeconomic History    Marital status:      Spouse name: None    Number of children: None    Years of education: None    Highest education level: None   Tobacco Use    Smoking status: Never    Smokeless tobacco: Never   Vaping Use    Vaping Use: Never used   Substance and Sexual Activity    Alcohol use: Not Currently    Drug use: Never   Social History Narrative    Drinks decaf tea       Allergies   Allergen Reactions    Nitrofuran Derivatives Other (See Comments)     Causes fever and chills.     Norvasc [Amlodipine]      Dizziness, light headed    Other Other (See Comments)     HONEYDEW: Sore Throat    Sulfa Antibiotics          Outpatient Medications Marked as Taking for the 7/22/22 encounter (Office Visit) with YOHANA Cameron CNP   Medication Sig Dispense Refill    metoprolol succinate (TOPROL XL) 25 MG extended release tablet Take 1 tablet by mouth daily 30 tablet 3    empagliflozin (JARDIANCE) 10 MG tablet Take 10 mg by mouth daily      spironolactone (ALDACTONE) 25 MG tablet Take 25 mg by mouth daily      furosemide (LASIX) 20 MG tablet Take 1 tablet by mouth daily (Patient taking differently: Take 20 mg by mouth in the morning. Take M-W-F.) 90 tablet 1    ferrous sulfate (IRON 325) 325 (65 Fe) MG tablet Take 325 mg by mouth daily (with breakfast)      omeprazole (PRILOSEC) 40 MG delayed release capsule Take 1 capsule by mouth every morning  0    sertraline (ZOLOFT) 50 MG tablet Take 75 mg by mouth daily   3    CPAP Machine MISC Inhale into the lungs nightly       vitamin D3 (CHOLECALCIFEROL) 1000 UNITS TABS tablet Take 1 tablet by mouth daily. 30 tablet 0       Review of Systems:   Cardiac: As per HPI  General: No fever, chills, rigors  Pulmonary: As per HPI  HEENT: No visual disturbances, difficult swallowing  GI: No nausea, vomiting, abdominal pain  : No dysuria or hematuria  Endocrine: No thyroid disease or diabetes  Musculoskeletal: NIXON x 4, no focal motor deficits  Skin: Intact, no rashes  Neuro/Psych: No headache or seizures        Weights: Wt Readings from Last 3 Encounters:   07/22/22 145 lb 3.2 oz (65.9 kg)   07/08/22 150 lb 4 oz (68.2 kg)   06/15/22 146 lb (66.2 kg)           Physical Examination:     Vitals:    07/22/22 1421   BP: 102/60   Resp: 16        CONSTITUTIONAL: Alert and oriented times 3, no acute distress and cooperative to examination with proper mood and affect. SKIN: Skin color, texture, turgor normal. No rashes or lesions. LYMPH: no cervical nodes, no inguinal nodes  HEENT: Head is normocephalic, atraumatic. EOMI, PERRLA. NECK: Supple, symmetrical, trachea midline, no adenopathy, thyroid symmetric, not enlarged and no tenderness, skin normal.  CHEST/LUNGS: chest symmetric with normal A/P diameter, normal respiratory rate and rhythm, lungs clear to auscultation without wheezes, rales or rhonchi. No accessory muscle use. Scars None   CARDIOVASCULAR: Heart sounds are normal.  Regular rate and rhythm without murmur, gallop or rub. Normal S1 and S2. . Carotid and femoral pulses 2+/4 and equal bilaterally. ABDOMEN: Normal shape. No and Laparoscopic scar(s) present. Normal bowel sounds. No bruits. soft, nondistended, no masses or organomegaly. no evidence of hernia. Percussion: Normal without hepatosplenomegally. Tenderness: absent. RECTAL: deferred, not clinically indicated  NEUROLOGIC: There are no focalizing motor or sensory deficits. CN II-XII are grossly intact. [employer] EXTREMITIES: no cyanosis, no clubbing, and no edema. All the following diagnostics were personally reviewed and interpreted by me. LAB DATA:     7/8/22 02:08   Sodium 136   Potassium 4.7   Chloride 105   CO2 22   BUN,BUNPL 34 (H)   Creatinine 1.8 (H)   Anion Gap 9   GFR Non- 27   GFR  32   GLUCOSE, FASTING, (H)   CALCIUM, SERUM, 240901 8.7   WBC 6.8   RBC 3.56   Hemoglobin Quant 10.4 (L)   Hematocrit 31.6 (L)   MCV 88.8   MCH 29.2   MCHC 32.9   MPV 9.6   RDW 16.1 (H)   Platelet Count 760         IMAGING:    CT Chest (7/6/2022)  Impression  Bilateral lower lobe infiltrates with mild bronchiectasis and peribronchial  thickening likely on an inflammatory/infectious basis. Previously the lower  lobe infiltrates were more consolidative and there are bilateral pleural  effusions. Cardiomegaly and atherosclerosis. Stable benign adrenal adenomas bilaterally. No follow-up imaging is required. Bilateral renal cysts. No imaging follow-up is required. CARDIAC TESTING:    TTE (3/4/2021)   Summary:   Normal left ventricle size and systolic function. Ejection fraction is visually estimated at 60-65%. Atrial fibrillation may   affect the evaluation of LV systolic function. No regional wall motion abnormalities seen. Normal left ventricle wall thickness. Abnormal LV diastolic function with an indeterminate grade (E/e' > 11). The left atrium is severely dilated. Normal right ventricular size and function.    Mild mitral regurgitation with centrally directed jet.   No hemodynamically significant aortic stenosis is present. Mild tricuspid regurgitation. RVSP is 56 mmHg. Pulmonary hypertension is moderate . No evidence for hemodynamically significant pericardial effusion. ISABELLE (3/5/2021)   Summary   Normal left ventricle size and systolic function. Ejection fraction is visually estimated at 60-65%. Atrial fibrillation may   affect the evaluation of LV systolic function. No regional wall motion abnormalities seen. Normal left ventricle wall thickness. No evidence of thrombus within left atrium or appendage. Normal right ventricular size and function. Moderate mitral regurgitation is present. No hemodynamically significant aortic stenosis is present. Mild aortic regurgitation is noted. Mild tricuspid regurgitation. RVSP is 41 mmHg. Pulmonary hypertension is mild . No evidence for hemodynamically significant pericardial effusion. TTE (9/1/2021)   Summary:   Technically sub-optimal images. Normal left ventricular chamber size. Normal left ventricular systolic function, LVEF 01%. Mild left ventricular concentric hypertrophy noted. Stage I diastolic dysfunction. The left atrium is mildly dilated. Mildly increased left atrial volume. Interatrial septum not well visualized but appears intact. Normal right ventricle size and function. There is trace aortic regurgitation. Normal aortic root size. No evidence of pericardial effusion. No intra cardiac mass or thrombus. Compared to prior echo from March 2021, MR and TR appears to be less. EKG  Sinus rhythm       ASSESSMENT:  Chronic HFpEF  ACC stage C / NYHA class III  Euvolemic   PAF s/p DCCV to SR (3/5/2021) - [address] with Eliquis discontinued given frequent falls with head injury. Maintained on ASA 81 mg daily.   Hx of digoxin toxicity   Mild/moderate MR  HTN  Orthostatic hypotension  Hx pleural effusion s/p thoracentesis   T2DM - hgba1c 8.6  CKD  HLD  JERICA - inconsistent use of CPAP  Hx of PUD, esophageal strictures and dilations  Iron deficiency anemia   [place]  Depression  Hx of medical noncompliance       PLAN:  Continue lasix 3 times weekly     2. Continue rest of current cardiac medications     3. Stay hydrated     4. Follow up with Dr. Sherry Klein 3 months    5. Weigh yourself daily    -Stay Hydrated    -Diet should sodium restricted to 2 grams    -Again watch your daily weight trends and if you gain water weight please follow below instructions.    -If you gain 3-5 pounds in 2-3 days OR notice that you are retaining fluid in anyway just like you did before then take an extra dose of your water pill (furosemide/Lasix) every day until you lose the weight or feel better.     -If you notice that you have taken more than 2 extra doses in 1 week then please call and let us know. -If at any time you feel that you are retaining fluid, your medications are not working, or you feel ill in anyway, then please call us for either same day appointment or the next day, and for instructions. Our goal is to keep you out of the emergency room and the hospital and we have ways to do it. You just need to call us in a timely manner.     -If you become sick for other reasons, and notice that you are not urinating as much, the urine is very dark, you have significant diarrhea or vomiting, then please DO NOT take your water pill and CALL US immediately. Panda Carrington APRN-CNP  [id number] [facility].

## 2022-07-25 ENCOUNTER — HOSPITAL ENCOUNTER (OUTPATIENT)
Dept: OTHER | Age: 87
Setting detail: THERAPIES SERIES
Discharge: HOME | End: 2022-07-25
Payer: MEDICARE

## 2022-07-25 VITALS
RESPIRATION RATE: 16 BRPM | SYSTOLIC BLOOD PRESSURE: 121 MMHG | WEIGHT: 145.8 LBS | OXYGEN SATURATION: 97 % | BODY MASS INDEX: 23.53 KG/M2 | HEART RATE: 73 BPM | DIASTOLIC BLOOD PRESSURE: 59 MMHG

## 2022-07-25 LAB
ANION GAP SERPL CALCULATED.3IONS-SCNC: 8 MMOL/L (ref 7–16)
BUN BLDV-MCNC: 30 MG/DL (ref 6–23)
CALCIUM SERPL-MCNC: 9 MG/DL (ref 8.6–10.2)
CHLORIDE BLD-SCNC: 106 MMOL/L (ref 98–107)
CO2: 22 MMOL/L (ref 22–29)
CREAT SERPL-MCNC: 1.4 MG/DL (ref 0.5–1)
GFR AFRICAN AMERICAN: 43
GFR NON-AFRICAN AMERICAN: 36 ML/MIN/1.73
GLUCOSE BLD-MCNC: 134 MG/DL (ref 74–99)
POTASSIUM SERPL-SCNC: 4 MMOL/L (ref 3.5–5)
PRO-BNP: 1495 PG/ML (ref 0–450)
SODIUM BLD-SCNC: 136 MMOL/L (ref 132–146)

## 2022-07-25 PROCEDURE — 36415 COLL VENOUS BLD VENIPUNCTURE: CPT

## 2022-07-25 PROCEDURE — 99214 OFFICE O/P EST MOD 30 MIN: CPT

## 2022-07-25 PROCEDURE — 80048 BASIC METABOLIC PNL TOTAL CA: CPT

## 2022-07-25 PROCEDURE — 83880 ASSAY OF NATRIURETIC PEPTIDE: CPT

## 2022-07-25 NOTE — PROGRESS NOTES
Congestive Heart Failure [address] [facility]  [facility] [address] Karmen Skinner   1935    Referring Provider: [address] [place], [place] [address]   Primary Care Physician: Helen Rentreia   Cardiologist: Myles Fraga   Nephrologist:     History of Present Illness:     Sid Clemons is a 81 y.o. female with a history of HFpEF, most recent EF 66%. Patient Story:    She does  have dyspnea with exertion, shortness of breath, or decline in overall functional capacity. She does not have orthopnea, PND, nocturnal cough or hemoptysis. She does not have abdominal distention or bloating, early satiety, anorexia/change in appetite. She does has a good urinary response to oral diuretic. She does not have lower extremity edema. She does not have lightheadedness, dizziness. She denies palpitations, syncope or near syncope. She does not complain of chest pain, pressure, discomfort. Allergies   Allergen Reactions    Nitrofuran Derivatives Other (See Comments)     Causes fever and chills. Norvasc [Amlodipine]      Dizziness, light headed    Other Other (See Comments)     HONEYDEW: Sore Throat    Sulfa Antibiotics        Prior to Visit Medications    Medication Sig Taking? Authorizing Provider   metoprolol succinate (TOPROL XL) 25 MG extended release tablet Take 1 tablet by mouth daily  YOHANA Rodriguez CNP   empagliflozin (JARDIANCE) 10 MG tablet Take 10 mg by mouth daily  Historical Provider, MD   spironolactone (ALDACTONE) 25 MG tablet Take 25 mg by mouth daily  Historical Provider, MD   furosemide (LASIX) 20 MG tablet Take 1 tablet by mouth daily  Patient taking differently: Take 20 mg by mouth in the morning. Take M-W-F.   YOHANA Oh CNP   ferrous sulfate (IRON 325) 325 (65 Fe) MG tablet Take 325 mg by mouth daily (with breakfast)  Historical Provider, MD   omeprazole (PRILOSEC) 40 MG delayed release capsule Take 1 capsule by mouth every morning  Historical Provider, MD (mmol/L)   Date Value   07/25/2022 22   07/08/2022 22   07/07/2022 22     BUN (mg/dL)   Date Value   07/25/2022 30 (H)   07/08/2022 34 (H)   07/07/2022 30 (H)     Glucose (mg/dL)   Date Value   07/25/2022 134 (H)   07/08/2022 135 (H)   07/07/2022 129 (H)   10/15/2021 114 (H)   02/27/2012 108   07/01/2011 79     Calcium (mg/dL)   Date Value   07/25/2022 9.0   07/08/2022 8.7   07/07/2022 9.1       Last 3 BNP       Pro-BNP (pg/mL)   Date Value   07/25/2022 1,495 (H)   07/06/2022 897 (H)   06/15/2022 619 (H)          CBC: No results for input(s): WBC, HGB, PLT in the last 72 hours. BMP:    Recent Labs     07/25/22  1217      K 4.0      CO2 22   BUN 30*   CREATININE 1.4*   GLUCOSE 134*       Hepatic: No results for input(s): AST, ALT, ALB, BILITOT, ALKPHOS in the last 72 hours. Troponin: No results for input(s): TROPONINI in the last 72 hours. BNP: No results for input(s): BNP in the last 72 hours. Lipids: No results for input(s): CHOL, HDL in the last 72 hours. Invalid input(s): LDLCALCU  INR: No results for input(s): INR in the last 72 hours. WEIGHTS:    Wt Readings from Last 3 Encounters:   07/25/22 145 lb 12.8 oz (66.1 kg)   07/22/22 145 lb 3.2 oz (65.9 kg)   07/08/22 150 lb 4 oz (68.2 kg)     TELEMETRY:  Cardiac Regularity: Regular  Cardiac Rhythm/Interpretation: SR     ASSESSMENT   Rajni Robles is euvolemic with stable weight. She has SOB with exertion at time but recovers quickly. She is tolerating her furosemide three times a week and her weight is unchanged and no swelling.     Interventions completed this visit:  IV diuretics given- No  Lab work obtained yes, proBNP, BMP   Reviewed currently prescribed medications with patient, educated on importance of compliance and answered any questions regarding their medication  Educated on signs and symptoms of HF  Educated on low sodium diet    PLAN:  Scheduled to follow up in CHF clinic on   Future Appointments   Date Time Provider Department Center   8/15/2022 12:00 PM HERNESTO [facility] ROOM 1 [facility]   10/4/2022 12:15 PM Chris Anderson, [address]     Given clinic phone number [phone] and aware of signs and symptoms to call with any HF change in symptoms.

## 2022-08-15 ENCOUNTER — HOSPITAL ENCOUNTER (OUTPATIENT)
Dept: OTHER | Age: 87
Setting detail: THERAPIES SERIES
Discharge: HOME | End: 2022-08-15
Payer: MEDICARE

## 2022-08-15 VITALS
BODY MASS INDEX: 23.05 KG/M2 | WEIGHT: 142.8 LBS | SYSTOLIC BLOOD PRESSURE: 123 MMHG | DIASTOLIC BLOOD PRESSURE: 60 MMHG | OXYGEN SATURATION: 95 % | RESPIRATION RATE: 18 BRPM | HEART RATE: 78 BPM

## 2022-08-15 LAB
ANION GAP SERPL CALCULATED.3IONS-SCNC: 13 MMOL/L (ref 7–16)
BUN BLDV-MCNC: 32 MG/DL (ref 6–23)
CALCIUM SERPL-MCNC: 9.2 MG/DL (ref 8.6–10.2)
CHLORIDE BLD-SCNC: 104 MMOL/L (ref 98–107)
CO2: 21 MMOL/L (ref 22–29)
CREAT SERPL-MCNC: 1.5 MG/DL (ref 0.5–1)
GFR AFRICAN AMERICAN: 40
GFR NON-AFRICAN AMERICAN: 33 ML/MIN/1.73
GLUCOSE BLD-MCNC: 148 MG/DL (ref 74–99)
POTASSIUM SERPL-SCNC: 4.3 MMOL/L (ref 3.5–5)
PRO-BNP: 875 PG/ML (ref 0–450)
SODIUM BLD-SCNC: 138 MMOL/L (ref 132–146)

## 2022-08-15 PROCEDURE — 83880 ASSAY OF NATRIURETIC PEPTIDE: CPT

## 2022-08-15 PROCEDURE — 80048 BASIC METABOLIC PNL TOTAL CA: CPT

## 2022-08-15 PROCEDURE — 36415 COLL VENOUS BLD VENIPUNCTURE: CPT

## 2022-08-15 PROCEDURE — 99214 OFFICE O/P EST MOD 30 MIN: CPT

## 2022-08-15 NOTE — PROGRESS NOTES
Congestive Heart Failure [facility] 34 [facility]  [address] [place]   1935    Referring Provider: Jenny Shelton  Primary Care Physician: Paul Andrews   Cardiologist: Cara Faustin   Nephrologist:     History of Present Illness:     Vanessa Rosales is a 81 y.o. female with a history of HFpEF, most recent EF 66%. Patient Story:    She does  have dyspnea with exertion, shortness of breath, or decline in overall functional capacity. She does not have orthopnea, PND, nocturnal cough or hemoptysis. She does not have abdominal distention or bloating, early satiety, anorexia/change in appetite. She does has a good urinary response to oral diuretic. She does not have lower extremity edema. She does not have lightheadedness, dizziness. She denies palpitations, syncope or near syncope. She does not complain of chest pain, pressure, discomfort. Allergies   Allergen Reactions    Nitrofuran Derivatives Other (See Comments)     Causes fever and chills. Norvasc [Amlodipine]      Dizziness, light headed    Other Other (See Comments)     HONEYDEW: Sore Throat    Sulfa Antibiotics        Prior to Visit Medications    Medication Sig Taking? Authorizing Provider   metoprolol succinate (TOPROL XL) 25 MG extended release tablet Take 1 tablet by mouth daily  YOHANA Berman CNP   empagliflozin (JARDIANCE) 10 MG tablet Take 10 mg by mouth daily  Historical Provider, MD   spironolactone (ALDACTONE) 25 MG tablet Take 25 mg by mouth daily  Historical Provider, MD   furosemide (LASIX) 20 MG tablet Take 1 tablet by mouth daily  Patient taking differently: Take 20 mg by mouth in the morning. Take M-W-F.   YOHANA Morel CNP   ferrous sulfate (IRON 325) 325 (65 Fe) MG tablet Take 325 mg by mouth daily (with breakfast)  Historical Provider, MD   omeprazole (PRILOSEC) 40 MG delayed release capsule Take 1 capsule by mouth every morning  Historical Provider, MD sertraline (ZOLOFT) 50 MG tablet Take 75 mg by mouth daily   Historical Provider, MD   CPAP Machine MISC Inhale into the lungs nightly   Historical Provider, MD   vitamin D3 (CHOLECALCIFEROL) 1000 UNITS TABS tablet Take 1 tablet by mouth daily. John Holland DO       Guideline directed medical:  ARNI/ACE I/ARB: No  Beta blocker:   Yes  Aldosterone antagonist: yes      Physical Examination:     /60   Pulse 78   Resp 18   Wt 142 lb 12.8 oz (64.8 kg)   SpO2 95%   BMI 23.05 kg/m²     Assessment  Charting Type: Shift assessment    Neurological  Level of Consciousness: Alert (0)     HEENT (Head, Ears, Eyes, Nose, & Throat)  HEENT (WDL): Exceptions to WDL  Right Eye: Glasses, Impaired vision  Left Eye: Glasses, Impaired vision  Right Ear: Impaired hearing  Left Ear: Impaired hearing  Teeth: Missing teeth    Respiratory  Respiratory Pattern: Regular  Respiratory Depth: Normal  Respiratory Quality/Effort: Dyspnea with exertion  L Breath Sounds: Clear  R Breath Sounds: Clear        Cardiac  Cardiac Regularity: Regular  Heart Sounds: S1, S2  Cardiac Rhythm: Sinus rhythm    Rhythm Interpretation  Heart Rate: 78     Gastrointestinal  Abdominal (WDL): Within Defined Limits  Abdomen Inspection: Soft           Peripheral Vascular  Peripheral Vascular (WDL): Within Defined Limits  Edema: None                Psychosocial  Psychosocial (WDL): Within Defined Limits              Heart Rate: 78           LAB DATA:    Last 3 BMP      Sodium (mmol/L)   Date Value   07/25/2022 136   07/08/2022 136   07/07/2022 137     Potassium (mmol/L)   Date Value   07/25/2022 4.0   07/08/2022 4.7   06/15/2022 3.8     Potassium reflex Magnesium (mmol/L)   Date Value   07/07/2022 4.5   07/06/2022 5.1 (H)   08/30/2021 4.1     Chloride (mmol/L)   Date Value   07/25/2022 106   07/08/2022 105   07/07/2022 106     CO2 (mmol/L)   Date Value   07/25/2022 22   07/08/2022 22   07/07/2022 22     BUN (mg/dL)   Date Value   07/25/2022 30 (H) 07/08/2022 34 (H)   07/07/2022 30 (H)     Glucose (mg/dL)   Date Value   07/25/2022 134 (H)   07/08/2022 135 (H)   07/07/2022 129 (H)   10/15/2021 114 (H)   02/27/2012 108   07/01/2011 79     Calcium (mg/dL)   Date Value   07/25/2022 9.0   07/08/2022 8.7   07/07/2022 9.1       Last 3 BNP       Pro-BNP (pg/mL)   Date Value   07/25/2022 1,495 (H)   07/06/2022 897 (H)   06/15/2022 619 (H)          CBC: No results for input(s): WBC, HGB, PLT in the last 72 hours. BMP:    No results for input(s): NA, K, CL, CO2, BUN, CREATININE, GLUCOSE in the last 72 hours. Hepatic: No results for input(s): AST, ALT, ALB, BILITOT, ALKPHOS in the last 72 hours. Troponin: No results for input(s): TROPONINI in the last 72 hours. BNP: No results for input(s): BNP in the last 72 hours. Lipids: No results for input(s): CHOL, HDL in the last 72 hours. Invalid input(s): LDLCALCU  INR: No results for input(s): INR in the last 72 hours. WEIGHTS:    Wt Readings from Last 3 Encounters:   08/15/22 142 lb 12.8 oz (64.8 kg)   07/25/22 145 lb 12.8 oz (66.1 kg)   07/22/22 145 lb 3.2 oz (65.9 kg)     TELEMETRY:  Cardiac Regularity: Regular  Cardiac Rhythm/Interpretation: SR     ASSESSMENT   Chicho Anderson is euvolemic with decrease in weight. She has SOB with exertion at time but recovers quickly.    Interventions completed this visit:  IV diuretics given- No  Lab work obtained yes, proBNP, BMP   Reviewed currently prescribed medications with patient, educated on importance of compliance and answered any questions regarding their medication  Educated on signs and symptoms of HF  Educated on low sodium diet    PLAN:  Scheduled to follow up in CHF clinic on   Future Appointments   Date Time Provider Santos Oconnor   9/19/2022 12:00 PM [facility] ROOM 1 [facility] HOD   10/4/2022 12:15 PM [place], [address]     Given clinic phone number [phone] and aware of signs and symptoms to call with any HF change in symptoms.

## 2022-09-19 ENCOUNTER — HOSPITAL ENCOUNTER (OUTPATIENT)
Dept: OTHER | Age: 87
Setting detail: THERAPIES SERIES
Discharge: HOME | End: 2022-09-19
Payer: MEDICARE

## 2022-09-19 VITALS
BODY MASS INDEX: 23.24 KG/M2 | SYSTOLIC BLOOD PRESSURE: 135 MMHG | RESPIRATION RATE: 16 BRPM | OXYGEN SATURATION: 95 % | HEART RATE: 72 BPM | DIASTOLIC BLOOD PRESSURE: 63 MMHG | WEIGHT: 144 LBS

## 2022-09-19 LAB
ANION GAP SERPL CALCULATED.3IONS-SCNC: 10 MMOL/L (ref 7–16)
BUN BLDV-MCNC: 39 MG/DL (ref 6–23)
CALCIUM SERPL-MCNC: 9.4 MG/DL (ref 8.6–10.2)
CHLORIDE BLD-SCNC: 103 MMOL/L (ref 98–107)
CO2: 24 MMOL/L (ref 22–29)
CREAT SERPL-MCNC: 1.5 MG/DL (ref 0.5–1)
GFR AFRICAN AMERICAN: 40
GFR NON-AFRICAN AMERICAN: 33 ML/MIN/1.73
GLUCOSE BLD-MCNC: 111 MG/DL (ref 74–99)
POTASSIUM SERPL-SCNC: 4.4 MMOL/L (ref 3.5–5)
PRO-BNP: 690 PG/ML (ref 0–450)
SODIUM BLD-SCNC: 137 MMOL/L (ref 132–146)

## 2022-09-19 PROCEDURE — 80048 BASIC METABOLIC PNL TOTAL CA: CPT

## 2022-09-19 PROCEDURE — 99214 OFFICE O/P EST MOD 30 MIN: CPT

## 2022-09-19 PROCEDURE — 36415 COLL VENOUS BLD VENIPUNCTURE: CPT

## 2022-09-19 PROCEDURE — 83880 ASSAY OF NATRIURETIC PEPTIDE: CPT

## 2022-09-19 NOTE — PROGRESS NOTES
Congestive Heart Failure [facility] 34 [facility]  [address] Rachna Lay   1935    Referring Provider: Estiven Chandra  Primary Care Physician: Jorge Mcgowan   Cardiologist: Jose Armando Mccloud   Nephrologist:     History of Present Illness:     Ephriam Osler is a 81 y.o. female with a history of HFpEF, most recent EF 66%. Patient Story:    She does not  have dyspnea with exertion, shortness of breath, or decline in overall functional capacity. She does not have orthopnea, PND, nocturnal cough or hemoptysis. She does not have abdominal distention or bloating, early satiety, anorexia/change in appetite. She does has a good urinary response to oral diuretic. She does not have lower extremity edema. She does not have lightheadedness, dizziness. She denies palpitations, syncope or near syncope. She does not complain of chest pain, pressure, discomfort. Allergies   Allergen Reactions    Nitrofuran Derivatives Other (See Comments)     Causes fever and chills. Norvasc [Amlodipine]      Dizziness, light headed    Other Other (See Comments)     HONEYDEW: Sore Throat    Sulfa Antibiotics        Prior to Visit Medications    Medication Sig Taking?  Authorizing Provider   metoprolol succinate (TOPROL XL) 25 MG extended release tablet Take 1 tablet by mouth daily  YOHANA Carolina CNP   empagliflozin (JARDIANCE) 10 MG tablet Take 10 mg by mouth daily  Historical Provider, MD   spironolactone (ALDACTONE) 25 MG tablet Take 25 mg by mouth daily  Historical Provider, MD   furosemide (LASIX) 20 MG tablet Take 1 tablet by mouth daily  Patient taking differently: Take 20 mg by mouth daily Take M-W-F  YOHANA Reynolds CNP   ferrous sulfate (IRON 325) 325 (65 Fe) MG tablet Take 325 mg by mouth daily (with breakfast)  Historical Provider, MD   omeprazole (PRILOSEC) 40 MG delayed release capsule Take 1 capsule by mouth every morning  Historical Provider, MD   sertraline (ZOLOFT) 50 MG tablet Take 75 mg by mouth daily   Historical Provider, MD   CPAP Machine MISC Inhale into the lungs nightly   Historical Provider, MD   vitamin D3 (CHOLECALCIFEROL) 1000 UNITS TABS tablet Take 1 tablet by mouth daily. Edwardo Peña DO       Guideline directed medical:  ARNI/ACE I/ARB: No  Beta blocker:   Yes  Aldosterone antagonist: yes      Physical Examination:     /63   Pulse 72   Resp 16   Wt 144 lb (65.3 kg)   SpO2 95%   BMI 23.24 kg/m²     Assessment  Charting Type: Shift assessment (chf clinic)    Neurological  Level of Consciousness: Alert (0)          Respiratory  Respiratory Quality/Effort: Unlabored  Chest Assessment: Chest expansion symmetrical        Cardiac  Cardiac Rhythm: Sinus rhythm    Rhythm Interpretation  Heart Rate: 72     Gastrointestinal  Abdominal (WDL): Within Defined Limits  Abdomen Inspection: Soft           Peripheral Vascular  Peripheral Vascular (WDL): Within Defined Limits  Edema: Left lower extremity, Right lower extremity  RLE Edema: None  LLE Edema: None             Genitourinary  Genitourinary (WDL): Within Defined Limits  Psychosocial  Psychosocial (WDL): Within Defined Limits  Pain Assessment  Pain Assessment: None - Denies Pain           Heart Rate: 72           LAB DATA:    Last 3 BMP      Sodium (mmol/L)   Date Value   09/19/2022 137   08/15/2022 138   07/25/2022 136     Potassium (mmol/L)   Date Value   09/19/2022 4.4   08/15/2022 4.3   07/25/2022 4.0     Potassium reflex Magnesium (mmol/L)   Date Value   07/07/2022 4.5   07/06/2022 5.1 (H)   08/30/2021 4.1     Chloride (mmol/L)   Date Value   09/19/2022 103   08/15/2022 104   07/25/2022 106     CO2 (mmol/L)   Date Value   09/19/2022 24   08/15/2022 21 (L)   07/25/2022 22     BUN (mg/dL)   Date Value   09/19/2022 39 (H)   08/15/2022 32 (H)   07/25/2022 30 (H)     Glucose (mg/dL)   Date Value   09/19/2022 111 (H)   08/15/2022 148 (H)   07/25/2022 134 (H)   10/15/2021 114 (H)   02/27/2012 108 07/01/2011 79     Calcium (mg/dL)   Date Value   09/19/2022 9.4   08/15/2022 9.2   07/25/2022 9.0       Last 3 BNP       Pro-BNP (pg/mL)   Date Value   09/19/2022 690 (H)   08/15/2022 875 (H)   07/25/2022 1,495 (H)          CBC: No results for input(s): WBC, HGB, PLT in the last 72 hours. BMP:    Recent Labs     09/19/22  1235      K 4.4      CO2 24   BUN 39*   CREATININE 1.5*   GLUCOSE 111*         Hepatic: No results for input(s): AST, ALT, ALB, BILITOT, ALKPHOS in the last 72 hours. Troponin: No results for input(s): TROPONINI in the last 72 hours. BNP: No results for input(s): BNP in the last 72 hours. Lipids: No results for input(s): CHOL, HDL in the last 72 hours. Invalid input(s): LDLCALCU  INR: No results for input(s): INR in the last 72 hours. WEIGHTS:    Wt Readings from Last 3 Encounters:   09/19/22 144 lb (65.3 kg)   08/15/22 142 lb 12.8 oz (64.8 kg)   07/25/22 145 lb 12.8 oz (66.1 kg)     TELEMETRY:  Cardiac Regularity: Regular  Cardiac Rhythm/Interpretation: SR     ASSESSMENT   Armond Radha weight is stable with 2lb weight gain Patient states she feels\" good \". Lungs are clear ,no lower leg edema, abd soft  Denies any SOB and did not need rollerade today to walk to clinic .  Patient states she hydrates with approx 64 fld ounces of fluid and urinates \"a lot\"  Interventions completed this visit:  IV diuretics given- No  Lab work obtained yes, proBNP, BMP   Reviewed currently prescribed medications with patient, educated on importance of compliance and answered any questions regarding their medication  Educated on signs and symptoms of HF  Educated on low sodium diet    PLAN:  Scheduled to follow up in CHF clinic on   Future Appointments   Date Time Provider Santos Oconnor   10/4/2022 12:15 PM Zach Courtney  [address]   [facility]     Given clinic phone number [phone] and aware of signs and symptoms to call with any HF change in symptoms.

## 2022-10-03 PROBLEM — E44.1 MILD PROTEIN-CALORIE MALNUTRITION (HCC): Status: ACTIVE | Noted: 2021-10-13

## 2022-10-03 PROBLEM — F41.1 GENERALIZED ANXIETY DISORDER: Status: ACTIVE | Noted: 2021-10-13
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
## 2022-10-03 PROBLEM — I13.0 HYPERTENSIVE HEART AND CHRONIC KIDNEY DISEASE WITH HEART FAILURE AND STAGE 1 THROUGH STAGE 4 CHRONIC KIDNEY DISEASE, OR UNSPECIFIED CHRONIC KIDNEY DISEASE (HCC): Status: ACTIVE | Noted: 2021-10-13
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
## 2022-10-03 PROBLEM — I50.9 HEART FAILURE, UNSPECIFIED (HCC): Status: ACTIVE | Noted: 2021-10-13

## 2022-10-03 PROBLEM — F33.0 MAJOR DEPRESSIVE DISORDER, RECURRENT, MILD (HCC): Status: ACTIVE | Noted: 2021-10-13

## 2022-10-03 PROBLEM — Z90.49 ACQUIRED ABSENCE OF OTHER SPECIFIED PARTS OF DIGESTIVE TRACT: Status: ACTIVE | Noted: 2021-10-13

## 2022-10-03 PROBLEM — Z90.710 ACQUIRED ABSENCE OF BOTH CERVIX AND UTERUS: Status: ACTIVE | Noted: 2021-10-13

## 2022-10-03 PROBLEM — Z20.822 CONTACT WITH AND (SUSPECTED) EXPOSURE TO COVID-19: Status: ACTIVE | Noted: 2021-10-13
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
## 2022-10-03 PROBLEM — R42 DIZZINESS AND GIDDINESS: Status: ACTIVE | Noted: 2021-10-13

## 2022-10-03 PROBLEM — R26.2 DIFFICULTY IN WALKING, NOT ELSEWHERE CLASSIFIED: Status: ACTIVE | Noted: 2021-10-13

## 2022-10-03 PROBLEM — I95.1 ORTHOSTATIC HYPOTENSION: Status: ACTIVE | Noted: 2021-10-13

## 2022-10-03 RX ORDER — LEVOFLOXACIN 250 MG/1
TABLET ORAL
COMMUNITY
Start: 2022-09-27

## 2022-10-03 RX ORDER — BLOOD SUGAR DIAGNOSTIC
STRIP MISCELLANEOUS
COMMUNITY
Start: 2022-09-22

## 2022-10-03 RX ORDER — LEVOFLOXACIN 500 MG/1
TABLET, FILM COATED ORAL
COMMUNITY
Start: 2022-08-26 | End: 2022-10-03

## 2022-10-03 RX ORDER — LANCETS 33 GAUGE
EACH MISCELLANEOUS
COMMUNITY
Start: 2022-06-29

## 2022-11-18 ENCOUNTER — HOSPITAL ENCOUNTER (OUTPATIENT)
Dept: OTHER | Age: 87
Setting detail: THERAPIES SERIES
Discharge: HOME | End: 2022-11-18
Payer: MEDICARE

## 2022-11-18 VITALS
SYSTOLIC BLOOD PRESSURE: 133 MMHG | HEART RATE: 83 BPM | BODY MASS INDEX: 23.24 KG/M2 | DIASTOLIC BLOOD PRESSURE: 63 MMHG | RESPIRATION RATE: 16 BRPM | OXYGEN SATURATION: 95 % | WEIGHT: 144 LBS

## 2022-11-18 LAB
ANION GAP SERPL CALCULATED.3IONS-SCNC: 10 MMOL/L (ref 7–16)
BUN BLDV-MCNC: 35 MG/DL (ref 6–23)
CALCIUM SERPL-MCNC: 9.2 MG/DL (ref 8.6–10.2)
CHLORIDE BLD-SCNC: 100 MMOL/L (ref 98–107)
CO2: 24 MMOL/L (ref 22–29)
CREAT SERPL-MCNC: 1.6 MG/DL (ref 0.5–1)
GFR SERPL CREATININE-BSD FRML MDRD: 31 ML/MIN/1.73
GLUCOSE BLD-MCNC: 141 MG/DL (ref 74–99)
POTASSIUM SERPL-SCNC: 4.2 MMOL/L (ref 3.5–5)
PRO-BNP: 1118 PG/ML (ref 0–450)
SODIUM BLD-SCNC: 134 MMOL/L (ref 132–146)

## 2022-11-18 PROCEDURE — 99214 OFFICE O/P EST MOD 30 MIN: CPT

## 2022-11-18 PROCEDURE — 83880 ASSAY OF NATRIURETIC PEPTIDE: CPT

## 2022-11-18 PROCEDURE — 80048 BASIC METABOLIC PNL TOTAL CA: CPT

## 2022-11-18 PROCEDURE — 36415 COLL VENOUS BLD VENIPUNCTURE: CPT

## 2022-11-18 NOTE — PROGRESS NOTES
[facility]  [address]riet Jacinto   1935    Referring Provider: Tamanna Cabrera  Primary Care Physician: Alejandra Zamora   Cardiologist: Bj Sarmiento   Nephrologist:     History of Present Illness:     Asya Malik is a 81 y.o. female with a history of HFpEF, most recent EF 66%. Patient Story:    She does  have dyspnea with exertion states \"it is cold out and I got SOB walking in here\" recovered with rest. , or decline in overall functional capacity. She does not have orthopnea, PND, nocturnal cough or hemoptysis. She does not have abdominal distention or bloating, early satiety, anorexia/change in appetite. She does has a good urinary response to oral diuretic. She does not have lower extremity edema. She does not have lightheadedness, dizziness. She denies palpitations, syncope or near syncope. She does not complain of chest pain, pressure, discomfort. Allergies   Allergen Reactions    Nitrofuran Derivatives Other (See Comments)     Causes fever and chills. Norvasc [Amlodipine]      Dizziness, light headed    Other Other (See Comments)     HONEYDEW: Sore Throat    Sulfa Antibiotics        Prior to Visit Medications    Medication Sig Taking?  Authorizing Provider   Lancets (Lavella Ek) [address]  Historical Provider, MD   ONETOUCH ULTRA strip [address]  Historical Provider, MD   metoprolol succinate (TOPROL XL) 25 MG extended release tablet Take 1 tablet by mouth daily  Davey Stevenson APRN - CNP   spironolactone (ALDACTONE) 25 MG tablet Take 25 mg by mouth daily  Historical Provider, MD   furosemide (LASIX) 20 MG tablet Take 1 tablet by mouth daily  Patient taking differently: Take 20 mg by mouth daily Take M-W-F  Meredith Soni APRN - CNP   ferrous sulfate (IRON 325) 325 (65 Fe) MG tablet Take 325 mg by mouth daily (with breakfast)  Historical Provider, MD   omeprazole (PRILOSEC) 40 MG delayed release capsule Take 1 capsule by mouth every morning  Historical Provider, MD   sertraline (ZOLOFT) 50 MG tablet Take 100 mg by mouth daily  Historical Provider, MD   CPAP Machine MISC Inhale into the lungs nightly   Historical Provider, MD   vitamin D3 (CHOLECALCIFEROL) 1000 UNITS TABS tablet Take 1 tablet by mouth daily. Yasir Frias DO       Guideline directed medical:  ARNI/ACE I/ARB: No  Beta blocker:   Yes  Aldosterone antagonist: yes      Physical Examination:     /63   Pulse 83   Resp 16   Wt 144 lb (65.3 kg)   SpO2 95%   BMI 23.24 kg/m²     Assessment  Charting Type: Shift assessment (chf clinic)    Neurological  Level of Consciousness: Alert (0)          Respiratory  Respiratory Quality/Effort: Dyspnea with exertion, Unlabored  Chest Assessment: Chest expansion symmetrical  Breath Sounds  Right Upper Lobe: Clear  Right Middle Lobe: Clear  Right Lower Lobe: Diminished  Left Upper Lobe: Clear  Left Lower Lobe: Clear     Cardiac  Cardiac Rhythm: Sinus rhythm    Rhythm Interpretation  Heart Rate: 83     Gastrointestinal  Abdominal (WDL): Within Defined Limits  Abdomen Inspection: Soft           Peripheral Vascular  Peripheral Vascular (WDL): Within Defined Limits  Edema: Right lower extremity, Left lower extremity  RLE Edema: None  LLE Edema: None             Genitourinary  Genitourinary (WDL): Within Defined Limits  Psychosocial  Psychosocial (WDL): Within Defined Limits  Pain Assessment  Pain Assessment: None - Denies Pain           Heart Rate: 83           LAB DATA:    Last 3 BMP      Sodium (mmol/L)   Date Value   11/18/2022 134   09/19/2022 137   08/15/2022 138     Potassium (mmol/L)   Date Value   11/18/2022 4.2   09/19/2022 4.4   08/15/2022 4.3     Potassium reflex Magnesium (mmol/L)   Date Value   07/07/2022 4.5   07/06/2022 5.1 (H)   08/30/2021 4.1     Chloride (mmol/L)   Date Value   11/18/2022 100   09/19/2022 103   08/15/2022 104     CO2 (mmol/L)   Date Value   11/18/2022 24   09/19/2022 24   08/15/2022 21 (L)     BUN (mg/dL)   Date Value   11/18/2022 35 (H)   09/19/2022 39 (H)   08/15/2022 32 (H)     Glucose (mg/dL)   Date Value   11/18/2022 141 (H)   09/19/2022 111 (H)   08/15/2022 148 (H)   10/15/2021 114 (H)   02/27/2012 108   07/01/2011 79     Calcium (mg/dL)   Date Value   11/18/2022 9.2   09/19/2022 9.4   08/15/2022 9.2       Last 3 BNP       Pro-BNP (pg/mL)   Date Value   11/18/2022 1,118 (H)   09/19/2022 690 (H)   08/15/2022 875 (H)          CBC: No results for input(s): WBC, HGB, PLT in the last 72 hours. BMP:    Recent Labs     11/18/22  1127      K 4.2      CO2 24   BUN 35*   CREATININE 1.6*   GLUCOSE 141*           Hepatic: No results for input(s): AST, ALT, ALB, BILITOT, ALKPHOS in the last 72 hours. Troponin: No results for input(s): TROPONINI in the last 72 hours. BNP: No results for input(s): BNP in the last 72 hours. Lipids: No results for input(s): CHOL, HDL in the last 72 hours. Invalid input(s): LDLCALCU  INR: No results for input(s): INR in the last 72 hours. WEIGHTS:    Wt Readings from Last 3 Encounters:   11/18/22 144 lb (65.3 kg)   09/19/22 144 lb (65.3 kg)   08/15/22 142 lb 12.8 oz (64.8 kg)     TELEMETRY:  Cardiac Regularity: Regular  Cardiac Rhythm/Interpretation: SR     ASSESSMENT   Karen Couch weight is stable ,no lower leg edema, abd soft  and Patient states \"its cold out and I was SOB walking to clinic \",patient recovered  with rest did not need rollerade today to walk to clinic .  Patient states she hydrates with approx 64 fld ounces of fluid and urinates \"well\" Patients Jardiance stopped per Dr Soheila Dougherty patient states \"my sugar is normal\"  Interventions completed this visit:  IV diuretics given- No  Lab work obtained yes, proBNP, BMP   Reviewed currently prescribed medications with patient, educated on importance of compliance and answered any questions regarding their medication  Educated on signs and symptoms of HF  Educated on low sodium diet    PLAN:  Scheduled to follow up in CHF clinic on   Future Appointments   Date Time Provider Santos Oconnor   1/18/2023 12:00 PM [facility] ROOM 2 [facility] [address]     Given clinic phone number [phone] and aware of signs and symptoms to call with any HF change in symptoms.

## 2022-11-21 NOTE — PROGRESS NOTES
[facility] ([facility]) Hospitalist Progress Note    Admission Date  3/3/2021 10:55 AM  Chief Complaint Emesis / cough / tachycardia  Admit Dx   Atrial fibrillation with RVR ([address] [I48.91]    Subjective  History of Present Illness  Patient seen in her room, sitting up in the chair, she says she is breathing better today, actually on room air with nasal cannula oxygen having been discontinued altogether. She continues to be without chest pain, palpitations, nausea, or other new issues. Cardiology note reviewed, diuretics changed to oral route, would like to monitor the patient today but hopeful discharge tomorrow. Discussed with patient and she is agreeable to this plan, and offers no new complaints today. No family was present during my exam.    Review of Systems - 12-point review of systems has been reviewed and is otherwise negative except as listed in the HPI    Objective  Physical Exam  Vitals: BP (!) 148/70   Pulse 67   Temp 98.3 °F (36.8 °C) (Oral)   Resp 16   Ht 5' 6\" (1.676 m)   Wt 155 lb 10.3 oz (70.6 kg)   SpO2 94%   BMI 25.12 kg/m²   General: well-developed, well-nourished, no acute distress, cooperative  Skin: warm, dry, intact, normal color without cyanosis  HEENT: normocephalic, atraumatic, mucous membranes normal; NCO2 in place  Respiratory: clear to auscultation bilaterally without respiratory distress  Cardiovascular: reg rate and rhythm tachy without murmur / rub / gallop  Abdominal: soft, nontender, nondistended, normoactive bowel sounds  Extremities: no mottling, pulses intact, no edema  Neurologic: awake, alert, no focal deficits  Psychiatric: normal affect, cooperative    Assessment / Plan  Atrial fib w RVR - s/p successful cardioversion 3/5; on Toprol XL 50 mg daily; Eliquis started; cardio following.     CKD IIIa w KENTRELL now resolved - baseline Cr 1.0-1.2, monitor    IDDM - hold orals / diabetic diet / ISS + BG checks ACHS / hypoglycemic protocol / target -180 / has been generally 140-200    Volume overload - echo shows no systolic dysfunction, diastolic function difficult to assess w pt in a fib. Getting Lasix IV per cardio w spironolactone added 3/8 and Lasix now switched to PO per cardio. Follow volume status. Pt's PMH otherwise pertinent for anemia, HTN, sleep apnea. Continue outpt med regimen; if any particular issues regarding these items, will address and move to active problem list above.      Code status  Full  DVT prophylaxis Eliquis  Disposition  Home when ready, possibly tomorrow    Electronically signed by Wilbert Mckeon DO on 3/9/2021 at 2:15 PM Detail Level: Zone

## 2022-12-28 ENCOUNTER — HOSPITAL ENCOUNTER (OUTPATIENT)
Dept: MRI IMAGING | Age: 87
Discharge: HOME | End: 2022-12-30
Payer: MEDICARE

## 2022-12-28 DIAGNOSIS — R41.0 CONFUSION: ICD-10-CM

## 2022-12-28 PROCEDURE — 70551 MRI BRAIN STEM W/O DYE: CPT

## 2023-01-18 ENCOUNTER — HOSPITAL ENCOUNTER (OUTPATIENT)
Dept: OTHER | Age: 88
Setting detail: THERAPIES SERIES
Discharge: HOME | End: 2023-01-18
Payer: MEDICARE

## 2023-01-18 VITALS
HEART RATE: 73 BPM | OXYGEN SATURATION: 97 % | RESPIRATION RATE: 16 BRPM | BODY MASS INDEX: 23.24 KG/M2 | SYSTOLIC BLOOD PRESSURE: 108 MMHG | WEIGHT: 144 LBS | DIASTOLIC BLOOD PRESSURE: 60 MMHG

## 2023-01-18 LAB
ANION GAP SERPL CALCULATED.3IONS-SCNC: 12 MMOL/L (ref 7–16)
BUN BLDV-MCNC: 36 MG/DL (ref 6–23)
CALCIUM SERPL-MCNC: 9.2 MG/DL (ref 8.6–10.2)
CHLORIDE BLD-SCNC: 100 MMOL/L (ref 98–107)
CO2: 24 MMOL/L (ref 22–29)
CREAT SERPL-MCNC: 1.6 MG/DL (ref 0.5–1)
GFR SERPL CREATININE-BSD FRML MDRD: 31 ML/MIN/1.73
GLUCOSE BLD-MCNC: 127 MG/DL (ref 74–99)
POTASSIUM SERPL-SCNC: 4.2 MMOL/L (ref 3.5–5)
PRO-BNP: 1524 PG/ML (ref 0–450)
SODIUM BLD-SCNC: 136 MMOL/L (ref 132–146)

## 2023-01-18 PROCEDURE — 83880 ASSAY OF NATRIURETIC PEPTIDE: CPT

## 2023-01-18 PROCEDURE — 80048 BASIC METABOLIC PNL TOTAL CA: CPT

## 2023-01-18 PROCEDURE — 36415 COLL VENOUS BLD VENIPUNCTURE: CPT

## 2023-01-18 PROCEDURE — 99214 OFFICE O/P EST MOD 30 MIN: CPT

## 2023-01-18 NOTE — PROGRESS NOTES
[facility] [address] [facility]  [address] [facility]   1935    Referring Provider: Braeden Paula  Primary Care Physician: Pillo Jose   Cardiologist: Dr. Jose Loving   Nephrologist:     History of Present Illness:     Teresa Kennedy is a 81 y.o. female with a history of HFpEF, most recent EF 66%. Patient Story:    She does have dyspnea with exertion , shortness of breath, or decline in overall functional capacity. She does not have orthopnea, PND, nocturnal cough or hemoptysis. She does not have abdominal distention or bloating, early satiety, anorexia/change in appetite. She does has a good urinary response to oral diuretic.that she takes three times a week. She does not have lower extremity edema. She does not have lightheadedness, dizziness. She denies palpitations, syncope or near syncope. She does not complain of chest pain, pressure, discomfort. Allergies   Allergen Reactions    Nitrofuran Derivatives Other (See Comments)     Causes fever and chills. Norvasc [Amlodipine]      Dizziness, light headed    Other Other (See Comments)     HONEYDEW: Sore Throat    Sulfa Antibiotics        Prior to Visit Medications    Medication Sig Taking?  Authorizing Provider   Lancets (Dave Cherry) [address]  Historical Provider, MD BRYANC[employer] ULTRA strip [address]  Historical Provider, MD   metoprolol succinate (TOPROL XL) 25 MG extended release tablet Take 1 tablet by mouth daily  Yarelis Melara APRN - CNP   spironolactone (ALDACTONE) 25 MG tablet Take 25 mg by mouth daily  Historical Provider, MD   furosemide (LASIX) 20 MG tablet Take 1 tablet by mouth daily  Patient taking differently: Take 20 mg by mouth daily Take M-W-F  Raffi Gonzalez APRN - CNP   ferrous sulfate (IRON 325) 325 (65 Fe) MG tablet Take 325 mg by mouth daily (with breakfast)  Historical Provider, MD   omeprazole (PRILOSEC) 40 MG delayed release capsule Take 1 capsule by mouth every morning  Historical Provider, MD   sertraline (ZOLOFT) 50 MG tablet Take 100 mg by mouth daily  Historical Provider, MD   CPAP Machine MISC Inhale into the lungs nightly   Historical Provider, MD   vitamin D3 (CHOLECALCIFEROL) 1000 UNITS TABS tablet Take 1 tablet by mouth daily. Geovanna Bailey DO       Physical Examination:     /60   Pulse 73   Resp 16   Wt 144 lb (65.3 kg)   SpO2 97%   BMI 23.24 kg/m²     Assessment  Charting Type: Shift assessment (chf clinic)    Neurological  Level of Consciousness: Alert (0)     HEENT (Head, Ears, Eyes, Nose, & Throat)  HEENT (WDL): Exceptions to WDL  Right Eye: Glasses, Impaired vision  Left Eye: Glasses, Impaired vision  Right Ear: Impaired hearing  Left Ear: Impaired hearing    Respiratory  Respiratory Pattern: Regular  Respiratory Quality/Effort: Dyspnea with exertion, Unlabored  Chest Assessment: Chest expansion symmetrical  L Breath Sounds: Clear  R Breath Sounds: Clear  Breath Sounds  Right Upper Lobe: Clear  Right Middle Lobe: Clear  Right Lower Lobe: Clear  Left Upper Lobe: Clear  Left Lower Lobe: Clear     Cardiac  Cardiac Regularity: Regular  Cardiac Rhythm: Sinus rhythm    Rhythm Interpretation  Heart Rate: 73     Gastrointestinal  Abdominal (WDL): Within Defined Limits  Abdomen Inspection: Soft  RUQ Bowel Sounds: Active  LUQ Bowel Sounds: Active  RLQ Bowel Sounds: Active  LLQ Bowel Sounds: Active      Bowel Sounds  RUQ Bowel Sounds: Active  LUQ Bowel Sounds: Active  RLQ Bowel Sounds: Active  LLQ Bowel Sounds:  Active    Peripheral Vascular  Peripheral Vascular (WDL): Within Defined Limits  Edema: None             Genitourinary  Genitourinary (WDL): Within Defined Limits  Psychosocial  Psychosocial (WDL): Within Defined Limits              Heart Rate: 73           LAB DATA:    Last 3 BMP      Sodium (mmol/L)   Date Value   11/18/2022 134   09/19/2022 137   08/15/2022 138     Potassium (mmol/L) Date Value   11/18/2022 4.2   09/19/2022 4.4   08/15/2022 4.3     Potassium reflex Magnesium (mmol/L)   Date Value   07/07/2022 4.5   07/06/2022 5.1 (H)   08/30/2021 4.1     Chloride (mmol/L)   Date Value   11/18/2022 100   09/19/2022 103   08/15/2022 104     CO2 (mmol/L)   Date Value   11/18/2022 24   09/19/2022 24   08/15/2022 21 (L)     BUN (mg/dL)   Date Value   11/18/2022 35 (H)   09/19/2022 39 (H)   08/15/2022 32 (H)     Glucose (mg/dL)   Date Value   11/18/2022 141 (H)   09/19/2022 111 (H)   08/15/2022 148 (H)   10/15/2021 114 (H)   02/27/2012 108   07/01/2011 79     Calcium (mg/dL)   Date Value   11/18/2022 9.2   09/19/2022 9.4   08/15/2022 9.2       Last 3 BNP       Pro-BNP (pg/mL)   Date Value   11/18/2022 1,118 (H)   09/19/2022 690 (H)   08/15/2022 875 (H)          CBC: No results for input(s): WBC, HGB, PLT in the last 72 hours. BMP:    No results for input(s): NA, K, CL, CO2, BUN, CREATININE, GLUCOSE in the last 72 hours. Hepatic: No results for input(s): AST, ALT, ALB, BILITOT, ALKPHOS in the last 72 hours. Troponin: No results for input(s): TROPONINI in the last 72 hours. BNP: No results for input(s): BNP in the last 72 hours. Lipids: No results for input(s): CHOL, HDL in the last 72 hours. Invalid input(s): LDLCALCU  INR: No results for input(s): INR in the last 72 hours. WEIGHTS:    Wt Readings from Last 3 Encounters:   01/18/23 144 lb (65.3 kg)   11/18/22 144 lb (65.3 kg)   09/19/22 144 lb (65.3 kg)     TELEMETRY:  Cardiac Regularity: Regular  Cardiac Rhythm/Interpretation: SR     ASSESSMENT:  Maria C Donato has stable weight and no swelling in BLE. She says she occasionally gets short of breath with prolonged exertion. Only on a loop diuretic three times weekly and tolerating well with a good urinary response the days she takes her furosemide.     Interventions completed this visit:  IV diuretics given- No  Lab work obtained yes, proBNP, BMP   Reviewed currently prescribed medications with patient, educated on importance of compliance and answered any questions regarding their medication  Educated on signs and symptoms of HF  Educated on low sodium diet    PLAN:  Scheduled to follow up in CHF clinic on   Future Appointments   Date Time Provider Santos Oconnor   4/24/2023 12:15 PM [facility] ROOM 1 [facility]       Given clinic phone number [phone] and aware of signs and symptoms to call with any HF change in symptoms.

## 2023-02-20 ENCOUNTER — APPOINTMENT (OUTPATIENT)
Dept: CT IMAGING | Age: 88
End: 2023-02-20
Payer: MEDICARE

## 2023-02-20 ENCOUNTER — HOSPITAL ENCOUNTER (EMERGENCY)
Age: 88
Discharge: HOME | End: 2023-02-20
Attending: EMERGENCY MEDICINE
Payer: MEDICARE

## 2023-02-20 ENCOUNTER — APPOINTMENT (OUTPATIENT)
Dept: GENERAL RADIOLOGY | Age: 88
End: 2023-02-20
Payer: MEDICARE

## 2023-02-20 VITALS
OXYGEN SATURATION: 94 % | WEIGHT: 152.5 LBS | TEMPERATURE: 98.7 F | RESPIRATION RATE: 22 BRPM | SYSTOLIC BLOOD PRESSURE: 166 MMHG | HEIGHT: 66 IN | DIASTOLIC BLOOD PRESSURE: 75 MMHG | HEART RATE: 81 BPM | BODY MASS INDEX: 24.51 KG/M2

## 2023-02-20 DIAGNOSIS — R79.89 ELEVATED BRAIN NATRIURETIC PEPTIDE (BNP) LEVEL: ICD-10-CM

## 2023-02-20 DIAGNOSIS — R05.9 COUGH, UNSPECIFIED TYPE: ICD-10-CM

## 2023-02-20 DIAGNOSIS — R07.9 CHEST PAIN, UNSPECIFIED TYPE: ICD-10-CM

## 2023-02-20 DIAGNOSIS — J06.9 ACUTE UPPER RESPIRATORY INFECTION: ICD-10-CM

## 2023-02-20 DIAGNOSIS — R51.9 NONINTRACTABLE HEADACHE, UNSPECIFIED CHRONICITY PATTERN, UNSPECIFIED HEADACHE TYPE: Primary | ICD-10-CM

## 2023-02-20 LAB
ALBUMIN SERPL-MCNC: 3.9 G/DL (ref 3.5–5.2)
ALP BLD-CCNC: 100 U/L (ref 35–104)
ALT SERPL-CCNC: 8 U/L (ref 0–32)
ANION GAP SERPL CALCULATED.3IONS-SCNC: 11 MMOL/L (ref 7–16)
AST SERPL-CCNC: 15 U/L (ref 0–31)
BACTERIA: NORMAL /HPF
BASOPHILS ABSOLUTE: 0.04 E9/L (ref 0–0.2)
BASOPHILS RELATIVE PERCENT: 0.3 % (ref 0–2)
BILIRUB SERPL-MCNC: 0.9 MG/DL (ref 0–1.2)
BILIRUBIN URINE: NEGATIVE
BLOOD, URINE: ABNORMAL
BUN BLDV-MCNC: 20 MG/DL (ref 6–23)
CALCIUM SERPL-MCNC: 9.1 MG/DL (ref 8.6–10.2)
CHLORIDE BLD-SCNC: 100 MMOL/L (ref 98–107)
CHP ED QC CHECK: NORMAL
CLARITY: CLEAR
CO2: 26 MMOL/L (ref 22–29)
COLOR: YELLOW
CREAT SERPL-MCNC: 1.3 MG/DL (ref 0.5–1)
EOSINOPHILS ABSOLUTE: 0.02 E9/L (ref 0.05–0.5)
EOSINOPHILS RELATIVE PERCENT: 0.2 % (ref 0–6)
GFR SERPL CREATININE-BSD FRML MDRD: 40 ML/MIN/1.73
GLUCOSE BLD-MCNC: 124 MG/DL (ref 74–99)
GLUCOSE BLD-MCNC: 136 MG/DL
GLUCOSE URINE: NEGATIVE MG/DL
HCT VFR BLD CALC: 38.5 % (ref 34–48)
HEMOGLOBIN: 12.4 G/DL (ref 11.5–15.5)
IMMATURE GRANULOCYTES #: 0.08 E9/L
IMMATURE GRANULOCYTES %: 0.6 % (ref 0–5)
INFLUENZA A BY PCR: NOT DETECTED
INFLUENZA B BY PCR: NOT DETECTED
KETONES, URINE: NEGATIVE MG/DL
LEUKOCYTE ESTERASE, URINE: ABNORMAL
LYMPHOCYTES ABSOLUTE: 1.07 E9/L (ref 1.5–4)
LYMPHOCYTES RELATIVE PERCENT: 8.7 % (ref 20–42)
MCH RBC QN AUTO: 29 PG (ref 26–35)
MCHC RBC AUTO-ENTMCNC: 32.2 % (ref 32–34.5)
MCV RBC AUTO: 90 FL (ref 80–99.9)
METER GLUCOSE: 136 MG/DL (ref 74–99)
MONOCYTES ABSOLUTE: 0.91 E9/L (ref 0.1–0.95)
MONOCYTES RELATIVE PERCENT: 7.4 % (ref 2–12)
NEUTROPHILS ABSOLUTE: 10.23 E9/L (ref 1.8–7.3)
NEUTROPHILS RELATIVE PERCENT: 82.8 % (ref 43–80)
NITRITE, URINE: NEGATIVE
PDW BLD-RTO: 15.7 FL (ref 11.5–15)
PH UA: 6 (ref 5–9)
PLATELET # BLD: 217 E9/L (ref 130–450)
PMV BLD AUTO: 10 FL (ref 7–12)
POTASSIUM SERPL-SCNC: 4.4 MMOL/L (ref 3.5–5)
PRO-BNP: ABNORMAL PG/ML (ref 0–450)
PROTEIN UA: 30 MG/DL
RBC # BLD: 4.28 E12/L (ref 3.5–5.5)
RBC UA: NORMAL /HPF (ref 0–2)
SARS-COV-2, NAAT: NOT DETECTED
SODIUM BLD-SCNC: 137 MMOL/L (ref 132–146)
SPECIFIC GRAVITY UA: 1.02 (ref 1–1.03)
TOTAL PROTEIN: 7.2 G/DL (ref 6.4–8.3)
TROPONIN, HIGH SENSITIVITY: 14 NG/L (ref 0–9)
TROPONIN, HIGH SENSITIVITY: 16 NG/L (ref 0–9)
UROBILINOGEN, URINE: 0.2 E.U./DL
WBC # BLD: 12.4 E9/L (ref 4.5–11.5)
WBC UA: NORMAL /HPF (ref 0–5)

## 2023-02-20 PROCEDURE — 83880 ASSAY OF NATRIURETIC PEPTIDE: CPT

## 2023-02-20 PROCEDURE — 93005 ELECTROCARDIOGRAM TRACING: CPT

## 2023-02-20 PROCEDURE — 84484 ASSAY OF TROPONIN QUANT: CPT

## 2023-02-20 PROCEDURE — 99285 EMERGENCY DEPT VISIT HI MDM: CPT

## 2023-02-20 PROCEDURE — 70450 CT HEAD/BRAIN W/O DYE: CPT

## 2023-02-20 PROCEDURE — 87502 INFLUENZA DNA AMP PROBE: CPT

## 2023-02-20 PROCEDURE — 85025 COMPLETE CBC W/AUTO DIFF WBC: CPT

## 2023-02-20 PROCEDURE — 87635 SARS-COV-2 COVID-19 AMP PRB: CPT

## 2023-02-20 PROCEDURE — 82962 GLUCOSE BLOOD TEST: CPT

## 2023-02-20 PROCEDURE — 80053 COMPREHEN METABOLIC PANEL: CPT

## 2023-02-20 PROCEDURE — 71046 X-RAY EXAM CHEST 2 VIEWS: CPT

## 2023-02-20 PROCEDURE — 81001 URINALYSIS AUTO W/SCOPE: CPT

## 2023-02-20 ASSESSMENT — ENCOUNTER SYMPTOMS
ABDOMINAL PAIN: 0
WHEEZING: 0
PHOTOPHOBIA: 0
SHORTNESS OF BREATH: 0
DIARRHEA: 0
BACK PAIN: 0
COUGH: 1
NAUSEA: 0
VOMITING: 0
VOICE CHANGE: 0
TROUBLE SWALLOWING: 0
SINUS PRESSURE: 1

## 2023-02-20 ASSESSMENT — PAIN DESCRIPTION - ORIENTATION: ORIENTATION: ANTERIOR

## 2023-02-20 ASSESSMENT — PAIN SCALES - GENERAL
PAINLEVEL_OUTOF10: 3
PAINLEVEL_OUTOF10: 8

## 2023-02-20 ASSESSMENT — PAIN - FUNCTIONAL ASSESSMENT
PAIN_FUNCTIONAL_ASSESSMENT: NONE - DENIES PAIN
PAIN_FUNCTIONAL_ASSESSMENT: 0-10
PAIN_FUNCTIONAL_ASSESSMENT: 0-10

## 2023-02-20 ASSESSMENT — LIFESTYLE VARIABLES
HOW OFTEN DO YOU HAVE A DRINK CONTAINING ALCOHOL: NEVER
HOW MANY STANDARD DRINKS CONTAINING ALCOHOL DO YOU HAVE ON A TYPICAL DAY: PATIENT DOES NOT DRINK

## 2023-02-20 ASSESSMENT — PAIN DESCRIPTION - LOCATION
LOCATION: LEG;HEAD;CHEST
LOCATION: HEAD

## 2023-02-20 NOTE — ED PROVIDER NOTES
81y.o. year old female presenting to the emergency room with concerns of Headache, cough, chest discomfort x1 month. .  Patient reports associated sinus pressure. Patient reports that she has previously been seen at PCPs office. Denies any shortness of breath with exertion. Alert and oriented x4, patient reports that she lives at home with her son. Chief Complaint   Patient presents with    Headache    Cough     X 1 month    Chest Pain       Review of Systems   Constitutional:  Positive for fatigue. Negative for chills and fever. HENT:  Positive for congestion and sinus pressure. Negative for trouble swallowing and voice change. Eyes:  Negative for photophobia and visual disturbance. Respiratory:  Positive for cough. Negative for shortness of breath and wheezing. Cardiovascular:  Positive for chest pain. Negative for palpitations. Gastrointestinal:  Negative for abdominal pain, diarrhea, nausea and vomiting. Genitourinary:  Negative for flank pain, frequency and urgency. Musculoskeletal:  Negative for arthralgias, back pain, neck pain and neck stiffness. Skin:  Negative for rash and wound. Neurological:  Positive for headaches. Negative for dizziness, syncope, facial asymmetry, weakness and numbness. Psychiatric/Behavioral:  Negative for behavioral problems and confusion. The patient is nervous/anxious. Physical Exam  Vitals reviewed. Constitutional:       General: She is not in acute distress. Appearance: Normal appearance. She is normal weight. She is not ill-appearing or diaphoretic. HENT:      Head: Normocephalic. Right Ear: External ear normal.      Left Ear: External ear normal.      Nose: Congestion present. Mouth/Throat:      Mouth: Mucous membranes are moist.      Pharynx: No posterior oropharyngeal erythema. Eyes:      General: No scleral icterus. Right eye: No discharge. Left eye: No discharge.       Conjunctiva/sclera: Conjunctivae normal. Cardiovascular:      Rate and Rhythm: Normal rate and regular rhythm. Heart sounds: No friction rub. Pulmonary:      Effort: Pulmonary effort is normal. No respiratory distress. Breath sounds: No stridor. No rhonchi or rales. Abdominal:      General: There is no distension. Tenderness: There is no abdominal tenderness. There is no guarding or rebound. Musculoskeletal:         General: No tenderness or deformity. Cervical back: Normal range of motion and neck supple. No rigidity or tenderness. Skin:     General: Skin is warm. Coloration: Skin is not jaundiced. Findings: No erythema or rash. Neurological:      Mental Status: She is alert and oriented to person, place, and time. Cranial Nerves: No dysarthria or facial asymmetry. Sensory: No sensory deficit. Motor: No weakness. Coordination: Coordination normal.      Gait: Gait normal.   Psychiatric:         Mood and Affect: Mood normal.         Behavior: Behavior normal.        Procedures     XR CHEST (2 VW)   Final Result   No obvious pneumonia or pleural effusion. CT Head W/O Contrast   Final Result   No acute intracranial abnormality. EKG:  This EKG is signed by emergency department physician. Rate: 80  Rhythm: Sinus  Interpretation: sinus rhythm with left axis deviation, LVH. Comparison: stable as compared to patient's most recent EKG     MDM:  81y.o. year old female presenting to the ER with complaints of headache, cough, congestion x1 month. Associated chest discomfort x1 month. Negative for influenza negative COVID no electrolyte abnormality with liver function within normal limits. Delta troponin. BNP elevated from previous. Chest x-ray with no pneumonia or pleural effusion. CT head negative for acute abnormality. Mild leukocytosis. Patient able to ambulate with SPO2 greater than 90%.   Spoke to patient, discussed today's results and through shared decision making we will plan to discharge patient at this time with follow-up with PCP. Return instructions given.       --------------------------------------------- PAST HISTORY ---------------------------------------------  Past Medical History:  has a past medical history of (HFpEF) heart failure with preserved ejection fraction ([facility] [address] [facility] Acute cystitis with hematuria, Anemia, Atrial fibrillation ([facility] CKD (chronic kidney disease) stage 3, GFR 30-59 ml/min ([facility]), CPAP (continuous positive airway pressure) dependence, [facility] Esophageal stricture, Gastric ulcer, Goiter, Heart murmur, Hypertension, SACHIN (iron deficiency anemia), Kidney stones, Rheumatic fever, and Sleep apnea. Past Surgical History:  has a past surgical history that includes cardiovascular stress test (1999); doppler echocardiography (05/14/03); doppler echocardiography (06/09/04); Hysterectomy; Cholecystectomy; polypectomy; Upper gastrointestinal endoscopy (07/11); Colonoscopy (07/11); Breast lumpectomy (Left); and Foot surgery. Social History:  reports that she has never smoked. She has never used smokeless tobacco. She reports that she does not currently use alcohol. She reports that she does not use drugs. Family History: family history includes Alcohol Abuse in her father; Asthma in her mother; Diabetes in her mother. The patients home medications have been reviewed.     Allergies: Nitrofuran derivatives, Norvasc [amlodipine], Other, and Sulfa antibiotics    -------------------------------------------------- RESULTS -------------------------------------------------  Labs:  Results for orders placed or performed during the hospital encounter of 02/20/23   COVID-19, Rapid    Specimen: Nasopharyngeal Swab   Result Value Ref Range    SARS-CoV-2, NAAT Not Detected Not Detected   RAPID INFLUENZA A/B ANTIGENS    Specimen: Nasopharyngeal   Result Value Ref Range    Influenza A by PCR Not Detected Not Detected    Influenza B by PCR Not Detected Not Detected   CBC with Auto Differential   Result Value Ref Range    WBC 12.4 (H) 4.5 - 11.5 E9/L    RBC 4.28 3.50 - 5.50 E12/L    Hemoglobin 12.4 11.5 - 15.5 g/dL    Hematocrit 38.5 34.0 - 48.0 %    MCV 90.0 80.0 - 99.9 fL    MCH 29.0 26.0 - 35.0 pg    MCHC 32.2 32.0 - 34.5 %    RDW 15.7 (H) 11.5 - 15.0 fL    Platelets 384 607 - 124 E9/L    MPV 10.0 7.0 - 12.0 fL    Neutrophils % 82.8 (H) 43.0 - 80.0 %    Immature Granulocytes % 0.6 0.0 - 5.0 %    Lymphocytes % 8.7 (L) 20.0 - 42.0 %    Monocytes % 7.4 2.0 - 12.0 %    Eosinophils % 0.2 0.0 - 6.0 %    Basophils % 0.3 0.0 - 2.0 %    Neutrophils Absolute 10.23 (H) 1.80 - 7.30 E9/L    Immature Granulocytes # 0.08 E9/L    Lymphocytes Absolute 1.07 (L) 1.50 - 4.00 E9/L    Monocytes Absolute 0.91 0.10 - 0.95 E9/L    Eosinophils Absolute 0.02 (L) 0.05 - 0.50 E9/L    Basophils Absolute 0.04 0.00 - 0.20 E9/L   CMP   Result Value Ref Range    Sodium 137 132 - 146 mmol/L    Potassium 4.4 3.5 - 5.0 mmol/L    Chloride 100 98 - 107 mmol/L    CO2 26 22 - 29 mmol/L    Anion Gap 11 7 - 16 mmol/L    Glucose 124 (H) 74 - 99 mg/dL    BUN 20 6 - 23 mg/dL    Creatinine 1.3 (H) 0.5 - 1.0 mg/dL    Est, Glom Filt Rate 40 >=60 mL/min/1.73    Calcium 9.1 8.6 - 10.2 mg/dL    Total Protein 7.2 6.4 - 8.3 g/dL    Albumin 3.9 3.5 - 5.2 g/dL    Total Bilirubin 0.9 0.0 - 1.2 mg/dL    Alkaline Phosphatase 100 35 - 104 U/L    ALT 8 0 - 32 U/L    AST 15 0 - 31 U/L   Troponin   Result Value Ref Range    Troponin, High Sensitivity 16 (H) 0 - 9 ng/L   Urinalysis   Result Value Ref Range    Color, UA Yellow Straw/Yellow    Clarity, UA Clear Clear    Glucose, Ur Negative Negative mg/dL    Bilirubin Urine Negative Negative    Ketones, Urine Negative Negative mg/dL    Specific Gravity, UA 1.020 1.005 - 1.030    Blood, Urine TRACE-INTACT Negative    pH, UA 6.0 5.0 - 9.0    Protein, UA 30 (A) Negative mg/dL    Urobilinogen, Urine 0.2 <2.0 E.U./dL    Nitrite, Urine Negative Negative    Leukocyte Esterase, Urine TRACE (A) Negative   Microscopic Urinalysis   Result Value Ref Range    WBC, UA 0-1 0 - 5 /HPF    RBC, UA 1-3 0 - 2 /HPF    Bacteria, UA NONE SEEN None Seen /HPF   Troponin   Result Value Ref Range    Troponin, High Sensitivity 14 (H) 0 - 9 ng/L   Brain Natriuretic Peptide   Result Value Ref Range    Pro-BNP 10,461 (H) 0 - 450 pg/mL   POCT Glucose   Result Value Ref Range    Glucose 136 mg/dL    QC OK? passed    POCT Glucose   Result Value Ref Range    Meter Glucose 136 (H) 74 - 99 mg/dL   EKG 12 Lead   Result Value Ref Range    Ventricular Rate 80 BPM    Atrial Rate 80 BPM    P-R Interval 136 ms    QRS Duration 84 ms    Q-T Interval 384 ms    QTc Calculation (Bazett) 442 ms    P Axis 36 degrees    R Axis -39 degrees    T Axis 36 degrees       Radiology:  XR CHEST (2 VW)   Final Result   No obvious pneumonia or pleural effusion. CT Head W/O Contrast   Final Result   No acute intracranial abnormality.             ------------------------- NURSING NOTES AND VITALS REVIEWED ---------------------------  Date / Time Roomed:  2/20/2023  1:02 PM  ED Bed Assignment:  17/17    The nursing notes within the ED encounter and vital signs as below have been reviewed. BP (!) 166/75   Pulse 81   Temp 98.7 °F (37.1 °C)   Resp 22   Ht 5' 6\" (1.676 m)   Wt 152 lb 8 oz (69.2 kg)   SpO2 94%   BMI 24.61 kg/m²   Oxygen Saturation Interpretation: Normal      ------------------------------------------ PROGRESS NOTES ------------------------------------------  I have spoken with the patient and discussed todays results, in addition to providing specific details for the plan of care and counseling regarding the diagnosis and prognosis. Their questions are answered at this time and they are agreeable with the plan. I discussed at length with them reasons for immediate return here for re evaluation. They will followup with primary care by calling their office tomorrow.       --------------------------------- ADDITIONAL PROVIDER NOTES ---------------------------------  At this time the patient is without objective evidence of an acute process requiring hospitalization or inpatient management. They have remained hemodynamically stable throughout their entire ED visit and are stable for discharge with outpatient follow-up. The plan has been discussed in detail and they are aware of the specific conditions for emergent return, as well as the importance of follow-up. Discharge Medication List as of 2/20/2023  6:32 PM          Diagnosis:  1. Nonintractable headache, unspecified chronicity pattern, unspecified headache type    2. Cough, unspecified type    3. Acute upper respiratory infection    4. Chest pain, unspecified type    5. Elevated brain natriuretic peptide (BNP) level        Disposition:  Patient's disposition: Discharge to home  Patient's condition is stable. Attending was present and available throughout encounter including all critical portions;  See Attending Note/Attestation for Final [address], DO  Resident  02/20/23 9180

## 2023-02-20 NOTE — ED NOTES
The PT is ambulated while monitoring her pulse/ox. He O2 sat remains consistent at 93% and her pulse rises from 80 BPM to 102 BPM.  She is steady while ambulating and does not require assistance. She reports no dyspnea.        Yehuda Fleming RN  02/20/23 1118

## 2023-02-21 LAB
EKG ATRIAL RATE: 80 BPM
EKG P AXIS: 36 DEGREES
EKG P-R INTERVAL: 136 MS
EKG Q-T INTERVAL: 384 MS
EKG QRS DURATION: 84 MS
EKG QTC CALCULATION (BAZETT): 442 MS
EKG R AXIS: -39 DEGREES
EKG T AXIS: 36 DEGREES
EKG VENTRICULAR RATE: 80 BPM

## 2023-02-21 PROCEDURE — 93010 ELECTROCARDIOGRAM REPORT: CPT | Performed by: INTERNAL MEDICINE

## 2023-04-17 PROBLEM — U07.1 COVID-19: Status: ACTIVE | Noted: 2022-09-27

## 2023-04-17 RX ORDER — SERTRALINE HYDROCHLORIDE 100 MG/1
100 TABLET, FILM COATED ORAL DAILY
COMMUNITY
Start: 2023-02-22

## 2023-04-17 RX ORDER — ASPIRIN 81 MG/1
TABLET, COATED ORAL
COMMUNITY
Start: 2023-01-24

## 2023-04-17 RX ORDER — FLUTICASONE PROPIONATE 50 MCG
SPRAY, SUSPENSION (ML) NASAL
COMMUNITY
Start: 2023-03-29

## 2023-04-18 ENCOUNTER — OFFICE VISIT (OUTPATIENT)
Dept: CARDIOLOGY CLINIC | Age: 88
End: 2023-04-18
Payer: MEDICARE

## 2023-04-18 VITALS
SYSTOLIC BLOOD PRESSURE: 134 MMHG | HEIGHT: 66 IN | HEART RATE: 97 BPM | RESPIRATION RATE: 18 BRPM | BODY MASS INDEX: 23.3 KG/M2 | WEIGHT: 145 LBS | DIASTOLIC BLOOD PRESSURE: 89 MMHG

## 2023-04-18 DIAGNOSIS — I10 PRIMARY HYPERTENSION: Primary | ICD-10-CM

## 2023-04-18 DIAGNOSIS — I50.32 CHRONIC HEART FAILURE WITH PRESERVED EJECTION FRACTION (HCC): ICD-10-CM

## 2023-04-18 PROCEDURE — 1036F TOBACCO NON-USER: CPT | Performed by: INTERNAL MEDICINE

## 2023-04-18 PROCEDURE — 1090F PRES/ABSN URINE INCON ASSESS: CPT | Performed by: INTERNAL MEDICINE

## 2023-04-18 PROCEDURE — G8420 CALC BMI NORM PARAMETERS: HCPCS | Performed by: INTERNAL MEDICINE

## 2023-04-18 PROCEDURE — 93000 ELECTROCARDIOGRAM COMPLETE: CPT | Performed by: INTERNAL MEDICINE

## 2023-04-18 PROCEDURE — G8427 DOCREV CUR MEDS BY ELIG CLIN: HCPCS | Performed by: INTERNAL MEDICINE

## 2023-04-18 PROCEDURE — 99214 OFFICE O/P EST MOD 30 MIN: CPT | Performed by: INTERNAL MEDICINE

## 2023-04-18 PROCEDURE — 1123F ACP DISCUSS/DSCN MKR DOCD: CPT | Performed by: INTERNAL MEDICINE

## 2023-04-18 RX ORDER — FUROSEMIDE 20 MG/1
40 TABLET ORAL DAILY
Qty: 90 TABLET | Refills: 3 | Status: SHIPPED | OUTPATIENT
Start: 2023-04-18

## 2023-04-18 NOTE — PROGRESS NOTES
Housing Stability: Not on file       Family History   Problem Relation Age of Onset    Asthma Mother     Diabetes Mother     Alcohol Abuse Father      Review of Systems:  Heart: as above   Lungs: as above   Eyes: denies changes in vision or discharge. Ears: denies changes in hearing or pain. Nose: denies epistaxis or masses   Throat: denies sore throat or trouble swallowing. Neuro: denies numbness, tingling, tremors. Skin: denies rashes or itching. : denies hematuria, dysuria   GI: denies vomiting, diarrhea   Psych: denies mood changed, anxiety, depression. All other systems negative. Physical Exam   /89   Pulse 97   Resp 18   Ht 5' 6\" (1.676 m)   Wt 145 lb (65.8 kg)   BMI 23.40 kg/m²   Constitutional: Oriented to person, place, and time. Well-developed and well-nourished. No distress. Head: Normocephalic and atraumatic. Eyes: EOM are normal. Pupils are equal, round, and reactive to light. Neck: Normal range of motion. Neck supple. No hepatojugular reflux and no JVD present. Carotid bruit is not present. No tracheal deviation present. No thyromegaly present. Cardiovascular: Normal rate, regular rhythm, normal heart sounds and intact distal pulses. Exam reveals no gallop and no friction rub. No murmur heard. Pulmonary/Chest: Effort normal and breath sounds normal. No respiratory distress. No wheezes. No rales. No tenderness. Abdominal: Soft. Bowel sounds are normal. No distension and no mass. No tenderness. No rebound and no guarding. Musculoskeletal: Normal range of motion. No edema and no tenderness. Lymphadenopathy:   No cervical adenopathy. No groin adenopathy. Neurological: Alert and oriented to person, place, and time. Skin: Skin is warm and dry. No rash noted. Not diaphoretic. No erythema. Psychiatric: Normal mood and affect. Behavior is normal.     EKG:  normal sinus rhythm, LVH, nonspecific ST and T waves changes.     TTE (3/4/2021)   Summary:   Normal left

## 2023-04-24 ENCOUNTER — HOSPITAL ENCOUNTER (OUTPATIENT)
Dept: OTHER | Age: 88
Setting detail: THERAPIES SERIES
Discharge: HOME | End: 2023-04-24
Payer: MEDICARE

## 2023-04-24 VITALS
OXYGEN SATURATION: 92 % | WEIGHT: 143.25 LBS | HEART RATE: 76 BPM | BODY MASS INDEX: 23.12 KG/M2 | RESPIRATION RATE: 16 BRPM | SYSTOLIC BLOOD PRESSURE: 115 MMHG | DIASTOLIC BLOOD PRESSURE: 60 MMHG

## 2023-04-24 LAB
ANION GAP SERPL CALCULATED.3IONS-SCNC: 11 MMOL/L (ref 7–16)
BNP BLD-MCNC: 703 PG/ML (ref 0–450)
BUN SERPL-MCNC: 25 MG/DL (ref 6–23)
CALCIUM SERPL-MCNC: 9.1 MG/DL (ref 8.6–10.2)
CHLORIDE SERPL-SCNC: 98 MMOL/L (ref 98–107)
CO2 SERPL-SCNC: 28 MMOL/L (ref 22–29)
CREAT SERPL-MCNC: 1.6 MG/DL (ref 0.5–1)
GLUCOSE SERPL-MCNC: 192 MG/DL (ref 74–99)
POTASSIUM SERPL-SCNC: 3.4 MMOL/L (ref 3.5–5)
SODIUM SERPL-SCNC: 137 MMOL/L (ref 132–146)

## 2023-04-24 PROCEDURE — 83880 ASSAY OF NATRIURETIC PEPTIDE: CPT

## 2023-04-24 PROCEDURE — 80048 BASIC METABOLIC PNL TOTAL CA: CPT

## 2023-04-24 PROCEDURE — 36415 COLL VENOUS BLD VENIPUNCTURE: CPT

## 2023-04-24 PROCEDURE — 99214 OFFICE O/P EST MOD 30 MIN: CPT

## 2023-04-24 NOTE — PROGRESS NOTES
Congestive Heart Failure [address] [facility]  [address] [facility] [address]hem[address] You   1935    Referring Provider: Rachelle Rasmussen  Primary Care Physician: Giorgio Thomas   Cardiologist: Dr. Chris Rose   Nephrologist:     History of Present Illness:     Aretha Montano is a 81 y.o. female with a history of HFpEF, most recent EF 66%. Patient Story:    She does have dyspnea with exertion , shortness of breath, or decline in overall functional capacity. She does not have orthopnea, PND, nocturnal cough or hemoptysis. She does not have abdominal distention or bloating, early satiety, anorexia/change in appetite. She does has a good urinary response to oral diuretic.that she takes three times a week. She does not have lower extremity edema. She does not have lightheadedness, dizziness. She denies palpitations, syncope or near syncope. She does not complain of chest pain, pressure, discomfort. Allergies   Allergen Reactions    Nitrofuran Derivatives Other (See Comments)     Causes fever and chills. Norvasc [Amlodipine]      Dizziness, light headed    Other Other (See Comments)     HONEYDEW: Sore Throat    Sulfa Antibiotics        Prior to Visit Medications    Medication Sig Taking? Authorizing Provider   furosemide (LASIX) 20 MG tablet Take 2 tablets by mouth daily Take M-W-F  Ashvin Dean, DO   ASPIRIN LOW DOSE 81 MG EC tablet TAKE ONE TABLET BY MOUTH EVERY DAY WITH FOOD  Historical Provider, MD   sertraline (ZOLOFT) 100 MG tablet Take 1 tablet by mouth daily  Historical Provider, MD   fluticasone (FLONASE) 50 MCG/ACT nasal spray USE 2 SPRAYS IN EACH NOSTRIL EVERY DAY.   Patient not taking: Reported on 4/24/2023  Historical Provider, MD [place] ([facility]) [address] [facility]. Provider, MD   [address]  Historical Provider, MD   metoprolol succinate (TOPROL XL) 25 MG extended release tablet Take 1

## 2023-05-08 ENCOUNTER — HOSPITAL ENCOUNTER (INPATIENT)
Age: 88
LOS: 3 days | Discharge: HOME | DRG: 308 | End: 2023-05-11
Attending: EMERGENCY MEDICINE | Admitting: INTERNAL MEDICINE
Payer: MEDICARE

## 2023-05-08 ENCOUNTER — APPOINTMENT (OUTPATIENT)
Dept: CT IMAGING | Age: 88
DRG: 308 | End: 2023-05-08
Payer: MEDICARE

## 2023-05-08 ENCOUNTER — APPOINTMENT (OUTPATIENT)
Dept: GENERAL RADIOLOGY | Age: 88
DRG: 308 | End: 2023-05-08
Payer: MEDICARE
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
## 2023-05-08 DIAGNOSIS — I48.91 ATRIAL FIBRILLATION WITH RAPID VENTRICULAR RESPONSE (HCC): Primary | ICD-10-CM

## 2023-05-08 PROBLEM — R73.9 BLOOD SUGAR INCREASED: Status: ACTIVE | Noted: 2023-05-08

## 2023-05-08 PROBLEM — N18.4 CKD (CHRONIC KIDNEY DISEASE), SYMPTOM MANAGEMENT ONLY, STAGE 4 (SEVERE) (HCC): Status: ACTIVE | Noted: 2023-05-08

## 2023-05-08 PROBLEM — R55 POSTURAL DIZZINESS WITH PRESYNCOPE: Status: ACTIVE | Noted: 2021-10-13

## 2023-05-08 PROBLEM — R73.09 BLOOD SUGAR INCREASED: Status: ACTIVE | Noted: 2023-05-08

## 2023-05-08 PROBLEM — J16.0 CAP (COMMUNITY ACQUIRED PNEUMONIA) DUE TO CHLAMYDIA SPECIES: Status: RESOLVED | Noted: 2022-07-06 | Resolved: 2023-05-08

## 2023-05-08 LAB
ALBUMIN SERPL-MCNC: 3.9 G/DL (ref 3.5–5.2)
ALP SERPL-CCNC: 105 U/L (ref 35–104)
ALT SERPL-CCNC: 7 U/L (ref 0–32)
ANION GAP SERPL CALCULATED.3IONS-SCNC: 9 MMOL/L (ref 7–16)
AST SERPL-CCNC: 12 U/L (ref 0–31)
BASOPHILS # BLD: 0.03 E9/L (ref 0–0.2)
BASOPHILS NFR BLD: 0.3 % (ref 0–2)
BILIRUB SERPL-MCNC: 0.2 MG/DL (ref 0–1.2)
BUN SERPL-MCNC: 38 MG/DL (ref 6–23)
CALCIUM SERPL-MCNC: 9 MG/DL (ref 8.6–10.2)
CHLORIDE SERPL-SCNC: 101 MMOL/L (ref 98–107)
CO2 SERPL-SCNC: 26 MMOL/L (ref 22–29)
CREAT SERPL-MCNC: 1.8 MG/DL (ref 0.5–1)
EOSINOPHIL # BLD: 0.15 E9/L (ref 0.05–0.5)
EOSINOPHIL NFR BLD: 1.3 % (ref 0–6)
ERYTHROCYTE [DISTWIDTH] IN BLOOD BY AUTOMATED COUNT: 15.3 FL (ref 11.5–15)
GLUCOSE SERPL-MCNC: 151 MG/DL (ref 74–99)
HCT VFR BLD AUTO: 38 % (ref 34–48)
HGB BLD-MCNC: 12 G/DL (ref 11.5–15.5)
IMM GRANULOCYTES # BLD: 0.08 E9/L
IMM GRANULOCYTES NFR BLD: 0.7 % (ref 0–5)
LYMPHOCYTES # BLD: 1.18 E9/L (ref 1.5–4)
LYMPHOCYTES NFR BLD: 10.4 % (ref 20–42)
MAGNESIUM SERPL-MCNC: 2.1 MG/DL (ref 1.6–2.6)
MCH RBC QN AUTO: 28.2 PG (ref 26–35)
MCHC RBC AUTO-ENTMCNC: 31.6 % (ref 32–34.5)
MCV RBC AUTO: 89.4 FL (ref 80–99.9)
MONOCYTES # BLD: 0.73 E9/L (ref 0.1–0.95)
MONOCYTES NFR BLD: 6.4 % (ref 2–12)
NEUTROPHILS # BLD: 9.19 E9/L (ref 1.8–7.3)
NEUTS SEG NFR BLD: 80.9 % (ref 43–80)
PLATELET # BLD AUTO: 300 E9/L (ref 130–450)
PMV BLD AUTO: 10 FL (ref 7–12)
POTASSIUM SERPL-SCNC: 4.3 MMOL/L (ref 3.5–5)
PROT SERPL-MCNC: 6.8 G/DL (ref 6.4–8.3)
RBC # BLD AUTO: 4.25 E12/L (ref 3.5–5.5)
SODIUM SERPL-SCNC: 136 MMOL/L (ref 132–146)
TROPONIN, HIGH SENSITIVITY: 20 NG/L (ref 0–9)
TROPONIN, HIGH SENSITIVITY: 22 NG/L (ref 0–9)
WBC # BLD: 11.4 E9/L (ref 4.5–11.5)

## 2023-05-08 PROCEDURE — 70450 CT HEAD/BRAIN W/O DYE: CPT

## 2023-05-08 PROCEDURE — 6370000000 HC RX 637 (ALT 250 FOR IP): Performed by: INTERNAL MEDICINE

## 2023-05-08 PROCEDURE — 83735 ASSAY OF MAGNESIUM: CPT

## 2023-05-08 PROCEDURE — 99222 1ST HOSP IP/OBS MODERATE 55: CPT | Performed by: INTERNAL MEDICINE

## 2023-05-08 PROCEDURE — 96374 THER/PROPH/DIAG INJ IV PUSH: CPT

## 2023-05-08 PROCEDURE — 99285 EMERGENCY DEPT VISIT HI MDM: CPT

## 2023-05-08 PROCEDURE — 80053 COMPREHEN METABOLIC PANEL: CPT

## 2023-05-08 PROCEDURE — 93005 ELECTROCARDIOGRAM TRACING: CPT | Performed by: EMERGENCY MEDICINE

## 2023-05-08 PROCEDURE — 2500000003 HC RX 250 WO HCPCS: Performed by: STUDENT IN AN ORGANIZED HEALTH CARE EDUCATION/TRAINING PROGRAM

## 2023-05-08 PROCEDURE — 85025 COMPLETE CBC W/AUTO DIFF WBC: CPT

## 2023-05-08 PROCEDURE — 72125 CT NECK SPINE W/O DYE: CPT

## 2023-05-08 PROCEDURE — 2580000003 HC RX 258: Performed by: STUDENT IN AN ORGANIZED HEALTH CARE EDUCATION/TRAINING PROGRAM

## 2023-05-08 PROCEDURE — 71045 X-RAY EXAM CHEST 1 VIEW: CPT

## 2023-05-08 PROCEDURE — 84484 ASSAY OF TROPONIN QUANT: CPT

## 2023-05-08 PROCEDURE — 2060000000 HC ICU INTERMEDIATE R&B

## 2023-05-08 RX ORDER — SERTRALINE HYDROCHLORIDE 100 MG/1
100 TABLET, FILM COATED ORAL DAILY
Status: DISCONTINUED | OUTPATIENT
Start: 2023-05-09 | End: 2023-05-09 | Stop reason: SDUPTHER

## 2023-05-08 RX ORDER — METOPROLOL SUCCINATE 25 MG/1
25 TABLET, EXTENDED RELEASE ORAL 2 TIMES DAILY
Status: DISCONTINUED | OUTPATIENT
Start: 2023-05-08 | End: 2023-05-09

## 2023-05-08 RX ORDER — 0.9 % SODIUM CHLORIDE 0.9 %
1000 INTRAVENOUS SOLUTION INTRAVENOUS ONCE
Status: COMPLETED | OUTPATIENT
Start: 2023-05-08 | End: 2023-05-08

## 2023-05-08 RX ORDER — DILTIAZEM HYDROCHLORIDE 5 MG/ML
10 INJECTION INTRAVENOUS ONCE
Status: COMPLETED | OUTPATIENT
Start: 2023-05-08 | End: 2023-05-08

## 2023-05-08 RX ORDER — PANTOPRAZOLE SODIUM 40 MG/1
40 TABLET, DELAYED RELEASE ORAL
Status: DISCONTINUED | OUTPATIENT
Start: 2023-05-09 | End: 2023-05-09 | Stop reason: SDUPTHER

## 2023-05-08 RX ORDER — VITAMIN B COMPLEX
1000 TABLET ORAL DAILY
Status: DISCONTINUED | OUTPATIENT
Start: 2023-05-08 | End: 2023-05-11 | Stop reason: HOSPADM

## 2023-05-08 RX ADMIN — METOPROLOL SUCCINATE 25 MG: 25 TABLET, EXTENDED RELEASE ORAL at 22:37

## 2023-05-08 RX ADMIN — DILTIAZEM HYDROCHLORIDE 10 MG: 5 INJECTION INTRAVENOUS at 17:55

## 2023-05-08 RX ADMIN — SODIUM CHLORIDE 2.5 MG/HR: 900 INJECTION, SOLUTION INTRAVENOUS at 18:00

## 2023-05-08 RX ADMIN — SODIUM CHLORIDE 1000 ML: 9 INJECTION, SOLUTION INTRAVENOUS at 17:05

## 2023-05-08 RX ADMIN — Medication 1000 UNITS: at 22:37

## 2023-05-08 ASSESSMENT — PAIN - FUNCTIONAL ASSESSMENT: PAIN_FUNCTIONAL_ASSESSMENT: NONE - DENIES PAIN

## 2023-05-08 ASSESSMENT — ENCOUNTER SYMPTOMS
COUGH: 0
VOMITING: 0
SHORTNESS OF BREATH: 0
WHEEZING: 0
ABDOMINAL DISTENTION: 0
EYE REDNESS: 0
DIARRHEA: 0
EYE PAIN: 0
NAUSEA: 0
BACK PAIN: 0
SORE THROAT: 0
SINUS PRESSURE: 0
EYE DISCHARGE: 0

## 2023-05-08 ASSESSMENT — PAIN DESCRIPTION - ONSET: ONSET: GRADUAL

## 2023-05-08 ASSESSMENT — PAIN DESCRIPTION - LOCATION: LOCATION: HEAD

## 2023-05-08 ASSESSMENT — PAIN DESCRIPTION - PAIN TYPE: TYPE: ACUTE PAIN

## 2023-05-08 ASSESSMENT — PAIN DESCRIPTION - FREQUENCY: FREQUENCY: CONTINUOUS

## 2023-05-08 ASSESSMENT — PAIN DESCRIPTION - DESCRIPTORS: DESCRIPTORS: ACHING

## 2023-05-08 ASSESSMENT — PAIN SCALES - GENERAL: PAINLEVEL_OUTOF10: 3

## 2023-05-08 NOTE — ED PROVIDER NOTES
Basophils Absolute 0.03 0.00 - 0.20 E9/L   Comprehensive Metabolic Panel w/ Reflex to MG   Result Value Ref Range    Sodium 136 132 - 146 mmol/L    Potassium reflex Magnesium 4.3 3.5 - 5.0 mmol/L    Chloride 101 98 - 107 mmol/L    CO2 26 22 - 29 mmol/L    Anion Gap 9 7 - 16 mmol/L    Glucose 151 (H) 74 - 99 mg/dL    BUN 38 (H) 6 - 23 mg/dL    Creatinine 1.8 (H) 0.5 - 1.0 mg/dL    Est, Glom Filt Rate 27 >=60 mL/min/1.73    Calcium 9.0 8.6 - 10.2 mg/dL    Total Protein 6.8 6.4 - 8.3 g/dL    Albumin 3.9 3.5 - 5.2 g/dL    Total Bilirubin 0.2 0.0 - 1.2 mg/dL    Alkaline Phosphatase 105 (H) 35 - 104 U/L    ALT 7 0 - 32 U/L    AST 12 0 - 31 U/L   Troponin   Result Value Ref Range    Troponin, High Sensitivity 22 (H) 0 - 9 ng/L   Magnesium   Result Value Ref Range    Magnesium 2.1 1.6 - 2.6 mg/dL   Troponin   Result Value Ref Range    Troponin, High Sensitivity 20 (H) 0 - 9 ng/L   EKG 12 Lead   Result Value Ref Range    Ventricular Rate 121 BPM    Atrial Rate 441 BPM    QRS Duration 102 ms    Q-T Interval 320 ms    QTc Calculation (Bazett) 454 ms    R Axis -47 degrees    T Axis 98 degrees       RADIOLOGY:  XR CHEST PORTABLE   Final Result   Borderline cardiac size with atelectasis in the lung bases. CT HEAD WO CONTRAST   Final Result   No acute intracranial abnormality. CT CERVICAL SPINE WO CONTRAST   Final Result   No acute abnormality of the cervical spine.                   ------------------------- NURSING NOTES AND VITALS REVIEWED ---------------------------  Date / Time Roomed:  5/8/2023  4:09 PM  ED Bed Assignment:  08/08    The nursing notes within the ED encounter and vital signs as below have been reviewed.      Patient Vitals for the past 24 hrs:   BP Temp Temp src Pulse Resp SpO2 Height Weight   05/08/23 2015 123/77 98.5 °F (36.9 °C) Oral 81 16 96 % -- --   05/08/23 1903 124/77 -- -- 84 18 97 % -- --   05/08/23 1801 109/76 -- -- (!) 153 16 97 % -- --   05/08/23 1707 123/76 -- -- (!) 132 16 97 %

## 2023-05-08 NOTE — ED NOTES
Patients son called made aware of admission and given room number. Patient resting in bed with call light in reach.  No distress noted HR currently 82bpm.     Jesus Sol LPN  74/52/77 [id number]

## 2023-05-08 NOTE — ED NOTES
Patient returned to room. Assisted to BR, gait steady, denies any dizziness upon standing/ambulation.      Jaylyn Harding LPN  27/60/04 6113

## 2023-05-09 ENCOUNTER — APPOINTMENT (OUTPATIENT)
Dept: GENERAL RADIOLOGY | Age: 88
DRG: 308 | End: 2023-05-09
Payer: MEDICARE

## 2023-05-09 PROBLEM — I48.91 ATRIAL FIBRILLATION (HCC): Status: ACTIVE | Noted: 2023-05-09

## 2023-05-09 PROBLEM — F32.A DEPRESSION: Status: ACTIVE | Noted: 2023-05-09

## 2023-05-09 PROBLEM — E11.9 CONTROLLED TYPE 2 DIABETES MELLITUS WITHOUT COMPLICATION (HCC): Status: ACTIVE | Noted: 2023-05-09

## 2023-05-09 LAB
ALBUMIN SERPL-MCNC: 3.6 G/DL (ref 3.5–5.2)
ALP SERPL-CCNC: 112 U/L (ref 35–104)
ALT SERPL-CCNC: 6 U/L (ref 0–32)
ANION GAP SERPL CALCULATED.3IONS-SCNC: 8 MMOL/L (ref 7–16)
AST SERPL-CCNC: 29 U/L (ref 0–31)
BASOPHILS # BLD: 0.03 E9/L (ref 0–0.2)
BASOPHILS NFR BLD: 0.2 % (ref 0–2)
BILIRUB SERPL-MCNC: 0.3 MG/DL (ref 0–1.2)
BNP BLD-MCNC: 8248 PG/ML (ref 0–450)
BUN SERPL-MCNC: 33 MG/DL (ref 6–23)
CALCIUM SERPL-MCNC: 8.7 MG/DL (ref 8.6–10.2)
CHLORIDE SERPL-SCNC: 106 MMOL/L (ref 98–107)
CHOLESTEROL, TOTAL: 170 MG/DL (ref 0–199)
CO2 SERPL-SCNC: 25 MMOL/L (ref 22–29)
CREAT SERPL-MCNC: 1.5 MG/DL (ref 0.5–1)
EKG ATRIAL RATE: 441 BPM
EKG ATRIAL RATE: 84 BPM
EKG P AXIS: 28 DEGREES
EKG P-R INTERVAL: 146 MS
EKG Q-T INTERVAL: 320 MS
EKG Q-T INTERVAL: 386 MS
EKG QRS DURATION: 102 MS
EKG QRS DURATION: 88 MS
EKG QTC CALCULATION (BAZETT): 454 MS
EKG QTC CALCULATION (BAZETT): 456 MS
EKG R AXIS: -47 DEGREES
EKG R AXIS: -47 DEGREES
EKG T AXIS: 43 DEGREES
EKG T AXIS: 98 DEGREES
EKG VENTRICULAR RATE: 121 BPM
EKG VENTRICULAR RATE: 84 BPM
EOSINOPHIL # BLD: 0.22 E9/L (ref 0.05–0.5)
EOSINOPHIL NFR BLD: 1.8 % (ref 0–6)
ERYTHROCYTE [DISTWIDTH] IN BLOOD BY AUTOMATED COUNT: 15.1 FL (ref 11.5–15)
GLUCOSE SERPL-MCNC: 121 MG/DL (ref 74–99)
HBA1C MFR BLD: 6.2 % (ref 4–5.6)
HCT VFR BLD AUTO: 34.5 % (ref 34–48)
HDLC SERPL-MCNC: 29 MG/DL
HGB BLD-MCNC: 11 G/DL (ref 11.5–15.5)
IMM GRANULOCYTES # BLD: 0.07 E9/L
IMM GRANULOCYTES NFR BLD: 0.6 % (ref 0–5)
LDLC SERPL CALC-MCNC: 103 MG/DL (ref 0–99)
LV EF: 58 %
LVEF MODALITY: NORMAL
LYMPHOCYTES # BLD: 1.39 E9/L (ref 1.5–4)
LYMPHOCYTES NFR BLD: 11.1 % (ref 20–42)
MCH RBC QN AUTO: 28.7 PG (ref 26–35)
MCHC RBC AUTO-ENTMCNC: 31.9 % (ref 32–34.5)
MCV RBC AUTO: 90.1 FL (ref 80–99.9)
METER GLUCOSE: 158 MG/DL (ref 74–99)
MONOCYTES # BLD: 0.82 E9/L (ref 0.1–0.95)
MONOCYTES NFR BLD: 6.6 % (ref 2–12)
NEUTROPHILS # BLD: 9.96 E9/L (ref 1.8–7.3)
NEUTS SEG NFR BLD: 79.7 % (ref 43–80)
PLATELET # BLD AUTO: 259 E9/L (ref 130–450)
PMV BLD AUTO: 9.9 FL (ref 7–12)
POTASSIUM SERPL-SCNC: 4.8 MMOL/L (ref 3.5–5)
PROT SERPL-MCNC: 6.3 G/DL (ref 6.4–8.3)
RBC # BLD AUTO: 3.83 E12/L (ref 3.5–5.5)
SODIUM SERPL-SCNC: 139 MMOL/L (ref 132–146)
T4 FREE SERPL-MCNC: 1.22 NG/DL (ref 0.93–1.7)
TRIGL SERPL-MCNC: 189 MG/DL (ref 0–149)
TSH SERPL-MCNC: 2.65 UIU/ML (ref 0.27–4.2)
VLDLC SERPL CALC-MCNC: 38 MG/DL
WBC # BLD: 12.5 E9/L (ref 4.5–11.5)

## 2023-05-09 PROCEDURE — 2580000003 HC RX 258: Performed by: INTERNAL MEDICINE

## 2023-05-09 PROCEDURE — 93306 TTE W/DOPPLER COMPLETE: CPT

## 2023-05-09 PROCEDURE — 6360000002 HC RX W HCPCS: Performed by: INTERNAL MEDICINE

## 2023-05-09 PROCEDURE — APPSS180 APP SPLIT SHARED TIME > 60 MINUTES: Performed by: NURSE PRACTITIONER

## 2023-05-09 PROCEDURE — 85025 COMPLETE CBC W/AUTO DIFF WBC: CPT

## 2023-05-09 PROCEDURE — 80053 COMPREHEN METABOLIC PANEL: CPT

## 2023-05-09 PROCEDURE — 2060000000 HC ICU INTERMEDIATE R&B

## 2023-05-09 PROCEDURE — 84443 ASSAY THYROID STIM HORMONE: CPT

## 2023-05-09 PROCEDURE — 93005 ELECTROCARDIOGRAM TRACING: CPT | Performed by: NURSE PRACTITIONER

## 2023-05-09 PROCEDURE — 84439 ASSAY OF FREE THYROXINE: CPT

## 2023-05-09 PROCEDURE — 83036 HEMOGLOBIN GLYCOSYLATED A1C: CPT

## 2023-05-09 PROCEDURE — 80061 LIPID PANEL: CPT

## 2023-05-09 PROCEDURE — 71045 X-RAY EXAM CHEST 1 VIEW: CPT

## 2023-05-09 PROCEDURE — 6370000000 HC RX 637 (ALT 250 FOR IP): Performed by: INTERNAL MEDICINE

## 2023-05-09 PROCEDURE — 99223 1ST HOSP IP/OBS HIGH 75: CPT | Performed by: INTERNAL MEDICINE

## 2023-05-09 PROCEDURE — 83880 ASSAY OF NATRIURETIC PEPTIDE: CPT

## 2023-05-09 PROCEDURE — 6360000002 HC RX W HCPCS: Performed by: NURSE PRACTITIONER

## 2023-05-09 PROCEDURE — 6360000004 HC RX CONTRAST MEDICATION: Performed by: NURSE PRACTITIONER

## 2023-05-09 PROCEDURE — 36415 COLL VENOUS BLD VENIPUNCTURE: CPT

## 2023-05-09 PROCEDURE — 82962 GLUCOSE BLOOD TEST: CPT

## 2023-05-09 PROCEDURE — 6370000000 HC RX 637 (ALT 250 FOR IP): Performed by: NURSE PRACTITIONER

## 2023-05-09 PROCEDURE — 93010 ELECTROCARDIOGRAM REPORT: CPT | Performed by: INTERNAL MEDICINE

## 2023-05-09 PROCEDURE — 99232 SBSQ HOSP IP/OBS MODERATE 35: CPT | Performed by: INTERNAL MEDICINE

## 2023-05-09 RX ORDER — PANTOPRAZOLE SODIUM 40 MG/1
40 TABLET, DELAYED RELEASE ORAL
Status: DISCONTINUED | OUTPATIENT
Start: 2023-05-09 | End: 2023-05-11 | Stop reason: HOSPADM

## 2023-05-09 RX ORDER — SERTRALINE HYDROCHLORIDE 100 MG/1
100 TABLET, FILM COATED ORAL DAILY
Status: DISCONTINUED | OUTPATIENT
Start: 2023-05-09 | End: 2023-05-11 | Stop reason: HOSPADM

## 2023-05-09 RX ORDER — FUROSEMIDE 10 MG/ML
20 INJECTION INTRAMUSCULAR; INTRAVENOUS DAILY
Status: DISCONTINUED | OUTPATIENT
Start: 2023-05-10 | End: 2023-05-09

## 2023-05-09 RX ORDER — METOPROLOL SUCCINATE 25 MG/1
37.5 TABLET, EXTENDED RELEASE ORAL 2 TIMES DAILY
Status: DISCONTINUED | OUTPATIENT
Start: 2023-05-09 | End: 2023-05-11 | Stop reason: HOSPADM

## 2023-05-09 RX ORDER — SODIUM CHLORIDE 9 MG/ML
INJECTION, SOLUTION INTRAVENOUS CONTINUOUS
Status: ACTIVE | OUTPATIENT
Start: 2023-05-09 | End: 2023-05-10

## 2023-05-09 RX ORDER — ENOXAPARIN SODIUM 100 MG/ML
30 INJECTION SUBCUTANEOUS DAILY
Status: DISCONTINUED | OUTPATIENT
Start: 2023-05-09 | End: 2023-05-11 | Stop reason: HOSPADM

## 2023-05-09 RX ORDER — FUROSEMIDE 10 MG/ML
40 INJECTION INTRAMUSCULAR; INTRAVENOUS ONCE
Status: COMPLETED | OUTPATIENT
Start: 2023-05-09 | End: 2023-05-09

## 2023-05-09 RX ADMIN — FUROSEMIDE 40 MG: 10 INJECTION, SOLUTION INTRAMUSCULAR; INTRAVENOUS at 09:09

## 2023-05-09 RX ADMIN — ENOXAPARIN SODIUM 30 MG: 100 INJECTION SUBCUTANEOUS at 16:54

## 2023-05-09 RX ADMIN — METOPROLOL SUCCINATE 25 MG: 25 TABLET, EXTENDED RELEASE ORAL at 06:10

## 2023-05-09 RX ADMIN — METOPROLOL SUCCINATE 37.5 MG: 25 TABLET, EXTENDED RELEASE ORAL at 16:54

## 2023-05-09 RX ADMIN — PANTOPRAZOLE SODIUM 40 MG: 40 TABLET, DELAYED RELEASE ORAL at 06:10

## 2023-05-09 RX ADMIN — SERTRALINE 100 MG: 100 TABLET, FILM COATED ORAL at 09:09

## 2023-05-09 RX ADMIN — PERFLUTREN 1.5 ML: 6.52 INJECTION, SUSPENSION INTRAVENOUS at 15:10

## 2023-05-09 RX ADMIN — SODIUM CHLORIDE: 9 INJECTION, SOLUTION INTRAVENOUS at 09:58

## 2023-05-09 RX ADMIN — Medication 1000 UNITS: at 09:09

## 2023-05-09 ASSESSMENT — PAIN SCALES - GENERAL: PAINLEVEL_OUTOF10: 0

## 2023-05-09 NOTE — PROGRESS NOTES
Per update from CMR, patient was SR when the monitor was applied during admission to the unit  and she remained that way through the night. There is no strip showing conversion. Conversion to SR must have happened in the ED.     Electronically signed by Namrata Muñoz RN on 5/9/2023 at 8:20 AM

## 2023-05-09 NOTE — H&P
[facility] History and Physical        Chief Complaint:  PAF  History of Present Illness   The patient is a 81 y.o. female    HTN, JERICA, dm2, hx of PAF  Not on blood thinner  But on toprol XL 25QD  Cardo workup:  Echo, 01/08/2016. Stage II diastolic dysfunction. Aortic mild aortic insufficiency. . Mean transvalvular gradient 7 mmHg and moderate mitral insufficiency  Pulmonary venous flow pattern appeared normal.   Pharmacologic MPS, 04/19/2016. EF 75%. No ischemia. Low risk exam.   Last Echo Echo Conclusion 10/15/2021:          The left ventricular systolic function is normal.        LVEF is 55-60%. Severe concentric left ventricular hypertrophy. Grade III - reversible restrictive diastolic dysfunction. The right ventricle is normal size. The right ventricular systolic function is normal.        Left atrium is mildly dilated. Mild to moderate mitral regurgitation. Mild tricuspid regurgitation. RVSP is 53 mmHg.   mild pulmonary hypertension. There is no pericardial effusion. Known to [employer] cardio and follows CHF clinic   chf stable-- good urinary response to oral diuretic.that she takes three times a week    Recurrent presyncope and orthostatic complaints  This seems subacute chrornic   She was bending forward and felt more lightheaded/presyncopal  And fel-- shes freqently gets blurrrin vision and lightneaded when standing not when lying  Some sob earlier today and racing heart earlier this am  In ER noted Afib with RVR placed on cardizem drip    No chest pain/racing heart  or SOB now  Hx of PAF--she has needed a cardioversion previously several years ago. Has not needed one since      - hx taken from the [facility]  REVIEW OF SYSTEMS:  no fevers, chills, cp, sob, n/v, ha, vision/hearing changes, wt changes, hot/cold flashes, other open skin lesions, diarrhea, constipation, dysuria/hematuria unless noted in HPI.  Complete ROS

## 2023-05-09 NOTE — PROGRESS NOTES
[facility] Progress Note    Admitting Date and Time: 5/8/2023  4:09 PM  Admit Dx: Atrial fibrillation with rapid ventricular response ([address] [I48.91]  Fall, initial encounter [W19. XXXA]    Subjective:  Patient is being followed for Atrial fibrillation with rapid ventricular response ([address] [I48.91]  Fall, initial encounter [W19. XXXA]     Seen and examined at bedside. Awake and alert. Afebrile. Hard of hearing. No family present. Trace edema present. No complaints of pain, SOB, chest pain, or cough.      ROS: denies fever, chills, cp, sob, n/v, HA unless stated above.      sertraline  100 mg Oral Daily    pantoprazole  40 mg Oral QAM AC    metoprolol succinate  37.5 mg Oral BID    Vitamin D  1,000 Units Oral Daily     perflutren lipid microspheres, 1.5 mL, ONCE PRN       Objective:    /80   Pulse 86   Temp 98 °F (36.7 °C) (Oral)   Resp 18   Ht 5' 6\" (1.676 m) Comment: stated  Wt 145 lb 11.2 oz (66.1 kg)   SpO2 96%   BMI 23.52 kg/m²     General Appearance: alert and oriented to person, place and time and in no acute distress + [place]   Skin: warm and dry  Head: normocephalic and atraumatic  Eyes: pupils equal, round, and reactive to light, extraocular eye movements intact, conjunctivae normal  Neck: neck supple and non tender without mass   Pulmonary/Chest: clear to auscultation bilaterally- no wheezes, rales or rhonchi, normal air movement, no respiratory distress  Cardiovascular: normal rate, normal S1 and S2 and no carotid bruits  Abdomen: soft, non-tender, non-distended, normal bowel sounds, no masses or organomegaly  Extremities: no cyanosis, no clubbing and trace BLE edema  Neurologic: no cranial nerve deficit and speech normal    Recent Labs     05/08/23  1612 05/09/23  0610    139   K 4.3 4.8    106   CO2 26 25   BUN 38* 33*   CREATININE 1.8* 1.5*   GLUCOSE 151* 121*   CALCIUM 9.0 8.7       Recent Labs     05/08/23  1612 05/09/23  0610   WBC 11.4 12.5*   RBC 4.25 3.83

## 2023-05-09 NOTE — ED NOTES
.ED to Inpatient Handoff Report    Notified floor that electronic handoff available and patient ready for transport to room 616. Safety Risks: Risk of falls    Patient in Restraints: no    Constant Observer or Patient : no    Telemetry Monitoring Ordered: Yes          Order to transfer to unit without monitor: NO    Last MEWS: 1 Time completed: 2015    Vitals:    05/08/23 1707 05/08/23 1801 05/08/23 1903 05/08/23 2015   BP: 123/76 109/76 124/77 123/77   Pulse: (!) 132 (!) 153 84 81   Resp: 16 16 18 16   Temp:    98.5 °F (36.9 °C)   TempSrc:    Oral   SpO2: 97% 97% 97% 96%   Weight:       Height:           Opportunity for questions and clarification was provided.        Rylie Medrano RN  05/08/23 9196

## 2023-05-09 NOTE — ACP (ADVANCE CARE PLANNING)
Advance Care Planning   Healthcare Decision Maker:    Primary Decision Maker: Tennille Doty Child - [phone]    Secondary Decision Maker: Toma Marcelo - Child - [phone]    Click here to complete Healthcare Decision Makers including selection of the Healthcare Decision Maker Relationship (ie \"Primary\").

## 2023-05-10 ENCOUNTER — APPOINTMENT (OUTPATIENT)
Dept: NUCLEAR MEDICINE | Age: 88
DRG: 308 | End: 2023-05-10
Payer: MEDICARE

## 2023-05-10 ENCOUNTER — APPOINTMENT (OUTPATIENT)
Dept: NON INVASIVE DIAGNOSTICS | Age: 88
DRG: 308 | End: 2023-05-10
Payer: MEDICARE

## 2023-05-10 LAB
ANION GAP SERPL CALCULATED.3IONS-SCNC: 14 MMOL/L (ref 7–16)
BASOPHILS # BLD: 0.03 E9/L (ref 0–0.2)
BASOPHILS NFR BLD: 0.3 % (ref 0–2)
BUN SERPL-MCNC: 29 MG/DL (ref 6–23)
CALCIUM SERPL-MCNC: 8.7 MG/DL (ref 8.6–10.2)
CHLORIDE SERPL-SCNC: 104 MMOL/L (ref 98–107)
CO2 SERPL-SCNC: 22 MMOL/L (ref 22–29)
CREAT SERPL-MCNC: 1.7 MG/DL (ref 0.5–1)
EOSINOPHIL # BLD: 0.18 E9/L (ref 0.05–0.5)
EOSINOPHIL NFR BLD: 1.8 % (ref 0–6)
ERYTHROCYTE [DISTWIDTH] IN BLOOD BY AUTOMATED COUNT: 15.2 FL (ref 11.5–15)
GLUCOSE SERPL-MCNC: 111 MG/DL (ref 74–99)
HCT VFR BLD AUTO: 32.5 % (ref 34–48)
HGB BLD-MCNC: 10.3 G/DL (ref 11.5–15.5)
IMM GRANULOCYTES # BLD: 0.05 E9/L
IMM GRANULOCYTES NFR BLD: 0.5 % (ref 0–5)
LV EF: 78 %
LVEF MODALITY: NORMAL
LYMPHOCYTES # BLD: 1.63 E9/L (ref 1.5–4)
LYMPHOCYTES NFR BLD: 16 % (ref 20–42)
MAGNESIUM SERPL-MCNC: 1.7 MG/DL (ref 1.6–2.6)
MCH RBC QN AUTO: 28.3 PG (ref 26–35)
MCHC RBC AUTO-ENTMCNC: 31.7 % (ref 32–34.5)
MCV RBC AUTO: 89.3 FL (ref 80–99.9)
MONOCYTES # BLD: 0.69 E9/L (ref 0.1–0.95)
MONOCYTES NFR BLD: 6.8 % (ref 2–12)
NEUTROPHILS # BLD: 7.58 E9/L (ref 1.8–7.3)
NEUTS SEG NFR BLD: 74.6 % (ref 43–80)
PHOSPHATE SERPL-MCNC: 2.7 MG/DL (ref 2.5–4.5)
PLATELET # BLD AUTO: 232 E9/L (ref 130–450)
PMV BLD AUTO: 10.1 FL (ref 7–12)
POTASSIUM SERPL-SCNC: 3.8 MMOL/L (ref 3.5–5)
RBC # BLD AUTO: 3.64 E12/L (ref 3.5–5.5)
SODIUM SERPL-SCNC: 140 MMOL/L (ref 132–146)
WBC # BLD: 10.2 E9/L (ref 4.5–11.5)

## 2023-05-10 PROCEDURE — 6360000002 HC RX W HCPCS: Performed by: INTERNAL MEDICINE

## 2023-05-10 PROCEDURE — 97165 OT EVAL LOW COMPLEX 30 MIN: CPT

## 2023-05-10 PROCEDURE — 78452 HT MUSCLE IMAGE SPECT MULT: CPT

## 2023-05-10 PROCEDURE — 93018 CV STRESS TEST I&R ONLY: CPT | Performed by: INTERNAL MEDICINE

## 2023-05-10 PROCEDURE — A9500 TC99M SESTAMIBI: HCPCS | Performed by: RADIOLOGY

## 2023-05-10 PROCEDURE — 80048 BASIC METABOLIC PNL TOTAL CA: CPT

## 2023-05-10 PROCEDURE — 6370000000 HC RX 637 (ALT 250 FOR IP): Performed by: INTERNAL MEDICINE

## 2023-05-10 PROCEDURE — 84100 ASSAY OF PHOSPHORUS: CPT

## 2023-05-10 PROCEDURE — 99233 SBSQ HOSP IP/OBS HIGH 50: CPT | Performed by: INTERNAL MEDICINE

## 2023-05-10 PROCEDURE — 6370000000 HC RX 637 (ALT 250 FOR IP): Performed by: NURSE PRACTITIONER

## 2023-05-10 PROCEDURE — 97530 THERAPEUTIC ACTIVITIES: CPT

## 2023-05-10 PROCEDURE — 3430000000 HC RX DIAGNOSTIC RADIOPHARMACEUTICAL: Performed by: RADIOLOGY

## 2023-05-10 PROCEDURE — 83735 ASSAY OF MAGNESIUM: CPT

## 2023-05-10 PROCEDURE — 85025 COMPLETE CBC W/AUTO DIFF WBC: CPT

## 2023-05-10 PROCEDURE — 93016 CV STRESS TEST SUPVJ ONLY: CPT | Performed by: INTERNAL MEDICINE

## 2023-05-10 PROCEDURE — 2060000000 HC ICU INTERMEDIATE R&B

## 2023-05-10 PROCEDURE — 93017 CV STRESS TEST TRACING ONLY: CPT

## 2023-05-10 PROCEDURE — 36415 COLL VENOUS BLD VENIPUNCTURE: CPT

## 2023-05-10 RX ORDER — TETRAKIS(2-METHOXYISOBUTYLISOCYANIDE)COPPER(I) TETRAFLUOROBORATE 1 MG/ML
30 INJECTION, POWDER, LYOPHILIZED, FOR SOLUTION INTRAVENOUS
Status: COMPLETED | OUTPATIENT
Start: 2023-05-10 | End: 2023-05-10

## 2023-05-10 RX ORDER — TETRAKIS(2-METHOXYISOBUTYLISOCYANIDE)COPPER(I) TETRAFLUOROBORATE 1 MG/ML
10 INJECTION, POWDER, LYOPHILIZED, FOR SOLUTION INTRAVENOUS
Status: COMPLETED | OUTPATIENT
Start: 2023-05-10 | End: 2023-05-10

## 2023-05-10 RX ORDER — FUROSEMIDE 20 MG/1
20 TABLET ORAL DAILY
Status: DISCONTINUED | OUTPATIENT
Start: 2023-05-10 | End: 2023-05-11 | Stop reason: HOSPADM

## 2023-05-10 RX ORDER — AMINOPHYLLINE DIHYDRATE 25 MG/ML
50 INJECTION, SOLUTION INTRAVENOUS
Status: COMPLETED | OUTPATIENT
Start: 2023-05-10 | End: 2023-05-10

## 2023-05-10 RX ADMIN — ENOXAPARIN SODIUM 30 MG: 100 INJECTION SUBCUTANEOUS at 10:39

## 2023-05-10 RX ADMIN — SERTRALINE 100 MG: 100 TABLET, FILM COATED ORAL at 10:39

## 2023-05-10 RX ADMIN — Medication 30 MILLICURIE: at 08:45

## 2023-05-10 RX ADMIN — METOPROLOL SUCCINATE 37.5 MG: 25 TABLET, EXTENDED RELEASE ORAL at 06:34

## 2023-05-10 RX ADMIN — AMINOPHYLLINE 50 MG: 25 INJECTION, SOLUTION INTRAVENOUS at 08:45

## 2023-05-10 RX ADMIN — Medication 1000 UNITS: at 10:39

## 2023-05-10 RX ADMIN — REGADENOSON 0.4 MG: 0.08 INJECTION, SOLUTION INTRAVENOUS at 08:40

## 2023-05-10 RX ADMIN — METOPROLOL SUCCINATE 37.5 MG: 25 TABLET, EXTENDED RELEASE ORAL at 19:03

## 2023-05-10 RX ADMIN — PANTOPRAZOLE SODIUM 40 MG: 40 TABLET, DELAYED RELEASE ORAL at 06:34

## 2023-05-10 RX ADMIN — Medication 10 MILLICURIE: at 07:20

## 2023-05-10 RX ADMIN — FUROSEMIDE 20 MG: 20 TABLET ORAL at 10:39

## 2023-05-10 NOTE — DISCHARGE INSTRUCTIONS
HEART FAILURE  / CONGESTIVE HEART FAILURE  DISCHARGE INSTRUCTIONS:  GUIDELINES TO FOLLOW AT HOME    Self- Managed Care:     MEDICATIONS:  Take your medication as directed. If you are experiencing any side effects, inform your doctor, Do not stop taking any of your medications without letting your doctor know. Check with your doctor before taking any over-the-counter medications / herbal / or dietary supplements. They may interfere with your other medications. Do not take ibuprofen (Advil or Motrin) and naproxen (Aleve) without talking to your doctor first. They could make your heart failure worse. WEIGHT MONITORING:   Weigh yourself everyday (with the same scale) around the same time of the day and write it down. (you can chart them on a calendar or keep track of them on paper. Notify your doctor of a weight gain of 3 pounds or more in 1 day   OR a total of 5 pounds or more in 1 week    Take your weight record to your doctor visits  Also, the same goes if you loose more than 3# in one day, let your heart doctor know. DIET:   Cardiac heart healthy diet- Low saturated / low trans fat, no added salt, caffeine restricted, Low sodium diet-   No more than 2,000mg (2 grams) of salt / sodium per day (which equals to a little less than  a teaspoon of salt)  If your doctor wants you on a fluid restriction. ..it is usually recommended a fluid limit of 2,000cc -  Fluid restriction- 2,000 ml (milliliters) = 64 ounces = you can have 8 glasses of fluid per day (each glass 8 ounces)    Follow a low salt diet - avoid using salt at the table, avoid / limit use of canned soups, processed / packaged foods, salted snacks, olives and pickles. Do not use a salt substitute without checking with your doctor, they may contain a high amount of potassioum. (Mrs. Miles Cedeno is safe to use).     Limit the use of alcohol       CALL YOUR DOCTOR THE FIRST DAY YOU NOTICE ANY OF THESE   SYMPTOMS:  You have a

## 2023-05-10 NOTE — PROGRESS NOTES
Occupational Therapy  OCCUPATIONAL THERAPY INITIAL EVALUATION  [address] [address]    Date: 5/10/2023     Patient Name: Yadira Beasley  MRN: [id number]  : 1935  Room: [address] [place] [address]    Evaluating OT: Nabil Baez OTR/KATERINA - [id number]    Referring Provider: Kiesha Wong MD  Specific Provider Orders/Date: \"OT eval and treat\" - 2023    Diagnosis: Atrial fibrillation with rapid ventricular response ([address] [I48.91]  Fall, initial encounter [W19. XXXA]      Pertinent Medical History: anemia, A fib, CKD, DM    Precautions: fall risk    Assessment of Current Deficits:    [x] Functional mobility   [x]ADLs  [x] Strength               []Cognition   [x] Functional transfers   [x] IADLs         [x] Safety Awareness   [x]Endurance   [] Fine Coordination              [x] Balance      [] Vision/perception   []Sensation    []Gross Motor Coordination  [] ROM  [] Delirium                   [] Motor Control     OT PLAN OF CARE   OT POC is based on physician orders, patient diagnosis, and results of clinical assessment.   Frequency/Duration 2-5 days/week for 2 weeks PRN   Specific OT Treatment Interventions to Include:   * Instruction/training on adapted ADL techniques and AE recommendations to increase functional independence within precautions       * Training on energy conservation strategies, correct breathing pattern and techniques to improve independence/tolerance for self-care routine  * Functional transfer/mobility training/DME recommendations for increased independence, safety, and fall prevention  * Patient/Family education to increase follow through with safety techniques and functional independence  * Recommendation of environmental modifications for increased safety with functional transfers/mobility and ADLs  * Therapeutic exercise to improve motor endurance, ROM, and functional strength for ADLs/functional transfers  * Therapeutic

## 2023-05-10 NOTE — PLAN OF CARE
Patient's chart updated to reflect:      . - HF care plan, HF education points and HF discharge instructions.  -Orders: 2 gram sodium diet, daily weights, I/O.  -PCP and cardiology follow up appointments to be scheduled within 7 days of hospital discharge. -CHF education session will be provided to the patient prior to hospital discharge.     Tk Salinas RN BSN  Heart Failure Navigator

## 2023-05-10 NOTE — PROGRESS NOTES
INPATIENT CARDIOLOGY FOLLOW-UP    Name: Tan Calderon    Age: 81 y.o. Date of Admission: 5/8/2023  4:09 PM    Date of Service: 5/10/2023    Chief Complaint: Follow-up for AF with RVR    Interim History:  No new overnight cardiac complaints except for cough and dyspnea last night after getting IV fluids. Denies any more chest pain or pressure over the last 24 hours. Currently with no complaints of palpitations, dizziness, or lightheadedness. SR on telemetry.     Review of Systems:   Cardiac: As per HPI  General: No fever, chills  Pulmonary: As per HPI  HEENT: No visual disturbances, difficult swallowing  GI: No nausea, vomiting  Endocrine: No thyroid disease or DM  Musculoskeletal: NIXON x 4, no focal motor deficits  Skin: Intact, no rashes  Neuro/Psych: No headache or seizures    Problem List:  Patient Active Problem List   Diagnosis    HTN (hypertension)    Hyperlipidemia    Anemia    GERD (gastroesophageal reflux disease)    JERICA on CPAP    Vitamin D deficiency    Abnormal bone density screening    PVC's (premature ventricular contractions)    Mitral insufficiency    Acute on chronic diastolic CHF (congestive heart failure), NYHA class 1 ([facility])    Renal insufficiency    Atypical chest pain    Rheumatic mitral stenosis    Non-rheumatic mitral valve stenosis    Chest pain    CKD (chronic kidney disease) stage 3, GFR 30-59 ml/min ([facility])    Acute cystitis without hematuria    Valvular heart disease    PAF (paroxysmal atrial fibrillation) ([facility])    Heart failure, unspecified ([facility])    Atrial fibrillation with RVR ([facility])    Chronic heart failure with preserved ejection fraction ([facility])    Pleural effusion on left    HAP (hospital-acquired pneumonia)    Pneumonia    Acute kidney injury superimposed on chronic kidney disease [address]    Acquired absence of both cervix and uterus    Acquired absence of other specified parts of digestive tract    Activity, other specified    Difficulty in walking, not elsewhere classified

## 2023-05-10 NOTE — PROGRESS NOTES
[facility] Progress Note    Admitting Date and Time: 5/8/2023  4:09 PM  Admit Dx: Atrial fibrillation with rapid ventricular response ([facility] [I48.91]  Fall, initial encounter [W19. XXXA]    Subjective:  Patient is being followed for Atrial fibrillation with rapid ventricular response ([address] [I48.91]  Fall, initial encounter [W19. XXXA]     Seen and examined at bedside. Awake and alert. Afebrile. Feels she is foggy due to stress test. Stating she is still lightheaded and dizzy when ambulating. Tolerating medications.      ROS: denies fever, chills, cp, sob, n/v, HA unless stated above.      sertraline  100 mg Oral Daily    pantoprazole  40 mg Oral QAM AC    metoprolol succinate  37.5 mg Oral BID    enoxaparin  30 mg SubCUTAneous Daily    Vitamin D  1,000 Units Oral Daily     [COMPLETED] technetium sestamibi, 30 millicurie, ONCE PRN  regadenoson, 0.4 mg, ONCE PRN       Objective:    BP (!) 143/73   Pulse 86   Temp 98.3 °F (36.8 °C) (Oral)   Resp 18   Ht 5' 6\" (1.676 m) Comment: stated  Wt 139 lb 6.4 oz (63.2 kg)   SpO2 93%   BMI 22.50 kg/m²     General Appearance: alert and oriented to person, place and time and in no acute distress + [place]   Skin: warm and dry  Head: normocephalic and atraumatic  Eyes: pupils equal, round, and reactive to light, extraocular eye movements intact, conjunctivae normal  Neck: neck supple and non tender without mass   Pulmonary/Chest: clear to auscultation bilaterally- no wheezes, rales or rhonchi, normal air movement, no respiratory distress  Cardiovascular: normal rate, normal S1 and S2 and no carotid bruits  Abdomen: soft, non-tender, non-distended, normal bowel sounds, no masses or organomegaly  Extremities: no cyanosis, no clubbing and trace BLE edema  Neurologic: no cranial nerve deficit and speech normal    Recent Labs     05/08/23  1612 05/09/23  0610 05/10/23  0425    139 140   K 4.3 4.8 3.8    106 104   CO2 26 25 22   BUN 38* 33* 29*   CREATININE

## 2023-05-10 NOTE — PROCEDURES
[place] Nuclear Stress Test:    Cardiologist: Dr. Liza Conley EKG: NSR, LAD, LVH, NSST, abnormal EKG    Indications for study: Chest pain    1. No chest pain  2. No new arrhythmias  3. No EKG changes suggestive of stress induced ischemia  4. Nuclear images pending    Pranav Harding MD., [facility].    [facility]) Cardiology

## 2023-05-11 VITALS
WEIGHT: 144.5 LBS | DIASTOLIC BLOOD PRESSURE: 80 MMHG | BODY MASS INDEX: 23.22 KG/M2 | HEIGHT: 66 IN | SYSTOLIC BLOOD PRESSURE: 134 MMHG | TEMPERATURE: 98.3 F | OXYGEN SATURATION: 98 % | RESPIRATION RATE: 18 BRPM | HEART RATE: 75 BPM

## 2023-05-11 LAB
ANION GAP SERPL CALCULATED.3IONS-SCNC: 7 MMOL/L (ref 7–16)
BUN SERPL-MCNC: 28 MG/DL (ref 6–23)
CALCIUM SERPL-MCNC: 8.8 MG/DL (ref 8.6–10.2)
CHLORIDE SERPL-SCNC: 104 MMOL/L (ref 98–107)
CO2 SERPL-SCNC: 29 MMOL/L (ref 22–29)
CREAT SERPL-MCNC: 1.6 MG/DL (ref 0.5–1)
GLUCOSE SERPL-MCNC: 110 MG/DL (ref 74–99)
MAGNESIUM SERPL-MCNC: 1.8 MG/DL (ref 1.6–2.6)
PHOSPHATE SERPL-MCNC: 3.3 MG/DL (ref 2.5–4.5)
POTASSIUM SERPL-SCNC: 3.7 MMOL/L (ref 3.5–5)
SODIUM SERPL-SCNC: 140 MMOL/L (ref 132–146)

## 2023-05-11 PROCEDURE — 6370000000 HC RX 637 (ALT 250 FOR IP): Performed by: NURSE PRACTITIONER

## 2023-05-11 PROCEDURE — 6370000000 HC RX 637 (ALT 250 FOR IP): Performed by: INTERNAL MEDICINE

## 2023-05-11 PROCEDURE — 36415 COLL VENOUS BLD VENIPUNCTURE: CPT

## 2023-05-11 PROCEDURE — 84100 ASSAY OF PHOSPHORUS: CPT

## 2023-05-11 PROCEDURE — 99239 HOSP IP/OBS DSCHRG MGMT >30: CPT | Performed by: INTERNAL MEDICINE

## 2023-05-11 PROCEDURE — 83735 ASSAY OF MAGNESIUM: CPT

## 2023-05-11 PROCEDURE — 99232 SBSQ HOSP IP/OBS MODERATE 35: CPT | Performed by: INTERNAL MEDICINE

## 2023-05-11 PROCEDURE — 6360000002 HC RX W HCPCS: Performed by: INTERNAL MEDICINE

## 2023-05-11 PROCEDURE — 80048 BASIC METABOLIC PNL TOTAL CA: CPT

## 2023-05-11 RX ORDER — FUROSEMIDE 20 MG/1
20 TABLET ORAL DAILY
Qty: 60 TABLET | Refills: 3 | Status: SHIPPED | OUTPATIENT
Start: 2023-05-12

## 2023-05-11 RX ORDER — METOPROLOL SUCCINATE 25 MG/1
37.5 TABLET, EXTENDED RELEASE ORAL DAILY
Qty: 45 TABLET | Refills: 0 | Status: SHIPPED | OUTPATIENT
Start: 2023-05-11 | End: 2023-06-10

## 2023-05-11 RX ADMIN — SERTRALINE 100 MG: 100 TABLET, FILM COATED ORAL at 08:49

## 2023-05-11 RX ADMIN — PANTOPRAZOLE SODIUM 40 MG: 40 TABLET, DELAYED RELEASE ORAL at 06:23

## 2023-05-11 RX ADMIN — FUROSEMIDE 20 MG: 20 TABLET ORAL at 06:23

## 2023-05-11 RX ADMIN — Medication 1000 UNITS: at 08:49

## 2023-05-11 RX ADMIN — ENOXAPARIN SODIUM 30 MG: 100 INJECTION SUBCUTANEOUS at 08:49

## 2023-05-11 RX ADMIN — METOPROLOL SUCCINATE 37.5 MG: 25 TABLET, EXTENDED RELEASE ORAL at 06:23

## 2023-05-11 ASSESSMENT — PAIN SCALES - GENERAL
PAINLEVEL_OUTOF10: 0
PAINLEVEL_OUTOF10: 0

## 2023-05-11 NOTE — PLAN OF CARE
Problem: Discharge Planning  Goal: Discharge to home or other facility with appropriate resources  5/11/2023 1333 by Nora Piedra RN  Outcome: Completed  5/11/2023 1100 by Nora Piedra RN  Outcome: Progressing     Problem: Safety - Adult  Goal: Free from fall injury  5/11/2023 1333 by Nora Piedra RN  Outcome: Completed  5/11/2023 1100 by Nora Piedra RN  Outcome: Progressing     Problem: ABCDS Injury Assessment  Goal: Absence of physical injury  5/11/2023 1333 by Nora Piedra RN  Outcome: Completed  5/11/2023 1100 by Nora Piedra RN  Outcome: Progressing     Problem: Chronic Conditions and Co-morbidities  Goal: Patient's chronic conditions and co-morbidity symptoms are monitored and maintained or improved  5/11/2023 1333 by Nora Piedra RN  Outcome: Completed  5/11/2023 1100 by Nora Piedra RN  Outcome: Progressing     Problem: Pain  Goal: Verbalizes/displays adequate comfort level or baseline comfort level  5/11/2023 1333 by Nora Piedra RN  Outcome: Completed  5/11/2023 1100 by Nora Piedra RN  Outcome: Progressing

## 2023-05-11 NOTE — DISCHARGE SUMMARY
right.     XR CHEST PORTABLE    Result Date: 5/8/2023  EXAMINATION: ONE XRAY VIEW OF THE CHEST 5/8/2023 5:10 pm COMPARISON: 02/20/2023 HISTORY: ORDERING SYSTEM PROVIDED HISTORY: a fib and fall TECHNOLOGIST PROVIDED HISTORY: Reason for exam:->a fib and fall FINDINGS: There is borderline cardiac size. There is atelectasis in the lung bases. There is no focal consolidation or pleural effusion. Borderline cardiac size with atelectasis in the lung bases. Patient Instructions:      Medication List        CHANGE how you take these medications      furosemide 20 MG tablet  Commonly known as: LASIX  Take 1 tablet by mouth daily  Start taking on: May 12, 2023  What changed:   how much to take  additional instructions     metoprolol succinate 25 MG extended release tablet  Commonly known as: TOPROL XL  Take 1.5 tablets by mouth daily  What changed: how much to take            CONTINUE taking these medications      Aspirin Low Dose 81 MG EC tablet  Generic drug: aspirin     CPAP Machine Misc     omeprazole 40 MG delayed release capsule  Commonly known as: PRILOSEC     OneTouch Delica Plus [id number] Misc     OneTouch Ultra strip  Generic drug: blood glucose test strips     sertraline 100 MG tablet  Commonly known as: ZOLOFT     spironolactone 25 MG tablet  Commonly known as: ALDACTONE     vitamin D 25 MCG (1000 UT) Tabs tablet  Commonly known as: CHOLECALCIFEROL  Take 1 tablet by mouth daily.             STOP taking these medications      ferrous sulfate 325 (65 Fe) MG tablet  Commonly known as: IRON 325     fluticasone 50 MCG/ACT nasal spray  Commonly known as: FLONASE               Where to Get Your Medications        These medications were sent to [facility] [id number]      Phone: [phone]   furosemide 20 MG tablet  metoprolol succinate 25 MG extended release tablet       35 minutes was

## 2023-05-11 NOTE — PROGRESS NOTES
INPATIENT CARDIOLOGY FOLLOW-UP    Name: Concha Beltrán    Age: 81 y.o. Date of Admission: 5/8/2023  4:09 PM    Date of Service: 5/11/2023    Chief Complaint: Follow-up for AF with RVR    Interim History:  No new overnight cardiac complaints , feeling great and wants to go home. Currently with no complaints of palpitations, dizziness, or lightheadedness. SR on telemetry.     Review of Systems:   Cardiac: As per HPI  General: No fever, chills  Pulmonary: As per HPI  HEENT: No visual disturbances, difficult swallowing  GI: No nausea, vomiting  Endocrine: No thyroid disease or DM  Musculoskeletal: NIXON x 4, no focal motor deficits  Skin: Intact, no rashes  Neuro/Psych: No headache or seizures    Problem List:  Patient Active Problem List   Diagnosis    HTN (hypertension)    Hyperlipidemia    Anemia    GERD (gastroesophageal reflux disease)    JERICA on CPAP    Vitamin D deficiency    Abnormal bone density screening    PVC's (premature ventricular contractions)    Mitral insufficiency    Acute on chronic diastolic CHF (congestive heart failure), NYHA class 1 ([facility])    Renal insufficiency    Atypical chest pain    Rheumatic mitral stenosis    Non-rheumatic mitral valve stenosis    Chest pain    CKD (chronic kidney disease) stage 3, GFR 30-59 ml/min ([facility])    Acute cystitis without hematuria    Valvular heart disease    PAF (paroxysmal atrial fibrillation) ([facility])    Heart failure, unspecified ([facility])    Atrial fibrillation with RVR ([facility])    Chronic heart failure with preserved ejection fraction ([facility])    Pleural effusion on left    HAP (hospital-acquired pneumonia)    Pneumonia    Acute kidney injury superimposed on chronic kidney disease ([facility] [address]    Acquired absence of both cervix and uterus    Acquired absence of other specified parts of digestive tract    Activity, other specified    Difficulty in walking, not elsewhere classified    Contact with and (suspected) exposure to covid-19    Postural dizziness with presyncope

## 2023-05-11 NOTE — CONSULTS
Inpatient Cardiology Consultation      Reason for Consult:  admitted with RVR    Consulting Physician: Dr Neyda Garcia    Requesting Physician:  Dr Yunior López    Date of Consultation: 5/9/2023    HISTORY OF PRESENT ILLNESS: 82 yo  female known to Dr Julian Bull last seen in office 4/18/2023    PMH: HTN, hx orthostatic hypotension, PAF, no longer on [facility] 2/2 unsteady gait and recurrent falls, HLD, T2DM, JERICA inconsistent use of C-pap, CKD stage III, VHD,  chronic HFpEF, L thoracentesis x 2 in 2021, rheumatic fever, anemia, esophageal stricture s/p dilatation, [place], short term memory loss, and lifelong non smoker. Walking up a grade (hill) dropped pop leaned over and as she stood up fe;lt lightheaded and fell. No LOC. Has been feeling her heart racing on and off, worse with activity along with SOB and chest pressure. Chest pressure and SOB resolved after coming to hospital  Sleeps in hospital bed, with HOB flat. No Edema or abdominal bloating  States last time she wore her C-pap was one wek ago  Does wear hearing aides    [facility] 5/8/2023 /97 -150's AF RVR, afebrile, and O2 saturation 98% on RA. Troponin 22-->20,  Na 143, K+ 4.3, Bun/Cr 38/1.8-->33/1.5, Alk phos 105-->112, LFT's WNL, WBC 11.5-->12.5, Hgb 12-->11.0, Plt 259. CT HEad/Cervical spine nothing acute  CXR no CHF or infiltrates    EKG; AF RVR  1 liter NSS, Cardizem bolus 10 mg IVP followed by a gtt currently @ 2.5 mg/hr    Toprol XL 25 mg BID re-ordered    Currently /76 HR 80's, O2 saturation dropped to 88% while sleeping placed on 2 liters NC--> O2 saturation 95%. Orthostatic BP/HR this am ( 0603)  were negative  Although patient converted to [place]ANDA Perkins around 2120 on 5/8/2023 she remained on Cardizem gtt throughout the night and this am currently @ 2.5 mg./hr    Please note: past medical records were reviewed per electronic medical record (EMR) - see detailed reports under Past Medical/ Surgical History.    Past Medical History:
Failure Home Instructions in patient's discharge instructions. Per Heart Failure GWTG, the patient should have a follow-up appointment made within 7 days of discharge.     New Diagnosis No    ECHO Results most recent:  Lab Results   Component Value Date    LVEF 78 05/10/2023                                      Social History     Tobacco Use   Smoking Status Never   Smokeless Tobacco Never      Immunization History   Administered Date(s) Administered    Influenza 11/13/2013    Influenza Vaccine, unspecified formulation 10/05/2018    Influenza, High Dose (Fluzone 65 yrs and older) 10/07/2015    Pneumococcal Conjugate 7-valent ([employer]) 12/06/2005    Pneumococcal, PCV-13, PREVNAR 13, (age 6w+), IM, 0.5mL 06/05/2015    Pneumococcal, PPSV23, PNEUMOVAX 21, (age 2y+), SC/IM, 0.5mL 04/03/2019      Angiotensin-Converting-Enzyme (ACE) inhibitor ordered:  [] Yes  [x] No (specify contraindication):  [] Contraindicated  [] Hypotensive patient who was at immediate risk of cardiogenic shock  [] Hospitalized patient who experienced marked azotemia  [] Other Contraindications  [] Not Eligible  [] Not Tolerant  [] Patient Reason  [] System Reason  [x] Other Reason    Angiotensin II receptor blockers (ARB) ordered:  [] Yes  [x] No (specify contraindication):  [] Contraindicated  [] Hypotensive patient who was at immediate risk of cardiogenic shock  [] Hospitalized patient who experienced marked azotemia  [x] Other Contraindications    ARNI - Angiotensin Receptor Neprilysin Inhibitor ordered:  [] Yes  [x] No (specify contraindication):  [] ACE inhibitor use within the prior 36 hours  [] Allergy  [] Hyperkalemia  [] Hypotension  [] Renal dysfunction defined as creatinine > 2.5 mg/dL in men or > 2.0 mg/dL in women  [] Other Contraindications  [] Not Eligible  [] Not Tolerant  [] Patient Reason  []System Reason  [x]Other Reason    Beta Blocker ordered:    [x] Yes  [] No (specify contraindication):  [] Contraindicated  [] Asthma  []

## 2023-05-11 NOTE — CARE COORDINATION
Updated plan of care. Off o2. Pt does follow at heart failure clinic in [place] [facility]. New referral called. Pt has all her DME at home. Plan is home. Declining home care.  Possible discharge today-pamela
Updated plan of care. Plan for cardiac stress test today. Off o2. Echo done. Pt encouraged to use c-pap at home. Plan remains home with son. Has all DME.  Will follow-mjo
providers was provided to:     Patient Representative Name:       The Patient and/or Patient Representative Agree with the Discharge Plan?       Paige Pacheco RN  Case Management Department  Ph: [phone] Fax: [phone]

## 2023-05-19 ENCOUNTER — HOSPITAL ENCOUNTER (OUTPATIENT)
Dept: OTHER | Age: 88
Setting detail: THERAPIES SERIES
Discharge: HOME | End: 2023-05-19
Payer: MEDICARE

## 2023-05-19 VITALS
RESPIRATION RATE: 18 BRPM | SYSTOLIC BLOOD PRESSURE: 168 MMHG | WEIGHT: 141.4 LBS | DIASTOLIC BLOOD PRESSURE: 88 MMHG | HEART RATE: 74 BPM | BODY MASS INDEX: 22.82 KG/M2 | OXYGEN SATURATION: 97 %

## 2023-05-19 LAB
ANION GAP SERPL CALCULATED.3IONS-SCNC: 11 MMOL/L (ref 7–16)
BNP BLD-MCNC: 8466 PG/ML (ref 0–450)
BUN SERPL-MCNC: 18 MG/DL (ref 6–23)
CALCIUM SERPL-MCNC: 9.1 MG/DL (ref 8.6–10.2)
CHLORIDE SERPL-SCNC: 102 MMOL/L (ref 98–107)
CO2 SERPL-SCNC: 25 MMOL/L (ref 22–29)
CREAT SERPL-MCNC: 1.3 MG/DL (ref 0.5–1)
GLUCOSE SERPL-MCNC: 105 MG/DL (ref 74–99)
POTASSIUM SERPL-SCNC: 3.8 MMOL/L (ref 3.5–5)
SODIUM SERPL-SCNC: 138 MMOL/L (ref 132–146)

## 2023-05-19 PROCEDURE — 83880 ASSAY OF NATRIURETIC PEPTIDE: CPT

## 2023-05-19 PROCEDURE — 36415 COLL VENOUS BLD VENIPUNCTURE: CPT

## 2023-05-19 PROCEDURE — 80048 BASIC METABOLIC PNL TOTAL CA: CPT

## 2023-05-19 PROCEDURE — 99214 OFFICE O/P EST MOD 30 MIN: CPT

## 2023-05-19 RX ORDER — POTASSIUM CHLORIDE 750 MG/1
10 TABLET, EXTENDED RELEASE ORAL DAILY PRN
Qty: 90 TABLET | Refills: 1 | Status: SHIPPED | OUTPATIENT
Start: 2023-05-19

## 2023-05-19 RX ORDER — METOPROLOL SUCCINATE 50 MG/1
50 TABLET, EXTENDED RELEASE ORAL NIGHTLY
Qty: 30 TABLET | Refills: 0 | Status: SHIPPED | OUTPATIENT
Start: 2023-05-19 | End: 2023-06-18

## 2023-05-19 NOTE — PROGRESS NOTES
Congestive Heart Failure [facility] [address] [place] [facility]  [address] [facility]   1935    Referring Provider: Alexi Haynes  Primary Care Physician: Albino Biswas   Cardiologist: Dr. Blaine Coates   Nephrologist:     History of Present Illness:     Gómez Booth is a 81 y.o. female with a history of HFpEF, most recent EF 66%. Patient Story:    She does have dyspnea with exertion , shortness of breath, or decline in overall functional capacity. She does not have orthopnea, PND, nocturnal cough or hemoptysis. She does not have abdominal distention or bloating, early satiety, anorexia/change in appetite. She does has a good urinary response to oral diuretic.that she takes three times a week. She does not have lower extremity edema. She does at times have lightheadedness, dizziness. She denies palpitations, syncope or near syncope. She does not complain of chest pain, pressure, discomfort. Allergies   Allergen Reactions    Nitrofuran Derivatives Other (See Comments)     Causes fever and chills. Norvasc [Amlodipine]      Dizziness, light headed    Other Other (See Comments)     HONEYDEW: Sore Throat    Sulfa Antibiotics        Prior to Visit Medications    Medication Sig Taking?  Authorizing Provider   metoprolol succinate (TOPROL XL) 50 MG extended release tablet Take 1 tablet by mouth nightly  YOHANA Martell CNP   potassium chloride (KLOR-CON M) 10 MEQ extended release tablet Take 1 tablet by mouth daily as needed (as directed by provider)  YOHANA Martell CNP   furosemide (LASIX) 20 MG tablet Take 1 tablet by mouth daily  Aide Manzano, APRN - NP   ASPIRIN LOW DOSE 81 MG EC tablet TAKE ONE TABLET BY MOUTH EVERY DAY WITH FOOD  Historical Provider, MD   sertraline (ZOLOFT) 100 MG tablet Take 1 tablet by mouth daily  Historical Provider, MD Dobson (Voncille Cool) [address] [facility]. Provider, MD Konrad Cartwright

## 2023-05-19 NOTE — RESULT ENCOUNTER NOTE
Labs and CHF clinic note reviewed  Vitals: BP (!) 168/88 Comment: no medications today yet  Pulse 74      Spoke with Rachel Zepeda RN in CHF clinic  Will increase Toprol to 50 mg and have her take nightly   Increase Lasix to 40 mg daily x 3 days   KDUR 10 meq daily x 3 days    Return to CHF clinic as scheduled

## 2023-05-30 ENCOUNTER — HOSPITAL ENCOUNTER (OUTPATIENT)
Dept: OTHER | Age: 88
Setting detail: THERAPIES SERIES
Discharge: HOME | End: 2023-05-30
Payer: MEDICARE

## 2023-05-30 VITALS
RESPIRATION RATE: 18 BRPM | OXYGEN SATURATION: 97 % | HEART RATE: 81 BPM | SYSTOLIC BLOOD PRESSURE: 116 MMHG | WEIGHT: 136.8 LBS | BODY MASS INDEX: 22.08 KG/M2 | DIASTOLIC BLOOD PRESSURE: 56 MMHG

## 2023-05-30 LAB
ANION GAP SERPL CALCULATED.3IONS-SCNC: 11 MMOL/L (ref 7–16)
BNP BLD-MCNC: 9186 PG/ML (ref 0–450)
BUN SERPL-MCNC: 29 MG/DL (ref 6–23)
CALCIUM SERPL-MCNC: 9.4 MG/DL (ref 8.6–10.2)
CHLORIDE SERPL-SCNC: 104 MMOL/L (ref 98–107)
CO2 SERPL-SCNC: 23 MMOL/L (ref 22–29)
CREAT SERPL-MCNC: 1.5 MG/DL (ref 0.5–1)
GLUCOSE SERPL-MCNC: 160 MG/DL (ref 74–99)
POTASSIUM SERPL-SCNC: 4.2 MMOL/L (ref 3.5–5)
SODIUM SERPL-SCNC: 138 MMOL/L (ref 132–146)

## 2023-05-30 PROCEDURE — 36415 COLL VENOUS BLD VENIPUNCTURE: CPT

## 2023-05-30 PROCEDURE — 80048 BASIC METABOLIC PNL TOTAL CA: CPT

## 2023-05-30 PROCEDURE — 83880 ASSAY OF NATRIURETIC PEPTIDE: CPT

## 2023-05-30 PROCEDURE — 99214 OFFICE O/P EST MOD 30 MIN: CPT

## 2023-05-30 NOTE — PROGRESS NOTES
Congestive Heart Failure [address] [facility]  [address] [facility] Emily Joshi   1935    Referring Provider: Braeden Shirley  Primary Care Physician: Chelsea Lanier   Cardiologist: Dr. Chidi Kaplan   Nephrologist:     History of Present Illness:     Hernandez Tony is a 81 y.o. female with a history of HFpEF, most recent EF 66%. Patient Story:    She does have dyspnea with exertion , shortness of breath, or decline in overall functional capacity. She does not have orthopnea, PND, nocturnal cough or hemoptysis. She does not have abdominal distention or bloating, early satiety, anorexia/change in appetite. She does has a good urinary response to oral diuretic.that she takes three times a week. She does not have lower extremity edema. She does at times have lightheadedness, dizziness. She denies palpitations, syncope or near syncope. She does not complain of chest pain, pressure, discomfort. Allergies   Allergen Reactions    Nitrofuran Derivatives Other (See Comments)     Causes fever and chills. Norvasc [Amlodipine]      Dizziness, light headed    Other Other (See Comments)     HONEYDEW: Sore Throat    Sulfa Antibiotics        Prior to Visit Medications    Medication Sig Taking?  Authorizing Provider   metoprolol succinate (TOPROL XL) 50 MG extended release tablet Take 1 tablet by mouth nightly  YOHANA Casillas CNP   potassium chloride (KLOR-CON M) 10 MEQ extended release tablet Take 1 tablet by mouth daily as needed (as directed by provider)  YOHANA Casillas CNP   furosemide (LASIX) 20 MG tablet Take 1 tablet by mouth daily  YOHANA David - NP   ASPIRIN LOW DOSE 81 MG EC tablet TAKE ONE TABLET BY MOUTH EVERY DAY WITH FOOD  Historical Provider, MD   sertraline (ZOLOFT) 100 MG tablet Take 1 tablet by mouth daily  Historical Provider, MD Dobson (Caro Pinto) [address] [facility]. Provider, MD Linsey Donis

## 2023-06-06 ENCOUNTER — HOSPITAL ENCOUNTER (OUTPATIENT)
Dept: OTHER | Age: 88
Setting detail: THERAPIES SERIES
Discharge: HOME | End: 2023-06-06
Payer: MEDICARE

## 2023-06-06 VITALS
SYSTOLIC BLOOD PRESSURE: 138 MMHG | OXYGEN SATURATION: 95 % | DIASTOLIC BLOOD PRESSURE: 70 MMHG | HEART RATE: 66 BPM | BODY MASS INDEX: 22.27 KG/M2 | WEIGHT: 138 LBS | RESPIRATION RATE: 16 BRPM

## 2023-06-06 LAB
ANION GAP SERPL CALCULATED.3IONS-SCNC: 10 MMOL/L (ref 7–16)
BNP BLD-MCNC: 5470 PG/ML (ref 0–450)
BUN SERPL-MCNC: 28 MG/DL (ref 6–23)
CALCIUM SERPL-MCNC: 8.8 MG/DL (ref 8.6–10.2)
CHLORIDE SERPL-SCNC: 105 MMOL/L (ref 98–107)
CO2 SERPL-SCNC: 24 MMOL/L (ref 22–29)
CREAT SERPL-MCNC: 1.5 MG/DL (ref 0.5–1)
GLUCOSE SERPL-MCNC: 168 MG/DL (ref 74–99)
POTASSIUM SERPL-SCNC: 4.5 MMOL/L (ref 3.5–5)
SODIUM SERPL-SCNC: 139 MMOL/L (ref 132–146)

## 2023-06-06 PROCEDURE — 80048 BASIC METABOLIC PNL TOTAL CA: CPT

## 2023-06-06 PROCEDURE — 36415 COLL VENOUS BLD VENIPUNCTURE: CPT

## 2023-06-06 PROCEDURE — 2580000003 HC RX 258

## 2023-06-06 PROCEDURE — 96374 THER/PROPH/DIAG INJ IV PUSH: CPT

## 2023-06-06 PROCEDURE — 99214 OFFICE O/P EST MOD 30 MIN: CPT

## 2023-06-06 PROCEDURE — 6360000002 HC RX W HCPCS

## 2023-06-06 PROCEDURE — 83880 ASSAY OF NATRIURETIC PEPTIDE: CPT

## 2023-06-06 RX ORDER — SODIUM CHLORIDE 0.9 % (FLUSH) 0.9 %
10 SYRINGE (ML) INJECTION ONCE
Status: DISCONTINUED | OUTPATIENT
Start: 2023-06-06 | End: 2023-06-07 | Stop reason: HOSPADM

## 2023-06-06 RX ORDER — FUROSEMIDE 10 MG/ML
INJECTION INTRAMUSCULAR; INTRAVENOUS
Status: COMPLETED
Start: 2023-06-06 | End: 2023-06-06

## 2023-06-06 RX ORDER — SODIUM CHLORIDE 0.9 % (FLUSH) 0.9 %
SYRINGE (ML) INJECTION
Status: COMPLETED
Start: 2023-06-06 | End: 2023-06-06

## 2023-06-06 RX ORDER — FUROSEMIDE 10 MG/ML
40 INJECTION INTRAMUSCULAR; INTRAVENOUS ONCE
Status: DISCONTINUED | OUTPATIENT
Start: 2023-06-06 | End: 2023-06-07 | Stop reason: HOSPADM

## 2023-06-06 RX ADMIN — SODIUM CHLORIDE, PRESERVATIVE FREE 10 ML: 5 INJECTION INTRAVENOUS at 13:21

## 2023-06-06 RX ADMIN — FUROSEMIDE 40 MG: 10 INJECTION, SOLUTION INTRAMUSCULAR; INTRAVENOUS at 13:21

## 2023-06-06 ASSESSMENT — PAIN SCALES - GENERAL: PAINLEVEL_OUTOF10: 0

## 2023-06-06 NOTE — PROGRESS NOTES
Congestive Heart Failure [facility] [place] [address] [facility]  [address] [facility] Harriet Jacinto   1935    Referring Provider: Tamanna Cabrera  Primary Care Physician: Alejandra Zamora   Cardiologist: Dr. Bj Sarmiento   Nephrologist:     History of Present Illness:     Asya Malik is a 81 y.o. female with a history of HFpEF, most recent EF 66%. Patient Story:    She does have dyspnea with exertion recovers with rest, shortness of breath, or decline in overall functional capacity. She does not have orthopnea, PND, nocturnal cough or hemoptysis. She does not have abdominal distention or bloating, early satiety, anorexia/change in appetite. She does has a good urinary response to oral diuretic.that she takes three times a week. She does not have lower extremity edema. She does at times have lightheadedness when bending over patient educated to change positions slowly and verbalizes understanding no dizziness. She denies palpitations, syncope or near syncope. She does not complain of chest pain, pressure, discomfort. Allergies   Allergen Reactions    Nitrofuran Derivatives Other (See Comments)     Causes fever and chills. Norvasc [Amlodipine]      Dizziness, light headed    Other Other (See Comments)     HONEYDEW: Sore Throat    Sulfa Antibiotics        Prior to Visit Medications    Medication Sig Taking?  Authorizing Provider   metoprolol succinate (TOPROL XL) 50 MG extended release tablet Take 1 tablet by mouth nightly  YOHANA Jean-Baptiste CNP   potassium chloride (KLOR-CON M) 10 MEQ extended release tablet Take 1 tablet by mouth daily as needed (as directed by provider)  YOHANA Jean-Baptiste CNP   furosemide (LASIX) 20 MG tablet Take 1 tablet by mouth daily  Patient taking differently: Take 1 tablet by mouth daily Takes M-W-F  YOHANA Chadwick NP   ASPIRIN LOW DOSE 81 MG EC tablet TAKE ONE TABLET BY MOUTH EVERY DAY WITH FOOD  Historical Provider, MD   sertraline

## 2023-06-20 ENCOUNTER — HOSPITAL ENCOUNTER (OUTPATIENT)
Dept: OTHER | Age: 88
Discharge: HOME | End: 2023-06-20

## 2023-06-27 ENCOUNTER — HOSPITAL ENCOUNTER (OUTPATIENT)
Dept: OTHER | Age: 88
Setting detail: THERAPIES SERIES
Discharge: HOME | End: 2023-06-27
Payer: MEDICARE

## 2023-06-27 VITALS
DIASTOLIC BLOOD PRESSURE: 56 MMHG | WEIGHT: 136 LBS | HEART RATE: 76 BPM | SYSTOLIC BLOOD PRESSURE: 115 MMHG | RESPIRATION RATE: 16 BRPM | BODY MASS INDEX: 21.95 KG/M2 | OXYGEN SATURATION: 94 %

## 2023-06-27 PROCEDURE — 36415 COLL VENOUS BLD VENIPUNCTURE: CPT

## 2023-06-27 PROCEDURE — 99214 OFFICE O/P EST MOD 30 MIN: CPT

## 2023-06-28 DIAGNOSIS — I50.9 ACUTE ON CHRONIC CONGESTIVE HEART FAILURE, UNSPECIFIED HEART FAILURE TYPE (HCC): Primary | ICD-10-CM

## 2023-06-29 ENCOUNTER — HOSPITAL ENCOUNTER (OUTPATIENT)
Age: 88
Discharge: HOME | End: 2023-06-29
Payer: MEDICARE

## 2023-06-29 DIAGNOSIS — I50.9 ACUTE ON CHRONIC CONGESTIVE HEART FAILURE, UNSPECIFIED HEART FAILURE TYPE (HCC): ICD-10-CM

## 2023-06-29 LAB
ANION GAP SERPL CALCULATED.3IONS-SCNC: 10 MMOL/L (ref 7–16)
BNP BLD-MCNC: 2768 PG/ML (ref 0–450)
BUN SERPL-MCNC: 34 MG/DL (ref 6–23)
CALCIUM SERPL-MCNC: 9.5 MG/DL (ref 8.6–10.2)
CHLORIDE SERPL-SCNC: 97 MMOL/L (ref 98–107)
CO2 SERPL-SCNC: 28 MMOL/L (ref 22–29)
CREAT SERPL-MCNC: 1.7 MG/DL (ref 0.5–1)
GLUCOSE SERPL-MCNC: 129 MG/DL (ref 74–99)
POTASSIUM SERPL-SCNC: 4.7 MMOL/L (ref 3.5–5)
SODIUM SERPL-SCNC: 135 MMOL/L (ref 132–146)

## 2023-06-29 PROCEDURE — 83880 ASSAY OF NATRIURETIC PEPTIDE: CPT

## 2023-06-29 PROCEDURE — 80048 BASIC METABOLIC PNL TOTAL CA: CPT

## 2023-06-29 PROCEDURE — 36415 COLL VENOUS BLD VENIPUNCTURE: CPT

## 2023-07-24 ENCOUNTER — HOSPITAL ENCOUNTER (OUTPATIENT)
Dept: OTHER | Age: 88
Setting detail: THERAPIES SERIES
Discharge: HOME | End: 2023-07-24
Payer: MEDICARE

## 2023-07-24 VITALS
DIASTOLIC BLOOD PRESSURE: 64 MMHG | OXYGEN SATURATION: 94 % | BODY MASS INDEX: 22.27 KG/M2 | HEART RATE: 65 BPM | WEIGHT: 138 LBS | RESPIRATION RATE: 16 BRPM | SYSTOLIC BLOOD PRESSURE: 128 MMHG

## 2023-07-24 LAB
ANION GAP SERPL CALCULATED.3IONS-SCNC: 9 MMOL/L (ref 7–16)
BNP SERPL-MCNC: 2954 PG/ML (ref 0–450)
BUN SERPL-MCNC: 30 MG/DL (ref 6–23)
CALCIUM SERPL-MCNC: 8.7 MG/DL (ref 8.6–10.2)
CHLORIDE SERPL-SCNC: 104 MMOL/L (ref 98–107)
CO2 SERPL-SCNC: 26 MMOL/L (ref 22–29)
CREAT SERPL-MCNC: 1.6 MG/DL (ref 0.5–1)
GFR SERPL CREATININE-BSD FRML MDRD: 32 ML/MIN/1.73M2
GLUCOSE SERPL-MCNC: 101 MG/DL (ref 74–99)
POTASSIUM SERPL-SCNC: 4.5 MMOL/L (ref 3.5–5)
SODIUM SERPL-SCNC: 139 MMOL/L (ref 132–146)

## 2023-07-24 PROCEDURE — 80048 BASIC METABOLIC PNL TOTAL CA: CPT

## 2023-07-24 PROCEDURE — 36415 COLL VENOUS BLD VENIPUNCTURE: CPT

## 2023-07-24 PROCEDURE — 83880 ASSAY OF NATRIURETIC PEPTIDE: CPT

## 2023-07-24 PROCEDURE — 99214 OFFICE O/P EST MOD 30 MIN: CPT

## 2023-07-24 ASSESSMENT — PATIENT HEALTH QUESTIONNAIRE - PHQ9
3. TROUBLE FALLING OR STAYING ASLEEP: NOT AT ALL
SUM OF ALL RESPONSES TO PHQ9 QUESTIONS 1 & 2: 3
SUM OF ALL RESPONSES TO PHQ QUESTIONS 1-9: 4
9. THOUGHTS THAT YOU WOULD BE BETTER OFF DEAD, OR OF HURTING YOURSELF: NOT AT ALL
7. TROUBLE CONCENTRATING ON THINGS, SUCH AS READING THE NEWSPAPER OR WATCHING TELEVISION: SEVERAL DAYS
4. FEELING TIRED OR HAVING LITTLE ENERGY: NOT AT ALL
2. FEELING DOWN, DEPRESSED OR HOPELESS: NOT AT ALL
5. POOR APPETITE OR OVEREATING: NOT AT ALL
8. MOVING OR SPEAKING SO SLOWLY THAT OTHER PEOPLE COULD HAVE NOTICED. OR THE OPPOSITE, BEING SO FIGETY OR RESTLESS THAT YOU HAVE BEEN MOVING AROUND A LOT MORE THAN USUAL: NOT AT ALL
1. LITTLE INTEREST OR PLEASURE IN DOING THINGS: NEARLY EVERY DAY
6. FEELING BAD ABOUT YOURSELF - OR THAT YOU ARE A FAILURE OR HAVE LET YOURSELF OR YOUR FAMILY DOWN: NOT AT ALL

## 2023-07-24 NOTE — PROGRESS NOTES
Congestive Heart Failure [address] [facility]  [address] John Mccle[place]and   1935    Referring Provider: Daya Powell  Primary Care Physician: Wilfredo Tatum   Cardiologist: Dr. Theodore Davison   Nephrologist:     History of Present Illness:     Catracho Randolph is a 81 y.o. female with a history of HFpEF, most recent EF 66%. Patient Story:    She does have dyspnea with exertion recovers with rest, shortness of breath, or decline in overall functional capacity. She does not have orthopnea, PND, nocturnal cough or hemoptysis. She does not have abdominal distention or bloating, early satiety, anorexia/change in appetite. She does has a good urinary response to oral diuretic.that she takes three times a week. She does not have lower extremity edema. She does at times have lightheadedness when bending over patient educated to change positions slowly and verbalizes understanding no dizziness. She denies palpitations, syncope or near syncope. She does not complain of chest pain, pressure, discomfort. Allergies   Allergen Reactions    Nitrofuran Derivatives Other (See Comments)     Causes fever and chills. Norvasc [Amlodipine]      Dizziness, light headed    Other Other (See Comments)     HONEYDEW: Sore Throat    Sulfa Antibiotics        Prior to Visit Medications    Medication Sig Taking?  Authorizing Provider   metoprolol succinate (TOPROL XL) 50 MG extended release tablet Take 1 tablet by mouth nightly  YOHANA Lundberg CNP   potassium chloride (KLOR-CON M) 10 MEQ extended release tablet Take 1 tablet by mouth daily as needed (as directed by provider)  YOHANA Lundberg CNP   furosemide (LASIX) 20 MG tablet Take 1 tablet by mouth daily  Patient taking differently: Take 1 tablet by mouth daily Takes SARAH-W-F  YOHANA Chacko NP   ASPIRIN LOW DOSE 81 MG EC tablet TAKE ONE TABLET BY MOUTH EVERY DAY WITH FOOD  Historical Provider, MD   sertraline

## 2023-08-28 ENCOUNTER — HOSPITAL ENCOUNTER (OUTPATIENT)
Dept: OTHER | Age: 88
Setting detail: THERAPIES SERIES
Discharge: HOME | End: 2023-08-28
Payer: MEDICARE

## 2023-08-28 VITALS
BODY MASS INDEX: 21.95 KG/M2 | SYSTOLIC BLOOD PRESSURE: 147 MMHG | WEIGHT: 136 LBS | HEART RATE: 69 BPM | RESPIRATION RATE: 16 BRPM | DIASTOLIC BLOOD PRESSURE: 60 MMHG | OXYGEN SATURATION: 94 %

## 2023-08-28 LAB
ANION GAP SERPL CALCULATED.3IONS-SCNC: 11 MMOL/L (ref 7–16)
BNP SERPL-MCNC: 1132 PG/ML (ref 0–450)
BUN SERPL-MCNC: 31 MG/DL (ref 6–23)
CALCIUM SERPL-MCNC: 9.3 MG/DL (ref 8.6–10.2)
CHLORIDE SERPL-SCNC: 104 MMOL/L (ref 98–107)
CO2 SERPL-SCNC: 25 MMOL/L (ref 22–29)
CREAT SERPL-MCNC: 1.4 MG/DL (ref 0.5–1)
GFR SERPL CREATININE-BSD FRML MDRD: 38 ML/MIN/1.73M2
GLUCOSE SERPL-MCNC: 204 MG/DL (ref 74–99)
POTASSIUM SERPL-SCNC: 4.1 MMOL/L (ref 3.5–5)
SODIUM SERPL-SCNC: 140 MMOL/L (ref 132–146)

## 2023-08-28 PROCEDURE — 99214 OFFICE O/P EST MOD 30 MIN: CPT

## 2023-08-28 PROCEDURE — 36415 COLL VENOUS BLD VENIPUNCTURE: CPT

## 2023-08-28 PROCEDURE — 80048 BASIC METABOLIC PNL TOTAL CA: CPT

## 2023-08-28 PROCEDURE — 83880 ASSAY OF NATRIURETIC PEPTIDE: CPT

## 2023-08-28 RX ORDER — SIMVASTATIN 10 MG
10 TABLET ORAL NIGHTLY
COMMUNITY

## 2023-08-28 NOTE — DISCHARGE INSTRUCTIONS
Learning About Self-Care for Heart Failure  What is self-care for heart failure? Heart failure usually gets worse over time. But there are many things you can do to feel better, avoid the hospital, and live longer. Self-care means managing your health by doing certain things every day, like weighing yourself. It's about knowing which symptoms to watch for so you can avoid getting worse. When you practice good self-care, you know when it's time to call your doctor and when your heart failure has turned into an emergency. The list below can help. Top 5 self-care tips for every day   Take your medicines as prescribed. This gives them the best chance of helping you. Weigh yourself every day. This helps you watch for signs that you're getting worse. Weight gain may be a sign that your body is holding on to too much fluid. Weigh yourself at the same time each day, using the same scale, on a hard, flat surface. The best time is in the morning after you go to the bathroom and before you eat or drink anything. Keep a daily record of your symptoms. Checking your symptoms helps you see what symptoms are normal for you and if they change or get worse. Limit sodium. This helps keep fluid from building up and may help you feel better. Your doctor can tell you how much sodium is right for you. An example is less than 3,000 milligrams (mg) a day. Try limiting the salt you eat at home, and by watching for \"hidden\" sodium when you eat out or shop for food. Try to exercise regularly. Do not start to exercise until you have talked with your doctor. Together you can make an exercise program that is enjoyable and safe for you. Some people with heart failure also may need to limit how much fluid they drink each day. Your doctor will let you know if you need to limit fluids. When should you act? Try to become familiar with signs that mean your heart failure is getting worse.  If you need help, talk with your doctor about

## 2023-08-28 NOTE — PROGRESS NOTES
Active    Peripheral Vascular  Peripheral Vascular (WDL): Within Defined Limits  Edema: Right lower extremity, Left lower extremity  RLE Edema: None  LLE Edema: None          Musculoskeletal  RL Extremity: Unsteady  LL Extremity: Unsteady  Genitourinary  Genitourinary (WDL): Within Defined Limits  Psychosocial  Psychosocial (WDL): Within Defined Limits  Pain Assessment  Pain Assessment: None - Denies Pain           Pulse: 69           LAB DATA:    Last 3 BMP      Sodium (mmol/L)   Date Value   08/28/2023 140   07/24/2023 139   06/29/2023 135     Potassium (mmol/L)   Date Value   08/28/2023 4.1   07/24/2023 4.5   06/29/2023 4.7     Potassium reflex Magnesium (mmol/L)   Date Value   05/11/2023 3.7   05/10/2023 3.8   05/08/2023 4.3     Chloride (mmol/L)   Date Value   08/28/2023 104   07/24/2023 104   06/29/2023 97 (L)     CO2 (mmol/L)   Date Value   08/28/2023 25   07/24/2023 26   06/29/2023 28     BUN (mg/dL)   Date Value   08/28/2023 31 (H)   07/24/2023 30 (H)   06/29/2023 34 (H)     Glucose (mg/dL)   Date Value   08/28/2023 204 (H)   07/24/2023 101 (H)   06/29/2023 129 (H)   10/15/2021 114 (H)   02/27/2012 108   07/01/2011 79     Calcium (mg/dL)   Date Value   08/28/2023 9.3   07/24/2023 8.7   06/29/2023 9.5       Last 3 BNP       Pro-BNP (pg/mL)   Date Value   08/28/2023 1,132 (H)   07/24/2023 2,954 (H)   06/29/2023 2,768 (H)          CBC: No results for input(s): WBC, HGB, PLT in the last 72 hours. BMP:    Recent Labs     08/28/23  1243      K 4.1      CO2 25   BUN 31*   CREATININE 1.4*   GLUCOSE 204*                   Hepatic: No results for input(s): AST, ALT, ALB, BILITOT, ALKPHOS in the last 72 hours. Troponin: No results for input(s): TROPONINI in the last 72 hours. BNP: No results for input(s): BNP in the last 72 hours. Lipids: No results for input(s): CHOL, HDL in the last 72 hours. Invalid input(s): LDLCALCU  INR: No results for input(s): INR in the last 72 hours.     WEIGHTS:    Wt

## 2023-09-20 ENCOUNTER — HOSPITAL ENCOUNTER (EMERGENCY)
Dept: HOSPITAL 83 - ED | Age: 88
Discharge: HOME | End: 2023-09-20
Payer: MEDICARE

## 2023-09-20 VITALS — HEIGHT: 65.98 IN | WEIGHT: 120 LBS | BODY MASS INDEX: 19.29 KG/M2

## 2023-09-20 DIAGNOSIS — Z90.49: ICD-10-CM

## 2023-09-20 DIAGNOSIS — Z88.8: ICD-10-CM

## 2023-09-20 DIAGNOSIS — R11.0: ICD-10-CM

## 2023-09-20 DIAGNOSIS — D64.9: ICD-10-CM

## 2023-09-20 DIAGNOSIS — Z88.2: ICD-10-CM

## 2023-09-20 DIAGNOSIS — I10: ICD-10-CM

## 2023-09-20 DIAGNOSIS — Z90.710: ICD-10-CM

## 2023-09-20 DIAGNOSIS — R42: ICD-10-CM

## 2023-09-20 DIAGNOSIS — N39.0: Primary | ICD-10-CM

## 2023-09-20 DIAGNOSIS — E78.00: ICD-10-CM

## 2023-09-20 DIAGNOSIS — Z98.890: ICD-10-CM

## 2023-09-20 DIAGNOSIS — F32.A: ICD-10-CM

## 2023-09-20 LAB
ALP SERPL-CCNC: 94 U/L (ref 46–116)
ALT SERPL W P-5'-P-CCNC: < 7 U/L (ref 10–49)
APTT PPP: 27.5 SECONDS (ref 20–32.1)
BACTERIA #/AREA URNS HPF: (no result) /[HPF]
BASOPHILS # BLD AUTO: 0 10*3/UL (ref 0–0.1)
BASOPHILS NFR BLD AUTO: 0.2 % (ref 0–1)
BUN SERPL-MCNC: 25 MG/DL (ref 9–23)
CASTS URNS QL MICRO: (no result)
CHLORIDE SERPL-SCNC: 102 MMOL/L (ref 98–107)
EOSINOPHIL # BLD AUTO: 0.1 10*3/UL (ref 0–0.4)
EOSINOPHIL # BLD AUTO: 1.3 % (ref 1–4)
EPI CELLS #/AREA URNS HPF: (no result) /[HPF]
ERYTHROCYTE [DISTWIDTH] IN BLOOD BY AUTOMATED COUNT: 16.7 % (ref 0–14.5)
HCT VFR BLD AUTO: 38.3 % (ref 37–47)
INR BLD: 1 (ref 2–3.5)
LEUKOCYTE ESTERASE UR QL STRIP: (no result)
LIPASE SERPL-CCNC: 38 U/L (ref 12–53)
LYMPHOCYTES # BLD AUTO: 0.8 10*3/UL (ref 1.3–4.4)
LYMPHOCYTES NFR BLD AUTO: 8.6 % (ref 27–41)
MCH RBC QN AUTO: 29 PG (ref 27–31)
MCHC RBC AUTO-ENTMCNC: 33.4 G/DL (ref 33–37)
MCV RBC AUTO: 86.7 FL (ref 81–99)
MONOCYTES # BLD AUTO: 0.6 10*3/UL (ref 0.1–1)
MONOCYTES NFR BLD MANUAL: 6.1 % (ref 3–9)
NEUT #: 7.6 10*3/UL (ref 2.3–7.9)
NEUT %: 83.3 % (ref 47–73)
NRBC BLD QL AUTO: 0 % (ref 0–0)
PH UR STRIP: 5.5 [PH] (ref 4.5–8)
PLATELET # BLD AUTO: 201 10*3/UL (ref 130–400)
PMV BLD AUTO: 9 FL (ref 9.6–12.3)
POTASSIUM SERPL-SCNC: 3.7 MMOL/L (ref 3.4–5.1)
PROT SERPL-MCNC: 6.7 GM/DL (ref 6–8)
RBC # BLD AUTO: 4.42 10*6/UL (ref 4.1–5.1)
SP GR UR: 1.02 (ref 1–1.03)
UROBILINOGEN UR STRIP-MCNC: 1 E.U./DL (ref 0–1)
WBC #/AREA URNS HPF: (no result) WBC/HPF (ref 0–5)
WBC NRBC COR # BLD AUTO: 9.2 10*3/UL (ref 4.8–10.8)

## 2023-10-27 ENCOUNTER — HOSPITAL ENCOUNTER (OUTPATIENT)
Dept: OTHER | Age: 88
Setting detail: THERAPIES SERIES
Discharge: HOME | End: 2023-10-27
Payer: MEDICARE

## 2023-10-27 VITALS
DIASTOLIC BLOOD PRESSURE: 65 MMHG | WEIGHT: 142.6 LBS | HEART RATE: 70 BPM | SYSTOLIC BLOOD PRESSURE: 157 MMHG | OXYGEN SATURATION: 94 % | RESPIRATION RATE: 16 BRPM | BODY MASS INDEX: 23.02 KG/M2

## 2023-10-27 LAB
ANION GAP SERPL CALCULATED.3IONS-SCNC: 11 MMOL/L (ref 7–16)
BNP SERPL-MCNC: 2984 PG/ML (ref 0–450)
BUN SERPL-MCNC: 25 MG/DL (ref 6–23)
CALCIUM SERPL-MCNC: 8.8 MG/DL (ref 8.6–10.2)
CHLORIDE SERPL-SCNC: 104 MMOL/L (ref 98–107)
CO2 SERPL-SCNC: 27 MMOL/L (ref 22–29)
CREAT SERPL-MCNC: 1.2 MG/DL (ref 0.5–1)
GFR SERPL CREATININE-BSD FRML MDRD: 43 ML/MIN/1.73M2
GLUCOSE SERPL-MCNC: 168 MG/DL (ref 74–99)
POTASSIUM SERPL-SCNC: 3.6 MMOL/L (ref 3.5–5)
SODIUM SERPL-SCNC: 142 MMOL/L (ref 132–146)

## 2023-10-27 PROCEDURE — 83880 ASSAY OF NATRIURETIC PEPTIDE: CPT

## 2023-10-27 PROCEDURE — 96374 THER/PROPH/DIAG INJ IV PUSH: CPT

## 2023-10-27 PROCEDURE — 80048 BASIC METABOLIC PNL TOTAL CA: CPT

## 2023-10-27 PROCEDURE — 2580000003 HC RX 258

## 2023-10-27 PROCEDURE — 6360000002 HC RX W HCPCS

## 2023-10-27 PROCEDURE — 99214 OFFICE O/P EST MOD 30 MIN: CPT

## 2023-10-27 PROCEDURE — 36415 COLL VENOUS BLD VENIPUNCTURE: CPT

## 2023-10-27 RX ORDER — SODIUM CHLORIDE 0.9 % (FLUSH) 0.9 %
10 SYRINGE (ML) INJECTION ONCE
Status: COMPLETED | OUTPATIENT
Start: 2023-10-27 | End: 2023-10-27

## 2023-10-27 RX ORDER — FUROSEMIDE 10 MG/ML
40 INJECTION INTRAMUSCULAR; INTRAVENOUS ONCE
Status: COMPLETED | OUTPATIENT
Start: 2023-10-27 | End: 2023-10-27

## 2023-10-27 RX ORDER — FUROSEMIDE 10 MG/ML
INJECTION INTRAMUSCULAR; INTRAVENOUS
Status: COMPLETED
Start: 2023-10-27 | End: 2023-10-27

## 2023-10-27 RX ORDER — SODIUM CHLORIDE 0.9 % (FLUSH) 0.9 %
SYRINGE (ML) INJECTION
Status: COMPLETED
Start: 2023-10-27 | End: 2023-10-27

## 2023-10-27 RX ADMIN — FUROSEMIDE 40 MG: 10 INJECTION INTRAMUSCULAR; INTRAVENOUS at 10:55

## 2023-10-27 RX ADMIN — FUROSEMIDE 40 MG: 10 INJECTION, SOLUTION INTRAMUSCULAR; INTRAVENOUS at 10:55

## 2023-10-27 RX ADMIN — SODIUM CHLORIDE, PRESERVATIVE FREE 10 ML: 5 INJECTION INTRAVENOUS at 10:56

## 2023-10-27 RX ADMIN — Medication 10 ML: at 10:56

## 2023-10-27 ASSESSMENT — PATIENT HEALTH QUESTIONNAIRE - PHQ9
1. LITTLE INTEREST OR PLEASURE IN DOING THINGS: SEVERAL DAYS
SUM OF ALL RESPONSES TO PHQ9 QUESTIONS 1 & 2: 1
2. FEELING DOWN, DEPRESSED OR HOPELESS: NOT AT ALL

## 2023-10-27 NOTE — FLOWSHEET NOTE
10/27/23 1031   Over the past 2 weeks, how often have you been bothered by any of the following problems?    Little interest or pleasure in doing things Several days   Feeling down, depressed, or hopeless Not at all   Patient Health Questionnaire-2 Score 1     Chf

## 2023-10-27 NOTE — PROGRESS NOTES
05/10/2023 10.2     Hemoglobin (g/dL)   Date Value   05/10/2023 10.3 (L)     Hematocrit (%)   Date Value   05/10/2023 32.5 (L)     Platelets (B9/A)   Date Value   05/10/2023 232     Iron Studies:  Ferritin (ng/mL)   Date Value   03/06/2021 1,517     Iron (mcg/dL)   Date Value   03/06/2021 31 (L)     TIBC (mcg/dL)   Date Value   03/06/2021 169 (L)     Hepatic:  AST (U/L)   Date Value   05/09/2023 29     ALT (U/L)   Date Value   05/09/2023 6     Total Bilirubin (mg/dL)   Date Value   05/09/2023 0.3     Alkaline Phosphatase (U/L)   Date Value   05/09/2023 112 (H)     INR:  INR (no units)   Date Value   09/02/2021 1.1         Wt Readings from Last 3 Encounters:   10/27/23 64.7 kg (142 lb 9.6 oz)   08/28/23 61.7 kg (136 lb)   07/24/23 62.6 kg (138 lb)           ASSESSMENT/PLAN:    [] Euvolemic          [] Hypervolemic, with increase from baseline:  [] Shortness of breath/LEE  [] JVD  [] HJR  [] Abnormal lung assessment:   [] Orthopnea  [] PND  [] Decreased urinary response to oral diuretic   [] Scrotal swelling   [] Lower extremity edema  [] Compression stockings provided  [] Decline in functional capacity (unable to perform activities they had previously been able to do)  [] Weight gain     [] IV diuretics given IV LASIX 40 mg  [] Provider notified of recurrent IV diuretic use    Additional Notes:         [x]Lab work obtained    [x] Patient/Family Educated On:  [x] HF zones (Green, Yellow, Red) and aware of when to take action   [x] Daily weights  [] Scale provided   [x] Low sodium diet (2000 mg)  Barriers to compliance  [] Refuses to monitor diet  [] Socioeconomic difficulties  [] Unable to cook for self (use of frozen meals, can goods, etc)  [] CHF CHW consulted  [] Low sodium meal delivery options given to patient  [] Dietician consulted   [] Low sodium recipes provided  [] Sodium free seasoning provided   [x] Fluid intake 6-8 cups (around 64 oz)  [x] Reviewed currently prescribed medications with patient, educated

## 2023-11-01 NOTE — PLAN OF CARE
Problem: Cardiac Output - Decreased:  Goal: Hemodynamic stability will improve  Description: Hemodynamic stability will improve  Outcome: Ongoing     Problem: Falls - Risk of:  Goal: Will remain free from falls  Description: Will remain free from falls  3/5/2021 1426 by Angel Barnett  Outcome: Ongoing  3/5/2021 0249 by Donta Ivan RN  Outcome: Met This Shift  Goal: Absence of physical injury  Description: Absence of physical injury  3/5/2021 1426 by Angel Barnett  Outcome: Ongoing  3/5/2021 0249 by Donta Ivan RN  Outcome: Met This Shift Statement Selected

## 2023-11-10 ENCOUNTER — HOSPITAL ENCOUNTER (OUTPATIENT)
Dept: OTHER | Age: 88
Setting detail: THERAPIES SERIES
Discharge: HOME | End: 2023-11-10
Payer: MEDICARE

## 2023-11-10 VITALS
OXYGEN SATURATION: 95 % | BODY MASS INDEX: 22.27 KG/M2 | DIASTOLIC BLOOD PRESSURE: 71 MMHG | WEIGHT: 138 LBS | RESPIRATION RATE: 16 BRPM | SYSTOLIC BLOOD PRESSURE: 149 MMHG

## 2023-11-10 LAB
ANION GAP SERPL CALCULATED.3IONS-SCNC: 8 MMOL/L (ref 7–16)
BNP SERPL-MCNC: 1723 PG/ML (ref 0–450)
BUN SERPL-MCNC: 28 MG/DL (ref 6–23)
CALCIUM SERPL-MCNC: 9 MG/DL (ref 8.6–10.2)
CHLORIDE SERPL-SCNC: 99 MMOL/L (ref 98–107)
CO2 SERPL-SCNC: 28 MMOL/L (ref 22–29)
CREAT SERPL-MCNC: 1.3 MG/DL (ref 0.5–1)
GFR SERPL CREATININE-BSD FRML MDRD: 40 ML/MIN/1.73M2
GLUCOSE SERPL-MCNC: 181 MG/DL (ref 74–99)
POTASSIUM SERPL-SCNC: 3.7 MMOL/L (ref 3.5–5)
SODIUM SERPL-SCNC: 135 MMOL/L (ref 132–146)

## 2023-11-10 PROCEDURE — 36415 COLL VENOUS BLD VENIPUNCTURE: CPT

## 2023-11-10 PROCEDURE — 83880 ASSAY OF NATRIURETIC PEPTIDE: CPT

## 2023-11-10 PROCEDURE — 80048 BASIC METABOLIC PNL TOTAL CA: CPT

## 2023-11-10 PROCEDURE — 99214 OFFICE O/P EST MOD 30 MIN: CPT

## 2023-11-10 NOTE — PROGRESS NOTES
metoprolol succinate (TOPROL XL) 50 MG extended release tablet Take 1 tablet by mouth nightly  Sima Ramosmarcus Clinton APRN - CNP   potassium chloride (KLOR-CON M) 10 MEQ extended release tablet Take 1 tablet by mouth daily as needed (as directed by provider)  YOHANA Sparrow - CNP   furosemide (LASIX) 20 MG tablet Take 1 tablet by mouth daily  Patient taking differently: Take 1 tablet by mouth daily Takes M-W-F  YOHANA Pagan - NP   ASPIRIN LOW DOSE 81 MG EC tablet TAKE ONE TABLET BY MOUTH EVERY DAY WITH FOOD  ProviderPino MD   sertraline (ZOLOFT) 100 MG tablet Take 1 tablet by mouth daily  ProviderPino MD   Lancets (Shell [place]) [place] TEST EVERY DAY  Pino Boland MD   ONETOUCH ULTRA strip CHECK BLOOD SUGAR EVERY DAY  Pino Boland MD   spironolactone (ALDACTONE) 25 MG tablet Take 1 tablet by mouth daily  Pino Boland MD   omeprazole (PRILOSEC) 40 MG delayed release capsule Take 1 capsule by mouth every morning  ProviderPino MD   CPAP Machine MISC Inhale into the lungs nightly   ProviderPino MD   vitamin D3 (CHOLECALCIFEROL) 1000 UNITS TABS tablet Take 1 tablet by mouth daily.   Emmie Trejo, DO              [address] [place] [address] THERAPY for HFpEF:  SGLT2i:  (2a indication)  No  Aldosterone antagonist: (2b indication)  Spironolactone 25 mg daily  ARNI/ACE I/ARB: (2b indication, with LVEF on lower end of spectrum)  No      HEART FAILURE FOCUSED PHYSICAL EXAMINATION:    Vitals:    11/10/23 1200   BP: (!) 149/71   Resp: 16   SpO2: 95%                                                                                              LAB DATA:  BMP:  Sodium (mmol/L)   Date Value   10/27/2023 142   08/28/2023 140   07/24/2023 139     Potassium (mmol/L)   Date Value   10/27/2023 3.6   08/28/2023 4.1   07/24/2023 4.5     Potassium reflex Magnesium (mmol/L)   Date Value   05/11/2023 3.7   05/10/2023 3.8   05/08/2023 4.3

## 2023-12-11 ENCOUNTER — HOSPITAL ENCOUNTER (OUTPATIENT)
Dept: OTHER | Age: 88
Setting detail: THERAPIES SERIES
Discharge: HOME | End: 2023-12-11
Payer: MEDICARE

## 2023-12-11 VITALS
WEIGHT: 137 LBS | OXYGEN SATURATION: 93 % | DIASTOLIC BLOOD PRESSURE: 70 MMHG | RESPIRATION RATE: 18 BRPM | BODY MASS INDEX: 22.11 KG/M2 | HEART RATE: 75 BPM | SYSTOLIC BLOOD PRESSURE: 142 MMHG

## 2023-12-11 LAB
ANION GAP SERPL CALCULATED.3IONS-SCNC: 11 MMOL/L (ref 7–16)
BNP SERPL-MCNC: 930 PG/ML (ref 0–450)
BUN SERPL-MCNC: 24 MG/DL (ref 6–23)
CALCIUM SERPL-MCNC: 9.2 MG/DL (ref 8.6–10.2)
CHLORIDE SERPL-SCNC: 103 MMOL/L (ref 98–107)
CO2 SERPL-SCNC: 24 MMOL/L (ref 22–29)
CREAT SERPL-MCNC: 1.4 MG/DL (ref 0.5–1)
GFR SERPL CREATININE-BSD FRML MDRD: 37 ML/MIN/1.73M2
GLUCOSE SERPL-MCNC: 137 MG/DL (ref 74–99)
POTASSIUM SERPL-SCNC: 3.8 MMOL/L (ref 3.5–5)
SODIUM SERPL-SCNC: 138 MMOL/L (ref 132–146)

## 2023-12-11 PROCEDURE — 83880 ASSAY OF NATRIURETIC PEPTIDE: CPT

## 2023-12-11 PROCEDURE — 36415 COLL VENOUS BLD VENIPUNCTURE: CPT

## 2023-12-11 PROCEDURE — 80048 BASIC METABOLIC PNL TOTAL CA: CPT

## 2023-12-11 PROCEDURE — 99214 OFFICE O/P EST MOD 30 MIN: CPT

## 2023-12-11 NOTE — PROGRESS NOTES
of compliance and answered any questions regarding their medication  [] Pill box provided to patient  [] Patient using pill packing pharmacy   [] CPAP/BiPAP use  [] Low impact exercise / cardiac rehab   [] LifeVest use  [x] Patient aware of signs and symptoms of worsening HF, CHF clinic phone number provided and made aware to call clinic for sooner if evaluation if needed     [] Difficulty affording medications  [] CHF CHW consulted  [] Prescription assistance information given   [] [facility] medication assistance program information given   [] Sample medications provided to patient to help bridge gap until affordability N/A    [x] PHQ assessment completed while in CHF clinic (1st visit, every 3 months and PRN)      7/8/2016     2:26 PM   PHQ Scores   PHQ2 Score 0   PHQ9 Score 0     Interpretation of Total Score Depression Severity:   1-4 = Minimal depression  5-9 = Mild depression  10-14 = Moderate depression  15-19 = Moderately severe depression  20-27 = Severe depression   [] Patient provided community resources for counseling services  [] PCP called and PHQ result faxed   [] [facility]        Scheduled to follow up in CHF clinic on:    Future Appointments   Date Time Provider [address]   1/31/2024 10:30 AM DO TU Camacho [facility]   2/12/2024 12:30 PM HERNESTO [facility] ROOM 2 HERNESTO [facility] Torito HOD

## 2023-12-13 ENCOUNTER — APPOINTMENT (OUTPATIENT)
Dept: CT IMAGING | Age: 88
End: 2023-12-13
Payer: MEDICARE

## 2023-12-13 ENCOUNTER — HOSPITAL ENCOUNTER (EMERGENCY)
Age: 88
Discharge: HOME | End: 2023-12-13
Attending: EMERGENCY MEDICINE
Payer: MEDICARE

## 2023-12-13 VITALS
OXYGEN SATURATION: 98 % | SYSTOLIC BLOOD PRESSURE: 150 MMHG | DIASTOLIC BLOOD PRESSURE: 67 MMHG | RESPIRATION RATE: 16 BRPM | HEART RATE: 74 BPM | TEMPERATURE: 97 F

## 2023-12-13 DIAGNOSIS — S20.211A CONTUSION OF RIGHT CHEST WALL, INITIAL ENCOUNTER: ICD-10-CM
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
## 2023-12-13 PROCEDURE — 99284 EMERGENCY DEPT VISIT MOD MDM: CPT

## 2023-12-13 PROCEDURE — 70450 CT HEAD/BRAIN W/O DYE: CPT

## 2023-12-13 PROCEDURE — 72125 CT NECK SPINE W/O DYE: CPT

## 2023-12-13 PROCEDURE — 71250 CT THORAX DX C-: CPT

## 2023-12-13 RX ORDER — ACETAMINOPHEN 500 MG
1000 TABLET ORAL ONCE
Status: DISCONTINUED | OUTPATIENT
Start: 2023-12-13 | End: 2023-12-13 | Stop reason: HOSPADM

## 2023-12-13 NOTE — ED PROVIDER NOTES
81-year-old female presents to the emergency department with pain in her right neck and upper chest wall after she fell out of bed on Sunday. She states that she did have any bruising that she noted today but with the pain she decided to come in for evaluation. She reports she just rolled out of bed is when the symptoms started she states no other complaints no abdominal pain no other extremity injuries no other symptoms at this time    The history is provided by the patient. Fall  The accident occurred More than 2 days ago. The fall occurred from a bed. She fell from a height of 1 to 2 ft. She landed on A hard floor. The point of impact was the head. The pain is present in the head. The pain is at a severity of 3/10. The pain is mild. She was Ambulatory at the scene. Associated symptoms include headaches. Pertinent negatives include no fever, no nausea and no vomiting. Review of Systems   Constitutional:  Negative for chills and fever. HENT:  Negative for ear pain, sinus pressure and sore throat. Eyes:  Negative for pain, discharge and redness. Respiratory:  Negative for cough, shortness of breath and wheezing. Cardiovascular:  Positive for chest pain (chest wall). Gastrointestinal:  Negative for abdominal distention, diarrhea, nausea and vomiting. Genitourinary:  Negative for dysuria and frequency. Musculoskeletal:  Positive for back pain. Negative for arthralgias. Skin:  Negative for rash and wound. Neurological:  Positive for headaches. Negative for weakness. Hematological:  Negative for adenopathy. All other systems reviewed and are negative. Physical Exam  Constitutional:       Appearance: Normal appearance. HENT:      Head: Contusion (It appears a few days old) present. Nose: Nose normal.      Mouth/Throat:      Mouth: Mucous membranes are moist.   Eyes:      Extraocular Movements: Extraocular movements intact.       Pupils: Pupils are equal, round, and

## 2023-12-13 NOTE — ED NOTES
Discharge instructions given. Patient verbalizes understanding. No other noted or stated problems at this time. Patient will follow up with primary care.      Violetta Kate RN  12/13/23 9900

## 2024-01-14 ENCOUNTER — HOSPITAL ENCOUNTER (INPATIENT)
Dept: HOSPITAL 83 - ED | Age: 89
LOS: 1 days | Discharge: HOME | DRG: 605 | End: 2024-01-15
Attending: INTERNAL MEDICINE | Admitting: INTERNAL MEDICINE
Payer: MEDICARE

## 2024-01-14 VITALS — DIASTOLIC BLOOD PRESSURE: 77 MMHG

## 2024-01-14 VITALS — DIASTOLIC BLOOD PRESSURE: 50 MMHG | SYSTOLIC BLOOD PRESSURE: 159 MMHG

## 2024-01-14 VITALS — WEIGHT: 170 LBS | BODY MASS INDEX: 27.32 KG/M2 | HEIGHT: 65.98 IN

## 2024-01-14 DIAGNOSIS — E78.2: ICD-10-CM
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
## 2024-01-14 DIAGNOSIS — Z88.2: ICD-10-CM

## 2024-01-14 DIAGNOSIS — F33.0: ICD-10-CM

## 2024-01-14 DIAGNOSIS — E87.6: ICD-10-CM

## 2024-01-14 DIAGNOSIS — I50.9: ICD-10-CM
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
## 2024-01-14 DIAGNOSIS — K21.00: ICD-10-CM

## 2024-01-14 DIAGNOSIS — R62.7: ICD-10-CM

## 2024-01-14 DIAGNOSIS — N18.9: ICD-10-CM

## 2024-01-14 DIAGNOSIS — I13.0: ICD-10-CM

## 2024-01-14 DIAGNOSIS — S01.01XA: Primary | ICD-10-CM

## 2024-01-14 DIAGNOSIS — R26.2: ICD-10-CM
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
## 2024-01-14 DIAGNOSIS — Z88.8: ICD-10-CM
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
## 2024-01-14 DIAGNOSIS — N39.0: ICD-10-CM

## 2024-01-14 LAB
ALP SERPL-CCNC: 118 U/L (ref 46–116)
ALT SERPL W P-5'-P-CCNC: 11 U/L (ref 5–49)
APTT PPP: 29.6 SECONDS (ref 20–32.1)
BASOPHILS # BLD AUTO: 0 10*3/UL (ref 0–0.1)
BASOPHILS NFR BLD AUTO: 0.2 % (ref 0–1)
BUN SERPL-MCNC: 29 MG/DL (ref 9–23)
CHLORIDE SERPL-SCNC: 105 MMOL/L (ref 98–107)
EOSINOPHIL # BLD AUTO: 0.2 10*3/UL (ref 0–0.4)
EOSINOPHIL # BLD AUTO: 1.7 % (ref 1–4)
ERYTHROCYTE [DISTWIDTH] IN BLOOD BY AUTOMATED COUNT: 14.9 % (ref 0–14.5)
HCT VFR BLD AUTO: 40.5 % (ref 37–47)
LIPASE SERPL-CCNC: 43 U/L (ref 12–53)
LYMPHOCYTES # BLD AUTO: 1.1 10*3/UL (ref 1.3–4.4)
LYMPHOCYTES NFR BLD AUTO: 11 % (ref 27–41)
MCH RBC QN AUTO: 28.1 PG (ref 27–31)
MCHC RBC AUTO-ENTMCNC: 31.1 G/DL (ref 33–37)
MCV RBC AUTO: 90.4 FL (ref 81–99)
MONOCYTES # BLD AUTO: 0.6 10*3/UL (ref 0.1–1)
MONOCYTES NFR BLD MANUAL: 6.1 % (ref 3–9)
NEUT #: 8 10*3/UL (ref 2.3–7.9)
NEUT %: 80.4 % (ref 47–73)
NRBC BLD QL AUTO: 0 10*3/UL (ref 0–0)
PLATELET # BLD AUTO: 191 10*3/UL (ref 130–400)
PMV BLD AUTO: 9.2 FL (ref 9.6–12.3)
POTASSIUM SERPL-SCNC: 4.4 MMOL/L (ref 3.4–5.1)
PROT SERPL-MCNC: 6.7 GM/DL (ref 6–8)
RBC # BLD AUTO: 4.48 10*6/UL (ref 4.1–5.1)
WBC NRBC COR # BLD AUTO: 10 10*3/UL (ref 4.8–10.8)

## 2024-01-14 PROCEDURE — 0HQ0XZZ REPAIR SCALP SKIN, EXTERNAL APPROACH: ICD-10-PCS | Performed by: STUDENT IN AN ORGANIZED HEALTH CARE EDUCATION/TRAINING PROGRAM

## 2024-01-15 VITALS — SYSTOLIC BLOOD PRESSURE: 144 MMHG | DIASTOLIC BLOOD PRESSURE: 92 MMHG

## 2024-01-15 VITALS — DIASTOLIC BLOOD PRESSURE: 76 MMHG | SYSTOLIC BLOOD PRESSURE: 143 MMHG

## 2024-01-15 VITALS — DIASTOLIC BLOOD PRESSURE: 98 MMHG

## 2024-01-15 LAB
BASOPHILS # BLD AUTO: 0 10*3/UL (ref 0–0.1)
BASOPHILS NFR BLD AUTO: 0.2 % (ref 0–1)
BUN SERPL-MCNC: 26 MG/DL (ref 9–23)
CHLORIDE SERPL-SCNC: 108 MMOL/L (ref 98–107)
EOSINOPHIL # BLD AUTO: 0.2 10*3/UL (ref 0–0.4)
EOSINOPHIL # BLD AUTO: 1.5 % (ref 1–4)
ERYTHROCYTE [DISTWIDTH] IN BLOOD BY AUTOMATED COUNT: 14.8 % (ref 0–14.5)
HCT VFR BLD AUTO: 36.6 % (ref 37–47)
LYMPHOCYTES # BLD AUTO: 1.9 10*3/UL (ref 1.3–4.4)
LYMPHOCYTES NFR BLD AUTO: 19 % (ref 27–41)
MCH RBC QN AUTO: 28.4 PG (ref 27–31)
MCHC RBC AUTO-ENTMCNC: 32.2 G/DL (ref 33–37)
MCV RBC AUTO: 88 FL (ref 81–99)
MONOCYTES # BLD AUTO: 0.7 10*3/UL (ref 0.1–1)
MONOCYTES NFR BLD MANUAL: 6.9 % (ref 3–9)
NEUT #: 7.2 10*3/UL (ref 2.3–7.9)
NEUT %: 71.9 % (ref 47–73)
NRBC BLD QL AUTO: 0 % (ref 0–0)
PLATELET # BLD AUTO: 170 10*3/UL (ref 130–400)
PMV BLD AUTO: 9.7 FL (ref 9.6–12.3)
POTASSIUM SERPL-SCNC: 3.8 MMOL/L (ref 3.4–5.1)
RBC # BLD AUTO: 4.16 10*6/UL (ref 4.1–5.1)
WBC NRBC COR # BLD AUTO: 10 10*3/UL (ref 4.8–10.8)

## 2024-01-31 ENCOUNTER — OFFICE VISIT (OUTPATIENT)
Dept: CARDIOLOGY CLINIC | Age: 89
End: 2024-01-31
Payer: MEDICARE

## 2024-01-31 VITALS
BODY MASS INDEX: 22.37 KG/M2 | HEART RATE: 77 BPM | DIASTOLIC BLOOD PRESSURE: 72 MMHG | SYSTOLIC BLOOD PRESSURE: 133 MMHG | WEIGHT: 139.2 LBS | RESPIRATION RATE: 16 BRPM | HEIGHT: 66 IN

## 2024-01-31 DIAGNOSIS — I10 PRIMARY HYPERTENSION: Primary | ICD-10-CM

## 2024-01-31 PROCEDURE — 99214 OFFICE O/P EST MOD 30 MIN: CPT | Performed by: INTERNAL MEDICINE

## 2024-01-31 PROCEDURE — 1123F ACP DISCUSS/DSCN MKR DOCD: CPT | Performed by: INTERNAL MEDICINE

## 2024-01-31 PROCEDURE — 93000 ELECTROCARDIOGRAM COMPLETE: CPT | Performed by: INTERNAL MEDICINE

## 2024-01-31 NOTE — PROGRESS NOTES
CHIEF COMPLAINT: VHD/CP    HISTORY OF PRESENT ILLNESS: Patient is a 88 y.o. female seen at the request of Leodan Velazquez MD.      Some SOB. No CP.    Past Medical History:  Hypertension.  Type II diabetes mellitus.  Hyperlipidemia.  Obstructive sleep apnea.  Exercise MPS, 12/29/2014. 1 minute, 51 seconds Henrique protocol. 109% MPHR. Terminated for dyspnea. No diagnostic electrocardiographic changes. Normal myocardial perfusion images with ejection fraction 90%. No evidence for ischemia. Low risk study.   Echo, interpreted by Dr. Hunt, 02/12/2015. Normal LV size with borderline global hypokinesis. EF 50-55%. Mild CLVH. Moderate LAE. Moderate-severe MI. Mild AI. Mild-moderate TI. Mild PHTN.   Echo, 01/08/2016. Normal LV size with moderate CLVH. Normal regional wall motion and systolic function with Stage II diastolic dysfunction. Aortic sclerosis without stenosis, but with mild aortic insufficiency. Thickening of the mitral leaflets with decreased leaflet excursion associated with mild mitral stenosis. Mean transvalvular gradient 7 mmHg and moderate mitral insufficiency with a centrally directed jet. Pulmonary venous flow pattern appeared normal.   Pharmacologic MPS, 04/19/2016. EF 75%. No ischemia. Low risk exam.     Past Medical History:   Diagnosis Date    (HFpEF) heart failure with preserved ejection fraction (HCC)     Acute cystitis with hematuria 05/03/2019    Anemia 2008    RECEIVED 4 UNITS BLOOD    Atrial fibrillation (HCC)     CKD (chronic kidney disease) stage 3, GFR 30-59 ml/min (HCC) 05/03/2019    Controlled type 2 diabetes mellitus without complication ([facility]) 05/09/2023    CPAP (continuous positive airway pressure) dependence     Diabetes mellitus (HCC)     Esophageal stricture     Gastric ulcer     Goiter     Heart murmur     Hypertension     SINCE 1994    SACHIN (iron deficiency anemia)     Kidney stones     Rheumatic fever     POSSIBLY AS A CHILD    Sleep apnea        Patient Active Problem List

## 2024-02-12 ENCOUNTER — HOSPITAL ENCOUNTER (OUTPATIENT)
Dept: OTHER | Age: 89
Setting detail: THERAPIES SERIES
Discharge: HOME | End: 2024-02-12

## 2024-04-07 ENCOUNTER — HOSPITAL ENCOUNTER (EMERGENCY)
Dept: HOSPITAL 83 - ED | Age: 89
Discharge: HOME | End: 2024-04-07
Payer: MEDICARE

## 2024-04-07 VITALS — HEIGHT: 65.98 IN | BODY MASS INDEX: 23.78 KG/M2 | WEIGHT: 148 LBS

## 2024-04-07 DIAGNOSIS — Z90.49: ICD-10-CM

## 2024-04-07 DIAGNOSIS — Z88.2: ICD-10-CM

## 2024-04-07 DIAGNOSIS — Z90.710: ICD-10-CM

## 2024-04-07 DIAGNOSIS — Z98.890: ICD-10-CM

## 2024-04-07 DIAGNOSIS — D64.9: ICD-10-CM

## 2024-04-07 DIAGNOSIS — R42: ICD-10-CM

## 2024-04-07 DIAGNOSIS — Z88.8: ICD-10-CM

## 2024-04-07 DIAGNOSIS — F41.9: ICD-10-CM

## 2024-04-07 DIAGNOSIS — I10: ICD-10-CM

## 2024-04-07 DIAGNOSIS — F32.A: ICD-10-CM

## 2024-04-07 DIAGNOSIS — E78.00: ICD-10-CM

## 2024-04-07 DIAGNOSIS — N39.0: Primary | ICD-10-CM

## 2024-04-07 LAB
ALP SERPL-CCNC: 111 U/L (ref 46–116)
ALT SERPL W P-5'-P-CCNC: 9 U/L (ref 5–49)
BACTERIA #/AREA URNS HPF: (no result) /[HPF]
BASOPHILS # BLD AUTO: 0 10*3/UL (ref 0–0.1)
BASOPHILS NFR BLD AUTO: 0.3 % (ref 0–1)
BUN SERPL-MCNC: 26 MG/DL (ref 9–23)
CHLORIDE SERPL-SCNC: 109 MMOL/L (ref 98–107)
EOSINOPHIL # BLD AUTO: 0.2 10*3/UL (ref 0–0.4)
EOSINOPHIL # BLD AUTO: 3.1 % (ref 1–4)
EPI CELLS #/AREA URNS HPF: (no result) /[HPF]
ERYTHROCYTE [DISTWIDTH] IN BLOOD BY AUTOMATED COUNT: 16.7 % (ref 0–14.5)
HCT VFR BLD AUTO: 34.2 % (ref 37–47)
LEUKOCYTE ESTERASE UR QL STRIP: (no result)
LYMPHOCYTES # BLD AUTO: 1.1 10*3/UL (ref 1.3–4.4)
LYMPHOCYTES NFR BLD AUTO: 15.3 % (ref 27–41)
MCH RBC QN AUTO: 29 PG (ref 27–31)
MCHC RBC AUTO-ENTMCNC: 31.6 G/DL (ref 33–37)
MCV RBC AUTO: 91.7 FL (ref 81–99)
MONOCYTES # BLD AUTO: 0.4 10*3/UL (ref 0.1–1)
MONOCYTES NFR BLD MANUAL: 5.9 % (ref 3–9)
NEUT #: 5.3 10*3/UL (ref 2.3–7.9)
NEUT %: 75.1 % (ref 47–73)
NRBC BLD QL AUTO: 0 10*3/UL (ref 0–0)
PH UR STRIP: 5 [PH] (ref 4.5–8)
PLATELET # BLD AUTO: 184 10*3/UL (ref 130–400)
PMV BLD AUTO: 9.3 FL (ref 9.6–12.3)
POTASSIUM SERPL-SCNC: 4.5 MMOL/L (ref 3.4–5.1)
PROT SERPL-MCNC: 6.1 GM/DL (ref 6–8)
RBC # BLD AUTO: 3.73 10*6/UL (ref 4.1–5.1)
SP GR UR: 1.02 (ref 1–1.03)
UROBILINOGEN UR STRIP-MCNC: 0.2 E.U./DL (ref 0–1)
WBC #/AREA URNS HPF: (no result) WBC/HPF (ref 0–5)
WBC NRBC COR # BLD AUTO: 7 10*3/UL (ref 4.8–10.8)

## 2024-05-28 ENCOUNTER — TELEPHONE (OUTPATIENT)
Dept: CARDIOLOGY CLINIC | Age: 89
End: 2024-05-28

## 2024-05-28 NOTE — TELEPHONE ENCOUNTER
Dr alvarez was here last week and stated patient needed a hospital f/u and possible ISABELLE. When would you like patient to come in. She has an appointment with you in August. Please advise

## 2024-05-28 NOTE — TELEPHONE ENCOUNTER
You can add on my next week there.    Penelope Silva D.O.  Cardiologist  Cardiology, [facility]

## 2024-07-02 RX ORDER — SPIRONOLACTONE 25 MG/1
25 TABLET ORAL DAILY
Qty: 90 TABLET | Refills: 3 | Status: SHIPPED | OUTPATIENT
Start: 2024-07-02

## 2024-07-02 NOTE — TELEPHONE ENCOUNTER
Deepa's insurance called and want to know if Deepa is to still be taking Sprinolactone 25 mg daily she has not had this filled since Jan, 2024? Please advise and if so will need to be refilled

## 2024-07-31 DIAGNOSIS — R55 SYNCOPE, UNSPECIFIED SYNCOPE TYPE: Primary | ICD-10-CM

## 2024-08-13 ENCOUNTER — OFFICE VISIT (OUTPATIENT)
Dept: CARDIOLOGY CLINIC | Age: 89
End: 2024-08-13
Payer: MEDICARE

## 2024-08-13 VITALS
BODY MASS INDEX: 22.47 KG/M2 | HEIGHT: 66 IN | WEIGHT: 139.8 LBS | HEART RATE: 72 BPM | SYSTOLIC BLOOD PRESSURE: 131 MMHG | RESPIRATION RATE: 18 BRPM | DIASTOLIC BLOOD PRESSURE: 71 MMHG

## 2024-08-13 DIAGNOSIS — I10 PRIMARY HYPERTENSION: Primary | ICD-10-CM

## 2024-08-13 PROCEDURE — 1123F ACP DISCUSS/DSCN MKR DOCD: CPT | Performed by: INTERNAL MEDICINE

## 2024-08-13 PROCEDURE — 99214 OFFICE O/P EST MOD 30 MIN: CPT | Performed by: INTERNAL MEDICINE

## 2024-08-13 PROCEDURE — 93000 ELECTROCARDIOGRAM COMPLETE: CPT | Performed by: INTERNAL MEDICINE

## 2024-08-13 RX ORDER — FERROUS GLUCONATE 324(37.5)
1 TABLET ORAL DAILY
COMMUNITY
Start: 2024-08-08

## 2024-08-13 RX ORDER — POTASSIUM CHLORIDE 750 MG/1
10 TABLET, EXTENDED RELEASE ORAL DAILY
Qty: 90 TABLET | Refills: 3 | Status: SHIPPED | OUTPATIENT
Start: 2024-08-13

## 2024-08-13 RX ORDER — METOPROLOL SUCCINATE 50 MG/1
50 TABLET, EXTENDED RELEASE ORAL NIGHTLY
Qty: 90 TABLET | Refills: 3 | Status: SHIPPED | OUTPATIENT
Start: 2024-08-13 | End: 2025-08-08

## 2024-08-13 RX ORDER — FUROSEMIDE 20 MG/1
20 TABLET ORAL DAILY
Qty: 90 TABLET | Refills: 3 | Status: SHIPPED | OUTPATIENT
Start: 2024-08-13

## 2024-08-13 RX ORDER — DOXYCYCLINE HYCLATE 100 MG
100 TABLET ORAL 2 TIMES DAILY
COMMUNITY
Start: 2024-08-08

## 2024-08-13 NOTE — PROGRESS NOTES
CHIEF COMPLAINT: VHD/CP    HISTORY OF PRESENT ILLNESS: Patient is a 88 y.o. female seen at the request of Leodan Velazquez MD.      Some SOB. No CP.    Past Medical History:  Hypertension.  Type II diabetes mellitus.  Hyperlipidemia.  Obstructive sleep apnea.  Exercise MPS, 12/29/2014. 1 minute, 51 seconds Henrique protocol. 109% MPHR. Terminated for dyspnea. No diagnostic electrocardiographic changes. Normal myocardial perfusion images with ejection fraction 90%. No evidence for ischemia. Low risk study.   Echo, interpreted by Dr. Hunt, 02/12/2015. Normal LV size with borderline global hypokinesis. EF 50-55%. Mild CLVH. Moderate LAE. Moderate-severe MI. Mild AI. Mild-moderate TI. Mild PHTN.   Echo, 01/08/2016. Normal LV size with moderate CLVH. Normal regional wall motion and systolic function with Stage II diastolic dysfunction. Aortic sclerosis without stenosis, but with mild aortic insufficiency. Thickening of the mitral leaflets with decreased leaflet excursion associated with mild mitral stenosis. Mean transvalvular gradient 7 mmHg and moderate mitral insufficiency with a centrally directed jet. Pulmonary venous flow pattern appeared normal.   Pharmacologic MPS, 04/19/2016. EF 75%. No ischemia. Low risk exam.     Past Medical History:   Diagnosis Date    (HFpEF) heart failure with preserved ejection fraction (HCC)     Acute cystitis with hematuria 05/03/2019    Anemia 2008    RECEIVED 4 UNITS BLOOD    Atrial fibrillation (HCC)     CKD (chronic kidney disease) stage 3, GFR 30-59 ml/min (HCC) 05/03/2019    Controlled type 2 diabetes mellitus without complication ([facility]) 05/09/2023    CPAP (continuous positive airway pressure) dependence     Diabetes mellitus (HCC)     Esophageal stricture     Gastric ulcer     Goiter     Heart murmur     Hypertension     SINCE 1994    SACHIN (iron deficiency anemia)     Kidney stones     Rheumatic fever     POSSIBLY AS A CHILD    Sleep apnea        Patient Active Problem List

## 2024-09-29 ENCOUNTER — HOSPITAL ENCOUNTER (EMERGENCY)
Dept: HOSPITAL 83 - ED | Age: 89
Discharge: HOME | End: 2024-09-29
Payer: MEDICARE

## 2024-09-29 VITALS — BODY MASS INDEX: 22.5 KG/M2 | WEIGHT: 140 LBS | HEIGHT: 65.98 IN

## 2024-09-29 DIAGNOSIS — E87.6: ICD-10-CM

## 2024-09-29 DIAGNOSIS — I12.9: ICD-10-CM

## 2024-09-29 DIAGNOSIS — D63.1: ICD-10-CM

## 2024-09-29 DIAGNOSIS — S09.8XXA: Primary | ICD-10-CM

## 2024-09-29 DIAGNOSIS — F41.9: ICD-10-CM

## 2024-09-29 DIAGNOSIS — N18.9: ICD-10-CM

## 2024-09-29 DIAGNOSIS — F32.A: ICD-10-CM

## 2024-09-29 DIAGNOSIS — E78.00: ICD-10-CM

## 2024-09-29 DIAGNOSIS — Z88.8: ICD-10-CM
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
## 2024-09-29 DIAGNOSIS — Z90.49: ICD-10-CM
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
## 2024-09-29 DIAGNOSIS — Z88.1: ICD-10-CM

## 2024-09-29 DIAGNOSIS — I48.91: ICD-10-CM

## 2024-09-29 DIAGNOSIS — Z90.710: ICD-10-CM

## 2024-09-29 DIAGNOSIS — Z88.2: ICD-10-CM
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
## 2024-09-29 DIAGNOSIS — Z98.890: ICD-10-CM

## 2024-09-29 LAB
ALP SERPL-CCNC: 98 U/L (ref 46–116)
ALT SERPL W P-5'-P-CCNC: 13 U/L (ref 5–49)
BASOPHILS # BLD AUTO: 0 10*3/UL (ref 0–0.1)
BASOPHILS NFR BLD AUTO: 0.3 % (ref 0–1)
BUN SERPL-MCNC: 37 MG/DL (ref 9–23)
CHLORIDE SERPL-SCNC: 103 MMOL/L (ref 98–107)
CK SERPL-CCNC: 45 U/L (ref 34–171)
EOSINOPHIL # BLD AUTO: 0.2 10*3/UL (ref 0–0.4)
EOSINOPHIL # BLD AUTO: 1.7 % (ref 1–4)
ERYTHROCYTE [DISTWIDTH] IN BLOOD BY AUTOMATED COUNT: 20.9 % (ref 0–14.5)
HCT VFR BLD AUTO: 43.1 % (ref 37–47)
LYMPHOCYTES # BLD AUTO: 1.7 10*3/UL (ref 1.3–4.4)
LYMPHOCYTES NFR BLD AUTO: 18.7 % (ref 27–41)
MCH RBC QN AUTO: 26.6 PG (ref 27–31)
MCHC RBC AUTO-ENTMCNC: 30.6 G/DL (ref 33–37)
MCV RBC AUTO: 86.7 FL (ref 81–99)
MONOCYTES # BLD AUTO: 0.5 10*3/UL (ref 0.1–1)
MONOCYTES NFR BLD MANUAL: 5.8 % (ref 3–9)
NEUT #: 6.5 10*3/UL (ref 2.3–7.9)
NEUT %: 73.1 % (ref 47–73)
NRBC BLD QL AUTO: 0 10*3/UL (ref 0–0)
PLATELET # BLD AUTO: 187 10*3/UL (ref 130–400)
PMV BLD AUTO: 8.9 FL (ref 9.6–12.3)
POTASSIUM SERPL-SCNC: 4.1 MMOL/L (ref 3.4–5.1)
PROT SERPL-MCNC: 7.1 GM/DL (ref 6–8)
RBC # BLD AUTO: 4.97 10*6/UL (ref 4.1–5.1)
WBC NRBC COR # BLD AUTO: 8.9 10*3/UL (ref 4.8–10.8)

## 2024-12-23 ENCOUNTER — HOSPITAL ENCOUNTER (EMERGENCY)
Dept: HOSPITAL 83 - ED | Age: 89
Discharge: HOME | End: 2024-12-23
Payer: MEDICARE

## 2024-12-23 VITALS — HEIGHT: 65.98 IN | WEIGHT: 140 LBS | BODY MASS INDEX: 22.5 KG/M2

## 2024-12-23 DIAGNOSIS — E78.5: ICD-10-CM
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
## 2024-12-23 DIAGNOSIS — N18.9: ICD-10-CM

## 2024-12-23 DIAGNOSIS — Z90.49: ICD-10-CM

## 2024-12-23 DIAGNOSIS — I48.91: ICD-10-CM
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
## 2024-12-23 DIAGNOSIS — F41.9: ICD-10-CM

## 2024-12-23 DIAGNOSIS — I12.9: ICD-10-CM

## 2024-12-23 DIAGNOSIS — R42: ICD-10-CM

## 2024-12-23 DIAGNOSIS — Z88.2: ICD-10-CM

## 2024-12-23 DIAGNOSIS — S00.81XA: Primary | ICD-10-CM

## 2024-12-23 DIAGNOSIS — Z79.899: ICD-10-CM

## 2024-12-23 DIAGNOSIS — F32.A: ICD-10-CM

## 2024-12-23 DIAGNOSIS — Z88.1: ICD-10-CM

## 2024-12-23 DIAGNOSIS — Z88.8: ICD-10-CM

## 2024-12-23 DIAGNOSIS — Z90.710: ICD-10-CM
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
## 2025-05-22 ENCOUNTER — APPOINTMENT (OUTPATIENT)
Dept: CT IMAGING | Age: 89
End: 2025-05-22
Payer: MEDICARE

## 2025-05-22 ENCOUNTER — HOSPITAL ENCOUNTER (EMERGENCY)
Age: 89
Discharge: HOME | End: 2025-05-22
Attending: EMERGENCY MEDICINE
Payer: MEDICARE

## 2025-05-22 VITALS
WEIGHT: 125 LBS | SYSTOLIC BLOOD PRESSURE: 158 MMHG | BODY MASS INDEX: 20.09 KG/M2 | RESPIRATION RATE: 18 BRPM | DIASTOLIC BLOOD PRESSURE: 80 MMHG | HEART RATE: 80 BPM | TEMPERATURE: 98.5 F | HEIGHT: 66 IN | OXYGEN SATURATION: 95 %

## 2025-05-22 DIAGNOSIS — S22.31XA CLOSED FRACTURE OF ONE RIB OF RIGHT SIDE, INITIAL ENCOUNTER: Primary | ICD-10-CM

## 2025-05-22 LAB
ALBUMIN SERPL-MCNC: 4 G/DL (ref 3.5–5.2)
ALP SERPL-CCNC: 107 U/L (ref 35–104)
ALT SERPL-CCNC: 13 U/L (ref 0–32)
ANION GAP SERPL CALCULATED.3IONS-SCNC: 11 MMOL/L (ref 7–16)
AST SERPL-CCNC: 16 U/L (ref 0–31)
BASOPHILS # BLD: 0.02 K/UL (ref 0–0.2)
BASOPHILS NFR BLD: 0 % (ref 0–2)
BILIRUB SERPL-MCNC: 0.2 MG/DL (ref 0–1.2)
BUN SERPL-MCNC: 30 MG/DL (ref 6–23)
CALCIUM SERPL-MCNC: 9.2 MG/DL (ref 8.6–10.2)
CHLORIDE SERPL-SCNC: 103 MMOL/L (ref 98–107)
CO2 SERPL-SCNC: 27 MMOL/L (ref 22–29)
CREAT SERPL-MCNC: 1.1 MG/DL (ref 0.5–1)
EOSINOPHIL # BLD: 0.16 K/UL (ref 0.05–0.5)
EOSINOPHILS RELATIVE PERCENT: 2 % (ref 0–6)
ERYTHROCYTE [DISTWIDTH] IN BLOOD BY AUTOMATED COUNT: 15.5 % (ref 11.5–15)
GFR, ESTIMATED: 48 ML/MIN/1.73M2
GLUCOSE SERPL-MCNC: 122 MG/DL (ref 74–99)
HCT VFR BLD AUTO: 39.6 % (ref 34–48)
HGB BLD-MCNC: 13.3 G/DL (ref 11.5–15.5)
IMM GRANULOCYTES # BLD AUTO: 0.04 K/UL (ref 0–0.58)
IMM GRANULOCYTES NFR BLD: 0 % (ref 0–5)
LYMPHOCYTES NFR BLD: 1.1 K/UL (ref 1.5–4)
LYMPHOCYTES RELATIVE PERCENT: 12 % (ref 20–42)
MCH RBC QN AUTO: 29.2 PG (ref 26–35)
MCHC RBC AUTO-ENTMCNC: 33.6 G/DL (ref 32–34.5)
MCV RBC AUTO: 87 FL (ref 80–99.9)
MONOCYTES NFR BLD: 0.76 K/UL (ref 0.1–0.95)
MONOCYTES NFR BLD: 9 % (ref 2–12)
NEUTROPHILS NFR BLD: 77 % (ref 43–80)
NEUTS SEG NFR BLD: 6.82 K/UL (ref 1.8–7.3)
PLATELET # BLD AUTO: 154 K/UL (ref 130–450)
PMV BLD AUTO: 9.9 FL (ref 7–12)
POTASSIUM SERPL-SCNC: 4.1 MMOL/L (ref 3.5–5)
PROT SERPL-MCNC: 6.7 G/DL (ref 6.4–8.3)
RBC # BLD AUTO: 4.55 M/UL (ref 3.5–5.5)
SODIUM SERPL-SCNC: 141 MMOL/L (ref 132–146)
TROPONIN I SERPL HS-MCNC: 13 NG/L (ref 0–14)
WBC OTHER # BLD: 8.9 K/UL (ref 4.5–11.5)

## 2025-05-22 PROCEDURE — 85025 COMPLETE CBC W/AUTO DIFF WBC: CPT

## 2025-05-22 PROCEDURE — 71250 CT THORAX DX C-: CPT

## 2025-05-22 PROCEDURE — 93005 ELECTROCARDIOGRAM TRACING: CPT | Performed by: EMERGENCY MEDICINE

## 2025-05-22 PROCEDURE — 99284 EMERGENCY DEPT VISIT MOD MDM: CPT

## 2025-05-22 PROCEDURE — 80053 COMPREHEN METABOLIC PANEL: CPT

## 2025-05-22 PROCEDURE — 84484 ASSAY OF TROPONIN QUANT: CPT

## 2025-05-22 PROCEDURE — 74176 CT ABD & PELVIS W/O CONTRAST: CPT

## 2025-05-22 ASSESSMENT — PAIN - FUNCTIONAL ASSESSMENT
PAIN_FUNCTIONAL_ASSESSMENT: PREVENTS OR INTERFERES SOME ACTIVE ACTIVITIES AND ADLS
PAIN_FUNCTIONAL_ASSESSMENT: 0-10

## 2025-05-22 ASSESSMENT — PAIN DESCRIPTION - DESCRIPTORS: DESCRIPTORS: SHARP;ACHING

## 2025-05-22 ASSESSMENT — PAIN DESCRIPTION - LOCATION: LOCATION: RIB CAGE

## 2025-05-22 ASSESSMENT — PAIN SCALES - GENERAL: PAINLEVEL_OUTOF10: 8

## 2025-05-22 ASSESSMENT — PAIN DESCRIPTION - ORIENTATION: ORIENTATION: RIGHT

## 2025-05-22 ASSESSMENT — PAIN DESCRIPTION - PAIN TYPE: TYPE: ACUTE PAIN

## 2025-05-22 NOTE — ED PROVIDER NOTES
HPI:  5/22/25, Time: 5:40 PM EDT         Deepa Fung is a 89 y.o. female presenting to the ED for fall occurring on Monday.  Patient states that she turned too quickly and fell striking her right flank.  No head trauma or loss of consciousness.  She is not anticoagulated.  She reports pain to her right flank and back.  She denies headache, neck pain, chest pain, shortness of breath, abdominal pain, nausea, vomiting, diarrhea, and dysuria.    Review of Systems:  Pertinent positives and negatives as per HPI.    --------------------------------------------- PAST HISTORY ---------------------------------------------  Past Medical History:  has a past medical history of (HFpEF) heart failure with preserved ejection fraction (HCC), Acute cystitis with hematuria, Anemia, Atrial fibrillation (HCC), CKD (chronic kidney disease) stage 3, GFR 30-59 ml/min (HCC), Controlled type 2 diabetes mellitus without complication (HCC), CPAP (continuous positive airway pressure) dependence, Diabetes mellitus (HCC), Esophageal stricture, Gastric ulcer, Goiter, Heart murmur, Hypertension, SACHIN (iron deficiency anemia), Kidney stones, Rheumatic fever, and Sleep apnea.    Past Surgical History:  has a past surgical history that includes cardiovascular stress test (1999); doppler echocardiography (05/14/03); doppler echocardiography (06/09/04); Hysterectomy; Cholecystectomy; polypectomy; Upper gastrointestinal endoscopy (07/11); Colonoscopy (07/11); Breast lumpectomy (Left); and Foot surgery.    Social History:  reports that she has never smoked. She has never used smokeless tobacco. She reports that she does not currently use alcohol. She reports that she does not use drugs.    Family History: family history includes Alcohol Abuse in her father; Asthma in her mother; Diabetes in her mother.     The patient’s home medications have been reviewed.    Allergies: Cephalexin, Nitrofuran derivatives, Norvasc [amlodipine], Other/food, and Sulfa

## 2025-05-23 NOTE — ED NOTES
Patient learning assessment done; pt educated on use and benefits of incentive spirometry. Demonstrated how to use the device for the patient, she demonstrated proper use of the device and was able to teach back. Opportunity for questions was provided - pt states she feels competent to use the device at home and understands why it is important to do so.

## 2025-05-23 NOTE — DISCHARGE INSTRUCTIONS
Take Tylenol as needed for pain.  Continue your medications including your Lasix as prescribed by your doctor.  Return to the emergency department if worsening or concerning symptoms.

## 2025-05-24 LAB
EKG ATRIAL RATE: 80 BPM
EKG P AXIS: 62 DEGREES
EKG P-R INTERVAL: 148 MS
EKG Q-T INTERVAL: 384 MS
EKG QRS DURATION: 92 MS
EKG QTC CALCULATION (BAZETT): 442 MS
EKG R AXIS: -44 DEGREES
EKG T AXIS: 31 DEGREES
EKG VENTRICULAR RATE: 80 BPM

## 2025-05-24 PROCEDURE — 93010 ELECTROCARDIOGRAM REPORT: CPT | Performed by: INTERNAL MEDICINE

## 2025-06-06 ENCOUNTER — HOSPITAL ENCOUNTER (EMERGENCY)
Dept: HOSPITAL 83 - ED | Age: 89
Discharge: HOME | End: 2025-06-06
Payer: MEDICARE

## 2025-06-06 VITALS — HEIGHT: 63.98 IN | BODY MASS INDEX: 22.2 KG/M2 | WEIGHT: 130 LBS

## 2025-06-06 DIAGNOSIS — N28.9: ICD-10-CM

## 2025-06-06 DIAGNOSIS — R07.2: ICD-10-CM
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
## 2025-06-06 DIAGNOSIS — Z88.8: ICD-10-CM

## 2025-06-06 DIAGNOSIS — Z88.1: ICD-10-CM

## 2025-06-06 DIAGNOSIS — Z90.49: ICD-10-CM
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
## 2025-06-06 DIAGNOSIS — Z79.899: ICD-10-CM
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
## 2025-06-06 DIAGNOSIS — J44.9: ICD-10-CM

## 2025-06-06 DIAGNOSIS — Z90.710: ICD-10-CM

## 2025-06-06 DIAGNOSIS — S22.31XA: Primary | ICD-10-CM
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
## 2025-06-06 DIAGNOSIS — Z88.2: ICD-10-CM

## 2025-06-06 LAB
BASOPHILS # BLD AUTO: 0 10*3/UL (ref 0–0.1)
BASOPHILS NFR BLD AUTO: 0.3 % (ref 0–1)
BUN SERPL-MCNC: 24 MG/DL (ref 9–23)
CHLORIDE SERPL-SCNC: 105 MMOL/L (ref 98–107)
EOSINOPHIL # BLD AUTO: 0.2 10*3/UL (ref 0–0.4)
EOSINOPHIL # BLD AUTO: 2.3 % (ref 1–4)
ERYTHROCYTE [DISTWIDTH] IN BLOOD BY AUTOMATED COUNT: 15.5 % (ref 0–14.5)
HCT VFR BLD AUTO: 39.1 % (ref 37–47)
MANUAL DIFFERENTIAL PERFORMED BLD QL: (no result)
MCH RBC QN AUTO: 28.9 PG (ref 27–31)
MCHC RBC AUTO-ENTMCNC: 33 G/DL (ref 33–37)
MCV RBC AUTO: 87.7 FL (ref 81–99)
MONOCYTES # BLD AUTO: 0.6 10*3/UL (ref 0.1–1)
MONOCYTES NFR BLD MANUAL: 6.4 % (ref 3–9)
NEUT #: 7.6 10*3/UL (ref 2.3–7.9)
NEUTROPHILS NFR BLD AUTO: 76.3 % (ref 47–73)
NRBC BLD QL AUTO: 0 10*3/UL (ref 0–0)
PLATELET # BLD AUTO: 192 10*3/UL (ref 130–400)
PMV BLD AUTO: 9.3 FL (ref 9.6–12.3)
POTASSIUM SERPL-SCNC: 4.2 MMOL/L (ref 3.4–5.1)
RBC # BLD AUTO: 4.46 10*6/UL (ref 4.1–5.1)
WBC NRBC COR # BLD AUTO: 9.9 10*3/UL (ref 4.8–10.8)

## 2025-07-23 ENCOUNTER — HOSPITAL ENCOUNTER (EMERGENCY)
Dept: HOSPITAL 83 - ED | Age: 89
Discharge: HOME | End: 2025-07-23
Payer: MEDICARE

## 2025-07-23 VITALS — HEIGHT: 55 IN

## 2025-07-23 DIAGNOSIS — Z79.899: ICD-10-CM

## 2025-07-23 DIAGNOSIS — R05.9: ICD-10-CM

## 2025-07-23 DIAGNOSIS — Z88.1: ICD-10-CM

## 2025-07-23 DIAGNOSIS — Z88.2: ICD-10-CM

## 2025-07-23 DIAGNOSIS — R07.81: ICD-10-CM

## 2025-07-23 DIAGNOSIS — N39.0: Primary | ICD-10-CM

## 2025-07-23 DIAGNOSIS — Z90.49: ICD-10-CM

## 2025-07-23 DIAGNOSIS — Z90.710: ICD-10-CM

## 2025-07-23 LAB
APPEARANCE UR: CLEAR
BACTERIA #/AREA URNS HPF: (no result) /[HPF]
BASOPHILS # BLD AUTO: 0 10*3/UL (ref 0–0.1)
BASOPHILS NFR BLD AUTO: 0.1 % (ref 0–1)
BILIRUB UR QL STRIP: NEGATIVE
BUN SERPL-MCNC: 29 MG/DL (ref 9–23)
CASTS URNS QL MICRO: (no result)
CHLORIDE SERPL-SCNC: 101 MMOL/L (ref 98–107)
COLOR UR: YELLOW
CRYSTALS URNS MICRO: (no result)
EOSINOPHIL # BLD AUTO: 0.1 10*3/UL (ref 0–0.4)
EOSINOPHIL # BLD AUTO: 0.8 % (ref 1–4)
ERYTHROCYTE [DISTWIDTH] IN BLOOD BY AUTOMATED COUNT: 15 % (ref 0–14.5)
GLUCOSE UR QL: NEGATIVE
HCT VFR BLD AUTO: 40.2 % (ref 37–47)
HGB UR QL STRIP: NEGATIVE
KETONES UR QL STRIP: (no result)
LEUKOCYTE ESTERASE UR QL STRIP: (no result)
MANUAL DIFFERENTIAL PERFORMED BLD QL: (no result)
MCH RBC QN AUTO: 28.4 PG (ref 27–31)
MCHC RBC AUTO-ENTMCNC: 32.3 G/DL (ref 33–37)
MCV RBC AUTO: 88 FL (ref 81–99)
MONOCYTES # BLD AUTO: 0.5 10*3/UL (ref 0.1–1)
MONOCYTES NFR BLD MANUAL: 4.9 % (ref 3–9)
MUCOUS THREADS URNS QL MICRO: (no result)
NEUT #: 7.7 10*3/UL (ref 2.3–7.9)
NEUTROPHILS NFR BLD AUTO: 80.1 % (ref 47–73)
NITRITE UR QL STRIP: NEGATIVE
NRBC BLD QL AUTO: 0 10*3/UL (ref 0–0)
PH UR STRIP: 6.5 [PH] (ref 4.5–8)
PLATELET # BLD AUTO: 165 10*3/UL (ref 130–400)
PMV BLD AUTO: 9.6 FL (ref 9.6–12.3)
POTASSIUM SERPL-SCNC: 3.9 MMOL/L (ref 3.4–5.1)
RBC # BLD AUTO: 4.57 10*6/UL (ref 4.1–5.1)
RBC #/AREA URNS HPF: (no result) RBC/HPF (ref 0–2)
SP GR UR: 1.02 (ref 1–1.03)
T VAGINALIS URNS QL MICRO: (no result)
UROBILINOGEN UR STRIP-MCNC: 1 E.U./DL (ref 0–1)
WBC #/AREA URNS HPF: (no result) WBC/HPF (ref 0–5)
WBC NRBC COR # BLD AUTO: 9.6 10*3/UL (ref 4.8–10.8)
YEAST #/AREA URNS HPF: (no result) /[HPF]